# Patient Record
Sex: FEMALE | Race: WHITE | Employment: OTHER | URBAN - METROPOLITAN AREA
[De-identification: names, ages, dates, MRNs, and addresses within clinical notes are randomized per-mention and may not be internally consistent; named-entity substitution may affect disease eponyms.]

---

## 2017-03-01 ENCOUNTER — HOSPITAL ENCOUNTER (OUTPATIENT)
Age: 77
Setting detail: SPECIMEN
Discharge: HOME | End: 2017-03-01
Payer: MEDICARE

## 2017-03-01 LAB
POC INR: 2
PROTHROMBIN TIME, POC: 23.9 SEC (ref 9.6–14.4)

## 2017-03-29 ENCOUNTER — HOSPITAL ENCOUNTER (OUTPATIENT)
Age: 77
Setting detail: SPECIMEN
Discharge: HOME | End: 2017-03-29
Payer: MEDICARE

## 2017-03-29 LAB
POC INR: 1.8
PROTHROMBIN TIME, POC: 21.5 SEC (ref 9.6–14.4)

## 2017-04-12 ENCOUNTER — HOSPITAL ENCOUNTER (OUTPATIENT)
Age: 77
Setting detail: SPECIMEN
Discharge: HOME | End: 2017-04-12
Payer: MEDICARE

## 2017-04-12 LAB
POC INR: 1.8
PROTHROMBIN TIME, POC: 22.1 SEC (ref 9.6–14.4)

## 2017-05-03 ENCOUNTER — HOSPITAL ENCOUNTER (OUTPATIENT)
Age: 77
Setting detail: SPECIMEN
Discharge: HOME | End: 2017-05-03
Payer: MEDICARE

## 2017-05-03 LAB
POC INR: 2.4
PROTHROMBIN TIME, POC: 28.9 SEC (ref 9.6–14.4)

## 2017-06-06 ENCOUNTER — HOSPITAL ENCOUNTER (OUTPATIENT)
Age: 77
Setting detail: SPECIMEN
Discharge: HOME | End: 2017-06-06
Payer: MEDICARE

## 2017-06-06 LAB
POC INR: 2.5
PROTHROMBIN TIME, POC: 30.3 SEC (ref 9.6–14.4)

## 2017-07-05 ENCOUNTER — HOSPITAL ENCOUNTER (OUTPATIENT)
Age: 77
Setting detail: SPECIMEN
Discharge: HOME | End: 2017-07-05
Payer: MEDICARE

## 2017-07-05 LAB
POC INR: 2.5
PROTHROMBIN TIME, POC: 30.5 SEC (ref 9.6–14.4)

## 2017-08-02 ENCOUNTER — HOSPITAL ENCOUNTER (OUTPATIENT)
Age: 77
Setting detail: SPECIMEN
Discharge: HOME | End: 2017-08-02
Payer: MEDICARE

## 2017-08-02 LAB
POC INR: 2.2
PROTHROMBIN TIME, POC: 26.1 SEC (ref 9.6–14.4)

## 2017-09-11 ENCOUNTER — HOSPITAL ENCOUNTER (OUTPATIENT)
Age: 77
Setting detail: SPECIMEN
Discharge: HOME | End: 2017-09-11
Payer: MEDICARE

## 2017-09-11 LAB
POC INR: 2.6
PROTHROMBIN TIME, POC: 31.4 SEC (ref 9.6–14.4)

## 2017-10-13 ENCOUNTER — HOSPITAL ENCOUNTER (OUTPATIENT)
Age: 77
Setting detail: SPECIMEN
Discharge: HOME | End: 2017-10-13
Payer: MEDICARE

## 2017-10-13 LAB
POC INR: 2.1
PROTHROMBIN TIME, POC: 25.4 SEC (ref 9.6–14.4)

## 2017-11-17 ENCOUNTER — HOSPITAL ENCOUNTER (OUTPATIENT)
Age: 77
Setting detail: SPECIMEN
Discharge: HOME | End: 2017-11-17
Payer: MEDICARE

## 2017-11-17 LAB
POC INR: 3.1
PROTHROMBIN TIME, POC: 37.6 SEC (ref 9.6–14.4)

## 2017-11-20 ENCOUNTER — OFFICE VISIT (OUTPATIENT)
Dept: FAMILY MEDICINE CLINIC | Age: 77
End: 2017-11-20
Payer: MEDICARE

## 2017-11-20 VITALS
RESPIRATION RATE: 18 BRPM | HEART RATE: 61 BPM | HEIGHT: 65 IN | OXYGEN SATURATION: 98 % | DIASTOLIC BLOOD PRESSURE: 82 MMHG | SYSTOLIC BLOOD PRESSURE: 144 MMHG | TEMPERATURE: 97.3 F | BODY MASS INDEX: 30.16 KG/M2 | WEIGHT: 181 LBS

## 2017-11-20 DIAGNOSIS — M62.838 MUSCLE SPASM: Primary | ICD-10-CM

## 2017-11-20 DIAGNOSIS — M54.2 NECK PAIN: ICD-10-CM

## 2017-11-20 DIAGNOSIS — M54.50 ACUTE BILATERAL LOW BACK PAIN WITHOUT SCIATICA: ICD-10-CM

## 2017-11-20 PROCEDURE — G8484 FLU IMMUNIZE NO ADMIN: HCPCS | Performed by: INTERNAL MEDICINE

## 2017-11-20 PROCEDURE — G8399 PT W/DXA RESULTS DOCUMENT: HCPCS | Performed by: INTERNAL MEDICINE

## 2017-11-20 PROCEDURE — G8427 DOCREV CUR MEDS BY ELIG CLIN: HCPCS | Performed by: INTERNAL MEDICINE

## 2017-11-20 PROCEDURE — 4040F PNEUMOC VAC/ADMIN/RCVD: CPT | Performed by: INTERNAL MEDICINE

## 2017-11-20 PROCEDURE — 99213 OFFICE O/P EST LOW 20 MIN: CPT | Performed by: INTERNAL MEDICINE

## 2017-11-20 PROCEDURE — G8417 CALC BMI ABV UP PARAM F/U: HCPCS | Performed by: INTERNAL MEDICINE

## 2017-11-20 PROCEDURE — 1036F TOBACCO NON-USER: CPT | Performed by: INTERNAL MEDICINE

## 2017-11-20 PROCEDURE — 1123F ACP DISCUSS/DSCN MKR DOCD: CPT | Performed by: INTERNAL MEDICINE

## 2017-11-20 PROCEDURE — 1090F PRES/ABSN URINE INCON ASSESS: CPT | Performed by: INTERNAL MEDICINE

## 2017-11-20 RX ORDER — BACLOFEN 10 MG/1
10 TABLET ORAL 3 TIMES DAILY
Qty: 30 TABLET | Refills: 0 | Status: SHIPPED | OUTPATIENT
Start: 2017-11-20 | End: 2019-03-22

## 2017-11-20 ASSESSMENT — ENCOUNTER SYMPTOMS
VOMITING: 0
VISUAL CHANGE: 0
ABDOMINAL PAIN: 0
NAUSEA: 0
BACK PAIN: 1
DIARRHEA: 0
PHOTOPHOBIA: 0
ABDOMINAL DISTENTION: 0
BOWEL INCONTINENCE: 0
VOICE CHANGE: 0
TROUBLE SWALLOWING: 0

## 2017-11-20 ASSESSMENT — PATIENT HEALTH QUESTIONNAIRE - PHQ9
1. LITTLE INTEREST OR PLEASURE IN DOING THINGS: 0
SUM OF ALL RESPONSES TO PHQ9 QUESTIONS 1 & 2: 0
SUM OF ALL RESPONSES TO PHQ QUESTIONS 1-9: 0
2. FEELING DOWN, DEPRESSED OR HOPELESS: 0

## 2017-11-20 NOTE — PROGRESS NOTES
( goal A1C is < 7)   No results found for: LABMICR  LDL Cholesterol (mg/dL)   Date Value   01/11/2016 120   09/11/2014 117   07/08/2013 138 (H)       (goal LDL is <100)   AST (U/L)   Date Value   01/11/2016 18     ALT (U/L)   Date Value   01/11/2016 12     BUN (mg/dL)   Date Value   01/11/2016 17     BP Readings from Last 3 Encounters:   11/20/17 (!) 144/82   08/30/16 100/64   08/26/16 116/80          (goal 120/80)    Past Medical History:   Diagnosis Date    Allergic rhinitis     Arthritis     Atrial fibrillation (HCC)     Hypertension     [facility]     Myocardial infarction ([facility] [facility] 2008      Past Surgical History:   Procedure Laterality Date    APPENDECTOMY      CARPAL TUNNEL RELEASE Right     HERNIA REPAIR      HYSTERECTOMY      NECK SURGERY      C5-C6    NOSE SURGERY      cancer removed    OTHER SURGICAL HISTORY      heart surg/growth in heart sac       No family history on file. Social History   Substance Use Topics    Smoking status: Former Smoker     Quit date: 1/1/1981    Smokeless tobacco: Never Used    Alcohol use No      Current Outpatient Prescriptions   Medication Sig Dispense Refill    baclofen (LIORESAL) 10 MG tablet Take 1 tablet by mouth 3 times daily 30 tablet 0    polyvinyl alcohol-povidone (REFRESH) 1.4-0.6 % ophthalmic solution Place 1-2 drops into both eyes 4 times daily      triamterene-hydrochlorothiazide (MAXZIDE-25) 37.5-25 MG per tablet Take 1 tablet by mouth daily      Glucosamine-Chondroitin--200-150 MG TABS Take by mouth      PROAIR  (90 BASE) MCG/ACT AERS   Inhale 2 puffs into the lungs every 6 hours as needed       diltiazem (CARDIZEM CD) 240 MG ER capsule Take 240 mg by mouth daily.  aspirin 81 MG tablet Take 81 mg by mouth daily.  fluticasone-salmeterol (ADVAIR) 250-50 MCG/DOSE AEPB Inhale 1 puff into the lungs every 12 hours.  azelastine (ASTELIN) 137 MCG/SPRAY nasal spray 1 spray by Nasal route 2 times daily.  Use in

## 2017-12-11 ENCOUNTER — HOSPITAL ENCOUNTER (OUTPATIENT)
Age: 77
Setting detail: SPECIMEN
Discharge: HOME | End: 2017-12-11
Payer: MEDICARE

## 2017-12-11 LAB
POC INR: 2.7
PROTHROMBIN TIME, POC: 32 SEC (ref 9.6–14.4)

## 2018-01-09 ENCOUNTER — HOSPITAL ENCOUNTER (OUTPATIENT)
Age: 78
Setting detail: SPECIMEN
Discharge: HOME | End: 2018-01-09
Payer: MEDICARE

## 2018-01-09 LAB
POC INR: 3.4
PROTHROMBIN TIME, POC: 41.2 SEC (ref 9.6–14.4)

## 2018-01-26 ENCOUNTER — OFFICE VISIT (OUTPATIENT)
Dept: FAMILY MEDICINE CLINIC | Age: 78
End: 2018-01-26
Payer: MEDICARE

## 2018-01-26 VITALS
TEMPERATURE: 96.9 F | HEART RATE: 61 BPM | SYSTOLIC BLOOD PRESSURE: 138 MMHG | BODY MASS INDEX: 30.16 KG/M2 | WEIGHT: 181 LBS | OXYGEN SATURATION: 97 % | DIASTOLIC BLOOD PRESSURE: 74 MMHG | RESPIRATION RATE: 18 BRPM | HEIGHT: 65 IN

## 2018-01-26 DIAGNOSIS — M19.012 OSTEOARTHRITIS OF LEFT SHOULDER, UNSPECIFIED OSTEOARTHRITIS TYPE: ICD-10-CM

## 2018-01-26 DIAGNOSIS — M25.512 ACUTE PAIN OF LEFT SHOULDER: ICD-10-CM

## 2018-01-26 DIAGNOSIS — S49.90XA SHOULDER INJURY, INITIAL ENCOUNTER: ICD-10-CM

## 2018-01-26 DIAGNOSIS — M25.612 DECREASED RANGE OF MOTION OF LEFT SHOULDER: Primary | ICD-10-CM

## 2018-01-26 PROCEDURE — G8399 PT W/DXA RESULTS DOCUMENT: HCPCS | Performed by: INTERNAL MEDICINE

## 2018-01-26 PROCEDURE — G8484 FLU IMMUNIZE NO ADMIN: HCPCS | Performed by: INTERNAL MEDICINE

## 2018-01-26 PROCEDURE — 4040F PNEUMOC VAC/ADMIN/RCVD: CPT | Performed by: INTERNAL MEDICINE

## 2018-01-26 PROCEDURE — 99213 OFFICE O/P EST LOW 20 MIN: CPT | Performed by: INTERNAL MEDICINE

## 2018-01-26 PROCEDURE — G8427 DOCREV CUR MEDS BY ELIG CLIN: HCPCS | Performed by: INTERNAL MEDICINE

## 2018-01-26 PROCEDURE — G8417 CALC BMI ABV UP PARAM F/U: HCPCS | Performed by: INTERNAL MEDICINE

## 2018-01-26 PROCEDURE — 1036F TOBACCO NON-USER: CPT | Performed by: INTERNAL MEDICINE

## 2018-01-26 PROCEDURE — 96372 THER/PROPH/DIAG INJ SC/IM: CPT | Performed by: INTERNAL MEDICINE

## 2018-01-26 PROCEDURE — 1090F PRES/ABSN URINE INCON ASSESS: CPT | Performed by: INTERNAL MEDICINE

## 2018-01-26 PROCEDURE — 1123F ACP DISCUSS/DSCN MKR DOCD: CPT | Performed by: INTERNAL MEDICINE

## 2018-01-26 RX ORDER — MONTELUKAST SODIUM 10 MG/1
10 TABLET ORAL NIGHTLY
COMMUNITY
End: 2019-03-22

## 2018-01-26 RX ORDER — TRIAMCINOLONE ACETONIDE 40 MG/ML
40 INJECTION, SUSPENSION INTRA-ARTICULAR; INTRAMUSCULAR ONCE
Status: COMPLETED | OUTPATIENT
Start: 2018-01-26 | End: 2018-01-26

## 2018-01-26 RX ADMIN — TRIAMCINOLONE ACETONIDE 40 MG: 40 INJECTION, SUSPENSION INTRA-ARTICULAR; INTRAMUSCULAR at 10:33

## 2018-01-26 ASSESSMENT — ENCOUNTER SYMPTOMS
CONSTIPATION: 0
BACK PAIN: 0
SHORTNESS OF BREATH: 0
WHEEZING: 0
DIARRHEA: 0
COLOR CHANGE: 0
STRIDOR: 0
ABDOMINAL PAIN: 0

## 2018-01-26 ASSESSMENT — PATIENT HEALTH QUESTIONNAIRE - PHQ9
1. LITTLE INTEREST OR PLEASURE IN DOING THINGS: 0
2. FEELING DOWN, DEPRESSED OR HOPELESS: 0
SUM OF ALL RESPONSES TO PHQ9 QUESTIONS 1 & 2: 0
SUM OF ALL RESPONSES TO PHQ QUESTIONS 1-9: 0

## 2018-01-27 NOTE — PROGRESS NOTES
rheumatoid arthritis. Her past medical history is significant for osteoarthritis. There is no history of diabetes, gout or rheumatoid arthritis. No results found for: LABA1C          ( goal A1C is < 7)   No results found for: LABMICR  LDL Cholesterol (mg/dL)   Date Value   01/11/2016 120   09/11/2014 117   07/08/2013 138 (H)       (goal LDL is <100)   AST (U/L)   Date Value   01/11/2016 18     ALT (U/L)   Date Value   01/11/2016 12     BUN (mg/dL)   Date Value   01/11/2016 17     BP Readings from Last 3 Encounters:   01/26/18 138/74   11/20/17 (!) 144/82   08/30/16 100/64          (goal 120/80)    Past Medical History:   Diagnosis Date    Allergic rhinitis     Arthritis     At[facility] fibrillation ([facility] [facility]     Hypertension     Melanoma ([facility] [address]     Myocardial infarction 2008      Past Surgical History:   Procedure Laterality Date    APPENDECTOMY      CARPAL TUNNEL RELEASE Right     HERNIA REPAIR      HYSTERECTOMY      NECK SURGERY      C5-C6    NOSE SURGERY      cancer removed    OTHER SURGICAL HISTORY      heart surg/growth in heart sac       No family history on file. Social History   Substance Use Topics    Smoking status: Former Smoker     Quit date: 1/1/1981    Smokeless tobacco: Never Used    Alcohol use No      Current Outpatient Prescriptions   Medication Sig Dispense Refill    montelukast (SINGULAIR) 10 MG tablet Take 10 mg by mouth nightly      baclofen (LIORESAL) 10 MG tablet Take 1 tablet by mouth 3 times daily 30 tablet 0    polyvinyl alcohol-povidone (REFRESH) 1.4-0.6 % ophthalmic solution Place 1-2 drops into both eyes 4 times daily      triamterene-hydrochlorothiazide (MAXZIDE-25) 37.5-25 MG per tablet Take 1 tablet by mouth daily      Glucosamine-Chondroitin--200-150 MG TABS Take by mouth      PROAIR  (90 BASE) MCG/ACT AERS   Inhale 2 puffs into the lungs every 6 hours as needed       diltiazem (CARDIZEM CD) 240 MG ER capsule Take 240 mg by mouth daily.       aspirin 81 MG tablet Take 81 mg by mouth daily.  fluticasone-salmeterol (ADVAIR) 250-50 MCG/DOSE AEPB Inhale 1 puff into the lungs every 12 hours.  azelastine (ASTELIN) 137 MCG/SPRAY nasal spray 1 spray by Nasal route 2 times daily. Use in each nostril as directed      Calcium-Vitamin D (CALTRATE 600 PLUS-VIT D PO) Take  by mouth.  nitroGLYCERIN (NITRODUR) 0.1 MG/HR Place 1 patch onto the skin daily.  Multiple Vitamins-Minerals (MULTIVITAL PO) Take  by mouth.  warfarin (COUMADIN) 5 MG tablet Take 7.5 mg by mouth        No current facility-administered medications for this visit. No Known Allergies    Health Maintenance   Topic Date Due    DTaP/Tdap/Td vaccine (1 - Tdap) 01/14/1958    Pneumococcal low/med risk (1 of 2 - PCV13) 01/14/2004    Potassium monitoring  01/11/2017    Creatinine monitoring  01/11/2017    Lipid screen  01/11/2021    Zostavax vaccine  Addressed    DEXA (modify frequency per FRAX score)  Addressed    Flu vaccine  Completed       Subjective:      Review of Systems   Constitutional: Positive for activity change. Negative for appetite change, chills, diaphoresis, fatigue, fever and unexpected weight change. Eyes: Negative for visual disturbance. Respiratory: Negative for shortness of breath, wheezing and stridor. Cardiovascular: Negative for chest pain, palpitations and leg swelling. Gastrointestinal: Negative for abdominal pain, constipation and diarrhea. Musculoskeletal: Positive for arthralgias and stiffness. Negative for back pain, gait problem, gout, joint swelling, myalgias, neck pain and neck stiffness. Skin: Negative for color change, itching, rash and wound. Neurological: Positive for weakness. Negative for dizziness, tremors, seizures, syncope, speech difficulty, light-headedness, numbness and headaches. Hematological: Negative for adenopathy. Does not bruise/bleed easily. Psychiatric/Behavioral: Negative for sleep disturbance.

## 2018-02-06 ENCOUNTER — HOSPITAL ENCOUNTER (OUTPATIENT)
Age: 78
Setting detail: SPECIMEN
Discharge: HOME | End: 2018-02-06
Payer: MEDICARE

## 2018-02-06 LAB
POC INR: 2.5
PROTHROMBIN TIME, POC: 30.3 SEC (ref 9.6–14.4)

## 2018-03-07 ENCOUNTER — HOSPITAL ENCOUNTER (OUTPATIENT)
Age: 78
Setting detail: SPECIMEN
Discharge: HOME | End: 2018-03-07
Payer: MEDICARE

## 2018-03-07 LAB
POC INR: 1.7
PROTHROMBIN TIME, POC: 19.8 SEC (ref 9.6–14.4)

## 2018-03-27 ENCOUNTER — HOSPITAL ENCOUNTER (OUTPATIENT)
Age: 78
Setting detail: SPECIMEN
Discharge: HOME | End: 2018-03-27
Payer: MEDICARE

## 2018-03-27 LAB
POC INR: 1.7
PROTHROMBIN TIME, POC: 20 SEC (ref 9.6–14.4)

## 2018-04-17 ENCOUNTER — HOSPITAL ENCOUNTER (OUTPATIENT)
Age: 78
Setting detail: SPECIMEN
Discharge: HOME | End: 2018-04-17
Payer: MEDICARE

## 2018-04-17 LAB
POC INR: 2.8
PROTHROMBIN TIME, POC: 33 SEC (ref 9.6–14.4)

## 2018-05-15 ENCOUNTER — HOSPITAL ENCOUNTER (OUTPATIENT)
Age: 78
Setting detail: SPECIMEN
Discharge: HOME | End: 2018-05-15
Payer: MEDICARE

## 2018-05-15 LAB
POC INR: 1.9
PROTHROMBIN TIME, POC: 22.4 SEC (ref 9.6–14.4)

## 2018-06-05 ENCOUNTER — HOSPITAL ENCOUNTER (OUTPATIENT)
Age: 78
Setting detail: SPECIMEN
Discharge: HOME | End: 2018-06-05
Payer: MEDICARE

## 2018-06-05 LAB
POC INR: 2.6
PROTHROMBIN TIME, POC: 31 SEC (ref 9.6–14.4)

## 2018-06-11 ENCOUNTER — NURSE ONLY (OUTPATIENT)
Dept: FAMILY MEDICINE CLINIC | Age: 78
End: 2018-06-11
Payer: MEDICARE

## 2018-06-11 DIAGNOSIS — Z11.1 PPD SCREENING TEST: Primary | ICD-10-CM

## 2018-06-11 PROCEDURE — 86580 TB INTRADERMAL TEST: CPT | Performed by: NURSE PRACTITIONER

## 2018-06-14 ENCOUNTER — OFFICE VISIT (OUTPATIENT)
Dept: FAMILY MEDICINE CLINIC | Age: 78
End: 2018-06-14
Payer: MEDICARE

## 2018-06-14 DIAGNOSIS — Z11.1 PPD SCREENING TEST: Primary | ICD-10-CM

## 2018-06-14 PROCEDURE — G8417 CALC BMI ABV UP PARAM F/U: HCPCS | Performed by: PHYSICIAN ASSISTANT

## 2018-06-14 PROCEDURE — G8428 CUR MEDS NOT DOCUMENT: HCPCS | Performed by: PHYSICIAN ASSISTANT

## 2018-06-14 PROCEDURE — 99211 OFF/OP EST MAY X REQ PHY/QHP: CPT | Performed by: PHYSICIAN ASSISTANT

## 2018-06-26 ENCOUNTER — TELEPHONE (OUTPATIENT)
Dept: FAMILY MEDICINE CLINIC | Age: 78
End: 2018-06-26

## 2018-06-26 NOTE — TELEPHONE ENCOUNTER
The patient had a chest X-Ray due to left breast pain at the the [facility] the end of May or beginning of June. The patient also states she had a stress test (which was negative per patient) at the [facility].   She has an appointment on Monday 7/2/18 and she is requesting results be requested so it can be discussed with Collins Burnett

## 2018-06-28 NOTE — TELEPHONE ENCOUNTER
The patient had a chest X-Ray due to left breast pain at the the [facility] the end of May or beginning of June. The patient also states she had a stress test (which was negative per patient) at the [facility].   She has an appointment on Monday 7/2/18 and she is requesting results be requested so it can be discussed with Christiano Khan

## 2018-07-02 ENCOUNTER — OFFICE VISIT (OUTPATIENT)
Dept: FAMILY MEDICINE CLINIC | Age: 78
End: 2018-07-02
Payer: MEDICARE

## 2018-07-02 VITALS
HEART RATE: 64 BPM | RESPIRATION RATE: 18 BRPM | OXYGEN SATURATION: 94 % | WEIGHT: 188 LBS | DIASTOLIC BLOOD PRESSURE: 82 MMHG | HEIGHT: 65 IN | TEMPERATURE: 99.3 F | SYSTOLIC BLOOD PRESSURE: 138 MMHG | BODY MASS INDEX: 31.32 KG/M2

## 2018-07-02 DIAGNOSIS — R53.83 FATIGUE, UNSPECIFIED TYPE: ICD-10-CM

## 2018-07-02 DIAGNOSIS — N64.4 BREAST PAIN, LEFT: Primary | ICD-10-CM

## 2018-07-02 DIAGNOSIS — Z78.0 POSTMENOPAUSAL: ICD-10-CM

## 2018-07-02 DIAGNOSIS — Z23 NEED FOR PNEUMOCOCCAL VACCINATION: ICD-10-CM

## 2018-07-02 DIAGNOSIS — R07.81 RIB PAIN ON LEFT SIDE: ICD-10-CM

## 2018-07-02 DIAGNOSIS — I10 ESSENTIAL HYPERTENSION: ICD-10-CM

## 2018-07-02 PROCEDURE — 1036F TOBACCO NON-USER: CPT | Performed by: PHYSICIAN ASSISTANT

## 2018-07-02 PROCEDURE — G8427 DOCREV CUR MEDS BY ELIG CLIN: HCPCS | Performed by: PHYSICIAN ASSISTANT

## 2018-07-02 PROCEDURE — 99214 OFFICE O/P EST MOD 30 MIN: CPT | Performed by: PHYSICIAN ASSISTANT

## 2018-07-02 PROCEDURE — 90670 PCV13 VACCINE IM: CPT | Performed by: PHYSICIAN ASSISTANT

## 2018-07-02 PROCEDURE — G8417 CALC BMI ABV UP PARAM F/U: HCPCS | Performed by: PHYSICIAN ASSISTANT

## 2018-07-02 PROCEDURE — G8399 PT W/DXA RESULTS DOCUMENT: HCPCS | Performed by: PHYSICIAN ASSISTANT

## 2018-07-02 PROCEDURE — 1090F PRES/ABSN URINE INCON ASSESS: CPT | Performed by: PHYSICIAN ASSISTANT

## 2018-07-02 PROCEDURE — 4040F PNEUMOC VAC/ADMIN/RCVD: CPT | Performed by: PHYSICIAN ASSISTANT

## 2018-07-02 PROCEDURE — G0009 ADMIN PNEUMOCOCCAL VACCINE: HCPCS | Performed by: PHYSICIAN ASSISTANT

## 2018-07-02 PROCEDURE — 1123F ACP DISCUSS/DSCN MKR DOCD: CPT | Performed by: PHYSICIAN ASSISTANT

## 2018-07-02 RX ORDER — RANITIDINE 300 MG/1
TABLET ORAL NIGHTLY
COMMUNITY
Start: 2018-06-18 | End: 2021-03-02

## 2018-07-02 ASSESSMENT — ENCOUNTER SYMPTOMS
VOMITING: 0
NAUSEA: 0
SHORTNESS OF BREATH: 0
DIARRHEA: 0
BACK PAIN: 0
CONSTIPATION: 0
COUGH: 0
COLOR CHANGE: 0
ABDOMINAL PAIN: 0
WHEEZING: 0

## 2018-07-02 NOTE — PROGRESS NOTES
Visit Information    Have you changed or started any medications since your last visit including any over-the-counter medicines, vitamins, or herbal medicines? yes -    Are you having any side effects from any of your medications? -  no  Have you stopped taking any of your medications? Is so, why? -  no    Have you seen any other physician or provider since your last visit? Yes - Records Obtained  Have you had any other diagnostic tests since your last visit? Yes - Records Obtained  Have you been seen in the emergency room and/or had an admission to a hospital since we last saw you? No  Have you had your routine dental cleaning in the past 6 months? yes -     Have you activated your [employer] account? If not, what are your barriers?  No: pending     Patient Care Team:  Cassy Hatfield PA-C as PCP - Cleone Osgood, MD as Consulting Physician (Cardiology)  Cari Dykes MD as Consulting Physician (Allergy & Immunology)    Medical History Review  Past Medical, Family, and Social History reviewed and does contribute to the patient presenting condition    Health Maintenance   Topic Date Due    DTaP/Tdap/Td vaccine (1 - Tdap) 01/14/1958    Shingles Vaccine (1 of 2 - 2 Dose Series) 01/14/1989    Pneumococcal low/med risk (1 of 2 - PCV13) 01/14/2004    Potassium monitoring  01/11/2017    Creatinine monitoring  01/11/2017    Flu vaccine (1) 09/01/2018    DEXA (modify frequency per FRAX score)  Addressed
tablet Take 7.5 mg by mouth       PROAIR  (90 BASE) MCG/ACT AERS   Inhale 2 puffs into the lungs every 6 hours as needed        No current facility-administered medications for this visit. No Known Allergies    Health Maintenance   Topic Date Due    DTaP/Tdap/Td vaccine (1 - Tdap) 01/14/1958    Shingles Vaccine (1 of 2 - 2 Dose Series) 01/14/1989    Pneumococcal low/med risk (1 of 2 - PCV13) 01/14/2004    Potassium monitoring  01/11/2017    Creatinine monitoring  01/11/2017    Flu vaccine (1) 09/01/2018    DEXA (modify frequency per FRAX score)  Addressed       Subjective:      Review of Systems   Constitutional: Positive for fatigue. Negative for activity change, appetite change, chills and unexpected weight change. /82 (Site: Right Arm, Position: Sitting, Cuff Size: Medium Adult)   Pulse 64   Temp 99.3 °F (37.4 °C) (Oral)   Resp 18   Ht 5' 5\" (1.651 m)   Wt 188 lb (85.3 kg)   SpO2 94%   BMI 31.28 kg/m²    Respiratory: Negative for cough, shortness of breath and wheezing. Cardiovascular: Positive for chest pain. Gastrointestinal: Negative for abdominal pain, constipation, diarrhea, nausea and vomiting. Endocrine: Negative for polyuria. Genitourinary: Negative for dysuria. Musculoskeletal: Positive for arthralgias and myalgias. Negative for back pain, gait problem and joint swelling. Skin: Negative for color change, pallor, rash and wound. Neurological: Negative for dizziness. Hematological: Negative for adenopathy. Psychiatric/Behavioral: Positive for sleep disturbance. Negative for agitation and confusion. The patient is not nervous/anxious. Objective:     Physical Exam   Constitutional: She is oriented to person, place, and time. She appears well-developed and well-nourished. HENT:   Head: Normocephalic and atraumatic. Neck: Neck supple. Cardiovascular: Normal rate, regular rhythm and normal heart sounds. No murmur heard.   Pulmonary/Chest:

## 2018-07-03 ENCOUNTER — HOSPITAL ENCOUNTER (OUTPATIENT)
Age: 78
Setting detail: SPECIMEN
Discharge: HOME | End: 2018-07-03
Payer: MEDICARE

## 2018-07-03 ENCOUNTER — TELEPHONE (OUTPATIENT)
Dept: FAMILY MEDICINE CLINIC | Age: 78
End: 2018-07-03

## 2018-07-03 DIAGNOSIS — I10 ESSENTIAL HYPERTENSION: ICD-10-CM

## 2018-07-03 DIAGNOSIS — R53.83 FATIGUE, UNSPECIFIED TYPE: ICD-10-CM

## 2018-07-03 DIAGNOSIS — N64.4 BREAST PAIN: Primary | ICD-10-CM

## 2018-07-03 LAB
ABSOLUTE EOS #: 0.13 K/UL (ref 0–0.44)
ABSOLUTE IMMATURE GRANULOCYTE: 0.03 K/UL (ref 0–0.3)
ABSOLUTE LYMPH #: 1.98 K/UL (ref 1.1–3.7)
ABSOLUTE MONO #: 0.85 K/UL (ref 0.1–1.2)
ALBUMIN SERPL-MCNC: 3.9 G/DL (ref 3.5–5.2)
ALBUMIN/GLOBULIN RATIO: 1.3 (ref 1–2.5)
ALP BLD-CCNC: 76 U/L (ref 35–104)
ALT SERPL-CCNC: 12 U/L (ref 5–33)
ANION GAP SERPL CALCULATED.3IONS-SCNC: 16 MMOL/L (ref 9–17)
AST SERPL-CCNC: 16 U/L
BASOPHILS # BLD: 1 % (ref 0–2)
BASOPHILS ABSOLUTE: 0.07 K/UL (ref 0–0.2)
BILIRUB SERPL-MCNC: 0.47 MG/DL (ref 0.3–1.2)
BUN BLDV-MCNC: 24 MG/DL (ref 8–23)
BUN/CREAT BLD: ABNORMAL (ref 9–20)
CALCIUM SERPL-MCNC: 9.2 MG/DL (ref 8.6–10.4)
CHLORIDE BLD-SCNC: 105 MMOL/L (ref 98–107)
CHOLESTEROL/HDL RATIO: 3.5
CHOLESTEROL: 185 MG/DL
CO2: 24 MMOL/L (ref 20–31)
CREAT SERPL-MCNC: 1.05 MG/DL (ref 0.5–0.9)
DIFFERENTIAL TYPE: NORMAL
EOSINOPHILS RELATIVE PERCENT: 2 % (ref 1–4)
GFR AFRICAN AMERICAN: >60 ML/MIN
GFR NON-AFRICAN AMERICAN: 51 ML/MIN
GFR SERPL CREATININE-BSD FRML MDRD: ABNORMAL ML/MIN/{1.73_M2}
GFR SERPL CREATININE-BSD FRML MDRD: ABNORMAL ML/MIN/{1.73_M2}
GLUCOSE BLD-MCNC: 88 MG/DL (ref 70–99)
HCT VFR BLD CALC: 41.9 % (ref 36.3–47.1)
HDLC SERPL-MCNC: 53 MG/DL
HEMOGLOBIN: 13.4 G/DL (ref 11.9–15.1)
IMMATURE GRANULOCYTES: 0 %
LDL CHOLESTEROL: 107 MG/DL (ref 0–130)
LYMPHOCYTES # BLD: 28 % (ref 24–43)
MCH RBC QN AUTO: 29.4 PG (ref 25.2–33.5)
MCHC RBC AUTO-ENTMCNC: 32 G/DL (ref 28.4–34.8)
MCV RBC AUTO: 91.9 FL (ref 82.6–102.9)
MONOCYTES # BLD: 12 % (ref 3–12)
NRBC AUTOMATED: 0 PER 100 WBC
PDW BLD-RTO: 14 % (ref 11.8–14.4)
PLATELET # BLD: 251 K/UL (ref 138–453)
PLATELET ESTIMATE: NORMAL
PMV BLD AUTO: 11.1 FL (ref 8.1–13.5)
POC INR: 2.8
POTASSIUM SERPL-SCNC: 3.6 MMOL/L (ref 3.7–5.3)
PROTHROMBIN TIME, POC: 33.9 SEC (ref 9.6–14.4)
RBC # BLD: 4.56 M/UL (ref 3.95–5.11)
RBC # BLD: NORMAL 10*6/UL
SEG NEUTROPHILS: 57 % (ref 36–65)
SEGMENTED NEUTROPHILS ABSOLUTE COUNT: 4.01 K/UL (ref 1.5–8.1)
SODIUM BLD-SCNC: 145 MMOL/L (ref 135–144)
TOTAL PROTEIN: 6.9 G/DL (ref 6.4–8.3)
TRIGL SERPL-MCNC: 124 MG/DL
TSH SERPL DL<=0.05 MIU/L-ACNC: 1.47 MIU/L (ref 0.3–5)
VITAMIN B-12: 1184 PG/ML (ref 232–1245)
VLDLC SERPL CALC-MCNC: NORMAL MG/DL (ref 1–30)
WBC # BLD: 7.1 K/UL (ref 3.5–11.3)
WBC # BLD: NORMAL 10*3/UL

## 2018-07-09 ENCOUNTER — HOSPITAL ENCOUNTER (OUTPATIENT)
Dept: MAMMOGRAPHY | Age: 78
Discharge: HOME | End: 2018-07-11
Payer: MEDICARE

## 2018-07-09 ENCOUNTER — HOSPITAL ENCOUNTER (OUTPATIENT)
Dept: ULTRASOUND IMAGING | Age: 78
Discharge: HOME | End: 2018-07-11
Payer: MEDICARE

## 2018-07-09 DIAGNOSIS — R52 PAIN: ICD-10-CM

## 2018-07-09 DIAGNOSIS — N64.4 BREAST PAIN: ICD-10-CM

## 2018-07-09 PROCEDURE — 76642 ULTRASOUND BREAST LIMITED: CPT

## 2018-07-09 PROCEDURE — G0279 TOMOSYNTHESIS, MAMMO: HCPCS

## 2018-07-16 ENCOUNTER — OFFICE VISIT (OUTPATIENT)
Dept: FAMILY MEDICINE CLINIC | Age: 78
End: 2018-07-16
Payer: MEDICARE

## 2018-07-16 VITALS
SYSTOLIC BLOOD PRESSURE: 120 MMHG | OXYGEN SATURATION: 98 % | DIASTOLIC BLOOD PRESSURE: 74 MMHG | WEIGHT: 195 LBS | BODY MASS INDEX: 32.49 KG/M2 | HEIGHT: 65 IN | HEART RATE: 55 BPM

## 2018-07-16 DIAGNOSIS — M19.012 PRIMARY OSTEOARTHRITIS OF LEFT SHOULDER: Primary | ICD-10-CM

## 2018-07-16 PROCEDURE — 1090F PRES/ABSN URINE INCON ASSESS: CPT | Performed by: PHYSICIAN ASSISTANT

## 2018-07-16 PROCEDURE — 4040F PNEUMOC VAC/ADMIN/RCVD: CPT | Performed by: PHYSICIAN ASSISTANT

## 2018-07-16 PROCEDURE — G8417 CALC BMI ABV UP PARAM F/U: HCPCS | Performed by: PHYSICIAN ASSISTANT

## 2018-07-16 PROCEDURE — G8427 DOCREV CUR MEDS BY ELIG CLIN: HCPCS | Performed by: PHYSICIAN ASSISTANT

## 2018-07-16 PROCEDURE — 1123F ACP DISCUSS/DSCN MKR DOCD: CPT | Performed by: PHYSICIAN ASSISTANT

## 2018-07-16 PROCEDURE — 99213 OFFICE O/P EST LOW 20 MIN: CPT | Performed by: PHYSICIAN ASSISTANT

## 2018-07-16 PROCEDURE — G8399 PT W/DXA RESULTS DOCUMENT: HCPCS | Performed by: PHYSICIAN ASSISTANT

## 2018-07-16 PROCEDURE — 1101F PT FALLS ASSESS-DOCD LE1/YR: CPT | Performed by: PHYSICIAN ASSISTANT

## 2018-07-16 PROCEDURE — 1036F TOBACCO NON-USER: CPT | Performed by: PHYSICIAN ASSISTANT

## 2018-07-16 ASSESSMENT — ENCOUNTER SYMPTOMS
COLOR CHANGE: 0
SHORTNESS OF BREATH: 0
COUGH: 0
WHEEZING: 0
ABDOMINAL PAIN: 0
VOMITING: 0
CONSTIPATION: 0
NAUSEA: 0
DIARRHEA: 0

## 2018-07-16 NOTE — PROGRESS NOTES
Visit Information    Have you changed or started any medications since your last visit including any over-the-counter medicines, vitamins, or herbal medicines? no   Have you stopped taking any of your medications? Is so, why? -  no  Are you having any side effects from any of your medications? - no    Have you seen any other physician or provider since your last visit?  no   Have you had any other diagnostic tests since your last visit? yes - mammogram   Have you been seen in the emergency room and/or had an admission in a hospital since we last saw you?  no   Have you had your routine dental cleaning in the past 6 months?  no     Do you have an active MyChart account? If no, what is the barrier?   No: code    Patient Care Team:  Jasmin Morse PA-C as PCP - Gregoria Georges MD as Consulting Physician (Cardiology)  Diana Mendes MD as Consulting Physician (Allergy & Immunology)    Medical History Review  Past Medical, Family, and Social History reviewed and does contribute to the patient presenting condition    Health Maintenance   Topic Date Due    DTaP/Tdap/Td vaccine (1 - Tdap) 01/14/1958    Shingles Vaccine (1 of 2 - 2 Dose Series) 01/14/1989    Flu vaccine (1) 09/01/2018    Pneumococcal low/med risk (2 of 2 - PPSV23) 07/02/2019    Potassium monitoring  07/03/2019    Creatinine monitoring  07/03/2019    DEXA (modify frequency per FRAX score)  Addressed
Quit date: 1/1/1981    Smokeless tobacco: Never Used    Alcohol use No      Current Outpatient Prescriptions   Medication Sig Dispense Refill    ranitidine (ZANTAC) 300 MG tablet       montelukast (SINGULAIR) 10 MG tablet Take 10 mg by mouth nightly      baclofen (LIORESAL) 10 MG tablet Take 1 tablet by mouth 3 times daily 30 tablet 0    polyvinyl alcohol-povidone (REFRESH) 1.4-0.6 % ophthalmic solution Place 1-2 drops into both eyes 4 times daily      triamterene-hydrochlorothiazide (MAXZIDE-25) 37.5-25 MG per tablet Take 1 tablet by mouth daily      Glucosamine-Chondroitin--200-150 MG TABS Take by mouth      PROAIR  (90 BASE) MCG/ACT AERS   Inhale 2 puffs into the lungs every 6 hours as needed       diltiazem (CARDIZEM CD) 240 MG ER capsule Take 240 mg by mouth daily.  aspirin 81 MG tablet Take 81 mg by mouth daily.  fluticasone-salmeterol (ADVAIR) 250-50 MCG/DOSE AEPB Inhale 1 puff into the lungs every 12 hours.  azelastine (ASTELIN) 137 MCG/SPRAY nasal spray 1 spray by Nasal route 2 times daily. Use in each nostril as directed      Calcium-Vitamin D (CALTRATE 600 PLUS-VIT D PO) Take  by mouth.  nitroGLYCERIN (NITRODUR) 0.1 MG/HR Place 1 patch onto the skin daily.  Multiple Vitamins-Minerals (MULTIVITAL PO) Take  by mouth.  warfarin (COUMADIN) 5 MG tablet Take 7.5 mg by mouth        No current facility-administered medications for this visit.       No Known Allergies    Health Maintenance   Topic Date Due    DTaP/Tdap/Td vaccine (1 - Tdap) 01/14/1958    Shingles Vaccine (1 of 2 - 2 Dose Series) 01/14/1989    Flu vaccine (1) 09/01/2018    Pneumococcal low/med risk (2 of 2 - PPSV23) 07/02/2019    Potassium monitoring  07/03/2019    Creatinine monitoring  07/03/2019    DEXA (modify frequency per FRAX score)  Addressed       Subjective:      Review of Systems   Constitutional: Negative for activity change, appetite change, fatigue, fever and unexpected

## 2018-08-01 ENCOUNTER — HOSPITAL ENCOUNTER (OUTPATIENT)
Age: 78
Setting detail: SPECIMEN
Discharge: HOME | End: 2018-08-01
Payer: MEDICARE

## 2018-08-01 LAB
POC INR: 3.2
PROTHROMBIN TIME, POC: 38.4 SEC (ref 9.6–14.4)

## 2018-08-28 ENCOUNTER — HOSPITAL ENCOUNTER (OUTPATIENT)
Age: 78
Setting detail: SPECIMEN
Discharge: HOME | End: 2018-08-28
Payer: MEDICARE

## 2018-08-28 LAB
INR BLD: 2.2
PROTHROMBIN TIME: 22.8 SEC (ref 9–12)

## 2018-09-26 ENCOUNTER — HOSPITAL ENCOUNTER (OUTPATIENT)
Age: 78
Setting detail: SPECIMEN
Discharge: HOME | End: 2018-09-26
Payer: MEDICARE

## 2018-09-26 LAB
POC INR: 2.8
PROTHROMBIN TIME, POC: 34 SEC (ref 9.6–14.4)

## 2018-10-25 ENCOUNTER — HOSPITAL ENCOUNTER (OUTPATIENT)
Age: 78
Setting detail: SPECIMEN
Discharge: HOME | End: 2018-10-25
Payer: MEDICARE

## 2018-10-25 LAB
POC INR: 3.4
PROTHROMBIN TIME, POC: 40.5 SEC (ref 9.6–14.4)

## 2018-11-26 ENCOUNTER — HOSPITAL ENCOUNTER (OUTPATIENT)
Age: 78
Setting detail: SPECIMEN
Discharge: HOME | End: 2018-11-26
Payer: MEDICARE

## 2018-11-26 LAB
ANION GAP SERPL CALCULATED.3IONS-SCNC: 13 MMOL/L (ref 9–17)
BUN BLDV-MCNC: 17 MG/DL (ref 8–23)
BUN/CREAT BLD: ABNORMAL (ref 9–20)
CALCIUM SERPL-MCNC: 9.9 MG/DL (ref 8.6–10.4)
CHLORIDE BLD-SCNC: 100 MMOL/L (ref 98–107)
CO2: 27 MMOL/L (ref 20–31)
CREAT SERPL-MCNC: 0.98 MG/DL (ref 0.5–0.9)
GFR AFRICAN AMERICAN: >60 ML/MIN
GFR NON-AFRICAN AMERICAN: 55 ML/MIN
GFR SERPL CREATININE-BSD FRML MDRD: ABNORMAL ML/MIN/{1.73_M2}
GFR SERPL CREATININE-BSD FRML MDRD: ABNORMAL ML/MIN/{1.73_M2}
GLUCOSE BLD-MCNC: 88 MG/DL (ref 70–99)
INR BLD: 2.5
POTASSIUM SERPL-SCNC: 3.8 MMOL/L (ref 3.7–5.3)
PROTHROMBIN TIME: 25.3 SEC (ref 9–12)
SODIUM BLD-SCNC: 140 MMOL/L (ref 135–144)

## 2018-12-11 ENCOUNTER — OFFICE VISIT (OUTPATIENT)
Dept: FAMILY MEDICINE CLINIC | Age: 78
End: 2018-12-11
Payer: MEDICARE

## 2018-12-11 VITALS
HEIGHT: 66 IN | DIASTOLIC BLOOD PRESSURE: 72 MMHG | SYSTOLIC BLOOD PRESSURE: 120 MMHG | OXYGEN SATURATION: 98 % | HEART RATE: 58 BPM | WEIGHT: 193 LBS | BODY MASS INDEX: 31.02 KG/M2

## 2018-12-11 DIAGNOSIS — M25.551 PAIN OF RIGHT HIP JOINT: Primary | ICD-10-CM

## 2018-12-11 PROCEDURE — G8427 DOCREV CUR MEDS BY ELIG CLIN: HCPCS | Performed by: PHYSICIAN ASSISTANT

## 2018-12-11 PROCEDURE — 1101F PT FALLS ASSESS-DOCD LE1/YR: CPT | Performed by: PHYSICIAN ASSISTANT

## 2018-12-11 PROCEDURE — 1090F PRES/ABSN URINE INCON ASSESS: CPT | Performed by: PHYSICIAN ASSISTANT

## 2018-12-11 PROCEDURE — G8482 FLU IMMUNIZE ORDER/ADMIN: HCPCS | Performed by: PHYSICIAN ASSISTANT

## 2018-12-11 PROCEDURE — 4040F PNEUMOC VAC/ADMIN/RCVD: CPT | Performed by: PHYSICIAN ASSISTANT

## 2018-12-11 PROCEDURE — 99213 OFFICE O/P EST LOW 20 MIN: CPT | Performed by: PHYSICIAN ASSISTANT

## 2018-12-11 PROCEDURE — G8399 PT W/DXA RESULTS DOCUMENT: HCPCS | Performed by: PHYSICIAN ASSISTANT

## 2018-12-11 PROCEDURE — 1036F TOBACCO NON-USER: CPT | Performed by: PHYSICIAN ASSISTANT

## 2018-12-11 PROCEDURE — 1123F ACP DISCUSS/DSCN MKR DOCD: CPT | Performed by: PHYSICIAN ASSISTANT

## 2018-12-11 PROCEDURE — G8417 CALC BMI ABV UP PARAM F/U: HCPCS | Performed by: PHYSICIAN ASSISTANT

## 2018-12-11 ASSESSMENT — ENCOUNTER SYMPTOMS
COLOR CHANGE: 0
BACK PAIN: 0

## 2018-12-11 NOTE — PROGRESS NOTES
[address] [facility] [address]  Dept: [phone]  Dept Fax: [phone]    Fernando Arreaga is a 77 y.o. female who presents today for her medical conditions/complaintsas noted below. Fernando Arreaga is c/o of   Chief Complaint   Patient presents with    Groin Pain     Right side groin pain         HPI:     Hip Pain    The incident occurred more than 1 week ago. There was no injury mechanism. The pain is present in the right hip. The quality of the pain is described as aching. The pain is moderate. The pain has been fluctuating since onset. Pertinent negatives include no inability to bear weight, loss of motion, loss of sensation, muscle weakness, numbness or tingling. The symptoms are aggravated by movement. She has tried NSAIDs for the symptoms. The treatment provided mild relief. Patient here reporting hx of OA in the hip but now has been worsening and feeling it more in the right groin. She has been seeing DR. Tamika Ag but used celebrex and pt does not do well on this. She is interested in another opinion on this.      No results found for: LABA1C          ( goal A1Cis < 7)   No results found for: LABMICR  LDL Cholesterol (mg/dL)   Date Value   07/03/2018 107   01/11/2016 120   09/11/2014 117       (goal LDL is <100)   AST (U/L)   Date Value   07/03/2018 16     ALT (U/L)   Date Value   07/03/2018 12     BUN (mg/dL)   Date Value   11/26/2018 17     BP Readings from Last 3 Encounters:   12/11/18 120/72   07/16/18 120/74   07/02/18 138/82          (goal 120/80)    Past Medical History:   Diagnosis Date    Allergic rhinitis     Arthritis     Atrial fibrillation (HCC)     Hypertension     Melanoma ([address]     Myocardial infarction ([address] 2008      Past Surgical History:   Procedure Laterality Date    APPENDECTOMY      CARPAL TUNNEL RELEASE Right     HERNIA REPAIR      HYSTERECTOMY      NECK SURGERY      C5-C6    NOSE monitoring  11/26/2019    Creatinine monitoring  11/26/2019    DEXA (modify frequency per FRAX score)  Addressed    Flu vaccine  Completed       Subjective:     Review of Systems   Constitutional: Negative for activity change, appetite change, fatigue and fever. Musculoskeletal: Positive for arthralgias (hip R). Negative for back pain, gait problem, joint swelling and myalgias. Skin: Negative for color change, pallor, rash and wound. Neurological: Negative for tingling, weakness and numbness. Hematological: Negative for adenopathy. Psychiatric/Behavioral: The patient is not nervous/anxious. Objective:     Physical Exam   Constitutional: She is oriented to person, place, and time. She appears well-developed and well-nourished. HENT:   Head: Normocephalic and atraumatic. Musculoskeletal:        Right hip: She exhibits tenderness (medial thigh and with flexion of joint). She exhibits normal range of motion, normal strength, no bony tenderness, no swelling, no crepitus, no deformity and no laceration. Neurological: She is alert and oriented to person, place, and time. Skin: Skin is warm and dry. No rash noted. No erythema. No pallor. Psychiatric: She has a normal mood and affect. Her behavior is normal. Judgment and thought content normal.   Nursing note and vitals reviewed. /72   Pulse 58   Ht 5' 6\" (1.676 m)   Wt 193 lb (87.5 kg)   SpO2 98%   BMI 31.15 kg/m²     Assessment:       Diagnosis Orders   1. Pain of right hip joint  XR HIP RIGHT (2-3 VIEWS)    Brandon Harris DO, [facility]:      Return if symptoms worsen or fail to improve.     OA present on imaging  Referral to ortho at this point  Avoid nsaids due to coumadin  Can use tylenol prn  Patient agreed with treatment plan      Orders Placed This Encounter   Procedures    XR HIP RIGHT (2-3 VIEWS)     Order Specific Question:   Reason for exam:     Answer:   pain in groin, medially

## 2018-12-28 ENCOUNTER — HOSPITAL ENCOUNTER (OUTPATIENT)
Age: 78
Setting detail: SPECIMEN
Discharge: HOME | End: 2018-12-28
Payer: MEDICARE

## 2018-12-28 LAB
POC INR: 2.3
PROTHROMBIN TIME, POC: 27.4 SEC (ref 9.6–14.4)

## 2018-12-31 ENCOUNTER — OFFICE VISIT (OUTPATIENT)
Dept: ORTHOPEDIC SURGERY | Age: 78
End: 2018-12-31
Payer: MEDICARE

## 2018-12-31 VITALS — WEIGHT: 194 LBS | HEIGHT: 65 IN | BODY MASS INDEX: 32.32 KG/M2

## 2018-12-31 DIAGNOSIS — M16.11 PRIMARY OSTEOARTHRITIS OF RIGHT HIP: Primary | ICD-10-CM

## 2018-12-31 PROCEDURE — 1123F ACP DISCUSS/DSCN MKR DOCD: CPT | Performed by: STUDENT IN AN ORGANIZED HEALTH CARE EDUCATION/TRAINING PROGRAM

## 2018-12-31 PROCEDURE — 1090F PRES/ABSN URINE INCON ASSESS: CPT | Performed by: STUDENT IN AN ORGANIZED HEALTH CARE EDUCATION/TRAINING PROGRAM

## 2018-12-31 PROCEDURE — 99203 OFFICE O/P NEW LOW 30 MIN: CPT | Performed by: STUDENT IN AN ORGANIZED HEALTH CARE EDUCATION/TRAINING PROGRAM

## 2018-12-31 PROCEDURE — G8399 PT W/DXA RESULTS DOCUMENT: HCPCS | Performed by: STUDENT IN AN ORGANIZED HEALTH CARE EDUCATION/TRAINING PROGRAM

## 2018-12-31 PROCEDURE — 1036F TOBACCO NON-USER: CPT | Performed by: STUDENT IN AN ORGANIZED HEALTH CARE EDUCATION/TRAINING PROGRAM

## 2018-12-31 PROCEDURE — 1101F PT FALLS ASSESS-DOCD LE1/YR: CPT | Performed by: STUDENT IN AN ORGANIZED HEALTH CARE EDUCATION/TRAINING PROGRAM

## 2018-12-31 PROCEDURE — G8427 DOCREV CUR MEDS BY ELIG CLIN: HCPCS | Performed by: STUDENT IN AN ORGANIZED HEALTH CARE EDUCATION/TRAINING PROGRAM

## 2018-12-31 PROCEDURE — G8417 CALC BMI ABV UP PARAM F/U: HCPCS | Performed by: STUDENT IN AN ORGANIZED HEALTH CARE EDUCATION/TRAINING PROGRAM

## 2018-12-31 PROCEDURE — G8482 FLU IMMUNIZE ORDER/ADMIN: HCPCS | Performed by: STUDENT IN AN ORGANIZED HEALTH CARE EDUCATION/TRAINING PROGRAM

## 2018-12-31 PROCEDURE — 4040F PNEUMOC VAC/ADMIN/RCVD: CPT | Performed by: STUDENT IN AN ORGANIZED HEALTH CARE EDUCATION/TRAINING PROGRAM

## 2018-12-31 NOTE — PROGRESS NOTES
[facility]  [facility]  [address] [place], [place] [address] [address] [facility] [address]  Dept: [phone]    Ambulatory Orthopedic Office Visit      CHIEF COMPLAINT:    Chief Complaint   Patient presents with    Hip Pain     right       HISTORY OF PRESENT ILLNESS:      The patient is a 77 y. o.female who is being seen at the request of  Yenny Hernandez PA-C for consultation and evaluation of right hip pain. Pt reports dull, achy groin pain over the last several months. Significantly worsening the last month. No new falls, trauma. Pt does not use cane, walker. Pt having increased pain with activities. Pt takes tylenol without relief. Occasionally uses aleve if pain even worse. Pt has not had formal PT, injections.       Past Medical History:    Past Medical History:   Diagnosis Date    Allergic rhinitis     Arthritis     [facility] ([facility]     Hypertension     [facility]anoma ([facility]     Myocardial infarction ([facility] [address] 2008       Past Surgical History:    Past Surgical History:   Procedure Laterality Date    APPENDECTOMY      CARPAL TUNNEL RELEASE Right     HERNIA REPAIR      HYSTERECTOMY      NECK SURGERY      C5-C6    NOSE SURGERY      cancer removed    OTHER SURGICAL HISTORY      heart surg/growth in heart sac       Current Medications:   Current Outpatient Prescriptions   Medication Sig Dispense Refill    ranitidine (ZANTAC) 300 MG tablet       montelukast (SINGULAIR) 10 MG tablet Take 10 mg by mouth nightly      baclofen (LIORESAL) 10 MG tablet Take 1 tablet by mouth 3 times daily 30 tablet 0    polyvinyl alcohol-povidone (REFRESH) 1.4-0.6 % ophthalmic solution Place 1-2 drops into both eyes 4 times daily      triamterene-hydrochlorothiazide (MAXZIDE-25) 37.5-25 MG per tablet Take 1 tablet by mouth daily      Glucosamine-Chondroitin--200-150 MG TABS Take by mouth      PROAIR  (90 BASE) MCG/ACT AERS   Inhale 2 puffs into the lungs every 6 hours as needed       diltiazem (CARDIZEM

## 2019-02-04 ENCOUNTER — HOSPITAL ENCOUNTER (OUTPATIENT)
Age: 79
Setting detail: SPECIMEN
Discharge: HOME | End: 2019-02-04
Payer: MEDICARE

## 2019-02-04 LAB
POC INR: 2.8
PROTHROMBIN TIME, POC: 33.2 SEC (ref 9.6–14.4)

## 2019-02-22 ENCOUNTER — OFFICE VISIT (OUTPATIENT)
Dept: ORTHOPEDIC SURGERY | Age: 79
End: 2019-02-22
Payer: MEDICARE

## 2019-02-22 VITALS — BODY MASS INDEX: 32.39 KG/M2 | WEIGHT: 194.4 LBS | HEIGHT: 65 IN

## 2019-02-22 DIAGNOSIS — Z01.818 PRE-OP TESTING: ICD-10-CM

## 2019-02-22 DIAGNOSIS — M16.11 PRIMARY OSTEOARTHRITIS OF RIGHT HIP: Primary | ICD-10-CM

## 2019-02-22 PROCEDURE — 1036F TOBACCO NON-USER: CPT | Performed by: ORTHOPAEDIC SURGERY

## 2019-02-22 PROCEDURE — 4040F PNEUMOC VAC/ADMIN/RCVD: CPT | Performed by: ORTHOPAEDIC SURGERY

## 2019-02-22 PROCEDURE — 99213 OFFICE O/P EST LOW 20 MIN: CPT | Performed by: ORTHOPAEDIC SURGERY

## 2019-02-22 PROCEDURE — 1090F PRES/ABSN URINE INCON ASSESS: CPT | Performed by: ORTHOPAEDIC SURGERY

## 2019-02-22 PROCEDURE — 1123F ACP DISCUSS/DSCN MKR DOCD: CPT | Performed by: ORTHOPAEDIC SURGERY

## 2019-02-22 PROCEDURE — 1101F PT FALLS ASSESS-DOCD LE1/YR: CPT | Performed by: ORTHOPAEDIC SURGERY

## 2019-02-22 PROCEDURE — G8482 FLU IMMUNIZE ORDER/ADMIN: HCPCS | Performed by: ORTHOPAEDIC SURGERY

## 2019-02-22 PROCEDURE — G8417 CALC BMI ABV UP PARAM F/U: HCPCS | Performed by: ORTHOPAEDIC SURGERY

## 2019-02-22 PROCEDURE — G8427 DOCREV CUR MEDS BY ELIG CLIN: HCPCS | Performed by: ORTHOPAEDIC SURGERY

## 2019-02-22 PROCEDURE — G8399 PT W/DXA RESULTS DOCUMENT: HCPCS | Performed by: ORTHOPAEDIC SURGERY

## 2019-02-22 RX ORDER — LOSARTAN POTASSIUM 50 MG/1
50 TABLET ORAL DAILY
COMMUNITY
End: 2019-09-13 | Stop reason: ALTCHOICE

## 2019-02-22 ASSESSMENT — ENCOUNTER SYMPTOMS
SHORTNESS OF BREATH: 0
WHEEZING: 0
BACK PAIN: 0

## 2019-03-05 ENCOUNTER — HOSPITAL ENCOUNTER (OUTPATIENT)
Age: 79
Setting detail: SPECIMEN
Discharge: HOME | End: 2019-03-05
Payer: MEDICARE

## 2019-03-05 LAB
INR BLD: 2.3
PROTHROMBIN TIME: 22.4 SEC (ref 9–12)

## 2019-03-22 ENCOUNTER — HOSPITAL ENCOUNTER (OUTPATIENT)
Dept: PREADMISSION TESTING | Age: 79
Discharge: HOME | End: 2019-03-26
Payer: MEDICARE

## 2019-03-22 ENCOUNTER — HOSPITAL ENCOUNTER (OUTPATIENT)
Dept: GENERAL RADIOLOGY | Age: 79
Discharge: HOME | End: 2019-03-24
Payer: MEDICARE

## 2019-03-22 VITALS
DIASTOLIC BLOOD PRESSURE: 84 MMHG | WEIGHT: 194.45 LBS | RESPIRATION RATE: 20 BRPM | HEART RATE: 63 BPM | SYSTOLIC BLOOD PRESSURE: 174 MMHG | HEIGHT: 66 IN | OXYGEN SATURATION: 98 % | BODY MASS INDEX: 31.25 KG/M2 | TEMPERATURE: 97.7 F

## 2019-03-22 DIAGNOSIS — Z01.818 PRE-OP TESTING: ICD-10-CM

## 2019-03-22 LAB
ALBUMIN SERPL-MCNC: 4.2 G/DL (ref 3.5–5.2)
ALBUMIN/GLOBULIN RATIO: 1.2 (ref 1–2.5)
ALP BLD-CCNC: 79 U/L (ref 35–104)
ALT SERPL-CCNC: 12 U/L (ref 5–33)
ANION GAP SERPL CALCULATED.3IONS-SCNC: 13 MMOL/L (ref 9–17)
AST SERPL-CCNC: 17 U/L
BILIRUB SERPL-MCNC: 0.49 MG/DL (ref 0.3–1.2)
BILIRUBIN URINE: NEGATIVE
BUN BLDV-MCNC: 27 MG/DL (ref 8–23)
BUN/CREAT BLD: ABNORMAL (ref 9–20)
CALCIUM SERPL-MCNC: 9.6 MG/DL (ref 8.6–10.4)
CHLORIDE BLD-SCNC: 102 MMOL/L (ref 98–107)
CO2: 26 MMOL/L (ref 20–31)
COLOR: YELLOW
COMMENT UA: NORMAL
CREAT SERPL-MCNC: 1.04 MG/DL (ref 0.5–0.9)
GFR AFRICAN AMERICAN: >60 ML/MIN
GFR NON-AFRICAN AMERICAN: 51 ML/MIN
GFR SERPL CREATININE-BSD FRML MDRD: ABNORMAL ML/MIN/{1.73_M2}
GFR SERPL CREATININE-BSD FRML MDRD: ABNORMAL ML/MIN/{1.73_M2}
GLUCOSE BLD-MCNC: 99 MG/DL (ref 70–99)
GLUCOSE URINE: NEGATIVE
HCT VFR BLD CALC: 43.2 % (ref 36.3–47.1)
HEMOGLOBIN: 14 G/DL (ref 11.9–15.1)
INR BLD: 1.8
KETONES, URINE: NEGATIVE
LEUKOCYTE ESTERASE, URINE: NEGATIVE
MCH RBC QN AUTO: 30.2 PG (ref 25.2–33.5)
MCHC RBC AUTO-ENTMCNC: 32.4 G/DL (ref 28.4–34.8)
MCV RBC AUTO: 93.1 FL (ref 82.6–102.9)
MRSA, DNA, NASAL: NORMAL
NITRITE, URINE: NEGATIVE
NRBC AUTOMATED: 0 PER 100 WBC
PDW BLD-RTO: 13.5 % (ref 11.8–14.4)
PH UA: 7 (ref 5–8)
PLATELET # BLD: 253 K/UL (ref 138–453)
PMV BLD AUTO: 10.5 FL (ref 8.1–13.5)
POTASSIUM SERPL-SCNC: 4 MMOL/L (ref 3.7–5.3)
PROTEIN UA: NEGATIVE
PROTHROMBIN TIME: 18.4 SEC (ref 9–12)
RBC # BLD: 4.64 M/UL (ref 3.95–5.11)
SODIUM BLD-SCNC: 141 MMOL/L (ref 135–144)
SPECIFIC GRAVITY UA: 1.01 (ref 1–1.03)
SPECIMEN DESCRIPTION: NORMAL
TOTAL PROTEIN: 7.6 G/DL (ref 6.4–8.3)
TURBIDITY: CLEAR
URINE HGB: NEGATIVE
UROBILINOGEN, URINE: NORMAL
WBC # BLD: 5.9 K/UL (ref 3.5–11.3)

## 2019-03-22 PROCEDURE — 81003 URINALYSIS AUTO W/O SCOPE: CPT

## 2019-03-22 PROCEDURE — 36415 COLL VENOUS BLD VENIPUNCTURE: CPT

## 2019-03-22 PROCEDURE — 80053 COMPREHEN METABOLIC PANEL: CPT

## 2019-03-22 PROCEDURE — 87641 MR-STAPH DNA AMP PROBE: CPT

## 2019-03-22 PROCEDURE — 85610 PROTHROMBIN TIME: CPT

## 2019-03-22 PROCEDURE — 71046 X-RAY EXAM CHEST 2 VIEWS: CPT

## 2019-03-22 PROCEDURE — 85027 COMPLETE CBC AUTOMATED: CPT

## 2019-03-22 RX ORDER — MONTELUKAST SODIUM 10 MG/1
10 TABLET ORAL NIGHTLY
COMMUNITY

## 2019-03-22 RX ORDER — SODIUM CHLORIDE, SODIUM LACTATE, POTASSIUM CHLORIDE, CALCIUM CHLORIDE 600; 310; 30; 20 MG/100ML; MG/100ML; MG/100ML; MG/100ML
1000 INJECTION, SOLUTION INTRAVENOUS CONTINUOUS
Status: CANCELLED | OUTPATIENT
Start: 2019-03-22

## 2019-03-22 NOTE — H&P (VIEW-ONLY)
History and Physical    Pt Name: Niki Jessica  MRN: [id number]  YOB: 1940  Date of evaluation: 3/22/2019  Primary Care Physician: Mikie Sheehan PA-C  Patient evaluated at the request of  Dr. Marianne COTO    Reason for evaluation:  Right hip DJD   SUBJECTIVE:   History of Chief Complaint:  According to Ortho note in Feb/2019    Tatiana Harvey is a [de-identified] y.o. female ,   Chief Complaint   Patient presents with    Hip Pain       right         HPI Niki Jessica  is a [de-identified] y.o.  female who presents today in follow for right hip arthritis. The patient was last seen on 2/6/2019 and underwent treatment in the form of physical therapy. The patient notes some improvement for a short time but the right hip pain has returned. She would like to discuss her pain associated with right hip arthritis.      hx of right hip pain x 1 yr , hx of  Her right leg giving out , hx of deep  right  Buttock pain . Past Medical History      has a past medical history of Allergic rhinitis, Arthritis, Atrial fibrillation ([address] Hypertension, Melanoma ([address] and Myocardial infarction ([address] Past Surgical History   has a past surgical history that includes Appendectomy; hernia repair; other surgical history; Hysterectomy; Neck surgery; Carpal tunnel release (Right); and Nose surgery.        Medications   Scheduled Meds:  Current Outpatient Rx   Medication Sig Dispense Refill    losartan (COZAAR) 50 MG tablet Take 50 mg by mouth daily      ranitidine (ZANTAC) 300 MG tablet       montelukast (SINGULAIR) 10 MG tablet Take 10 mg by mouth nightly      baclofen (LIORESAL) 10 MG tablet Take 1 tablet by mouth 3 times daily 30 tablet 0    polyvinyl alcohol-povidone (REFRESH) 1.4-0.6 % ophthalmic solution Place 1-2 drops into both eyes 4 times daily      triamterene-hydrochlorothiazide (MAXZIDE-25) 37.5-25 MG per tablet Take 1 tablet by mouth daily      Glucosamine-Chondroitin--200-150 MG TABS Take by mouth  PROAIR  (90 BASE) MCG/ACT AERS   Inhale 2 puffs into the lungs every 6 hours as needed       diltiazem (CARDIZEM CD) 240 MG ER capsule Take 240 mg by mouth daily.  aspirin 81 MG tablet Take 81 mg by mouth daily.  fluticasone-salmeterol (ADVAIR) 250-50 MCG/DOSE AEPB Inhale 1 puff into the lungs every 12 hours.  azelastine (ASTELIN) 137 MCG/SPRAY nasal spray 1 spray by Nasal route 2 times daily. Use in each nostril as directed      Calcium-Vitamin D (CALTRATE 600 PLUS-VIT D PO) Take  by mouth.  nitroGLYCERIN (NITRODUR) 0.1 MG/HR Place 1 patch onto the skin daily.  Multiple Vitamins-Minerals (MULTIVITAL PO) Take  by mouth.  warfarin (COUMADIN) 5 MG tablet Take 7.5 mg by mouth        Continuous Infusions:  PRN Meds:. Allergies  has No Known Allergies. Family History    family history is not on file.     Family Status   Relation Name Status    Mother      Father           Social History  Social History     Socioeconomic History    Marital status:      Spouse name: Not on file    Number of children: Not on file    Years of education: Not on file    Highest education level: Not on file   Occupational History    Not on file   Social Needs    Financial resource strain: Not on file    Food insecurity:     Worry: Not on file     Inability: Not on file    Transportation needs:     Medical: Not on file     Non-medical: Not on file   Tobacco Use    Smoking status: Former Smoker     Packs/day: 1.00     Years: 20.00     Pack years: 20.00     Types: Cigarettes     Last attempt to quit: 1981     Years since quittin.2    Smokeless tobacco: Never Used   Substance and Sexual Activity    Alcohol use: No    Drug use: No    Sexual activity: Not on file   Lifestyle    Physical activity:     Days per week: Not on file     Minutes per session: Not on file    Stress: Not on file   Relationships    Social connections:     Talks on phone: Not on file

## 2019-03-27 ENCOUNTER — OFFICE VISIT (OUTPATIENT)
Dept: FAMILY MEDICINE CLINIC | Age: 79
End: 2019-03-27
Payer: MEDICARE

## 2019-03-27 VITALS
WEIGHT: 196 LBS | BODY MASS INDEX: 32.65 KG/M2 | HEIGHT: 65 IN | OXYGEN SATURATION: 98 % | HEART RATE: 60 BPM | TEMPERATURE: 97.6 F | DIASTOLIC BLOOD PRESSURE: 80 MMHG | SYSTOLIC BLOOD PRESSURE: 134 MMHG

## 2019-03-27 DIAGNOSIS — M16.11 PRIMARY OSTEOARTHRITIS OF RIGHT HIP: Primary | ICD-10-CM

## 2019-03-27 PROCEDURE — 99402 PREV MED CNSL INDIV APPRX 30: CPT | Performed by: PHYSICIAN ASSISTANT

## 2019-03-27 PROCEDURE — G8482 FLU IMMUNIZE ORDER/ADMIN: HCPCS | Performed by: PHYSICIAN ASSISTANT

## 2019-03-27 ASSESSMENT — ENCOUNTER SYMPTOMS
BACK PAIN: 0
VOMITING: 0
BLOOD IN STOOL: 0
WHEEZING: 0
SHORTNESS OF BREATH: 0
ABDOMINAL PAIN: 0
CONSTIPATION: 0
DIARRHEA: 0
COLOR CHANGE: 0
NAUSEA: 0
COUGH: 0

## 2019-03-27 ASSESSMENT — PATIENT HEALTH QUESTIONNAIRE - PHQ9
SUM OF ALL RESPONSES TO PHQ QUESTIONS 1-9: 0
SUM OF ALL RESPONSES TO PHQ QUESTIONS 1-9: 0
1. LITTLE INTEREST OR PLEASURE IN DOING THINGS: 0
SUM OF ALL RESPONSES TO PHQ9 QUESTIONS 1 & 2: 0
2. FEELING DOWN, DEPRESSED OR HOPELESS: 0

## 2019-04-08 ENCOUNTER — ANESTHESIA EVENT (OUTPATIENT)
Dept: OPERATING ROOM | Age: 79
DRG: 470 | End: 2019-04-08
Payer: MEDICARE

## 2019-04-09 ENCOUNTER — ANESTHESIA (OUTPATIENT)
Dept: OPERATING ROOM | Age: 79
DRG: 470 | End: 2019-04-09
Payer: MEDICARE

## 2019-04-09 ENCOUNTER — APPOINTMENT (OUTPATIENT)
Dept: GENERAL RADIOLOGY | Age: 79
DRG: 470 | End: 2019-04-09
Attending: ORTHOPAEDIC SURGERY
Payer: MEDICARE

## 2019-04-09 ENCOUNTER — HOSPITAL ENCOUNTER (INPATIENT)
Age: 79
LOS: 3 days | Discharge: HOME HEALTH | DRG: 470 | End: 2019-04-12
Attending: ORTHOPAEDIC SURGERY | Admitting: ORTHOPAEDIC SURGERY
Payer: MEDICARE

## 2019-04-09 VITALS — OXYGEN SATURATION: 89 % | SYSTOLIC BLOOD PRESSURE: 127 MMHG | DIASTOLIC BLOOD PRESSURE: 70 MMHG

## 2019-04-09 DIAGNOSIS — Z96.641 STATUS POST RIGHT HIP REPLACEMENT: ICD-10-CM

## 2019-04-09 DIAGNOSIS — M16.11 OSTEOARTHRITIS OF RIGHT HIP, UNSPECIFIED OSTEOARTHRITIS TYPE: Primary | ICD-10-CM

## 2019-04-09 DIAGNOSIS — G89.18 POST-OP PAIN: ICD-10-CM

## 2019-04-09 PROBLEM — R00.1 SINUS BRADYCARDIA: Status: ACTIVE | Noted: 2019-04-09

## 2019-04-09 LAB
POC INR: 1
POC POTASSIUM: 4 MMOL/L (ref 3.5–4.5)
PROTHROMBIN TIME, POC: 12 SEC (ref 10.4–14.2)

## 2019-04-09 PROCEDURE — 2500000003 HC RX 250 WO HCPCS: Performed by: STUDENT IN AN ORGANIZED HEALTH CARE EDUCATION/TRAINING PROGRAM

## 2019-04-09 PROCEDURE — 85610 PROTHROMBIN TIME: CPT

## 2019-04-09 PROCEDURE — 51798 US URINE CAPACITY MEASURE: CPT

## 2019-04-09 PROCEDURE — 2580000003 HC RX 258: Performed by: STUDENT IN AN ORGANIZED HEALTH CARE EDUCATION/TRAINING PROGRAM

## 2019-04-09 PROCEDURE — 6360000002 HC RX W HCPCS: Performed by: NURSE ANESTHETIST, CERTIFIED REGISTERED

## 2019-04-09 PROCEDURE — 6370000000 HC RX 637 (ALT 250 FOR IP): Performed by: STUDENT IN AN ORGANIZED HEALTH CARE EDUCATION/TRAINING PROGRAM

## 2019-04-09 PROCEDURE — 3600000004 HC SURGERY LEVEL 4 BASE: Performed by: ORTHOPAEDIC SURGERY

## 2019-04-09 PROCEDURE — 6360000002 HC RX W HCPCS: Performed by: ANESTHESIOLOGY

## 2019-04-09 PROCEDURE — 94640 AIRWAY INHALATION TREATMENT: CPT

## 2019-04-09 PROCEDURE — 64450 NJX AA&/STRD OTHER PN/BRANCH: CPT | Performed by: ANESTHESIOLOGY

## 2019-04-09 PROCEDURE — 84132 ASSAY OF SERUM POTASSIUM: CPT

## 2019-04-09 PROCEDURE — 3600000014 HC SURGERY LEVEL 4 ADDTL 15MIN: Performed by: ORTHOPAEDIC SURGERY

## 2019-04-09 PROCEDURE — 7100000001 HC PACU RECOVERY - ADDTL 15 MIN: Performed by: ORTHOPAEDIC SURGERY

## 2019-04-09 PROCEDURE — 97535 SELF CARE MNGMENT TRAINING: CPT

## 2019-04-09 PROCEDURE — C1713 ANCHOR/SCREW BN/BN,TIS/BN: HCPCS | Performed by: ORTHOPAEDIC SURGERY

## 2019-04-09 PROCEDURE — 3700000000 HC ANESTHESIA ATTENDED CARE: Performed by: ORTHOPAEDIC SURGERY

## 2019-04-09 PROCEDURE — 97530 THERAPEUTIC ACTIVITIES: CPT

## 2019-04-09 PROCEDURE — 3700000001 HC ADD 15 MINUTES (ANESTHESIA): Performed by: ORTHOPAEDIC SURGERY

## 2019-04-09 PROCEDURE — 2580000003 HC RX 258: Performed by: ORTHOPAEDIC SURGERY

## 2019-04-09 PROCEDURE — 2709999900 HC NON-CHARGEABLE SUPPLY: Performed by: ORTHOPAEDIC SURGERY

## 2019-04-09 PROCEDURE — 99221 1ST HOSP IP/OBS SF/LOW 40: CPT | Performed by: FAMILY MEDICINE

## 2019-04-09 PROCEDURE — 2580000003 HC RX 258: Performed by: ANESTHESIOLOGY

## 2019-04-09 PROCEDURE — 6360000002 HC RX W HCPCS: Performed by: STUDENT IN AN ORGANIZED HEALTH CARE EDUCATION/TRAINING PROGRAM

## 2019-04-09 PROCEDURE — 1200000000 HC SEMI PRIVATE

## 2019-04-09 PROCEDURE — 2500000003 HC RX 250 WO HCPCS: Performed by: ANESTHESIOLOGY

## 2019-04-09 PROCEDURE — 2580000003 HC RX 258: Performed by: FAMILY MEDICINE

## 2019-04-09 PROCEDURE — 97162 PT EVAL MOD COMPLEX 30 MIN: CPT

## 2019-04-09 PROCEDURE — 73501 X-RAY EXAM HIP UNI 1 VIEW: CPT

## 2019-04-09 PROCEDURE — 51701 INSERT BLADDER CATHETER: CPT

## 2019-04-09 PROCEDURE — 0SR9029 REPLACEMENT OF RIGHT HIP JOINT WITH METAL ON POLYETHYLENE SYNTHETIC SUBSTITUTE, CEMENTED, OPEN APPROACH: ICD-10-PCS | Performed by: ORTHOPAEDIC SURGERY

## 2019-04-09 PROCEDURE — 97166 OT EVAL MOD COMPLEX 45 MIN: CPT

## 2019-04-09 PROCEDURE — 27130 TOTAL HIP ARTHROPLASTY: CPT | Performed by: ORTHOPAEDIC SURGERY

## 2019-04-09 PROCEDURE — 7100000000 HC PACU RECOVERY - FIRST 15 MIN: Performed by: ORTHOPAEDIC SURGERY

## 2019-04-09 PROCEDURE — 2700000000 HC OXYGEN THERAPY PER DAY

## 2019-04-09 PROCEDURE — C1776 JOINT DEVICE (IMPLANTABLE): HCPCS | Performed by: ORTHOPAEDIC SURGERY

## 2019-04-09 PROCEDURE — 94664 DEMO&/EVAL PT USE INHALER: CPT

## 2019-04-09 PROCEDURE — 73502 X-RAY EXAM HIP UNI 2-3 VIEWS: CPT

## 2019-04-09 PROCEDURE — 2720000010 HC SURG SUPPLY STERILE: Performed by: ORTHOPAEDIC SURGERY

## 2019-04-09 PROCEDURE — 2500000003 HC RX 250 WO HCPCS: Performed by: NURSE ANESTHETIST, CERTIFIED REGISTERED

## 2019-04-09 DEVICE — PLUG BONE DIA20X24MM W/ DISP INSRTR ALLEN: Type: IMPLANTABLE DEVICE | Status: FUNCTIONAL

## 2019-04-09 DEVICE — CANCELLOUS BONE SCREW FLAT HEAD Ø 6,5 L30
Type: IMPLANTABLE DEVICE | Status: FUNCTIONAL
Brand: MPACT ACETABULAR SYSTEM

## 2019-04-09 DEVICE — CEMENT BONE 40GM HI VISC PALACOS R: Type: IMPLANTABLE DEVICE | Site: HIP | Status: FUNCTIONAL

## 2019-04-09 DEVICE — AMISTEM C CEMENTED STEM STANDARD SIZE 4
Type: IMPLANTABLE DEVICE | Status: FUNCTIONAL
Brand: AMISTEM C FEMORAL STEMS

## 2019-04-09 DEVICE — ACETABULAR SHELL Ø56 TWO HOLES
Type: IMPLANTABLE DEVICE | Status: FUNCTIONAL
Brand: MPACT ACETABULAR SYSTEM

## 2019-04-09 DEVICE — COMPONENT TOT HIP CAPPED STD HD LNR COCR POLYETH: Type: IMPLANTABLE DEVICE | Status: FUNCTIONAL

## 2019-04-09 DEVICE — FLAT PE  HC LINER Ø 36 / F
Type: IMPLANTABLE DEVICE | Status: FUNCTIONAL
Brand: MPACT ACETABULAR SYSTEM

## 2019-04-09 DEVICE — CANCELLOUS BONE SCREW FLAT HEAD Ø 6,5 L15
Type: IMPLANTABLE DEVICE | Status: FUNCTIONAL
Brand: MPACT ACETABULAR SYSTEM

## 2019-04-09 DEVICE — FEMORAL HEAD Ø 36 SIZE M
Type: IMPLANTABLE DEVICE | Site: HIP | Status: FUNCTIONAL
Brand: COCR FEMORAL BALL HEAD

## 2019-04-09 RX ORDER — WARFARIN SODIUM 10 MG/1
10 TABLET ORAL
Status: COMPLETED | OUTPATIENT
Start: 2019-04-09 | End: 2019-04-09

## 2019-04-09 RX ORDER — ASPIRIN 81 MG/1
81 TABLET ORAL DAILY
Status: DISCONTINUED | OUTPATIENT
Start: 2019-04-09 | End: 2019-04-12 | Stop reason: HOSPADM

## 2019-04-09 RX ORDER — FENTANYL CITRATE 50 UG/ML
25 INJECTION, SOLUTION INTRAMUSCULAR; INTRAVENOUS EVERY 5 MIN PRN
Status: DISCONTINUED | OUTPATIENT
Start: 2019-04-09 | End: 2019-04-09 | Stop reason: HOSPADM

## 2019-04-09 RX ORDER — ONDANSETRON 2 MG/ML
4 INJECTION INTRAMUSCULAR; INTRAVENOUS
Status: DISCONTINUED | OUTPATIENT
Start: 2019-04-09 | End: 2019-04-09 | Stop reason: HOSPADM

## 2019-04-09 RX ORDER — ACETAMINOPHEN 500 MG
1000 TABLET ORAL EVERY 8 HOURS SCHEDULED
Status: DISCONTINUED | OUTPATIENT
Start: 2019-04-09 | End: 2019-04-12 | Stop reason: HOSPADM

## 2019-04-09 RX ORDER — LIDOCAINE HYDROCHLORIDE 10 MG/ML
1 INJECTION, SOLUTION EPIDURAL; INFILTRATION; INTRACAUDAL; PERINEURAL
Status: DISCONTINUED | OUTPATIENT
Start: 2019-04-09 | End: 2019-04-09 | Stop reason: HOSPADM

## 2019-04-09 RX ORDER — GABAPENTIN 300 MG/1
300 CAPSULE ORAL NIGHTLY
Status: DISCONTINUED | OUTPATIENT
Start: 2019-04-09 | End: 2019-04-12 | Stop reason: HOSPADM

## 2019-04-09 RX ORDER — SODIUM CHLORIDE 0.9 % (FLUSH) 0.9 %
10 SYRINGE (ML) INJECTION PRN
Status: DISCONTINUED | OUTPATIENT
Start: 2019-04-09 | End: 2019-04-12 | Stop reason: HOSPADM

## 2019-04-09 RX ORDER — SODIUM CHLORIDE 0.9 % (FLUSH) 0.9 %
10 SYRINGE (ML) INJECTION EVERY 12 HOURS SCHEDULED
Status: DISCONTINUED | OUTPATIENT
Start: 2019-04-09 | End: 2019-04-12 | Stop reason: HOSPADM

## 2019-04-09 RX ORDER — SODIUM CHLORIDE 0.9 % (FLUSH) 0.9 %
10 SYRINGE (ML) INJECTION EVERY 12 HOURS SCHEDULED
Status: DISCONTINUED | OUTPATIENT
Start: 2019-04-09 | End: 2019-04-09 | Stop reason: HOSPADM

## 2019-04-09 RX ORDER — MORPHINE SULFATE 2 MG/ML
2 INJECTION, SOLUTION INTRAMUSCULAR; INTRAVENOUS EVERY 4 HOURS PRN
Status: DISCONTINUED | OUTPATIENT
Start: 2019-04-09 | End: 2019-04-10

## 2019-04-09 RX ORDER — MIDAZOLAM HYDROCHLORIDE 1 MG/ML
2 INJECTION INTRAMUSCULAR; INTRAVENOUS
Status: DISCONTINUED | OUTPATIENT
Start: 2019-04-09 | End: 2019-04-09 | Stop reason: HOSPADM

## 2019-04-09 RX ORDER — ONDANSETRON 2 MG/ML
4 INJECTION INTRAMUSCULAR; INTRAVENOUS EVERY 6 HOURS PRN
Status: DISCONTINUED | OUTPATIENT
Start: 2019-04-09 | End: 2019-04-12 | Stop reason: HOSPADM

## 2019-04-09 RX ORDER — DEXAMETHASONE SODIUM PHOSPHATE 10 MG/ML
10 INJECTION INTRAMUSCULAR; INTRAVENOUS ONCE
Status: DISCONTINUED | OUTPATIENT
Start: 2019-04-09 | End: 2019-04-10

## 2019-04-09 RX ORDER — MAGNESIUM HYDROXIDE 1200 MG/15ML
LIQUID ORAL CONTINUOUS PRN
Status: COMPLETED | OUTPATIENT
Start: 2019-04-09 | End: 2019-04-09

## 2019-04-09 RX ORDER — MIDAZOLAM HYDROCHLORIDE 1 MG/ML
INJECTION INTRAMUSCULAR; INTRAVENOUS PRN
Status: DISCONTINUED | OUTPATIENT
Start: 2019-04-09 | End: 2019-04-09 | Stop reason: SDUPTHER

## 2019-04-09 RX ORDER — GLYCOPYRROLATE 1 MG/5 ML
SYRINGE (ML) INTRAVENOUS PRN
Status: DISCONTINUED | OUTPATIENT
Start: 2019-04-09 | End: 2019-04-09 | Stop reason: SDUPTHER

## 2019-04-09 RX ORDER — OXYCODONE HYDROCHLORIDE 5 MG/1
5 TABLET ORAL EVERY 4 HOURS PRN
Status: DISCONTINUED | OUTPATIENT
Start: 2019-04-09 | End: 2019-04-12 | Stop reason: HOSPADM

## 2019-04-09 RX ORDER — SODIUM CHLORIDE 0.9 % (FLUSH) 0.9 %
10 SYRINGE (ML) INJECTION PRN
Status: DISCONTINUED | OUTPATIENT
Start: 2019-04-09 | End: 2019-04-09 | Stop reason: HOSPADM

## 2019-04-09 RX ORDER — SODIUM CHLORIDE, SODIUM LACTATE, POTASSIUM CHLORIDE, CALCIUM CHLORIDE 600; 310; 30; 20 MG/100ML; MG/100ML; MG/100ML; MG/100ML
1000 INJECTION, SOLUTION INTRAVENOUS CONTINUOUS
Status: DISCONTINUED | OUTPATIENT
Start: 2019-04-09 | End: 2019-04-09

## 2019-04-09 RX ORDER — ACETAMINOPHEN 500 MG
1000 TABLET ORAL ONCE
Status: COMPLETED | OUTPATIENT
Start: 2019-04-09 | End: 2019-04-09

## 2019-04-09 RX ORDER — DEXAMETHASONE SODIUM PHOSPHATE 10 MG/ML
INJECTION INTRAMUSCULAR; INTRAVENOUS PRN
Status: DISCONTINUED | OUTPATIENT
Start: 2019-04-09 | End: 2019-04-09 | Stop reason: SDUPTHER

## 2019-04-09 RX ORDER — FENTANYL CITRATE 50 UG/ML
50 INJECTION, SOLUTION INTRAMUSCULAR; INTRAVENOUS EVERY 5 MIN PRN
Status: DISCONTINUED | OUTPATIENT
Start: 2019-04-09 | End: 2019-04-09 | Stop reason: HOSPADM

## 2019-04-09 RX ORDER — ONDANSETRON 2 MG/ML
4 INJECTION INTRAMUSCULAR; INTRAVENOUS ONCE
Status: DISCONTINUED | OUTPATIENT
Start: 2019-04-09 | End: 2019-04-09 | Stop reason: HOSPADM

## 2019-04-09 RX ORDER — PROPOFOL 10 MG/ML
INJECTION, EMULSION INTRAVENOUS CONTINUOUS PRN
Status: DISCONTINUED | OUTPATIENT
Start: 2019-04-09 | End: 2019-04-09 | Stop reason: SDUPTHER

## 2019-04-09 RX ORDER — SODIUM CHLORIDE, SODIUM LACTATE, POTASSIUM CHLORIDE, CALCIUM CHLORIDE 600; 310; 30; 20 MG/100ML; MG/100ML; MG/100ML; MG/100ML
INJECTION, SOLUTION INTRAVENOUS CONTINUOUS
Status: DISCONTINUED | OUTPATIENT
Start: 2019-04-09 | End: 2019-04-09

## 2019-04-09 RX ORDER — HEPARIN SODIUM 5000 [USP'U]/ML
5000 INJECTION, SOLUTION INTRAVENOUS; SUBCUTANEOUS EVERY 8 HOURS SCHEDULED
Status: DISCONTINUED | OUTPATIENT
Start: 2019-04-10 | End: 2019-04-10

## 2019-04-09 RX ORDER — NITROGLYCERIN 2.5 MG/D
1 PATCH TRANSDERMAL DAILY
Status: DISCONTINUED | OUTPATIENT
Start: 2019-04-09 | End: 2019-04-12 | Stop reason: HOSPADM

## 2019-04-09 RX ORDER — KETOROLAC TROMETHAMINE 30 MG/ML
15 INJECTION, SOLUTION INTRAMUSCULAR; INTRAVENOUS EVERY 6 HOURS
Status: DISCONTINUED | OUTPATIENT
Start: 2019-04-09 | End: 2019-04-10

## 2019-04-09 RX ORDER — TRIAMTERENE AND HYDROCHLOROTHIAZIDE 37.5; 25 MG/1; MG/1
1 TABLET ORAL DAILY
Status: DISCONTINUED | OUTPATIENT
Start: 2019-04-10 | End: 2019-04-10

## 2019-04-09 RX ORDER — SODIUM CHLORIDE 9 MG/ML
INJECTION, SOLUTION INTRAVENOUS CONTINUOUS
Status: DISCONTINUED | OUTPATIENT
Start: 2019-04-09 | End: 2019-04-10

## 2019-04-09 RX ORDER — CYCLOSPORINE 0.5 MG/ML
1 EMULSION OPHTHALMIC 2 TIMES DAILY
Status: DISCONTINUED | OUTPATIENT
Start: 2019-04-09 | End: 2019-04-12 | Stop reason: HOSPADM

## 2019-04-09 RX ORDER — ROPIVACAINE HYDROCHLORIDE 2 MG/ML
INJECTION, SOLUTION EPIDURAL; INFILTRATION; PERINEURAL
Status: DISCONTINUED | OUTPATIENT
Start: 2019-04-09 | End: 2019-04-09 | Stop reason: SDUPTHER

## 2019-04-09 RX ORDER — AZELASTINE 1 MG/ML
1 SPRAY, METERED NASAL 2 TIMES DAILY
Status: DISCONTINUED | OUTPATIENT
Start: 2019-04-09 | End: 2019-04-12 | Stop reason: HOSPADM

## 2019-04-09 RX ORDER — LOSARTAN POTASSIUM 50 MG/1
50 TABLET ORAL DAILY
Status: DISCONTINUED | OUTPATIENT
Start: 2019-04-10 | End: 2019-04-10

## 2019-04-09 RX ORDER — WARFARIN SODIUM 7.5 MG/1
7.5 TABLET ORAL DAILY
Status: DISCONTINUED | OUTPATIENT
Start: 2019-04-10 | End: 2019-04-09 | Stop reason: DRUGHIGH

## 2019-04-09 RX ORDER — DILTIAZEM HYDROCHLORIDE 240 MG/1
240 CAPSULE, COATED, EXTENDED RELEASE ORAL DAILY
Status: DISCONTINUED | OUTPATIENT
Start: 2019-04-10 | End: 2019-04-12 | Stop reason: HOSPADM

## 2019-04-09 RX ORDER — ONDANSETRON 2 MG/ML
INJECTION INTRAMUSCULAR; INTRAVENOUS PRN
Status: DISCONTINUED | OUTPATIENT
Start: 2019-04-09 | End: 2019-04-09 | Stop reason: SDUPTHER

## 2019-04-09 RX ORDER — EPHEDRINE SULFATE/0.9% NACL/PF 50 MG/5 ML
SYRINGE (ML) INTRAVENOUS PRN
Status: DISCONTINUED | OUTPATIENT
Start: 2019-04-09 | End: 2019-04-09 | Stop reason: SDUPTHER

## 2019-04-09 RX ORDER — OXYCODONE HYDROCHLORIDE 5 MG/1
10 TABLET ORAL EVERY 4 HOURS PRN
Status: DISCONTINUED | OUTPATIENT
Start: 2019-04-09 | End: 2019-04-12 | Stop reason: HOSPADM

## 2019-04-09 RX ORDER — MONTELUKAST SODIUM 10 MG/1
10 TABLET ORAL NIGHTLY
Status: DISCONTINUED | OUTPATIENT
Start: 2019-04-09 | End: 2019-04-12 | Stop reason: HOSPADM

## 2019-04-09 RX ORDER — PREGABALIN 75 MG/1
75 CAPSULE ORAL ONCE
Status: COMPLETED | OUTPATIENT
Start: 2019-04-09 | End: 2019-04-09

## 2019-04-09 RX ADMIN — ROPIVACAINE HYDROCHLORIDE 40 ML: 2 INJECTION, SOLUTION EPIDURAL; INFILTRATION at 10:12

## 2019-04-09 RX ADMIN — ACETAMINOPHEN 1000 MG: 500 TABLET ORAL at 13:53

## 2019-04-09 RX ADMIN — MIDAZOLAM HYDROCHLORIDE 2 MG: 1 INJECTION, SOLUTION INTRAMUSCULAR; INTRAVENOUS at 07:15

## 2019-04-09 RX ADMIN — ONDANSETRON 4 MG: 2 INJECTION INTRAMUSCULAR; INTRAVENOUS at 15:58

## 2019-04-09 RX ADMIN — OXYCODONE HYDROCHLORIDE 10 MG: 5 TABLET ORAL at 17:03

## 2019-04-09 RX ADMIN — Medication 0.2 MG: at 07:40

## 2019-04-09 RX ADMIN — SODIUM CHLORIDE: 9 INJECTION, SOLUTION INTRAVENOUS at 23:31

## 2019-04-09 RX ADMIN — Medication 2 G: at 07:30

## 2019-04-09 RX ADMIN — PHENYLEPHRINE HYDROCHLORIDE 100 MCG: 10 INJECTION INTRAVENOUS at 07:41

## 2019-04-09 RX ADMIN — Medication 2 G: at 15:58

## 2019-04-09 RX ADMIN — MOMETASONE FUROATE AND FORMOTEROL FUMARATE DIHYDRATE 2 PUFF: 200; 5 AEROSOL RESPIRATORY (INHALATION) at 21:37

## 2019-04-09 RX ADMIN — Medication 40 ML: at 10:08

## 2019-04-09 RX ADMIN — PHENYLEPHRINE HYDROCHLORIDE 100 MCG: 10 INJECTION INTRAVENOUS at 07:44

## 2019-04-09 RX ADMIN — TRANEXAMIC ACID 1 G: 100 INJECTION, SOLUTION INTRAVENOUS at 08:49

## 2019-04-09 RX ADMIN — KETOROLAC TROMETHAMINE 15 MG: 30 INJECTION, SOLUTION INTRAMUSCULAR; INTRAVENOUS at 22:03

## 2019-04-09 RX ADMIN — SODIUM CHLORIDE, POTASSIUM CHLORIDE, SODIUM LACTATE AND CALCIUM CHLORIDE: 600; 310; 30; 20 INJECTION, SOLUTION INTRAVENOUS at 09:08

## 2019-04-09 RX ADMIN — Medication 2 G: at 23:31

## 2019-04-09 RX ADMIN — WARFARIN SODIUM 10 MG: 10 TABLET ORAL at 18:18

## 2019-04-09 RX ADMIN — OXYCODONE HYDROCHLORIDE 10 MG: 5 TABLET ORAL at 22:03

## 2019-04-09 RX ADMIN — GABAPENTIN 300 MG: 300 CAPSULE ORAL at 22:03

## 2019-04-09 RX ADMIN — CYCLOSPORINE 1 DROP: 0.5 EMULSION OPHTHALMIC at 22:03

## 2019-04-09 RX ADMIN — ONDANSETRON 4 MG: 2 INJECTION, SOLUTION INTRAMUSCULAR; INTRAVENOUS at 08:53

## 2019-04-09 RX ADMIN — SODIUM CHLORIDE, POTASSIUM CHLORIDE, SODIUM LACTATE AND CALCIUM CHLORIDE: 600; 310; 30; 20 INJECTION, SOLUTION INTRAVENOUS at 06:42

## 2019-04-09 RX ADMIN — MONTELUKAST SODIUM 10 MG: 10 TABLET, FILM COATED ORAL at 22:03

## 2019-04-09 RX ADMIN — KETOROLAC TROMETHAMINE 15 MG: 30 INJECTION, SOLUTION INTRAMUSCULAR; INTRAVENOUS at 17:04

## 2019-04-09 RX ADMIN — Medication 10 MG: at 07:47

## 2019-04-09 RX ADMIN — DEXAMETHASONE SODIUM PHOSPHATE 10 MG: 10 INJECTION INTRAMUSCULAR; INTRAVENOUS at 08:53

## 2019-04-09 RX ADMIN — MORPHINE SULFATE 2 MG: 2 INJECTION, SOLUTION INTRAMUSCULAR; INTRAVENOUS at 23:47

## 2019-04-09 RX ADMIN — OXYCODONE HYDROCHLORIDE 10 MG: 5 TABLET ORAL at 12:59

## 2019-04-09 RX ADMIN — Medication 10 MG: at 08:04

## 2019-04-09 RX ADMIN — Medication 10 MG: at 07:51

## 2019-04-09 RX ADMIN — Medication 10 MG: at 08:25

## 2019-04-09 RX ADMIN — PROPOFOL 100 MCG/KG/MIN: 10 INJECTION, EMULSION INTRAVENOUS at 07:31

## 2019-04-09 RX ADMIN — TRANEXAMIC ACID 1 G: 100 INJECTION, SOLUTION INTRAVENOUS at 07:34

## 2019-04-09 RX ADMIN — PREGABALIN 75 MG: 75 CAPSULE ORAL at 06:45

## 2019-04-09 RX ADMIN — Medication 10 MG: at 07:46

## 2019-04-09 RX ADMIN — PHENYLEPHRINE HYDROCHLORIDE 100 MCG: 10 INJECTION INTRAVENOUS at 07:34

## 2019-04-09 RX ADMIN — ACETAMINOPHEN 1000 MG: 500 TABLET ORAL at 06:45

## 2019-04-09 RX ADMIN — SODIUM CHLORIDE: 9 INJECTION, SOLUTION INTRAVENOUS at 11:22

## 2019-04-09 RX ADMIN — PHENYLEPHRINE HYDROCHLORIDE 100 MCG: 10 INJECTION INTRAVENOUS at 07:39

## 2019-04-09 RX ADMIN — KETOROLAC TROMETHAMINE 15 MG: 30 INJECTION, SOLUTION INTRAMUSCULAR; INTRAVENOUS at 10:19

## 2019-04-09 RX ADMIN — ACETAMINOPHEN 1000 MG: 500 TABLET ORAL at 22:03

## 2019-04-09 ASSESSMENT — PULMONARY FUNCTION TESTS
PIF_VALUE: 0
PIF_VALUE: 1
PIF_VALUE: 0
PIF_VALUE: 3
PIF_VALUE: 0

## 2019-04-09 ASSESSMENT — PAIN DESCRIPTION - PAIN TYPE
TYPE: SURGICAL PAIN;ACUTE PAIN
TYPE: SURGICAL PAIN
TYPE: SURGICAL PAIN

## 2019-04-09 ASSESSMENT — PAIN SCALES - GENERAL
PAINLEVEL_OUTOF10: 0
PAINLEVEL_OUTOF10: 2
PAINLEVEL_OUTOF10: 0
PAINLEVEL_OUTOF10: 7
PAINLEVEL_OUTOF10: 2
PAINLEVEL_OUTOF10: 7
PAINLEVEL_OUTOF10: 6
PAINLEVEL_OUTOF10: 6
PAINLEVEL_OUTOF10: 7
PAINLEVEL_OUTOF10: 10
PAINLEVEL_OUTOF10: 9
PAINLEVEL_OUTOF10: 0
PAINLEVEL_OUTOF10: 2
PAINLEVEL_OUTOF10: 0

## 2019-04-09 ASSESSMENT — PAIN DESCRIPTION - LOCATION
LOCATION: HIP

## 2019-04-09 ASSESSMENT — PAIN DESCRIPTION - DESCRIPTORS: DESCRIPTORS: ACHING;DISCOMFORT

## 2019-04-09 ASSESSMENT — PAIN DESCRIPTION - FREQUENCY: FREQUENCY: CONTINUOUS

## 2019-04-09 ASSESSMENT — PAIN DESCRIPTION - ORIENTATION
ORIENTATION: RIGHT

## 2019-04-09 ASSESSMENT — PAIN DESCRIPTION - PROGRESSION: CLINICAL_PROGRESSION: NOT CHANGED

## 2019-04-09 ASSESSMENT — PAIN - FUNCTIONAL ASSESSMENT: PAIN_FUNCTIONAL_ASSESSMENT: 0-10

## 2019-04-09 ASSESSMENT — PAIN DESCRIPTION - ONSET: ONSET: ON-GOING

## 2019-04-09 NOTE — CARE COORDINATION
[facility] for [facility] Replacement Ortho Bundle Transition Interview    Patient Interviewed: Samia Castillo  Informed patient of CCJR Ortho Bundle Program and role of CTS/CTC. CMS Letter Given:  Yes    Living Situation:   Living arrangements: with family:  daughter                   Home: 1-story house/ trailer   Social support:a good social support network    Risk Analysis:  Has the following:  Readmission Risk              Risk of Unplanned Readmission:        10          Chronic Illness:   Past Medical History:   Diagnosis Date    Allergic rhinitis     Arthritis     [facility]     Hypertension     Melanoma ([facility]     on nose    Myocardial infarction [facility]) 2008    Wears glasses     Wears partial dentures     upper     Fall Risk & DME:   Any previous falls or injuries in the home? Denies falls. Visual Impairment:  Glasses   Difficulty hearing: WFL (hard of hearing, Deaf, Wears hearing aids)   Able to express needs/desires? Yes   Home Equipment: Shower chair, toilet raiser, grab bars in the shower, grab bars around the toilet. *New rolling walker delivered today from [place] [employer]. *      Financial Assessment   Afford medications? Yes   Connected with the following services? none    Mode of transportation? Active . Works part time [employer] on [place]. Health Literacy Assessment   Does anyone help with medications at home? No: Independent. Anything preventing from taking medications at home? No: Denies. Anticipated Needs Post Evaluation:  CCJR Care Transitions and discharge planning. Met with patient at bedside. Provided patient with a copy of the Medicare CCJR Bundle Initiative Letter. Answered all questions as they arose during visit regarding CCJR Bundle initiative. Explained role of Care Transitions with CCJR Bundle. Patient is agreeable to care transitions outreach. Contact information provided for Care Transitions office and remote [id number]      Patient lives with her

## 2019-04-09 NOTE — PROGRESS NOTES
Dr. Miguel Ohara completes right lumbar plexis nerve block in PACU. Patient still under effects of spinal anesthesia so nerve stimulator not used.  No sedation given and vital signs per PACU RN

## 2019-04-09 NOTE — INTERVAL H&P NOTE
Pt Name: Rachelle Diaz  MRN: [id number]  YOB: 1940  Date of evaluation: 4/9/2019    I have reviewed the patient's history and physical examination completed in pre-admission testing on 3/22/19    No changes to history or on examination today, unless noted below. Cardiac clearance granted, copy on file. No other change. (PAT vitals in epic flowsheet).      LALITA QUIROZ CNP  4/9/19  6:23 AM

## 2019-04-09 NOTE — ANESTHESIA PRE PROCEDURE
Department of Anesthesiology  Preprocedure Note       Name:  Kari Garg   Age:  [de-identified] y.o.  :  1940                                          MRN:  [id number]         Date:  2019      Surgeon: Johnathan Ulrich):  Omari Christopher, DO    Procedure: HIP TOTAL ARTHROPLASTY - MEDACTA, C-ARM, MEDACTA TABLE, NSA= GENERAL VS SPINAL, FASCIA ILLIACA BLOCK, *STANDARD (Right )    Medications prior to admission:   Prior to Admission medications    Medication Sig Start Date End Date Taking? Authorizing Provider   CycloSPORINE (RESTASIS OP) Apply 1 drop to eye 2 times daily   Yes Historical Provider, MD   Multiple Vitamins-Minerals (HAIR SKIN AND NAILS FORMULA PO) Take by mouth   Yes Historical Provider, MD   losartan (COZAAR) 50 MG tablet Take 50 mg by mouth daily   Yes Historical Provider, MD   ranitidine (ZANTAC) 300 MG tablet nightly  18  Yes Historical Provider, MD   polyvinyl alcohol-povidone (REFRESH) 1.4-0.6 % ophthalmic solution Place 1-2 drops into both eyes 4 times daily   Yes Historical Provider, MD   triamterene-hydrochlorothiazide (MAXZIDE-25) 37.5-25 MG per tablet Take 1 tablet by mouth daily 12/29/15  Yes Historical Provider, MD   diltiazem (CARDIZEM CD) 240 MG ER capsule Take 240 mg by mouth daily. 14  Yes DUDLEY Rosales - IRA   fluticasone-salmeterol (ADVAIR) 250-50 MCG/DOSE AEPB Inhale 1 puff into the lungs as needed    Yes Historical Provider, MD   nitroGLYCERIN (NITRODUR) 0.1 MG/HR Place 1 patch onto the skin daily. Yes Historical Provider, MD   Multiple Vitamins-Minerals (MULTIVITAL PO) Take  by mouth. Yes Historical Provider, MD   montelukast (SINGULAIR) 10 MG tablet Take 10 mg by mouth nightly    Historical Provider, MD   aspirin 81 MG tablet Take 81 mg by mouth daily Will stop 7 days before surgery    Historical Provider, MD   azelastine (ASTELIN) 137 MCG/SPRAY nasal spray 1 spray by Nasal route 2 times daily.  Use in each nostril as directed    Historical Provider, MD HYSTERECTOMY      NECK SURGERY      C5-C6    NOSE SURGERY      cancer removed    OTHER SURGICAL HISTORY      heart surg/growth in heart sac    TONSILLECTOMY         Social History:    Social History     Tobacco Use    Smoking status: Former Smoker     Packs/day: 1.00     Years: 20.00     Pack years: 20.00     Types: Cigarettes     Last attempt to quit: 1981     Years since quittin.2    Smokeless tobacco: Never Used   Substance Use Topics    Alcohol use: No                                Counseling given: Not Answered      Vital Signs (Current):   Vitals:    19 0549 19 0621   BP:  (!) 155/62   Pulse:  58   Resp:  20   Temp:  98.6 °F (37 °C)   TempSrc:  Temporal   SpO2:  98%   Weight: 195 lb (88.5 kg)    Height: 5' 5.51\" (1.664 m)                                               BP Readings from Last 3 Encounters:   19 (!) 155/62   19 134/80   19 (!) 174/84       NPO Status: Time of last liquid consumption:                         Time of last solid consumption:                         Date of last liquid consumption: 19                        Date of last solid food consumption: 19    BMI:   Wt Readings from Last 3 Encounters:   19 195 lb (88.5 kg)   19 196 lb (88.9 kg)   19 194 lb 7.1 oz (88.2 kg)     Body mass index is 31.94 kg/m².     CBC:   Lab Results   Component Value Date    WBC 5.9 2019    RBC 4.64 2019    HGB 14.0 2019    HCT 43.2 2019    MCV 93.1 2019    RDW 13.5 2019     2019       CMP:   Lab Results   Component Value Date     2019    K 4.0 2019     2019    CO2 26 2019    BUN 27 2019    CREATININE 1.04 2019    GFRAA >60 2019    LABGLOM 51 2019    GLUCOSE 99 2019    PROT 7.6 2019    CALCIUM 9.6 2019    BILITOT 0.49 2019    ALKPHOS 79 2019    AST 17 2019    ALT 12 2019       POC Tests: No results for input(s): POCGLU, POCNA, POCK, POCCL, POCBUN, POCHEMO, POCHCT in the last 72 hours. Coags:   Lab Results   Component Value Date    PROTIME 18.4 03/22/2019    PROTIME 33.2 02/04/2019    INR 1.8 03/22/2019       HCG (If Applicable): No results found for: PREGTESTUR, PREGSERUM, HCG, HCGQUANT     ABGs: No results found for: PHART, PO2ART, QYK6VGF, GKB6USU, BEART, S0IKESRF     Type & Screen (If Applicable):  No results found for: [facility]    Anesthesia Evaluation  Patient summary reviewed and Nursing notes reviewed  Airway: Mallampati: I  TM distance: >3 FB   Neck ROM: full  Mouth opening: > = 3 FB Dental:    (+) lower dentures and upper dentures      Pulmonary:Negative Pulmonary ROS breath sounds clear to auscultation                             Cardiovascular:  Exercise tolerance: good (>4 METS),   (+) hypertension:,     (-) past MI      Rhythm: regular  Rate: normal           Beta Blocker:  Not on Beta Blocker         Neuro/Psych:   Negative Neuro/Psych ROS  (+) depression/anxiety             GI/Hepatic/Renal: Neg GI/Hepatic/Renal ROS            Endo/Other: Negative Endo/Other ROS                    Abdominal:       Abdomen: soft. Vascular: negative vascular ROS. Anesthesia Plan      MAC, spinal, regional and general     ASA 3     (Plan for LP block (right) and spinal, AND propofol gtt. Back up GETA )  Induction: intravenous. MIPS: Postoperative opioids intended and Prophylactic antiemetics administered. Anesthetic plan and risks discussed with patient. Plan discussed with CRNA.     Attending anesthesiologist reviewed and agrees with [address] MD   4/9/2019

## 2019-04-09 NOTE — LETTER
[address]  [facility] for Joint Replacement Model  Notification Letter    [facility] is participating in a New Care Improvement Initiative from [facility] is participating in a Medicare initiative called the [address] [place] [address] for Joint Replacement (CJR) model. The CJR model aims to promote quality and financial accountability for care surrounding lower-extremity joint replacement (LEJR) procedures, commonly referred to as hip and knee replacements and/or other major leg procedures. [address] participation in the [address] [place] [address],[address] [facility] should not restrict your access to care for your medical condition or your freedom to choose your health care providers and services. All existing Medicare beneficiary protections continue to be available to you. These include the ability to report concerns of substandard care to Quality Improvement Organizations and 1-800-MEDICARE. The CJR model aims to help give you better care. The CJR model aims to support better and more efficient care for beneficiaries undergoing LEJR procedures. A CJR episode of care is typically defined as an admission of an eligible Medicare beneficiary to a hospital participating in the [address] [place] [address],[address] [facility] that eventually results in a discharge paid under Medicare Severity-Diagnosis Related Groups (MS-DRG) 469 (major joint replacement or reattachment of lower extremity with major complications or comorbidities) or 470 (major joint replacement or reattachment of lower extremity without major complications or comorbidities). The CJR episode of care continues for 90 days following discharge.  This model tests bundled payment and quality measurement for an episode of care associated with LEJR procedures to encourage hospitals, physicians, and post-acute care providers to work together to improve the quality and coordination of care from the initial hospitalization through recovery. Through this bundled payment model, Marilou Marie will receive additional payments if quality and spending performance are strong or, if not, potentially have to repay Medicare for a portion of the spending for care surrounding a lower extremity joint replacement procedure. Medicare is using the CJR model to encourage Marilou Marie to work more closely with your doctors and other health care providers that help patients recover after discharge from the hospital, including nursing homes (skilled nursing facilities), home health agencies, inpatient rehabilitation facilities, and long-term care hospitals. The goal of the model is to encourage these providers and suppliers to provide you with better, more coordinated care during and following your hospital stay. The model is expected to lower the cost of care to [facility] but your costs for covered care will not increase due to these changes. Marilou Marie is working closely with the doctors and other health care providers and suppliers who will care for you during and following your hospital stay and extending through the recovery period. By working together, your health care providers and suppliers are planning more efficient, high quality care as you undergo treatment. Medicare will monitor your care to ensure you and others are receiving high quality care. It's your choice which hospital, doctor, or other providers you use. You have the right to choose which hospital, doctor, or other post-hospital stay health care provider you use. · To find a different doctor, visit Medicare's Physician Compare website, Imaxio.co.nz, or call 1-800-MEDICARE (811 8221). TTY users should call 0-113.288.3152.   · To find a different hospital, visit [email or web address]

## 2019-04-09 NOTE — ANESTHESIA PROCEDURE NOTES
Spinal Block    Patient location during procedure: OR  Reason for block: primary anesthetic  Staffing  Anesthesiologist: Elida Lutz MD  Resident/CRNA: DUDLEY Glasgow CRNA  Performed: anesthesiologist   Preanesthetic Checklist  Completed: patient identified, site marked, surgical consent, pre-op evaluation, timeout performed, IV checked, risks and benefits discussed, monitors and equipment checked, anesthesia consent given, oxygen available and patient being monitored  Spinal Block  Patient position: sitting  Prep: ChloraPrep  Patient monitoring: cardiac monitor and continuous pulse ox  Approach: left paramedian  Location: L3/L4  Provider prep: sterile gloves  Local infiltration: lidocaine  Agent: bupivacaine  Dose: 2  Dose: 2  Needle  Needle type: Pencan   Needle gauge: 24 G  Needle length: 4 in  Needle insertion depth: 5 cm  Assessment  Sensory level: T8  Swirl obtained: Yes  CSF: clear  Attempts: 1  Hemodynamics: stable

## 2019-04-09 NOTE — DISCHARGE INSTR - COC
Continuity of Care Form    Patient Name: Christal Navarro   :  1940  MRN:  [id number]    Admit date:  2019  Discharge date:      Code Status Order: Full Code   Advance Directives:   [facility] Documentation     Date/Time Healthcare Directive Type of Healthcare Directive Copy in [address] Agent's Name Healthcare Agent's Phone Number    [phone]  Yes, patient has an advance directive for healthcare treatment -- -- -- -- --          Admitting Physician:  Natalia Watts DO  PCP: Davina Florez PA-C    Discharging Nurse: Boaz Unit/Room#: 3548/6052-63  Discharging Unit Phone Number: [phone]    Emergency Contact:   Extended Emergency Contact Information  Primary Emergency Contact: Tammie James  Address: [place] [address]  Home Phone: [phone]  Relation: Domestic Partner  Secondary Emergency Contact: Benito Main  Address: [place] [address]  Home Phone: [phone]  Relation: Grandchild    Past Surgical History:  Past Surgical History:   Procedure Laterality Date    APPENDECTOMY      CARPAL TUNNEL RELEASE Right     HERNIA REPAIR      umbilical    HYSTERECTOMY      JOINT REPLACEMENT      NECK SURGERY      C5-C6    NOSE SURGERY      cancer removed    OTHER SURGICAL HISTORY      heart surg/growth in heart sac    TONSILLECTOMY      TOTAL HIP ARTHROPLASTY Right 2019       Immunization History:   Immunization History   Administered Date(s) Administered    Influenza Vaccine, unspecified formulation 10/04/2015    Influenza, High Dose (Fluzone 65 yrs and older) 10/28/2014, 10/15/2017, 2018    PPD Test 2018    Pneumococcal 13-valent Conjugate (Tiffany Habermann) 2018, 2018       Active Problems:  Patient Active Problem List   Diagnosis Code    Hypertension I10    Status post right hip replacement Z96.641    Allergic rhinitis J30.9    Atrial fibrillation (HCC) I48.91    Melanoma ([facility] C43.9    [facility] I21.9    Wears glasses Z97.3       Isolation/Infection:   Isolation          No Isolation            Nurse Assessment:  Last Vital Signs: BP (!) 142/64   Pulse (!) 47   Temp 95.8 °F (35.4 °C) (Oral)   Resp 16   Ht 5' 5.51\" (1.664 m)   Wt 195 lb (88.5 kg)   SpO2 100%   BMI 31.94 kg/m²     Last documented pain score (0-10 scale): Pain Level: 2  Last Weight:   Wt Readings from Last 1 Encounters:   04/09/19 195 lb (88.5 kg)     Mental Status:  oriented and alert    IV Access:  - None    Nursing Mobility/ADLs:  Walking   Assisted -walker X1 assist  Transfer  Assisted- walker X1 assist  Bathing  Assisted-set up  Dressing  Assisted  Toileting  Assisted-walker X1 assist  Feeding  Independent  Med Admin  Independent  Med Delivery   whole    Wound Care Documentation and Therapy:        Elimination:  Continence:   · Bowel: Yes  · Bladder: Yes  Urinary Catheter: None   Colostomy/Ileostomy/Ileal Conduit: No       Date of Last BM: n/a    Intake/Output Summary (Last 24 hours) at 4/9/2019 1236  Last data filed at 4/9/2019 1109  Gross per 24 hour   Intake 1167 ml   Output 500 ml   Net 667 ml     No intake/output data recorded. Safety Concerns:      At Risk for Falls    Impairments/Disabilities:      None    Nutrition Therapy:  Current Nutrition Therapy:   - Oral Diet:  General    Routes of Feeding: Oral  Liquids: No Restrictions  Daily Fluid Restriction: no  Last Modified Barium Swallow with Video (Video Swallowing Test): not done        Rehab Therapies: Physical Therapy and Occupational Therapy  Weight Bearing Status/Restrictions: No weight bearing restirctions  Other Medical Equipment (for information only, NOT a DME order):  walker  Other Treatments: ***    Patient's personal belongings (please select all that are sent with patient):  pt belonging bag    RN SIGNATURE:  Electronically signed by Valentine Jewell RN on 4/10/19 at 11:45 AM    CASE MANAGEMENT/SOCIAL WORK SECTION    Inpatient Status Date: 4/9/19    Readmission Risk Assessment Score:  Readmission Risk              Risk of Unplanned Readmission:        8           Discharging to Facility/ Agency   · Name: [facility]  [address] [facility] [place] [facility] [address]        Phone: [phone]       Fax: [phone]          ·     / signature: Electronically signed by Lily Dallas RN on 4/11/19 at 10:12 AM    PHYSICIAN SECTION    Prognosis: Good    Condition at Discharge: Stable    Rehab Potential (if transferring to Rehab): Good    Recommended Labs or Other Treatments After Discharge: NOne    Physician Certification: I certify the above information and transfer of Ching Talbert  is necessary for the continuing treatment of the diagnosis listed and that she requires Home Care for less 30 days.      Update Admission H&P: No change in H&P    PHYSICIAN SIGNATURE:  Electronically signed by Norma Jones DO on 4/10/19 at 11:21 AM

## 2019-04-09 NOTE — BRIEF OP NOTE
Brief Postoperative Note  ______________________________________________________________    Patient: Lynette Christensen  YOB: 1940  MRN: [id number]  Date of Procedure: 4/9/2019    Pre-Op Diagnosis: RIGHT HIP ARTHRITIS     Post-Op Diagnosis: Same       Procedure(s):  ASI HIP TOTAL ARTHROPLASTY - MEDACTA, C-ARM, MEDACTA TABLE, NSA= GENERAL VS SPINAL, FASCIA ILLIACA BLOCK, *STANDARD (RIGHT)    Anesthesia: Regional, General, Monitor Anesthesia Care, Spinal    Surgeon(s):  Guille Shore DO    Assistant: Christen Aguiar    Estimated Blood Loss (mL): 465     Complications: None    IVF: 1000 cc crystalloid    Implants:  Implant Name Type Inv. Item Serial No.  Lot No. LRB No. Used   IMPL HIP ACET SHELL CMNTLSS SZ 56 Hip IMPL HIP ACET SHELL CMNTLSS SZ 1000 [facility] [id number] Right 1   SCREW BONE CANC FLAT HEAD SZ 30 Screw/Plate/Nail/Owen SCREW BONE CANC FLAT HEAD SZ 30  [employer] [place] [id number] Right 1   IMPL HIP LINER  SZ 36 Hip IMPL HIP LINER  SZ 36  [employer] [place] [id number] Right 1   SCREW BONE CANC FLAT HEAD 6.5X15MM Screw/Plate/Nail/Owen SCREW BONE CANC FLAT HEAD 6.5X15MM  [employer] [place] [id number] Right 1   IMPL CEMENT PALACOS R Cement IMPL CEMENT [facility] [id number] Right 1   IMPL CEMENT PALACOS R Cement IMPL CEMENT [facility] [id number] Right 1   [employer] MEDULARRY CEMENT PLUG     [id number] Right 1   [employer]  FEMORAL STEM     [id number] Right 1   IMPL HIP COCR FEM HEAD 36MM 0 SZ M Hip IMPL HIP COCR FEM HEAD 36MM 0 SZ M  [employer] [place] [id number] Right 1         Drains:   Urethral Catheter Non-latex 16 fr (Active)       Findings: terminal right hip OA. See op note.      Radha Alonso DO  Date: 4/9/2019  Time: 9:21 AM

## 2019-04-09 NOTE — ANESTHESIA PROCEDURE NOTES
Peripheral Block    Patient location during procedure: post-op  Start time: 4/9/2019 10:00 AM  End time: 4/9/2019 10:08 AM  Staffing  Anesthesiologist: Viet Ochoa MD  Performed: anesthesiologist   Preanesthetic Checklist  Completed: patient identified, site marked, surgical consent, pre-op evaluation, timeout performed, IV checked, risks and benefits discussed, monitors and equipment checked, anesthesia consent given, oxygen available and patient being monitored  Peripheral Block  Patient position: left lateral decubitus  Prep: ChloraPrep  Patient monitoring: cardiac monitor, continuous pulse ox, frequent blood pressure checks and IV access  Block type: Lumbar plexus  Laterality: right  Injection technique: single-shot  Procedures: paresthesia technique  Local infiltration: lidocaine  Infiltration strength: 1 %  Dose: 3 mL  Provider prep: mask and sterile gloves  Local infiltration: lidocaine  Needle  Needle type: combined needle/nerve stimulator   Needle gauge: 21 G  Needle length: 10 cm  Needle localization: anatomical landmarks  Needle insertion depth: 6.5 cm  Assessment  Injection assessment: negative aspiration for heme, no paresthesia on injection and local visualized surrounding nerve on ultrasound  Paresthesia pain: none  Slow fractionated injection: yes  Hemodynamics: stable  Additional Notes              Reason for block: post-op pain management and at surgeon's request

## 2019-04-09 NOTE — PROGRESS NOTES
Patient has not urinated since surgery. Patient attempted to urinate on own. Bladder scan for 330mL  Notified resident, stated to re scan patient in a few hours and if greater than 400mL straight cath patient.

## 2019-04-09 NOTE — PROGRESS NOTES
Physical Therapy    Facility/Department: [address] ORTHO/MED SURG  Initial Assessment    NAME: Kari Garg  : 1940  MRN: [id number]    Date of Service: 2019    Discharge Recommendations:    Further therapy recommended at discharge. Assessment   Body structures, Functions, Activity limitations: Decreased functional mobility ; Decreased endurance;Decreased strength  Assessment: Good effort with ambulation with fairly good control of her R LE with mobilization. Prognosis: Good  Decision Making: Medium Complexity  Patient Education: PT  POC, Goals  REQUIRES PT FOLLOW UP: Yes  Activity Tolerance  Activity Tolerance: Patient limited by fatigue       Patient Diagnosis(es): The encounter diagnosis was Osteoarthritis of right hip, unspecified osteoarthritis type. has a past medical history of Allergic rhinitis, Arthritis, Atrial fibrillation ([facility] [address] Hypertension, Melanoma ([facility] [address] Myocardial infarction [facility]), Wears glasses, and Wears partial dentures. has a past surgical history that includes Appendectomy; other surgical history; Hysterectomy; Neck surgery; Carpal tunnel release (Right); Nose surgery; hernia repair; Tonsillectomy; joint replacement; and Total hip arthroplasty (Right, 2019).     Restrictions  Restrictions/Precautions  Restrictions/Precautions: Weight Bearing, Fall Risk  Required Braces or Orthoses?: No  Lower Extremity Weight Bearing Restrictions  Right Lower Extremity Weight Bearing: Weight Bearing As Tolerated  Position Activity Restriction  Hip Precautions: Posterior hip precautions  Vision/Hearing  Vision: Impaired  Vision Exceptions: Wears glasses at all times  Hearing: Within functional limits     Subjective  General  Patient assessed for rehabilitation services?: Yes  Response To Previous Treatment: Not applicable  Family / Caregiver Present: No  Follows Commands: Within Functional Limits  Pain Screening  Patient Currently in Pain: Yes  Pain Assessment  Pain Assessment: 0-10  Pain Assistance  Stand to sit: Minimal Assistance  Ambulation  Ambulation?: Yes  Ambulation 1  Surface: level tile  Device: Rolling Walker  Assistance: Minimal assistance  Distance: amb 25 ft with a RW x min assist with WBAT R LE     Balance  Posture: Good  Sitting - Static: Good  Sitting - Dynamic: Fair  Standing - Static: Fair  Standing - Dynamic: Fair  Exercises  Hamstring Sets: x 10 R LE  Quad Sets: x 10 R LE  Heelslides: x 10 R LE  Gluteal Sets: x 10  Knee Long Arc Quad: x 10 R LE  Ankle Pumps: x 10 R LE     Plan   Plan  Times per week: BID  Current Treatment Recommendations: Strengthening, Endurance Training, Gait Training, Functional Mobility Training, Stair training, Safety Education & Training  Safety Devices  Type of devices: Bed alarm in place, Call light within reach, Patient at risk for falls, Left in bed, Nurse notified    G-Code     OutComes Score    AM-PAC Score  AM-PAC Inpatient Mobility Raw Score : 16  AM-PAC Inpatient T-Scale Score : 40.78  Mobility Inpatient CMS 0-100% Score: 54.16  Mobility Inpatient CMS G-Code Modifier : CK     Goals  Short term goals  Time Frame for Short term goals: 10 visits  Short term goal 1: transfers with SBA  Short term goal 2: amb 200 ft with a RW x SBA with WBAT R LE  Short term goal 3: ascend/descend 4 steps with SBA  Short term goal 4: total hip exercises x SBA  Patient Goals   Patient goals : Return home       Therapy Time   Individual Concurrent Group Co-treatment   Time In 1300         Time Out 1330         Minutes [address], PT

## 2019-04-09 NOTE — PROGRESS NOTES
PROTOCOLS  NURSING IMPLEMENTED    TOTAL JOINT DVT/PE  VENOUS THROMBOEMBOLISM PROPHYLAXIS  (Nursing Automatically Implement)    Niki Jessica  [phone]  [email or web address]  4/9/19    YES DVT RISK FACTOR SCORE YES MAJOR BLEEDING RISK FACTORS SCORE     [x] 48years old or greater (1)   [] Hx. Easy Bleeding (1)      [] Heart failure (2)   [] NSAID Use in Last 5 Days (2)      [] Varicose veins - Hx. (1)   [] Gastrointestinal or Genitourinary bleeding in Last 14 Days (2)      [] Myocardial Infarction - Hx. (1)         [x] Cancer - Hx. (2)         [x] Atrial fibrillation - Hx. (1)         [] Ischemic Stroke - Hx. (1)         [] Diabetes Mellitus - Hx. (1)         [] Previous DVT/PE - Hx.  (2)         [] Hormone Replacement Therapy (1)         [] Obesity (1)         [] Paralysis (1)         [] Pregnancy (1)         [] Smoking (1)                   [] Thromophilia (1)   []   Mild to Moderate Bleeding (2)      [] Total Hip Arthroplasty (1)   [] Active Bleeding (4)      [] Family history of PE or DVT? (4) (Consider the following labs to test for presence of inhibitor deficiency state:) Factor V Leiden, Prothrombin Gene Mutation, Protein S Deficiency, Protien C Deficiency, Antithrombin Deficiency   [] Malignant Hypertension (2)        [] Thrombocytopenia 20k to 100k (2)        [] Thrombocytopenia less than 20k (4)        [] Bleeding Diathesis (4)        [] \"Bloody Stick\" Epidural or Spinal (2)     TOTAL DVT SCORE   TOTAL BLEEDING SCORE      [] CLASS A   Standard Risk DVT (0-3)    [x] CLASS X Standard Risk Bleeding (0-4)      [x] CLASS B Elevated Risk DVT (greater than 3)    [] CLASS Y High Risk Bleeding (greater than 4)     FINAL MATRIX (e.g. AY)       *If allergic to ASA use Warfarin  *BY patient consider no treatment  **Consider venous filter with high risk PE  **If on Coumadin pre-op, then restart night of surgery      []  DVT Prophylaxis: Class AX, AY    Ecotrin 81 mg by mouth BID starting day of surgery for 6 weeks for all total joints. (If allergic to aspirin, give eliquis 2.5mg BID X 14 days (knees) or 35 days  (hips) starting first day postop at 0600). [x]  DVT Prophylaxis:  Class BX (neo choice)    [x]Eliquis 2.5mg BID x 14 days (knees) or 35 days (hips) starting first day postop      at 0600      [] Lovenox 40 mg subcu daily starting first day postop at 0600 x 14 days                             (knees) or 35 days (hips)    Ecotrin 81 mg PO BID-start when Lovenox is finished. (Teach injection prior to discharge.)       [] Xarelto 10 mg by mouth daily starting first day postop at 0600     14 days (knees) or 35 days (hips). If creatinine clearance less than 30 mL/min, give Lovenox 30 mg subcu   Daily starting first day postop at 0600. Ecotrin 81 mg PO BID-starting   when Lovenox is finished. (Teach injection prior to      discharge.)  (For discharge fill throughout the 10 mg daily with no    refills.)      []  DVT Prophylaxis:  Class BY (neo choice)               []  Eliquis 2.5mg BID x 14 days (knees) or 35 days (hips) starting first day                              postop at 0600        []  Ecotrin 81 mg PO BID x 6weeks (all joints)      []  Lovenox 40mg SQ daily to start at 0600 first day post-Op day. 14 days for knees, 35 days for hips    Ecotrin 81 mg PO BID - start when Lovenox is finished. (Teach injection prior to discharge.)       [] Xarelto 10 mg PO daily starting first day Post-Op at 0600    14 days (knees) or 35 days (hips)    If Creatinine Clearance less than 30ml/min, give Lovenox 30 mg    SQ daily starting first day Post-Op at 0600 x 14 days (knees) or 35   days (hips). Ecotrin 81 mg PO BID - start when Lovenox is finished.     (Teach injection prior to discharge.)  (For discharge fill throughout the   10 mg daily #32 with no refills.)      Electronically signed by Martha Alonso DO on 4/9/2019 at 6:28 AM

## 2019-04-09 NOTE — CONSULTS
[address] / HISTORY AND PHYSICAL EXAMINATION            Date:   4/9/2019  Patient name:  Marie Saba  Date of admission:  4/9/2019  5:11 AM  MRN:   [id number]  Account:  [de-identified]  YOB: 1940  PCP:    Marnie Bernheim, PA-C  Room:   7796/2146-08  Code Status:    Full Code    Physician Requesting Consult: Sobia Mott DO    Reason for Consult:  Medical management    Chief Complaint:      Hip replacement    History Obtained From:     patient, electronic medical record    History of Present Illness:     Ms Nirali Velázquez is a pleasant [de-identified] Y O female that was admitted for elective R hip arthroplasty for chronic worsening OA. She has history of Afib, OA, HTN, CAD  Shunt is pretty much active and independent prior to admission  Currently she is status post right hip arthroplasty this a.m. She denies any chest pain, sob, nausea, vomiting, fever or chills. He is still uncomfortable at any position after the surgery which is expected    Past Medical History:     Past Medical History:   Diagnosis Date    Allergic rhinitis     Arthritis     Atrial fibrillation ([address]     Hypertension     Melanoma ([facility] [address]     on nose    Myocardial infarction [facility]) 2008    Wears glasses     Wears partial dentures     upper        Past Surgical History:     Past Surgical History:   Procedure Laterality Date    APPENDECTOMY      CARPAL TUNNEL RELEASE Right     HERNIA REPAIR      umbilical    HYSTERECTOMY      JOINT REPLACEMENT      NECK SURGERY      C5-C6    NOSE SURGERY      cancer removed    OTHER SURGICAL HISTORY      heart surg/growth in heart sac    TONSILLECTOMY      TOTAL HIP ARTHROPLASTY Right 04/09/2019        Medications Prior to Admission:     Prior to Admission medications    Medication Sig Start Date End Date Taking?  Authorizing Provider   CycloSPORINE (RESTASIS OP) Apply 1 drop to eye 2 times daily   Yes Historical Provider, MD   Multiple Vitamins-Minerals (HAIR SKIN AND NAILS FORMULA PO) Take by mouth   Yes Historical Provider, MD   losartan (COZAAR) 50 MG tablet Take 50 mg by mouth daily   Yes Historical Provider, MD   ranitidine (ZANTAC) 300 MG tablet nightly  6/18/18  Yes Historical Provider, MD   polyvinyl alcohol-povidone (REFRESH) 1.4-0.6 % ophthalmic solution Place 1-2 drops into both eyes 4 times daily   Yes Historical Provider, MD   triamterene-hydrochlorothiazide (MAXZIDE-25) 37.5-25 MG per tablet Take 1 tablet by mouth daily 12/29/15  Yes Historical Provider, MD   diltiazem (CARDIZEM CD) 240 MG ER capsule Take 240 mg by mouth daily. 8/8/14  Yes DUDLEY Malin - IRA   fluticasone-salmeterol (ADVAIR) 250-50 MCG/DOSE AEPB Inhale 1 puff into the lungs as needed    Yes Historical Provider, MD   nitroGLYCERIN (NITRODUR) 0.1 MG/HR Place 1 patch onto the skin daily. Yes Historical Provider, MD   Multiple Vitamins-Minerals (MULTIVITAL PO) Take  by mouth. Yes Historical Provider, MD   montelukast (SINGULAIR) 10 MG tablet Take 10 mg by mouth nightly    Historical Provider, MD   aspirin 81 MG tablet Take 81 mg by mouth daily Will stop 7 days before surgery    Historical Provider, MD   azelastine (ASTELIN) 137 MCG/SPRAY nasal spray 1 spray by Nasal route 2 times daily. Use in each nostril as directed    Historical Provider, MD   Calcium-Vitamin D (CALTRATE 600 PLUS-VIT D PO) Take  by mouth. Historical Provider, MD   warfarin (COUMADIN) 5 MG tablet Take 7.5 mg by mouth Will stop 4 day before surgery    Historical Provider, MD        Allergies:     Patient has no known allergies. Social History:     Tobacco:    reports that she quit smoking about 38 years ago. Her smoking use included cigarettes. She has a 20.00 pack-year smoking history. She has never used smokeless tobacco.  Alcohol:      reports that she does not drink alcohol. Drug Use:  reports that she does not use drugs.     Family History: supple, no carotid bruits, thyroid not palpable  Lungs: Bilateral equal air entry, clear to ausculation, no wheezing, rales or rhonchi, normal effort  Cardiovascular: normal rate, regular rhythm, no murmur, gallop, rub. Abdomen: Soft, nontender, nondistended, normal bowel sounds, no hepatomegaly or splenomegaly  Neurologic: There are no new focal motor or sensory deficits, normal muscle tone and bulk, no abnormal sensation, normal speech, cranial nerves II through XII grossly intact  Skin: No gross lesions, rashes, bruising or bleeding on exposed skin area  Extremities:  peripheral pulses palpable, no pedal edema or calf pain with palpation. Surgical site in dressing, CDI  Psych: normal affect    Investigations:      Laboratory Testing:  Recent Results (from the past 24 hour(s))   POTASSIUM (POC)    Collection Time: 04/09/19  6:34 AM   Result Value Ref Range    POC Potassium 4.0 3.5 - 4.5 mmol/L   POCT INR    Collection Time: 04/09/19  6:36 AM   Result Value Ref Range    Protime 12.0 10.4 - 14.2 sec    POC INR 1.0        Imaging/Diagonstics:    EXAMINATION:  SINGLE XRAY VIEW OF THE PELVIS AND 2 XRAY VIEWS RIGHT HIP   FINDINGS:  Right total hip arthroplasty is in place. There is no acute periprosthetic  fracture or evidence of prosthesis loosening. Overlying soft tissue gas is  compatible with recent surgery.        Assessment :      Primary Problem  Status post right hip replacement    Active Hospital Problems    Diagnosis Date Noted    Status post right hip replacement [Z96.641] 04/09/2019     Priority: High    Atrial fibrillation (HCC) [I48.91]      Priority: High    Sinus bradycardia [R00.1] 04/09/2019     Priority: Medium    Allergic rhinitis [J30.9]     History of MI (myocardial infarction) [I25.2] 01/01/2008       Plan:       Post op management per Ortho  Pain control, recommend to avoid IV pain meds  Titrate fluids to stop once tolerating diet  Monitor HR and vitals  Labs in am  PT/OT  Resume Warfarin

## 2019-04-09 NOTE — CARE COORDINATION
This patient is being followed by [address] [place] [address] for the [facility] Ortho Bundle:  Date of Surgery: 4/9/2019   90-day Post-Acute Medicare Bundle Initiative.

## 2019-04-09 NOTE — PROGRESS NOTES
Occupational Therapy   Occupational Therapy Initial Assessment  Date: 2019   Patient Name: Rayo Recinos  MRN: [id number]     : 1940    Date of Service: 2019    Discharge Recommendations:    Further therapy recommended at discharge. Assessment   Performance deficits / Impairments: Decreased functional mobility ; Decreased strength;Decreased endurance;Decreased ADL status; Decreased high-level IADLs  Treatment Diagnosis: R RENETTA   Prognosis: Good  Decision Making: Medium Complexity  Patient Education: pt ed on POc, purpose of eval, importance of movement, benefits of therapy, hip precautions, and safety during functional transfers/functional mobility. good return   REQUIRES OT FOLLOW UP: Yes  Activity Tolerance  Activity Tolerance: Patient Tolerated treatment well;Patient limited by pain  Safety Devices  Safety Devices in place: Yes  Type of devices: Gait belt;Call light within reach; Left in bed;Patient at risk for falls; Bed alarm in place  Restraints  Initially in place: No        Patient Diagnosis(es): The encounter diagnosis was Osteoarthritis of right hip, unspecified osteoarthritis type. has a past medical history of Allergic rhinitis, Arthritis, Atrial fibrillation ([facility] [address] Hypertension, Melanoma ([facility] [address] Myocardial infarction [facility]), Wears glasses, and Wears partial dentures. has a past surgical history that includes Appendectomy; other surgical history; Hysterectomy; Neck surgery; Carpal tunnel release (Right); Nose surgery; hernia repair; Tonsillectomy; joint replacement; and Total hip arthroplasty (Right, 2019).     Treatment Diagnosis: R RENETTA       Restrictions  Restrictions/Precautions  Restrictions/Precautions: Weight Bearing, Fall Risk  Required Braces or Orthoses?: No  Lower Extremity Weight Bearing Restrictions  Right Lower Extremity Weight Bearing: Weight Bearing As Tolerated  Position Activity Restriction  Hip Precautions: Posterior hip precautions    Subjective   General  Chart Reviewed: No  Patient assessed for rehabilitation services?: Yes  Family / Caregiver Present: Yes(pt daughter present for duration of session)  Diagnosis: R RENETTA   Subjective  Subjective: co-eval with PT  General Comment  Comments: RN ok'd for therapy this afternoon. Pt agreeable to participate in session and cooperative/pleasant throughout. Pt very motivated to get up and move. Pain Assessment  Pain Assessment: 0-10  Pain Level: 9  Pain Type: Surgical pain;Acute pain  Pain Location: Hip  Pain Orientation: Right  Non-Pharmaceutical Pain Intervention(s): Therapeutic presence;Distraction; Ambulation/Increased Activity  Response to Pain Intervention: Patient Satisfied    Oxygen Therapy  O2 Device: Nasal cannula  O2 Flow Rate (L/min): 2 L/min    Social/Functional History  Social/Functional History  Lives With: Daughter  Type of Home: House  Home Layout: One level  Home Access: Stairs to enter with rails  Entrance Stairs - Number of Steps: 3  Entrance Stairs - Rails: Both  Bathroom Shower/Tub: Tub/Shower unit  Bathroom Toilet: Standard  Bathroom Equipment: Toilet raiser, Grab bars around toilet, Grab bars in shower, Shower chair  Home Equipment: Standard walker(pt reported no use of DME at baseline )  Receives Help From: Family  ADL Assistance: [facility] [facility] [address]: [address] [place] [address] Responsibilities: Yes(pt reported completing [de-identified] of IADLs but daughter assists )  Meal Prep Responsibility: Primary  Laundry Responsibility: Primary  Cleaning Responsibility: Primary  Shopping Responsibility: Primary  Ambulation Assistance: Independent  Transfer Assistance: Independent  Active : Yes  Mode of Transportation: Car  Occupation: Retired, Part time employment  Type of occupation: Retired from "[employer]. 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   Additional Comments: pt reported daughter works during the day and pt reported doesn't spend a lot of time at home, on the go majority of time Objective   Vision: Impaired  Vision Exceptions: Wears glasses at all times  Hearing: Within functional limits    Orientation  Overall Orientation Status: Within Functional Limits     Balance  Sitting Balance: Supervision(~10 minutes on EOB and on toilet )  Standing Balance: Contact guard assistance(with RW)  Standing Balance  Time: ~4 minutes  Activity: pt completed functional mobility to/from bathroom   Sit to stand: Minimal assistance  Stand to sit: Minimal assistance  Comment: pt with no LOB but reported increased pain during movement      ADL  Feeding: Modified independent   Grooming: Modified independent   UE Bathing: Supervision  LE Bathing: Moderate assistance  UE Dressing: Supervision  LE Dressing:  Moderate assistance(pt required assistance for donning bilateral socks )  Toileting: Minimal assistance(pt attempted used restroom during session and required min A for toilet transfer )  Tone RUE  RUE Tone: Normotonic  Tone LUE  LUE Tone: Normotonic  Coordination  Movements Are Fluid And Coordinated: Yes     Bed mobility  Supine to Sit: Minimal assistance  Sit to Supine: Contact guard assistance  Scooting: Minimal assistance  Transfers  Sit to stand: Minimal assistance  Stand to sit: Minimal assistance  Transfer Comments: with RW      Cognition  Overall Cognitive Status: WFL     Sensation  Overall Sensation Status: WFL      LUE AROM : WFL  Left Hand AROM: WFL  RUE AROM : WFL  Right Hand AROM: WFL    LUE Strength  Gross LUE Strength: Exceptions to [facility] [place]  L Hand Grasp: 4/5  LUE Strength Comment: overall muscle strength 4/5   RUE Strength  Gross RUE Strength: Exceptions to [facility] [place]  R Hand Grasp: 4/5  RUE Strength Comment: overall muscle strength 4/5       Plan   Plan  Times per week: 5-7x/wk (joint)    AM-PAC Score   AM-[facility] Inpatient Daily Activity Raw Score: 18  AM-PAC Inpatient ADL T-Scale Score : 38.66  ADL Inpatient CMS 0-100% Score: 46.65  ADL Inpatient CMS G-Code Modifier : CK    Goals  Short term goals  Time

## 2019-04-09 NOTE — ANESTHESIA POSTPROCEDURE EVALUATION
Department of Anesthesiology  Postprocedure Note    Patient: Sayra Toro  MRN: [id number]  YOB: 1940  Date of evaluation: 4/9/2019  Time:  10:14 AM     Procedure Summary     Date:  04/09/19 Room / Location:  [address] OR    Anesthesia Start:  1846 Anesthesia Stop:  5378    Procedure:  HIP TOTAL ARTHROPLASTY - MEDACTA, C-ARM, MEDACTA TABLE, NSA= GENERAL VS SPINAL, FASCIA ILLIACA BLOCK, *STANDARD (Right ) Diagnosis:  (RIGHT HIP ARTHRITIS )    Surgeon:  Brad Wynn DO Responsible Provider:  Flynn Escobedo MD    Anesthesia Type:  MAC, spinal, regional, general ASA Status:  3          Anesthesia Type: MAC, spinal, regional, general    Zeke Phase I: Zeke Score: 8    Zeke Phase II:      Last vitals: Reviewed and per EMR flowsheets.        Anesthesia Post Evaluation    Patient location during evaluation: PACU  Patient participation: complete - patient participated  Level of consciousness: awake  Pain score: 2  Airway patency: patent  Nausea & Vomiting: no vomiting and no nausea  Complications: no  Cardiovascular status: blood pressure returned to baseline  Respiratory status: acceptable  Hydration status: euvolemic  Comments: Post op LP block done

## 2019-04-10 LAB
ANION GAP SERPL CALCULATED.3IONS-SCNC: 14 MMOL/L (ref 9–17)
ANION GAP SERPL CALCULATED.3IONS-SCNC: 14 MMOL/L (ref 9–17)
BUN BLDV-MCNC: 39 MG/DL (ref 8–23)
BUN BLDV-MCNC: 48 MG/DL (ref 8–23)
BUN/CREAT BLD: ABNORMAL (ref 9–20)
BUN/CREAT BLD: ABNORMAL (ref 9–20)
CALCIUM SERPL-MCNC: 7.5 MG/DL (ref 8.6–10.4)
CALCIUM SERPL-MCNC: 8.2 MG/DL (ref 8.6–10.4)
CHLORIDE BLD-SCNC: 95 MMOL/L (ref 98–107)
CHLORIDE BLD-SCNC: 98 MMOL/L (ref 98–107)
CO2: 19 MMOL/L (ref 20–31)
CO2: 20 MMOL/L (ref 20–31)
CREAT SERPL-MCNC: 1.68 MG/DL (ref 0.5–0.9)
CREAT SERPL-MCNC: 1.95 MG/DL (ref 0.5–0.9)
GFR AFRICAN AMERICAN: 30 ML/MIN
GFR AFRICAN AMERICAN: 36 ML/MIN
GFR NON-AFRICAN AMERICAN: 25 ML/MIN
GFR NON-AFRICAN AMERICAN: 29 ML/MIN
GFR SERPL CREATININE-BSD FRML MDRD: ABNORMAL ML/MIN/{1.73_M2}
GLUCOSE BLD-MCNC: 122 MG/DL (ref 70–99)
GLUCOSE BLD-MCNC: 143 MG/DL (ref 70–99)
HCT VFR BLD CALC: 34.4 % (ref 36.3–47.1)
HEMOGLOBIN: 10.9 G/DL (ref 11.9–15.1)
INR BLD: 0.9
MCH RBC QN AUTO: 30 PG (ref 25.2–33.5)
MCHC RBC AUTO-ENTMCNC: 31.7 G/DL (ref 28.4–34.8)
MCV RBC AUTO: 94.8 FL (ref 82.6–102.9)
NRBC AUTOMATED: 0 PER 100 WBC
PDW BLD-RTO: 13.2 % (ref 11.8–14.4)
PLATELET # BLD: 226 K/UL (ref 138–453)
PMV BLD AUTO: 10.7 FL (ref 8.1–13.5)
POTASSIUM SERPL-SCNC: 4.5 MMOL/L (ref 3.7–5.3)
POTASSIUM SERPL-SCNC: 5 MMOL/L (ref 3.7–5.3)
PROTHROMBIN TIME: 9.9 SEC (ref 9–12)
RBC # BLD: 3.63 M/UL (ref 3.95–5.11)
SODIUM BLD-SCNC: 129 MMOL/L (ref 135–144)
SODIUM BLD-SCNC: 131 MMOL/L (ref 135–144)
WBC # BLD: 13 K/UL (ref 3.5–11.3)

## 2019-04-10 PROCEDURE — 6370000000 HC RX 637 (ALT 250 FOR IP): Performed by: FAMILY MEDICINE

## 2019-04-10 PROCEDURE — 36415 COLL VENOUS BLD VENIPUNCTURE: CPT

## 2019-04-10 PROCEDURE — 2700000000 HC OXYGEN THERAPY PER DAY

## 2019-04-10 PROCEDURE — 97110 THERAPEUTIC EXERCISES: CPT

## 2019-04-10 PROCEDURE — 85610 PROTHROMBIN TIME: CPT

## 2019-04-10 PROCEDURE — 94664 DEMO&/EVAL PT USE INHALER: CPT

## 2019-04-10 PROCEDURE — 85027 COMPLETE CBC AUTOMATED: CPT

## 2019-04-10 PROCEDURE — 97116 GAIT TRAINING THERAPY: CPT

## 2019-04-10 PROCEDURE — 1200000000 HC SEMI PRIVATE

## 2019-04-10 PROCEDURE — 97530 THERAPEUTIC ACTIVITIES: CPT

## 2019-04-10 PROCEDURE — 2580000003 HC RX 258: Performed by: FAMILY MEDICINE

## 2019-04-10 PROCEDURE — 6360000002 HC RX W HCPCS: Performed by: STUDENT IN AN ORGANIZED HEALTH CARE EDUCATION/TRAINING PROGRAM

## 2019-04-10 PROCEDURE — 97535 SELF CARE MNGMENT TRAINING: CPT

## 2019-04-10 PROCEDURE — 51798 US URINE CAPACITY MEASURE: CPT

## 2019-04-10 PROCEDURE — 94640 AIRWAY INHALATION TREATMENT: CPT

## 2019-04-10 PROCEDURE — 6360000002 HC RX W HCPCS: Performed by: FAMILY MEDICINE

## 2019-04-10 PROCEDURE — 6370000000 HC RX 637 (ALT 250 FOR IP): Performed by: STUDENT IN AN ORGANIZED HEALTH CARE EDUCATION/TRAINING PROGRAM

## 2019-04-10 PROCEDURE — 99232 SBSQ HOSP IP/OBS MODERATE 35: CPT | Performed by: FAMILY MEDICINE

## 2019-04-10 PROCEDURE — 80048 BASIC METABOLIC PNL TOTAL CA: CPT

## 2019-04-10 RX ORDER — TRAMADOL HYDROCHLORIDE 50 MG/1
50 TABLET ORAL EVERY 6 HOURS PRN
Status: DISCONTINUED | OUTPATIENT
Start: 2019-04-10 | End: 2019-04-12 | Stop reason: HOSPADM

## 2019-04-10 RX ORDER — SODIUM CHLORIDE 9 MG/ML
INJECTION, SOLUTION INTRAVENOUS CONTINUOUS
Status: DISCONTINUED | OUTPATIENT
Start: 2019-04-10 | End: 2019-04-12

## 2019-04-10 RX ORDER — 0.9 % SODIUM CHLORIDE 0.9 %
1000 INTRAVENOUS SOLUTION INTRAVENOUS ONCE
Status: COMPLETED | OUTPATIENT
Start: 2019-04-10 | End: 2019-04-10

## 2019-04-10 RX ORDER — PROMETHAZINE HYDROCHLORIDE 25 MG/ML
12.5 INJECTION, SOLUTION INTRAMUSCULAR; INTRAVENOUS EVERY 6 HOURS PRN
Status: DISCONTINUED | OUTPATIENT
Start: 2019-04-10 | End: 2019-04-12 | Stop reason: HOSPADM

## 2019-04-10 RX ORDER — DOCUSATE SODIUM 100 MG/1
100 CAPSULE, LIQUID FILLED ORAL 2 TIMES DAILY PRN
Qty: 60 CAPSULE | Refills: 0 | Status: SHIPPED | OUTPATIENT
Start: 2019-04-10 | End: 2019-09-13

## 2019-04-10 RX ORDER — WARFARIN SODIUM 7.5 MG/1
7.5 TABLET ORAL
Status: COMPLETED | OUTPATIENT
Start: 2019-04-10 | End: 2019-04-10

## 2019-04-10 RX ORDER — TRAMADOL HYDROCHLORIDE 50 MG/1
100 TABLET ORAL EVERY 6 HOURS PRN
Status: DISCONTINUED | OUTPATIENT
Start: 2019-04-10 | End: 2019-04-12 | Stop reason: HOSPADM

## 2019-04-10 RX ORDER — METOPROLOL TARTRATE 5 MG/5ML
5 INJECTION INTRAVENOUS EVERY 6 HOURS PRN
Status: DISCONTINUED | OUTPATIENT
Start: 2019-04-10 | End: 2019-04-12 | Stop reason: HOSPADM

## 2019-04-10 RX ORDER — OXYCODONE HYDROCHLORIDE AND ACETAMINOPHEN 5; 325 MG/1; MG/1
1-2 TABLET ORAL EVERY 6 HOURS PRN
Qty: 42 TABLET | Refills: 0 | Status: SHIPPED | OUTPATIENT
Start: 2019-04-10 | End: 2019-04-17

## 2019-04-10 RX ADMIN — CYCLOSPORINE 1 DROP: 0.5 EMULSION OPHTHALMIC at 21:03

## 2019-04-10 RX ADMIN — ONDANSETRON 4 MG: 2 INJECTION INTRAMUSCULAR; INTRAVENOUS at 08:12

## 2019-04-10 RX ADMIN — ACETAMINOPHEN 1000 MG: 500 TABLET ORAL at 13:15

## 2019-04-10 RX ADMIN — SODIUM CHLORIDE 1000 ML: 9 INJECTION, SOLUTION INTRAVENOUS at 13:19

## 2019-04-10 RX ADMIN — TRAMADOL HYDROCHLORIDE 50 MG: 50 TABLET, FILM COATED ORAL at 21:02

## 2019-04-10 RX ADMIN — ONDANSETRON 4 MG: 2 INJECTION INTRAMUSCULAR; INTRAVENOUS at 17:27

## 2019-04-10 RX ADMIN — ASPIRIN 81 MG: 81 TABLET ORAL at 08:58

## 2019-04-10 RX ADMIN — OXYCODONE HYDROCHLORIDE 10 MG: 5 TABLET ORAL at 02:05

## 2019-04-10 RX ADMIN — DILTIAZEM HYDROCHLORIDE 240 MG: 240 CAPSULE, COATED, EXTENDED RELEASE ORAL at 08:58

## 2019-04-10 RX ADMIN — WARFARIN SODIUM 7.5 MG: 7.5 TABLET ORAL at 17:27

## 2019-04-10 RX ADMIN — ACETAMINOPHEN 1000 MG: 500 TABLET ORAL at 05:54

## 2019-04-10 RX ADMIN — SODIUM CHLORIDE 1000 ML: 9 INJECTION, SOLUTION INTRAVENOUS at 13:18

## 2019-04-10 RX ADMIN — KETOROLAC TROMETHAMINE 15 MG: 30 INJECTION, SOLUTION INTRAMUSCULAR; INTRAVENOUS at 04:08

## 2019-04-10 RX ADMIN — MONTELUKAST SODIUM 10 MG: 10 TABLET, FILM COATED ORAL at 21:02

## 2019-04-10 RX ADMIN — PROMETHAZINE HYDROCHLORIDE 12.5 MG: 25 INJECTION INTRAMUSCULAR; INTRAVENOUS at 10:19

## 2019-04-10 RX ADMIN — CYCLOSPORINE 1 DROP: 0.5 EMULSION OPHTHALMIC at 08:58

## 2019-04-10 RX ADMIN — MOMETASONE FUROATE AND FORMOTEROL FUMARATE DIHYDRATE 2 PUFF: 200; 5 AEROSOL RESPIRATORY (INHALATION) at 08:44

## 2019-04-10 RX ADMIN — ACETAMINOPHEN 1000 MG: 500 TABLET ORAL at 22:36

## 2019-04-10 RX ADMIN — SODIUM CHLORIDE: 9 INJECTION, SOLUTION INTRAVENOUS at 08:59

## 2019-04-10 RX ADMIN — TRAMADOL HYDROCHLORIDE 100 MG: 50 TABLET, FILM COATED ORAL at 13:16

## 2019-04-10 RX ADMIN — HEPARIN SODIUM 5000 UNITS: 5000 INJECTION INTRAVENOUS; SUBCUTANEOUS at 05:55

## 2019-04-10 RX ADMIN — SODIUM CHLORIDE: 9 INJECTION, SOLUTION INTRAVENOUS at 12:30

## 2019-04-10 RX ADMIN — OXYCODONE HYDROCHLORIDE 10 MG: 5 TABLET ORAL at 05:54

## 2019-04-10 RX ADMIN — SODIUM CHLORIDE 1000 ML: 9 INJECTION, SOLUTION INTRAVENOUS at 10:20

## 2019-04-10 ASSESSMENT — ENCOUNTER SYMPTOMS
WHEEZING: 0
VOICE CHANGE: 0
COUGH: 0
SHORTNESS OF BREATH: 0
CONSTIPATION: 0
SINUS PRESSURE: 0
VOMITING: 0
DIARRHEA: 0
SORE THROAT: 0
NAUSEA: 1
ABDOMINAL PAIN: 0
BLOOD IN STOOL: 0

## 2019-04-10 ASSESSMENT — PAIN SCALES - GENERAL
PAINLEVEL_OUTOF10: 6
PAINLEVEL_OUTOF10: 2
PAINLEVEL_OUTOF10: 2
PAINLEVEL_OUTOF10: 7
PAINLEVEL_OUTOF10: 5
PAINLEVEL_OUTOF10: 7
PAINLEVEL_OUTOF10: 5

## 2019-04-10 ASSESSMENT — PAIN DESCRIPTION - LOCATION
LOCATION: HIP

## 2019-04-10 ASSESSMENT — PAIN DESCRIPTION - FREQUENCY: FREQUENCY: CONTINUOUS

## 2019-04-10 ASSESSMENT — PAIN DESCRIPTION - ORIENTATION
ORIENTATION: RIGHT
ORIENTATION: RIGHT

## 2019-04-10 ASSESSMENT — PAIN DESCRIPTION - PROGRESSION: CLINICAL_PROGRESSION: NOT CHANGED

## 2019-04-10 ASSESSMENT — PAIN DESCRIPTION - PAIN TYPE
TYPE: SURGICAL PAIN

## 2019-04-10 ASSESSMENT — PAIN DESCRIPTION - DESCRIPTORS
DESCRIPTORS: ACHING;SORE
DESCRIPTORS: ACHING;DISCOMFORT;SORE
DESCRIPTORS: DISCOMFORT

## 2019-04-10 ASSESSMENT — PAIN DESCRIPTION - ONSET: ONSET: ON-GOING

## 2019-04-10 NOTE — PROGRESS NOTES
Pharmacy Note     Renal Dose Adjustment    Kari Garg is a [de-identified] y.o. female. Pharmacist assessment of renally cleared medications. Recent Labs     04/10/19  0602   BUN 39*       Recent Labs     04/10/19  0602   CREATININE 1.68*       Estimated Creatinine Clearance: 30 mL/min (A) (based on SCr of 1.68 mg/dL (H)). Estimated CrCl using Ideal Body Weight: 24.5 mL/min (based on IBW 58.2 kg)    Height:   Ht Readings from Last 1 Encounters:   04/09/19 5' 5.51\" (1.664 m)     Weight:  Wt Readings from Last 1 Encounters:   04/09/19 195 lb (88.5 kg)       The following medication dose has been adjusted based upon renal function per P&T Guidelines:             Enoxaparin adjusted to 30 mg daily for CrCl below 30 ml/min.   Priscila Augustin PharmD, BCPS  4/10/2019  11:37 AM

## 2019-04-10 NOTE — PROGRESS NOTES
Physical Therapy  Facility/Department: [address] ORTHO/MED SURG  Daily Treatment Note  NAME: Fred Og  : 1940  MRN: [id number]    Date of Service: 4/10/2019    Discharge Recommendations:Further therapy recommended at discharge. Patient Diagnosis(es): The primary encounter diagnosis was Osteoarthritis of right hip, unspecified osteoarthritis type. Diagnoses of Post-op pain and Status post right hip replacement were also pertinent to this visit. has a past medical history of Allergic rhinitis, Arthritis, Atrial fibrillation ([facility] [address] Hypertension, Melanoma ([facility] [address] Myocardial infarction [facility]), Wears glasses, and Wears partial dentures. has a past surgical history that includes Appendectomy; other surgical history; Hysterectomy; Neck surgery; Carpal tunnel release (Right); Nose surgery; hernia repair; Tonsillectomy; joint replacement; Total hip arthroplasty (Right, 2019); and Total hip arthroplasty (Right, 2019). Restrictions  Restrictions/Precautions  Restrictions/Precautions: Weight Bearing, Fall Risk  Required Braces or Orthoses?: No  Lower Extremity Weight Bearing Restrictions  Right Lower Extremity Weight Bearing: Weight Bearing As Tolerated  Position Activity Restriction  Hip Precautions: Posterior hip precautions  Other position/activity restrictions: Up with assist.  Subjective   General  Chart Reviewed: Yes  Response To Previous Treatment: Patient with no complaints from previous session. Family / Caregiver Present: No  Subjective  Subjective: RN and Pt agreeable to PT.  Pt resting in chair upon arrival, repost feeling better this afternoon  General Comment  Comments: Pt threw up during treatment x2  Pain Screening  Patient Currently in Pain: Denies  Vital Signs  Patient Currently in Pain: Denies       Orientation  Orientation  Overall Orientation Status: Within Normal Limits  Cognition      Objective   Bed mobility  Rolling to Left: Minimal assistance  Supine to Sit: Minimal assistance  Sit to Supine: Contact guard assistance  Scooting: Modified independent  Comment: Increase time required d/t pt feeling sick in stomach. Transfers  Sit to Stand: Minimal Assistance  Stand to sit: Minimal Assistance  Comment: Increase time to clear symptoms of dizziness upon standing. Ambulation  Ambulation?: Yes  Ambulation 1  Surface: level tile  Device: Rolling Walker  Assistance: Minimal assistance;Contact guard assistance  Quality of Gait: decrease quique forward head posture. Distance: 100ft x2  Comments: Cues given for safe turns. Pt feeling noxious requiring mulitple rest breaks for symptoms to clear. Balance  Posture: Good  Sitting - Static: (Pt sat on toilet sit for 6 minutes . demo fraustration for not being able to empty bladder, stating \"I have been trying all day \")  Sitting - Dynamic: Fair;+  Standing - Static: Fair  Standing - Dynamic: Fair  Comments: BUE support on RW with standing  Exercises  Hamstring Sets: 10reps  Quad Sets: 10reps  Gluteal Sets: 10reps  Knee Long Arc Quad: 10reps  Ankle Pumps: 10reps  Comments: . THE. Cues given for technique. Total Hip Arthroplasty Exercise Program: Quad sets, glut sets, ankle pumps, heel slides, short arc quads, hip abduction slides. Reps: 10     Assessment   Body structures, Functions, Activity limitations: Decreased functional mobility ; Decreased endurance;Decreased strength  Assessment: Pt amb 100ft x2 using Rw amd MIN-CGA for safety. Multiple rest breaks d/t fatigue and pt feeling noxious. Prognosis: Good  Patient Education: Safe mobility.  Hip preacuations  REQUIRES PT FOLLOW UP: Yes  Activity Tolerance  Activity Tolerance: Patient limited by fatigue;Treatment limited secondary to medical complications (free text)  Activity Tolerance: rest as needed     Goals  Short term goals  Time Frame for Short term goals: 10 visits  Short term goal 1: transfers with SBA  Short term goal 2: amb 200 ft with a RW x SBA with WBAT R LE  Short term goal 3: ascend/descend 4 steps with SBA  Short term goal 4: total hip exercises x SBA  Patient Goals   Patient goals : Return home    Plan    Plan  Times per week: BID  Current Treatment Recommendations: Strengthening, Endurance Training, Gait Training, Functional Mobility Training, Stair training, Safety Education & Training  Safety Devices  Type of devices: Bed alarm in place, Call light within reach, Patient at risk for falls, Left in bed, Nurse notified     Therapy Time   Individual Concurrent Group Co-treatment   Time In 0305         Time Out 0353         Minutes RIKI Kauffman

## 2019-04-10 NOTE — CARE COORDINATION
Joint Replacement Navigator Daily Rounding Note    Admission Date:   4/9/2019 Pavan Silver is a [de-identified] y.o.  female    Post Op Day # 1    Labs:         Recent Labs     04/10/19  0602   WBC 13.0*   HGB 10.9*          Recent Labs     04/10/19  0602   *   K 4.5   CL 95*   CO2 20   BUN 39*   CREATININE 1.68*   GLUCOSE 143*         Met with:     Patient  Patient's LOC:   fatigued  Patient progress   Slow, is nauseus and had emesis  Patient's Pain:   Patient rates pain managed  Oral Intake:    decreased but adequate  Output:    decreased monitoring   Sherman in:   no  infublock   no  Patient - ready for self care yes  Willing    Yes   Able    Yes with the following limitation  Renal function off, not urinating  Family or others are Ready for promoting self care  Yes   Willing    yes  Able    Yes    Patient goals  home    Considerations for care plan   Daughter will be assisting  Patients response to lifestyle changes  accepting  Additional educational needs are  None, spend time with patient and also daughter yesrterday  Barriers to self care plan   none  Plan for next level of care:  Home with home care [facility]    Walker/DME:    DME needed:    walker and      DME Agency:     received from [facility]    Anticoagulation:  Anticoagulation:  Is on lovenox now. Also coumadin, needs therapeutic INR  Prescription in chart:  yes     Continuity of Care: The [address] form is on the chart. The [address] form is not signed by the physician. Discharge Planning:  Confirmed with patient that discharge plan is Home with [address] [place] [address]. Agency/Facility chosen: [facility]  Awaiting pre-certification no  Anticipated discharge date: tomorrow    Self care instructions were reviewed with patient. Patient's questions and concerns were answered. Actively listened and provided reassurance.      Electronically signed by: Madonna Gann RN on 4/10/2019 at 12:19 PM

## 2019-04-10 NOTE — PROGRESS NOTES
50 mg Oral Daily   montelukast 10 mg Oral Nightly   nitroGLYCERIN 1 patch Transdermal Daily   triamterene-hydrochlorothiazide 1 tablet Oral Daily   sodium chloride flush 10 mL Intravenous 2 times per day   acetaminophen 1,000 mg Oral 3 times per day   ketorolac 15 mg Intravenous Q6H   gabapentin 300 mg Oral Nightly   mometasone-formoterol 2 puff Inhalation BID   warfarin (COUMADIN) daily dosing (placeholder)  Other RX Placeholder       Review of Systems   Review of Systems   Constitutional: Positive for appetite change. Negative for activity change, chills, fatigue, fever and unexpected weight change. HENT: Negative for congestion, mouth sores, postnasal drip, sinus pressure, sore throat and voice change. Eyes: Negative for visual disturbance. Respiratory: Negative for cough, shortness of breath and wheezing. Cardiovascular: Negative for chest pain and palpitations. Gastrointestinal: Positive for nausea. Negative for abdominal pain, blood in stool, constipation, diarrhea and vomiting. Endocrine: Negative for polyuria. Genitourinary: Negative for difficulty urinating, dysuria, frequency and urgency. Musculoskeletal: Negative for arthralgias, joint swelling and myalgias. Neurological: Negative for dizziness, tremors, speech difficulty, light-headedness and headaches.      Objective :      Current Vitals : Temp: 97.6 °F (36.4 °C),  Pulse: 68, Resp: 17, BP: (!) 110/47, SpO2: 97 %  Last 24 Hrs Vitals   Patient Vitals for the past 24 hrs:   BP Temp Temp src Pulse Resp SpO2   04/10/19 0745 (!) 110/47 97.6 °F (36.4 °C) Oral 68 17 97 %   04/10/19 0400 132/75 97.8 °F (36.6 °C) Oral 70 16 98 %   04/09/19 2315 119/67 97.5 °F (36.4 °C) Oral 61 17 95 %   04/09/19 1551 (!) 124/57 98.4 °F (36.9 °C) Oral 58 20 100 %   04/09/19 1120 (!) 142/64 95.8 °F (35.4 °C) Oral (!) 47 16 100 %   04/09/19 1030 110/82 96.8 °F (36 °C) Temporal (!) 44 17 99 %   04/09/19 1015 (!) 103/43 -- -- (!) 46 15 98 %   04/09/19 1000 (!) 102/37 -- -- (!) 47 14 95 %   04/09/19 0945 (!) 97/51 -- -- (!) 49 23 94 %   04/09/19 0930 (!) 102/39 -- -- 54 23 94 %   04/09/19 0924 (!) 100/42 96.8 °F (36 °C) Temporal 54 12 97 %     Intake / output   04/09 0701 - 04/10 0700  In: 1760.3 [I.V.:1760.3]  Out: 900 [Urine:600]  Physical Exam:  Physical Exam   Constitutional: She is oriented to person, place, and time. She appears well-developed and well-nourished. No distress. Nasal cannula in place. HENT:   Mouth/Throat: No oropharyngeal exudate. Eyes: Pupils are equal, round, and reactive to light. Conjunctivae are normal. No scleral icterus. Neck: No JVD present. No thyromegaly present. Cardiovascular: Normal rate, regular rhythm and normal heart sounds. Exam reveals no gallop and no friction rub. No murmur heard. Pulmonary/Chest: Effort normal. No respiratory distress. She has no wheezes. She has no rales. Abdominal: Soft. Bowel sounds are normal. She exhibits no distension and no mass. There is no tenderness. There is no rebound and no guarding. Musculoskeletal:   Right hip surgical wound   Lymphadenopathy:     She has no cervical adenopathy. Neurological: She is alert and oriented to person, place, and time. No cranial nerve deficit. She exhibits normal muscle tone. Skin: She is not diaphoretic. Nursing note and vitals reviewed.     Lower Extremities : No ankle Edema , No calf Tenderness     Laboratory findings:    Recent Labs     04/09/19  0636 04/10/19  0602   WBC  --  13.0*   HGB  --  10.9*   HCT  --  34.4*   PLT  --  226   INR 1.0 0.9     Recent Labs     04/10/19  0602   *   K 4.5   CL 95*   CO2 20   GLUCOSE 143*   BUN 39*   CREATININE 1.68*   CALCIUM 8.2*     No results for input(s): PROT, LABALBU, LABA1C, S6LQCUE, A9KYCOG, FT4, TSH, AST, ALT, LDH, GGT, ALKPHOS, BILITOT, BILIDIR, AMMONIA, AMYLASE, LIPASE, LACTATE, CHOL, HDL, LDLCHOLESTEROL, CHOLHDLRATIO, TRIG, VLDL, BNP, TROPONINI, CKTOTAL, CKMB, CKMBINDEX, RF, DO in the last 72 hours.       Specific Gravity, UA   Date Value Ref Range Status   03/22/2019 1.009 1.005 - 1.030 Final     Protein, UA   Date Value Ref Range Status   03/22/2019 NEGATIVE NEGATIVE Final     RBC, UA   Date Value Ref Range Status   12/01/2014 2 TO 5 0 - 2 /HPF Final     Blood, UA POC   Date Value Ref Range Status   12/05/2014 negative  Final     Bacteria, UA   Date Value Ref Range Status   12/01/2014 NOT REPORTED NONE Final     Nitrite, Urine   Date Value Ref Range Status   03/22/2019 NEGATIVE NEGATIVE Final     WBC, UA   Date Value Ref Range Status   12/01/2014 2 TO 5 0 - 5 /HPF Final     Leukocyte Esterase, Urine   Date Value Ref Range Status   03/22/2019 NEGATIVE NEGATIVE Final     Echocardiogram 11/22/17  LVEF 65%, no regional wall motion abnormality. 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 No pericardial effusion. Trace mitral regurgitation mild tricuspid regurgitation. Clinical Course : unchanged  Assessment and Plan  :        1. Paroxysmal atrial fibrillation  - in NSR. Rate controlled. Continue Cardizem. Coumadin resumed 4/9/19   2. SEGUN - . Pre renal - secondary to poor oral intake. Hold  losartan. Continue IV fluids. 3. Right hip arthritis s/p right hip arthroplasty - postop surgical management per orthopedic surgery. 4. Essential hypertension - well controlled  5. Former smoker - quit 38 years ago  10. Chronic diastolic dysfunction - compensated  7. Nausea and vomiting - likley secondary to opioid. Use tylenol and tramadol. Limit Narcotics. PT OT     Continue to monitor vitals , Intake / output ,  Cell count , HGB , Kidney function, oxygenation  as indicated . Plan and updates discussed with patient ,  answers  explained to satisfaction.    Plan discussed with Staff Randi AGUILERA     (Please note that portions of this note were completed with a voice recognition program. Efforts were made to edit the dictations but occasionally words are mis-transcribed.)      Kiana Pisano MD  4/10/2019

## 2019-04-10 NOTE — PROGRESS NOTES
post-operative restrictions, we have determined that they will need: bedside commode, shower chair and wheeled walker    ----------------------------------------  Hakan Franco DO  PGY-1, Department of [facility] [address], [place], [place]      PGY-3 Addendum    Patient seen and examined. Agree with above. Patient doing well. Appropriate post op TTP. No acute issues. Tolerating PO intake. Patient would like to try to go home today. Exam as above. Plan is to mobilize with therapy. Will DC home if doing well. DC instructions placed. All questions answered. Since patient wanting to go home, will DC heparin and start lovenox to bridge until Warfarin therapeutic. 5 day script provided to patient to use upon discharge.        Eben Smith DO PGY-3  Orthopedic Surgery Resident  [facility], [facility]

## 2019-04-10 NOTE — PLAN OF CARE
Problem: Discharge Planning:  Goal: Knowledge of discharge instructions  Description  Knowledge of discharge instructions  Outcome: Ongoing     Problem: Infection - Surgical Site:  Goal: Signs of wound healing will improve  Description  Signs of wound healing will improve  4/10/2019 1804 by Jose A Acevedo  Outcome: Ongoing  4/10/2019 0449 by María Hammer RN  Outcome: Ongoing     Problem: Mobility - Impaired:  Goal: Achieve maximum mobility level  Description  Achieve maximum mobility level  Outcome: Ongoing     Problem: Pain - Acute:  Goal: Pain level will decrease  Description  Pain level will decrease  4/10/2019 1804 by Jose A Acevedo  Outcome: Ongoing  4/10/2019 0449 by María Hammer RN  Outcome: Ongoing     Problem: Pain:  Goal: Pain level will decrease  Description  Pain level will decrease  4/10/2019 1804 by Jose A Acevedo  Outcome: Ongoing  4/10/2019 0449 by María Hammer RN  Outcome: Ongoing  Goal: Control of acute pain  Description  Control of acute pain  4/10/2019 1804 by Jose A Acevedo  Outcome: Ongoing  4/10/2019 0449 by María Hammer RN  Outcome: Ongoing  Goal: Control of chronic pain  Description  Control of chronic pain  Outcome: Ongoing     Problem: Musculor/Skeletal Functional Status  Goal: Highest potential functional level  Outcome: Ongoing     Problem: Falls - Risk of:  Goal: Will remain free from falls  Description  Will remain free from falls  Outcome: Ongoing  Goal: Absence of physical injury  Description  Absence of physical injury  Outcome: Ongoing

## 2019-04-10 NOTE — OP NOTE
[facility]                                OPERATIVE REPORT    PATIENT NAME: Luis Hinson                     :        1940  MED REC NO:   [id number]                             ROOM:       6128  ACCOUNT NO:   [de-identified]                           ADMIT DATE: 2019  PROVIDER:     Mandy Earl    DATE OF PROCEDURE:  2019    PREOPERATIVE DIAGNOSIS:  Right hip severe degenerative joint disease. POSTOPERATIVE DIAGNOSIS:  Right hip severe degenerative joint disease. PROCEDURE:  Right total hip arthroplasty. SURGEON:  Mandy Earl DO    ASSISTANTS:  Maria Isabel Singer DO; Rosa Greer DO    ANESTHESIA:  Spinal and regional.    COMPLICATIONS:  None. ESTIMATED BLOOD LOSS:  Per brief postoperative note. DISPOSITION:  To PACU in stable condition. NARRATIVE SUMMARY:  The patient is an 79-year-old female who presented  to [address] [place] [address] Surgical Department for right total hip  arthroplasty. The patient has had progressively worsening right hip  pain which has failed to improve despite conservative therapy to include  activity modifications. Symptoms are greatly affecting her usual  activities of daily living, and as a result, the patient has indicated  she would like surgical intervention at this time. The patient was identified preoperatively, where consent was obtained,  signed, placed on the chart. The procedure was described, her questions  were answered. The risks of the procedure were described to include,  but not limited to, the risk of anesthesia; infection; bleeding; need  for further surgery in future; numbness, tingling, weakness, and pain to  the right lower extremity; scar; limp; leg-length inequality; fracture;  hip dislocation; DVT; and death. She notes understanding of these risks  and states she wishes to proceed.   She was administered IV antibiotics  perioperatively. She was then taken to the operative suite, where she  was placed supine upon the operative table. Spinal anesthesia was  affected after a regional block was affected to the right lower  extremity. The patient was placed on AMIS table with her perineum  placed against a well-padded perineal post.  The right lower extremity  was placed in a well-padded traction boot hastings and the left lower  extremity placed in a well-padded Snyder stand. The right lower extremity  was then prepped and draped in sterile fashion. After an appropriate  surgical time-out, incision was made over the muscle belly of the tensor  fascia caroline. Sharp incision was carried through the skin and  subcutaneous tissue. Full-thickness skin flaps were raised over the  fascia, which was incised sharply in-line with the skin incision. Subfascial dissection anteriorly exposed the interval between the  sartorius and the tensor fascia caroline, where the circumflex vessels were  identified and ligated. The pericapsular fat was excised. The rectus  and the iliocapsularis were gently elevated from the anterior aspect of  the capsule, and the anterior capsule was excised. That exposed the  femoral neck and head. Appropriate femoral neck cut was made, and  femoral head was removed from the acetabulum. The acetabulum was then  inspected. Significant amount of chondromalacia of the femoral head and  acetabulum was appreciated to grade-4 chondromalacia. Marginal  osteophytes, labrum, and pulvinar were excised from the acetabulum. The  acetabulum was gently reamed to the appropriate size, and the  appropriately sized acetabular cup was then placed and impacted until  fully seated at an appropriate theta angle and anteversion. Supplemental screw fixation was affected using proper AO technique with  2 screws. An acetabular liner was then placed and impacted until fully seated. This was a neutral liner.     Attention was then drawn to preparation of the femur. The pubofemoral  and ischiofemoral ligaments were released. The proximal femur was then  externally rotated, adducted, and extended, bringing the femur into the  incision. Retractors were placed circumferentially around the femur for  exposure. Gentle box osteotome and broaching of the femoral canal was  then affected. It was determined at this time that cortical bone was a  little thin, and it was felt that cementing the femoral stem in would be  appropriate. The appropriately sized femoral stem was then cemented in  using a third-generation cement technique with pressurization and a  distal cement restrictor. Once this was cemented in the appropriate  version, trial heads were placed. It was reduced, put through range of  motion, and was found to be stable. Trial was then dislocated, and an  implantable head was then placed and impacted on the Reema Knife taper. The  hip was reduced, put through range of motion, and was found to be  stable. Leg lengths were noted be approximate with superimposed  fluoroscopic images. Final fluoroscopic images were taken and saved in  the AP and lateral projection. Dilute Betadine solution was placed  within the incision for 3 minutes and then thoroughly irrigated and  suctioned. The incision was then closed in layers with absorbable  suture, with the first layer being the fascial layer and then  subcutaneous fatty layer and then the subcuticular layer. Dermabond was  used cutaneously. Sterile dressing was applied. Anesthesia was  reversed. The patient was taken to the postop recovery room in stable  condition. There were no immediate postoperative complications, and the  procedure was tolerated well. IMPLANTS:  Medacta AMIS cemented size 4, highly polished stem, with a  56-mm Mpact acetabular cup with 2 supplemental screws, a 36-mm neutral  liner, and a cobalt-chrome standard 36-mm, +0 head.         Georgina GARCIA: 04/10/2019 6:52:52       T: 04/10/2019 6:56:16     [email or web address]  Job#: [id number]     Doc#: [id number]    CC:

## 2019-04-10 NOTE — PLAN OF CARE
Problem: Infection - Surgical Site:  Goal: Signs of wound healing will improve  Description  Signs of wound healing will improve  4/10/2019 0449 by Van Moreira RN  Outcome: Ongoing     Problem: Pain - Acute:  Goal: Pain level will decrease  Description  Pain level will decrease  4/10/2019 0449 by Van Moreira RN  Outcome: Ongoing     Problem: Pain:  Goal: Pain level will decrease  Description  Pain level will decrease  4/10/2019 0449 by Van Moreira RN  Outcome: Ongoing     Problem: Pain:  Goal: Control of acute pain  Description  Control of acute pain  4/10/2019 0449 by Van Moreira RN  Outcome: Ongoing

## 2019-04-10 NOTE — PROGRESS NOTES
Physical Therapy  Facility/Department: [address] ORTHO/MED SURG  Daily Treatment Note  NAME: Doris Chávez  : 1940  MRN: [id number]    Date of Service: 4/10/2019    Discharge Recommendations:Further therapy recommended at discharge. Patient Diagnosis(es): The primary encounter diagnosis was Osteoarthritis of right hip, unspecified osteoarthritis type. Diagnoses of Post-op pain and Status post right hip replacement were also pertinent to this visit. has a past medical history of Allergic rhinitis, Arthritis, Atrial fibrillation ([facility] [address] Hypertension, Melanoma ([facility] [address] Myocardial infarction [facility]), Wears glasses, and Wears partial dentures. has a past surgical history that includes Appendectomy; other surgical history; Hysterectomy; Neck surgery; Carpal tunnel release (Right); Nose surgery; hernia repair; Tonsillectomy; joint replacement; and Total hip arthroplasty (Right, 2019). Restrictions  Restrictions/Precautions  Restrictions/Precautions: Weight Bearing, Fall Risk  Required Braces or Orthoses?: No  Lower Extremity Weight Bearing Restrictions  Right Lower Extremity Weight Bearing: Weight Bearing As Tolerated  Position Activity Restriction  Hip Precautions: Posterior hip precautions  Subjective   General  Chart Reviewed: Yes  Response To Previous Treatment: Patient with no complaints from previous session. Family / Caregiver Present: No  Subjective  Subjective: RN and Pt agreeable to PT. Pt alert in bed upon arrival, reports feeling nauseated and throwing up since morning.    General Comment  Comments: Pt threw up during treatment x2  Pain Screening  Patient Currently in Pain: Denies  Vital Signs  Patient Currently in Pain: Denies       Orientation  Orientation  Overall Orientation Status: Within Normal Limits  Cognition      Objective   Bed mobility  Rolling to Left: Minimal assistance  Supine to Sit: Minimal assistance  Sit to Supine: Contact guard assistance  Scooting: Minimal assistance  Comment: Increase time required d/t pt feeling sick in stomach. Transfers  Sit to Stand: Minimal Assistance  Stand to sit: Minimal Assistance  Ambulation  Ambulation?: Yes  Ambulation 1  Surface: level tile  Device: Rolling Walker  Assistance: Minimal assistance  Quality of Gait: decrease quique forward head posture. Distance: 12ft x2  Comments: distance limited by pt throwing up and feeling weak. Balance  Posture: Good  Sitting - Static: (Sat on toilet sit for ~10minutes )  Sitting - Dynamic: Fair;+  Standing - Static: Fair  Standing - Dynamic: Fair  Comments: BUE support on RW with standing  Exercises  Hamstring Sets: 10reps  Quad Sets: 10reps  Gluteal Sets: 10reps  Knee Long Arc Quad: 10reps  Ankle Pumps: 10reps  Comments: . THE. Cues given for technique. Assessment   Body structures, Functions, Activity limitations: Decreased functional mobility ; Decreased endurance;Decreased strength  Assessment: Pt amb 12ft x2 using RW  min A, limited nausea and vomiting. Prognosis: Good  Patient Education: Safe mobility.  Hip preacuations  REQUIRES PT FOLLOW UP: Yes  Activity Tolerance  Activity Tolerance: Patient limited by fatigue;Treatment limited secondary to medical complications (free text)  Activity Tolerance: rest as needed d/t nausea and vomitting     Goals  Short term goals  Time Frame for Short term goals: 10 visits  Short term goal 1: transfers with SBA  Short term goal 2: amb 200 ft with a RW x SBA with WBAT R LE  Short term goal 3: ascend/descend 4 steps with SBA  Short term goal 4: total hip exercises x SBA  Patient Goals   Patient goals : Return home    Plan    Plan  Times per week: BID  Current Treatment Recommendations: Strengthening, Endurance Training, Gait Training, Functional Mobility Training, Stair training, Safety Education & Training  Safety Devices  Type of devices: Bed alarm in place, Call light within reach, Patient at risk for falls, Left in bed, Nurse notified     Therapy Time   Individual Concurrent Group Co-treatment   Time In 0909         Time Out 0950         Minutes [address] [place], [facility]

## 2019-04-10 NOTE — PROGRESS NOTES
Pharmacy Note  Warfarin Consult follow-up      Recent Labs     04/10/19  0602   INR 0.9     Recent Labs     04/10/19  0602   HGB 10.9*   HCT 34.4*          Significant Drug-Drug Interactions:  New warfarin drug-drug interactions: Lovenox  Discontinued drug-drug interactions: cefazolin  Current warfarin drug-drug interactions: acetaminophen, aspirin,  dexamethasone,       Date INR Dose   4/9/19 1.0 10 mg   4/10/19 0.9 7.5 mg       Notes:      Patient restarted warfarin yesterday so it is expected that INR is low today. Will order warfarin 7.5 mg which is an increase from 5 mg home dose. Daily PT/INR while inpatient. [address] [place] [address]  Ph., CACP, Clinical Pharmacist  Anticoagulation Services, [address] [place] [address] Coumadin Clinic  4/10/2019  2:31 PM

## 2019-04-10 NOTE — PROGRESS NOTES
assist.  Subjective   General  Chart Reviewed: No  Patient assessed for rehabilitation services?: Yes  Family / Caregiver Present: No  Diagnosis: R RENETTA   Subjective  Pain Assessment  Pain Assessment: 0-10  Pain Level: 7  Pain Type: Surgical pain  Pain Location: Hip  Pain Orientation: Right  Pain Descriptors: Aching;Discomfort; Sore  Pain Frequency: Continuous  Non-Pharmaceutical Pain Intervention(s): Therapeutic presence;Distraction; Ambulation/Increased Activity  Vital Signs  Patient Currently in Pain: Yes   Orientation  Orientation  Overall Orientation Status: Within Functional Limits  Objective    ADL  Grooming: Setup;Supervision(Oral care standing at sink.)  UE Bathing: Setup;Stand by assistance(Seated EOB, max A for back.)  LE Bathing: Setup;Stand by assistance  UE Dressing: Contact guard assistance(To manage gown.)  LE Dressing: Moderate assistance  Toileting: Setup;Stand by assistance(Pericare/bottom care standing at sink.)  Additional Comments: Pt completed ADL care seated EOB, surgical soap used appropriately. Oral care/pericare completed standing at sink. Balance  Sitting Balance: Supervision(Seated EOB unsupported.)  Standing Balance: Contact guard assistance  Standing Balance  Time: 10 minutes  Activity: Functional mobility to/from bathroom using RW, ADL care standing at sink. Sit to stand: Contact guard assistance(Completed x2.)  Stand to sit: Contact guard assistance(Completed x2.)  Functional Mobility  Functional - Mobility Device: Rolling Walker  Activity: To/from bathroom  Assist Level: Contact guard assistance  Functional Mobility Comments: No LOB noted.   Bed mobility  Supine to Sit: Minimal assistance(To manage RLE to EOB.)  Scooting: Modified independent  Transfers  Sit to stand: Contact guard assistance(Completed x2.)  Stand to sit: Contact guard assistance(Completed x2.)  Cognition  Overall Cognitive Status: [facility]    Plan   Plan  Times per week: 5-7x/wk (joint)    Goals  Short term goals  Time Frame for Short term goals: pt will, by discharge  Short term goal 1: dem SBA during functional transfers/functional mobility with LRD  Short term goal 2: dem ~8 minutes dynamic standing tolerance with LRD and SBA in order to complete functional tasks  Short term goal 3: increase activity tolerance to 25+ minutes in order to participate in [address] term goal 4: complete LB ADLs with min A, set up and AE, as needed  Short term goal 5: complete UB ADLs and grooming tasks with supervision and set up  Short term goal 6: ind maintain hip precautions during functional transfers/functional mobility        Therapy Time   Individual Concurrent Group Co-treatment   Time In 1345         Time Out 1445         Minutes 60            Pt supine in bed upon therapist arrival. Pleasant and agreeable to therapy. See above for LOF for all tasks. Pt retired to seated in chair at end of session with chair alarm activated and call light within reach.     KANDY Cramer/COLIN

## 2019-04-11 ENCOUNTER — APPOINTMENT (OUTPATIENT)
Dept: GENERAL RADIOLOGY | Age: 79
DRG: 470 | End: 2019-04-11
Attending: ORTHOPAEDIC SURGERY
Payer: MEDICARE

## 2019-04-11 ENCOUNTER — APPOINTMENT (OUTPATIENT)
Dept: ULTRASOUND IMAGING | Age: 79
DRG: 470 | End: 2019-04-11
Attending: ORTHOPAEDIC SURGERY
Payer: MEDICARE

## 2019-04-11 PROBLEM — N17.9 AKI (ACUTE KIDNEY INJURY) (HCC): Status: ACTIVE | Noted: 2019-04-11

## 2019-04-11 LAB
ANION GAP SERPL CALCULATED.3IONS-SCNC: 11 MMOL/L (ref 9–17)
BNP INTERPRETATION: ABNORMAL
BUN BLDV-MCNC: 53 MG/DL (ref 8–23)
BUN/CREAT BLD: ABNORMAL (ref 9–20)
CALCIUM SERPL-MCNC: 7.4 MG/DL (ref 8.6–10.4)
CHLORIDE BLD-SCNC: 95 MMOL/L (ref 98–107)
CO2: 18 MMOL/L (ref 20–31)
CREAT SERPL-MCNC: 1.88 MG/DL (ref 0.5–0.9)
GFR AFRICAN AMERICAN: 31 ML/MIN
GFR NON-AFRICAN AMERICAN: 26 ML/MIN
GFR SERPL CREATININE-BSD FRML MDRD: ABNORMAL ML/MIN/{1.73_M2}
GFR SERPL CREATININE-BSD FRML MDRD: ABNORMAL ML/MIN/{1.73_M2}
GLUCOSE BLD-MCNC: 104 MG/DL (ref 70–99)
INR BLD: 1.1
POTASSIUM SERPL-SCNC: 5.2 MMOL/L (ref 3.7–5.3)
PRO-BNP: 542 PG/ML
PROTHROMBIN TIME: 11.9 SEC (ref 9–12)
SODIUM BLD-SCNC: 124 MMOL/L (ref 135–144)

## 2019-04-11 PROCEDURE — 6370000000 HC RX 637 (ALT 250 FOR IP): Performed by: FAMILY MEDICINE

## 2019-04-11 PROCEDURE — 6370000000 HC RX 637 (ALT 250 FOR IP): Performed by: STUDENT IN AN ORGANIZED HEALTH CARE EDUCATION/TRAINING PROGRAM

## 2019-04-11 PROCEDURE — 97535 SELF CARE MNGMENT TRAINING: CPT

## 2019-04-11 PROCEDURE — 97110 THERAPEUTIC EXERCISES: CPT

## 2019-04-11 PROCEDURE — 2580000003 HC RX 258: Performed by: FAMILY MEDICINE

## 2019-04-11 PROCEDURE — 51798 US URINE CAPACITY MEASURE: CPT

## 2019-04-11 PROCEDURE — 99232 SBSQ HOSP IP/OBS MODERATE 35: CPT | Performed by: FAMILY MEDICINE

## 2019-04-11 PROCEDURE — 94640 AIRWAY INHALATION TREATMENT: CPT

## 2019-04-11 PROCEDURE — 97116 GAIT TRAINING THERAPY: CPT

## 2019-04-11 PROCEDURE — 1200000000 HC SEMI PRIVATE

## 2019-04-11 PROCEDURE — 51701 INSERT BLADDER CATHETER: CPT

## 2019-04-11 PROCEDURE — 80048 BASIC METABOLIC PNL TOTAL CA: CPT

## 2019-04-11 PROCEDURE — 36415 COLL VENOUS BLD VENIPUNCTURE: CPT

## 2019-04-11 PROCEDURE — 83880 ASSAY OF NATRIURETIC PEPTIDE: CPT

## 2019-04-11 PROCEDURE — 76770 US EXAM ABDO BACK WALL COMP: CPT

## 2019-04-11 PROCEDURE — 6360000002 HC RX W HCPCS: Performed by: STUDENT IN AN ORGANIZED HEALTH CARE EDUCATION/TRAINING PROGRAM

## 2019-04-11 PROCEDURE — 71045 X-RAY EXAM CHEST 1 VIEW: CPT

## 2019-04-11 PROCEDURE — 85610 PROTHROMBIN TIME: CPT

## 2019-04-11 PROCEDURE — 97530 THERAPEUTIC ACTIVITIES: CPT

## 2019-04-11 RX ORDER — PANTOPRAZOLE SODIUM 40 MG/1
40 TABLET, DELAYED RELEASE ORAL
Status: DISCONTINUED | OUTPATIENT
Start: 2019-04-12 | End: 2019-04-12 | Stop reason: HOSPADM

## 2019-04-11 RX ORDER — METOCLOPRAMIDE 5 MG/1
5 TABLET ORAL
Status: DISCONTINUED | OUTPATIENT
Start: 2019-04-11 | End: 2019-04-12 | Stop reason: HOSPADM

## 2019-04-11 RX ORDER — SUCRALFATE 1 G/1
1 TABLET ORAL EVERY 6 HOURS SCHEDULED
Status: DISCONTINUED | OUTPATIENT
Start: 2019-04-11 | End: 2019-04-12 | Stop reason: HOSPADM

## 2019-04-11 RX ORDER — POLYETHYLENE GLYCOL 3350 17 G/17G
17 POWDER, FOR SOLUTION ORAL DAILY
Status: DISCONTINUED | OUTPATIENT
Start: 2019-04-11 | End: 2019-04-12 | Stop reason: HOSPADM

## 2019-04-11 RX ORDER — WARFARIN SODIUM 7.5 MG/1
7.5 TABLET ORAL
Status: COMPLETED | OUTPATIENT
Start: 2019-04-11 | End: 2019-04-11

## 2019-04-11 RX ORDER — DOCUSATE SODIUM 100 MG/1
100 CAPSULE, LIQUID FILLED ORAL DAILY
Status: DISCONTINUED | OUTPATIENT
Start: 2019-04-11 | End: 2019-04-12 | Stop reason: HOSPADM

## 2019-04-11 RX ORDER — TAMSULOSIN HYDROCHLORIDE 0.4 MG/1
0.4 CAPSULE ORAL DAILY
Status: DISCONTINUED | OUTPATIENT
Start: 2019-04-11 | End: 2019-04-12 | Stop reason: HOSPADM

## 2019-04-11 RX ADMIN — ACETAMINOPHEN 1000 MG: 500 TABLET ORAL at 14:32

## 2019-04-11 RX ADMIN — DOCUSATE SODIUM 100 MG: 100 CAPSULE, LIQUID FILLED ORAL at 11:15

## 2019-04-11 RX ADMIN — ENOXAPARIN SODIUM 30 MG: 30 INJECTION SUBCUTANEOUS at 07:59

## 2019-04-11 RX ADMIN — GABAPENTIN 300 MG: 300 CAPSULE ORAL at 20:51

## 2019-04-11 RX ADMIN — SUCRALFATE 1 G: 1 TABLET ORAL at 17:23

## 2019-04-11 RX ADMIN — WARFARIN SODIUM 7.5 MG: 7.5 TABLET ORAL at 17:23

## 2019-04-11 RX ADMIN — POLYETHYLENE GLYCOL 3350 17 G: 17 POWDER, FOR SOLUTION ORAL at 11:15

## 2019-04-11 RX ADMIN — SODIUM CHLORIDE: 9 INJECTION, SOLUTION INTRAVENOUS at 21:25

## 2019-04-11 RX ADMIN — SODIUM CHLORIDE: 9 INJECTION, SOLUTION INTRAVENOUS at 11:21

## 2019-04-11 RX ADMIN — CYCLOSPORINE 1 DROP: 0.5 EMULSION OPHTHALMIC at 07:59

## 2019-04-11 RX ADMIN — ACETAMINOPHEN 1000 MG: 500 TABLET ORAL at 22:19

## 2019-04-11 RX ADMIN — MOMETASONE FUROATE AND FORMOTEROL FUMARATE DIHYDRATE 2 PUFF: 200; 5 AEROSOL RESPIRATORY (INHALATION) at 19:09

## 2019-04-11 RX ADMIN — TRAMADOL HYDROCHLORIDE 50 MG: 50 TABLET, FILM COATED ORAL at 11:24

## 2019-04-11 RX ADMIN — SUCRALFATE 1 G: 1 TABLET ORAL at 11:14

## 2019-04-11 RX ADMIN — CYCLOSPORINE 1 DROP: 0.5 EMULSION OPHTHALMIC at 20:51

## 2019-04-11 RX ADMIN — TAMSULOSIN HYDROCHLORIDE 0.4 MG: 0.4 CAPSULE ORAL at 11:15

## 2019-04-11 RX ADMIN — ASPIRIN 81 MG: 81 TABLET ORAL at 07:59

## 2019-04-11 RX ADMIN — METOCLOPRAMIDE 5 MG: 5 TABLET ORAL at 11:15

## 2019-04-11 RX ADMIN — ACETAMINOPHEN 1000 MG: 500 TABLET ORAL at 06:39

## 2019-04-11 RX ADMIN — METOCLOPRAMIDE 5 MG: 5 TABLET ORAL at 17:23

## 2019-04-11 RX ADMIN — DILTIAZEM HYDROCHLORIDE 240 MG: 240 CAPSULE, COATED, EXTENDED RELEASE ORAL at 07:59

## 2019-04-11 RX ADMIN — MONTELUKAST SODIUM 10 MG: 10 TABLET, FILM COATED ORAL at 20:51

## 2019-04-11 ASSESSMENT — PAIN DESCRIPTION - DESCRIPTORS
DESCRIPTORS: ACHING
DESCRIPTORS: ACHING

## 2019-04-11 ASSESSMENT — ENCOUNTER SYMPTOMS
SINUS PRESSURE: 0
CONSTIPATION: 1
NAUSEA: 1
WHEEZING: 0
SHORTNESS OF BREATH: 0
BLOOD IN STOOL: 0
DIARRHEA: 0
SORE THROAT: 0
ABDOMINAL PAIN: 0
VOICE CHANGE: 0
VOMITING: 0
COUGH: 0

## 2019-04-11 ASSESSMENT — PAIN SCALES - GENERAL
PAINLEVEL_OUTOF10: 3
PAINLEVEL_OUTOF10: 6
PAINLEVEL_OUTOF10: 3
PAINLEVEL_OUTOF10: 4
PAINLEVEL_OUTOF10: 5
PAINLEVEL_OUTOF10: 4
PAINLEVEL_OUTOF10: 4
PAINLEVEL_OUTOF10: 6

## 2019-04-11 ASSESSMENT — PAIN DESCRIPTION - ORIENTATION
ORIENTATION: RIGHT;ANTERIOR
ORIENTATION: RIGHT;ANTERIOR

## 2019-04-11 ASSESSMENT — PAIN DESCRIPTION - PAIN TYPE
TYPE: ACUTE PAIN;SURGICAL PAIN
TYPE: ACUTE PAIN;SURGICAL PAIN

## 2019-04-11 ASSESSMENT — PAIN DESCRIPTION - FREQUENCY
FREQUENCY: CONTINUOUS
FREQUENCY: CONTINUOUS

## 2019-04-11 ASSESSMENT — PAIN DESCRIPTION - LOCATION
LOCATION: HIP
LOCATION: HIP

## 2019-04-11 NOTE — PROGRESS NOTES
Orthopedic Progress Note    Patient:  Yinka Brink  YOB: 1940     [de-identified] y.o. female    Subjective:  Patient seen and examined this morning. No complaints or concerns at this time. She has been straight cathed 3 times, states she feels the urge but has not gone on her own. Patient will be heading home, she lives with her daughter. She has mild hip pain but states it is controlled on current regimen. Denies fever, HA, CP, SOB, N/V, dysuria, numbness or tingling. -BM/+flatus. Tolerating PO intake. PT ambulated 112 feet. Vitals reviewed, afebrile    Objective:   Vitals:    04/11/19 0400   BP: 132/60   Pulse: 55   Resp: 16   Temp: 97.8 °F (36.6 °C)   SpO2: 100%     Gen: NAD, cooperative  Cardiovascular: Regular rate, no dependent edema, distal pulses 2+  Respiratory: Chest symmetric, no accessory muscle use, normal respirations, no audible wheezes   RLE: Optifoam dressing in place to right hip; thigh and leg compartments soft and compressible. Calf appropriately tender post-op; Negative Hernando's. 2+ DP/PT pulses w/ BCR. TA/EHL/FHL/GS motor intact. Deep & Superficial Peroneal/Saphenous/Sural/Plantar SILT. Recent Labs     04/10/19  0602 04/10/19  1638   WBC 13.0*  --    HGB 10.9*  --    HCT 34.4*  --      --    INR 0.9  --    * 131*   K 4.5 5.0   BUN 39* 48*   CREATININE 1.68* 1.95*   GLUCOSE 143* 122*      Meds: Heparin, Coumadin, ASA  See rec for complete list    Impression/plan: [de-identified] y.o. female s/p Right total hip arthroplasty, POD#2    - WB status: Weight bearing as tolerated right lower extremity  - Completed post op Ancef. - Pain control PO/IV Medication. Attempt to Wean IV medications.   - DVT ppx: EPC, Heparin, Warfarin.  - IM on for assistance with medical management. - Ice as needed for pain and swelling (20 minutes per hour). - Encourage Incentive Spirometry use and deep breathing.   - PT/OT to continue treatment.   - DC planning consist of home with home health. - Please page ortho with any questions. Patient seen and examined. Discussed the need for medical equipment to assist with their recovery during the post-operative period.   Based on the patient's current physical condition and post-operative restrictions, we have determined that they will need: bedside commode, shower chair and wheeled walker    ----------------------------------------  Latisha Abrams DO  PGY-1, Department of [address], [place], [place]

## 2019-04-11 NOTE — PROGRESS NOTES
Pharmacy Note  Warfarin Consult follow-up      Recent Labs     04/11/19  0524   INR 1.1     Recent Labs     04/10/19  0602   HGB 10.9*   HCT 34.4*          Significant Drug-Drug Interactions:  New warfarin drug-drug interactions: Lovenox  Discontinued drug-drug interactions: dexamethasone  Current warfarin drug-drug interactions: acetaminophen, aspirin,  Lovenox       Date INR Dose   4/9/19 1.0 10 mg   4/10/19 0.9 7.5 mg   4/11/19 1.1 7.5 mg       Notes:          Warfarin 7.5 mg ordered per home regimen for today. Daily PT/INR while inpatient. [address] [place] [address]  Ph., CACP, Clinical Pharmacist  Anticoagulation Services, [address] [facility] Coumadin Clinic  4/11/2019  7:44 AM

## 2019-04-11 NOTE — PLAN OF CARE
Problem: Discharge Planning:  Goal: Knowledge of discharge instructions  Description  Knowledge of discharge instructions  4/11/2019 0506 by Anthony Solano RN  Outcome: Ongoing     Problem: Infection - Surgical Site:  Goal: Signs of wound healing will improve  Description  Signs of wound healing will improve  4/11/2019 0506 by Anthony Solano RN  Outcome: Ongoing     Problem: Mobility - Impaired:  Goal: Achieve maximum mobility level  Description  Achieve maximum mobility level  4/11/2019 0506 by Anthony Solaon RN  Outcome: Ongoing     Problem: Pain - Acute:  Goal: Pain level will decrease  Description  Pain level will decrease  4/11/2019 0506 by Anthony Solano RN  Outcome: Ongoing     Problem: Pain:  Goal: Pain level will decrease  Description  Pain level will decrease  4/11/2019 0506 by Anthony Solano RN  Outcome: Ongoing     Problem: Pain:  Goal: Control of acute pain  Description  Control of acute pain  4/11/2019 0506 by Anthony Solano RN  Outcome: Ongoing     Problem: Pain:  Goal: Control of chronic pain  Description  Control of chronic pain  4/11/2019 0506 by Anthony Solano RN  Outcome: Ongoing     Problem: Musculor/Skeletal Functional Status  Goal: Highest potential functional level  4/11/2019 0506 by Anthony Solano RN  Outcome: Ongoing     Problem: Falls - Risk of:  Goal: Will remain free from falls  Description  Will remain free from falls  4/11/2019 0506 by Anthony Solano RN  Outcome: Ongoing     Problem: Falls - Risk of:  Goal: Absence of physical injury  Description  Absence of physical injury  4/11/2019 0506 by Anthony Solano RN  Outcome: Ongoing

## 2019-04-11 NOTE — PROGRESS NOTES
Orthopedic Progress Note    Patient:  Terris Angelucci  YOB: 1940     [de-identified] y.o. female    Subjective:  Patient seen and examined this morning. No acute issues. Pleased with surgical outcome. Denies fever, HA, CP, SOB, N/V, dysuria, numbness or tingling. Tolerating PO intake. PT ambulated 224 feet. Having difficulty with urination. Vitals reviewed, afebrile    Objective:   Vitals:    04/11/19 0400   BP: 132/60   Pulse: 55   Resp: 16   Temp: 97.8 °F (36.6 °C)   SpO2: 100%     Gen: NAD, cooperative   RLE: Optifoam dressing in place to right hip; thigh and leg compartments soft and compressible. Appropriate post op TTP. +2 DP/PT pulses w/ BCR. TA/EHL/FHL/GS motor intact. Deep & Superficial Peroneal/Saphenous/Sural/Plantar SILT. Recent Labs     04/10/19  0602 04/10/19  1638   WBC 13.0*  --    HGB 10.9*  --    HCT 34.4*  --      --    INR 0.9  --    * 131*   K 4.5 5.0   BUN 39* 48*   CREATININE 1.68* 1.95*   GLUCOSE 143* 122*      Meds: Lovenox, Coumadin, ASA  See rec for complete list    Impression/plan: [de-identified] y.o. female s/p Right total hip arthroplasty, POD#2    - WB status: Weight bearing as tolerated right lower extremity  - Pain control PO/IV Medication. Attempt to Wean IV medications.   - DVT ppx: EPC, Lovenox, Warfarin. Script for Lovenox provided to continue upon discharge until Warfarin therapeutic. Patient states has INR draw next week. - IM on for assistance with medical management. - Ice as needed for pain and swelling (20 minutes per hour). - Encourage Incentive Spirometry use and deep breathing.   - PT/OT to continue treatment.  -Once able to urinate today, okay to be discharged. Will see if patient can make urine and attempt urination on own prior to placement of noble. - DC planning consist of home with home health. DC today.  Follow-up with Dr. Yumiko Lancaster in 10-14 days.   - Please page ortho with any questions      Christy Scott, DO PGY-3  Orthopedic Surgery Resident  [address], [facility]

## 2019-04-11 NOTE — PROGRESS NOTES
Pt up to toilet and voided 250 ml in hats and and unmeasured amount in the bed prior to getting up to the bathroom.

## 2019-04-11 NOTE — PLAN OF CARE
Problem: Infection - Surgical Site:  Goal: Signs of wound healing will improve  Description  Signs of wound healing will improve  4/11/2019 1708 by Constantino Borges RN  Outcome: Met This Shift  4/11/2019 0506 by Blessing Hess RN  Outcome: Ongoing     Problem: Mobility - Impaired:  Goal: Achieve maximum mobility level  Description  Achieve maximum mobility level  4/11/2019 1708 by Constantino Borges RN  Outcome: Met This Shift  4/11/2019 0506 by Blessing Hess RN  Outcome: Ongoing     Problem: Musculor/Skeletal Functional Status  Goal: Highest potential functional level  4/11/2019 1708 by Constantino Borges RN  Outcome: Met This Shift  4/11/2019 0506 by Blessing Hess RN  Outcome: Ongoing     Problem: Falls - Risk of:  Goal: Will remain free from falls  Description  Will remain free from falls  4/11/2019 1708 by Constantino Borges RN  Outcome: Met This Shift  4/11/2019 0506 by Blessing Hess RN  Outcome: Ongoing  Goal: Absence of physical injury  Description  Absence of physical injury  4/11/2019 1708 by Constantino Borges RN  Outcome: Met This Shift  4/11/2019 0506 by Blessing Hess RN  Outcome: Ongoing     Problem: Discharge Planning:  Goal: Knowledge of discharge instructions  Description  Knowledge of discharge instructions  4/11/2019 1708 by Constantino Borges RN  Outcome: Ongoing  4/11/2019 0506 by Blessing Hess RN  Outcome: Ongoing     Problem: Pain - Acute:  Goal: Pain level will decrease  Description  Pain level will decrease  4/11/2019 1708 by Constantino Borges RN  Outcome: Ongoing  4/11/2019 0506 by Blessing Hess RN  Outcome: Ongoing     Problem: Pain:  Goal: Pain level will decrease  Description  Pain level will decrease  4/11/2019 1708 by Constantino Borges RN  Outcome: Ongoing  4/11/2019 0506 by Blessing Hess RN  Outcome: Ongoing  Goal: Control of acute pain  Description  Control of acute pain  4/11/2019 1708 by Constantino Borges RN  Outcome: Ongoing  4/11/2019 0506 by Blessing Hess

## 2019-04-11 NOTE — CARE COORDINATION
Patient not seen by navigator. Discharge plan remains home with [facility]. Anticipate discharge on 4/12 if OK with intermed consult.     Electronically signed by Abbe Ellis RN on 4/11/2019 at 3:24 PM

## 2019-04-11 NOTE — PROGRESS NOTES
Occupational Therapy  Facility/Department: [address] ORTHO/MED SURG  Daily Treatment Note  NAME: Billy Goode  : 1940  MRN: [id number]    Date of Service: 2019    Discharge Recommendations: Further therapy recommended at discharge. Assessment   Performance deficits / Impairments: Decreased functional mobility ; Decreased strength;Decreased endurance;Decreased ADL status; Decreased high-level IADLs  Treatment Diagnosis: R RENETTA   Prognosis: Good  Patient Education: Reviewed on OT services, sx soap prec, hip prec, walker safety, transfer safet, DME/AE/ hit kit item demo, fall prevention tips- good return  REQUIRES OT FOLLOW UP: Yes  Activity Tolerance  Activity Tolerance: Patient Tolerated treatment well  Safety Devices  Safety Devices in place: Yes  Type of devices: All fall risk precautions in place;Call light within reach; Chair alarm in place; Left in chair;Nurse notified         Patient Diagnosis(es): The primary encounter diagnosis was Osteoarthritis of right hip, unspecified osteoarthritis type. Diagnoses of Post-op pain and Status post right hip replacement were also pertinent to this visit. has a past medical history of Allergic rhinitis, Arthritis, Atrial fibrillation ([facility] [address] Hypertension, Melanoma ([facility] [address] Myocardial infarction [facility]), Wears glasses, and Wears partial dentures. has a past surgical history that includes Appendectomy; other surgical history; Hysterectomy; Neck surgery; Carpal tunnel release (Right); Nose surgery; hernia repair; Tonsillectomy; joint replacement; Total hip arthroplasty (Right, 2019); and Total hip arthroplasty (Right, 2019).     Restrictions  Restrictions/Precautions  Restrictions/Precautions: General Precautions  Required Braces or Orthoses?: No  Lower Extremity Weight Bearing Restrictions  Right Lower Extremity Weight Bearing: Weight Bearing As Tolerated  Position Activity Restriction  Hip Precautions: Posterior hip precautions  Other position/activity restrictions: Up with assist.  Subjective   General  Chart Reviewed: No  Patient assessed for rehabilitation services?: Yes  Family / Caregiver Present: No  Diagnosis: R RENETTA   Subjective  Vital Signs  Patient Currently in Pain: Denies   Orientation  Orientation  Overall Orientation Status: Within Functional Limits  Objective    ADL  Feeding: Independent  Grooming: Setup;Supervision; Increased time to complete(stood at sink)  UE Bathing: Setup; Increased time to complete;Minimal assistance(stood at sink)  LE Bathing: Setup; Increased time to complete;Stand by assistance;Dependent/Total(stood at sink, dependent below knees seated in chair)  UE Dressing: Setup; Other (Comment); Stand by assistance  LE Dressing: Setup; Increased time to complete;Maximum assistance  Toileting: Setup; Increased time to complete;Stand by assistance  Additional Comments: pt completed ADLs standing at sink and used sx soap on this date. pt receptive to ed and retired to recliner at end of session.  NO LOB during transfers/functional mob w/ RW   Balance  Sitting Balance: Independent  Standing Balance: Stand by assistance  Standing Balance  Sit to stand: Stand by assistance  Stand to sit: Stand by assistance  Toilet Transfers  Toilet - Technique: Ambulating  Equipment Used: Raised toilet seat with rails  Toilet Transfer: Stand by assistance  Toilet Transfers Comments: pt independent w/ toilet hygiene     Transfers  Stand Step Transfers: Contact guard assistance(w/RW)  Sit to stand: Stand by assistance  Stand to sit: Stand by assistance  Attendance  Participation: Active participation         Plan   Plan  Times per week: 5-7x/wk (joint)    Goals  Short term goals  Time Frame for Short term goals: pt will, by discharge  Short term goal 1: dem SBA during functional transfers/functional mobility with LRD  Short term goal 2: dem ~8 minutes dynamic standing tolerance with LRD and SBA in order to complete functional tasks  Short term goal 3: increase activity tolerance to 25+ minutes in order to participate in [address] term goal 4: complete LB ADLs with min A, set up and AE, as needed  Short term goal 5: complete UB ADLs and grooming tasks with supervision and set up  Short term goal 6: ind maintain hip precautions during functional transfers/functional mobility        Therapy Time   Individual Concurrent Group Co-treatment   Time In  13:15  Attempt this am but pt was off the unit during the morning for testing.          Time Out  14:10         Minutes  [address] [facility]

## 2019-04-11 NOTE — PROGRESS NOTES
0.4 mg Oral Daily   enoxaparin 30 mg Subcutaneous Daily   tranexamic acid (CYCLOKAPRON) IVPB 1,000 mg Intravenous Once   aspirin 81 mg Oral Daily   azelastine 1 spray Each Nare BID   cycloSPORINE 1 drop Ophthalmic BID   diltiazem 240 mg Oral Daily   montelukast 10 mg Oral Nightly   nitroGLYCERIN 1 patch Transdermal Daily   sodium chloride flush 10 mL Intravenous 2 times per day   acetaminophen 1,000 mg Oral 3 times per day   gabapentin 300 mg Oral Nightly   mometasone-formoterol 2 puff Inhalation BID   warfarin (COUMADIN) daily dosing (placeholder)  Other RX Placeholder       Review of Systems   Review of Systems   Constitutional: Positive for appetite change. Negative for activity change, chills, fatigue, fever and unexpected weight change. HENT: Negative for congestion, mouth sores, postnasal drip, sinus pressure, sore throat and voice change. Eyes: Negative for visual disturbance. Respiratory: Negative for cough, shortness of breath and wheezing. Cardiovascular: Negative for chest pain and palpitations. Gastrointestinal: Positive for constipation and nausea. Negative for abdominal pain, blood in stool, diarrhea and vomiting. Endocrine: Negative for polyuria. Genitourinary: Positive for difficulty urinating. Negative for dysuria, frequency and urgency. Musculoskeletal: Negative for arthralgias, joint swelling and myalgias. Neurological: Negative for dizziness, tremors, speech difficulty, light-headedness and headaches.      Objective :      Current Vitals : Temp: 97.8 °F (36.6 °C),  Pulse: 55, Resp: 16, BP: 132/60, SpO2: 100 %  Last 24 Hrs Vitals   Patient Vitals for the past 24 hrs:   BP Temp Temp src Pulse Resp SpO2   04/11/19 0400 132/60 97.8 °F (36.6 °C) Oral 55 16 100 %   04/10/19 2358 110/61 97.7 °F (36.5 °C) Oral 61 16 98 %   04/10/19 2030 130/71 98 °F (36.7 °C) Oral 62 16 100 %   04/10/19 2020 -- -- -- -- -- 99 %   04/10/19 1600 (!) 102/56 98.6 °F (37 °C) Axillary 62 16 97 %   04/10/19 1145 133/69 97.5 °F (36.4 °C) Oral 64 14 100 %   04/10/19 0745 (!) 110/47 97.6 °F (36.4 °C) Oral 68 17 97 %     Intake / output   04/10 0701 - 04/11 0700  In: 4742.3 [I.V.:4742.3]  Out: 651 [Urine:650]  Physical Exam:  Physical Exam   Constitutional: She is oriented to person, place, and time. She appears well-developed and well-nourished. No distress. Nasal cannula in place. HENT:   Mouth/Throat: No oropharyngeal exudate. Eyes: Pupils are equal, round, and reactive to light. Conjunctivae are normal. No scleral icterus. Neck: No JVD present. No thyromegaly present. Cardiovascular: Normal rate, regular rhythm and normal heart sounds. Exam reveals no gallop and no friction rub. No murmur heard. Pulmonary/Chest: Effort normal. No respiratory distress. She has no wheezes. She has no rales. Abdominal: Soft. Bowel sounds are normal. She exhibits no distension and no mass. There is no tenderness. There is no rebound and no guarding. Musculoskeletal:   Right hip surgical wound   Lymphadenopathy:     She has no cervical adenopathy. Neurological: She is alert and oriented to person, place, and time. No cranial nerve deficit. She exhibits normal muscle tone. Skin: She is not diaphoretic. Nursing note and vitals reviewed.     Lower Extremities : No ankle Edema , No calf Tenderness     Laboratory findings:    Recent Labs     04/09/19  0636 04/10/19  0602 04/11/19  0524   WBC  --  13.0*  --    HGB  --  10.9*  --    HCT  --  34.4*  --    PLT  --  226  --    INR 1.0 0.9 1.1     Recent Labs     04/10/19  0602 04/10/19  1638 04/11/19  0524   * 131* 124*   K 4.5 5.0 5.2   CL 95* 98 95*   CO2 20 19* 18*   GLUCOSE 143* 122* 104*   BUN 39* 48* 53*   CREATININE 1.68* 1.95* 1.88*   CALCIUM 8.2* 7.5* 7.4*     No results for input(s): PROT, LABALBU, LABA1C, I9BNSYP, N0FYDIP, FT4, TSH, AST, ALT, LDH, GGT, ALKPHOS, BILITOT, BILIDIR, AMMONIA, AMYLASE, LIPASE, LACTATE, CHOL, HDL, LDLCHOLESTEROL, CHOLHDLRATIO, TRIG, VLDL, BNP, TROPONINI, CKTOTAL, CKMB, CKMBINDEX, RF, DO in the last 72 hours. Specific Gravity, UA   Date Value Ref Range Status   03/22/2019 1.009 1.005 - 1.030 Final     Protein, UA   Date Value Ref Range Status   03/22/2019 NEGATIVE NEGATIVE Final     RBC, UA   Date Value Ref Range Status   12/01/2014 2 TO 5 0 - 2 /HPF Final     Blood, UA POC   Date Value Ref Range Status   12/05/2014 negative  Final     Bacteria, UA   Date Value Ref Range Status   12/01/2014 NOT REPORTED NONE Final     Nitrite, Urine   Date Value Ref Range Status   03/22/2019 NEGATIVE NEGATIVE Final     WBC, UA   Date Value Ref Range Status   12/01/2014 2 TO 5 0 - 5 /HPF Final     Leukocyte Esterase, Urine   Date Value Ref Range Status   03/22/2019 NEGATIVE NEGATIVE Final     Echocardiogram 11/22/17  LVEF 65%, no regional wall motion abnormality. 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 No pericardial effusion. Trace mitral regurgitation mild tricuspid regurgitation. Clinical Course : unchanged  Assessment and Plan  :        1. Paroxysmal atrial fibrillation  - in NSR. Rate controlled. Continue Cardizem. Coumadin resumed 4/9/19 . Monitor INR. 2. SEGUN - . Pre renal and obstructive uropathy- secondary to poor oral intake. Hold  losartan. Continue IV fluids. 3. Urinary retention - intermittent catheterization as needed. Start Flomax. 4. Right hip arthritis s/p right hip arthroplasty - postop surgical management per orthopedic surgery. 5. Essential hypertension - well controlled  6. Former smoker - quit 38 years ago  9. Chronic diastolic dysfunction - compensated  8. Nausea and vomiting - likley secondary to opioid. Use tylenol and tramadol. Limit Narcotics. Bowel regimen for constipation. Encourage incentive spirometry    PT OT   Hold discharge today. Continue to monitor kidney function tomorrow. Continue to monitor vitals , Intake / output ,  Cell count , HGB , Kidney function, oxygenation  as indicated .    Plan and updates discussed with patient ,  answers  explained to satisfaction.    Plan discussed with Staff Сергей Negron RN     (Please note that portions of this note were completed with a voice recognition program. Efforts were made to edit the dictations but occasionally words are mis-transcribed.)      Kusum Gonzalez MD  4/11/2019

## 2019-04-11 NOTE — PROGRESS NOTES
Physical Therapy  Facility/Department: [address] ORTHO/MED SURG  Daily Treatment Note  NAME: Leeanna Cuevas  : 1940  MRN: [id number]    Date of Service: 2019    Discharge Recommendations:    Further therapy recommended at discharge. Patient Diagnosis(es): The primary encounter diagnosis was Osteoarthritis of right hip, unspecified osteoarthritis type. Diagnoses of Post-op pain and Status post right hip replacement were also pertinent to this visit. has a past medical history of Allergic rhinitis, Arthritis, Atrial fibrillation ([facility] [address] Hypertension, Melanoma ([facility] [address] Myocardial infarction [facility]), Wears glasses, and Wears partial dentures. has a past surgical history that includes Appendectomy; other surgical history; Hysterectomy; Neck surgery; Carpal tunnel release (Right); Nose surgery; hernia repair; Tonsillectomy; joint replacement; Total hip arthroplasty (Right, 2019); and Total hip arthroplasty (Right, 2019). Restrictions  Restrictions/Precautions  Restrictions/Precautions: General Precautions  Required Braces or Orthoses?: No  Lower Extremity Weight Bearing Restrictions  Right Lower Extremity Weight Bearing: Weight Bearing As Tolerated  Position Activity Restriction  Hip Precautions: Posterior hip precautions  Other position/activity restrictions: Up with assist.  Subjective   General  Chart Reviewed: Yes  Response To Previous Treatment: Patient with no complaints from previous session. Family / Caregiver Present: Yes(daughter)  Subjective  Subjective: Pt in chair visiting with family. Reports 4/10 pain in R hip. Agreeable to PT  General Comment  Comments: pt assisted to bed after session; call light in reach and bed alarm activated  Pain Screening  Patient Currently in Pain: Yes  Pain Assessment  Pain Assessment: 0-10  Pain Level: 4  Pain Type: Acute pain;Surgical pain  Pain Location: Hip  Pain Orientation: Right; Anterior  Pain Descriptors: Aching  Pain Frequency: Continuous  Non-Pharmaceutical Pain Intervention(s): Ambulation/Increased Activity;Cold applied;Elevation  Vital Signs  Patient Currently in Pain: Yes       Orientation  Orientation  Overall Orientation Status: Within Normal Limits  Cognition      Objective   Bed mobility  Bridging: Minimal assistance(pt required [place] to bridge and reposition in bed)  Supine to Sit: Minimal assistance(for RLE mgmt)  Sit to Supine: Minimal assistance  Scooting: Stand by assistance  Transfers  Sit to Stand: Minimal Assistance  Stand to sit: Contact guard assistance  Bed to Chair: Contact guard assistance  Ambulation  Ambulation?: Yes  Ambulation 1  Surface: level tile  Device: Rolling Walker  Assistance: Contact guard assistance  Quality of Gait: shuffling gait, constant VCs to increase R heel strike. decreased pace  Distance: 150ft  Stairs/Curb  Stairs? : (completed stair training during morning session)  Stairs  # Steps : 3  Stairs Height: 6\"  Rails: Bilateral  Device: No Device  Assistance: Contact guard assistance  Comment: cues for sequencing with fair follow thru, no LOB or buckling noted     Balance  Posture: Good  Sitting - Static: Good  Sitting - Dynamic: Good  Standing - Static: Fair;+  Standing - Dynamic: Fair       Exercises: Total Hip Arthroplasty Exercise Program: Quad sets, glut sets, ankle pumps, heel slides, hip abduction slides. Reps: 10x each, AA/AROM  Seated LAQs x 10 reps AROM      Assessment   Body structures, Functions, Activity limitations: Decreased functional mobility ; Decreased endurance;Decreased balance;Decreased strength  Assessment: Exhibits decreased endurance and shuffling gait which worsens with fatigue.   Prognosis: Good  Patient Education: hip precautions, HEP  REQUIRES PT FOLLOW UP: Yes  Activity Tolerance  Activity Tolerance: Patient limited by endurance  Activity Tolerance: SOB with activity, on RA, SPO2 94-97%       Goals  Short term goals  Time Frame for Short term goals: 10 visits  Short term goal 1: transfers with SBA  Short term goal 2: amb 200 ft with a RW x SBA with WBAT R LE  Short term goal 3: ascend/descend 4 steps with SBA  Short term goal 4: total hip exercises x SBA  Patient Goals   Patient goals : Return home    Plan    Plan  Times per week: BID  Current Treatment Recommendations: Strengthening, Endurance Training, Gait Training, Functional Mobility Training, Stair training, Safety Education & Training  Safety Devices  Type of devices:  All fall risk precautions in place, Gait belt, Patient at risk for falls, Call light within reach, Left in bed, Bed alarm in place, Nurse notified  Restraints  Initially in place: No     Therapy Time   Individual Concurrent Group Co-treatment   Time In [address]         Time Out 0309         Minutes [address], PTA

## 2019-04-12 VITALS
HEART RATE: 70 BPM | SYSTOLIC BLOOD PRESSURE: 127 MMHG | BODY MASS INDEX: 31.34 KG/M2 | TEMPERATURE: 97 F | OXYGEN SATURATION: 95 % | DIASTOLIC BLOOD PRESSURE: 54 MMHG | WEIGHT: 195 LBS | RESPIRATION RATE: 16 BRPM | HEIGHT: 66 IN

## 2019-04-12 LAB
ANION GAP SERPL CALCULATED.3IONS-SCNC: 9 MMOL/L (ref 9–17)
BUN BLDV-MCNC: 33 MG/DL (ref 8–23)
BUN/CREAT BLD: ABNORMAL (ref 9–20)
CALCIUM SERPL-MCNC: 7.7 MG/DL (ref 8.6–10.4)
CHLORIDE BLD-SCNC: 100 MMOL/L (ref 98–107)
CO2: 21 MMOL/L (ref 20–31)
CREAT SERPL-MCNC: 1.04 MG/DL (ref 0.5–0.9)
GFR AFRICAN AMERICAN: >60 ML/MIN
GFR NON-AFRICAN AMERICAN: 51 ML/MIN
GFR SERPL CREATININE-BSD FRML MDRD: ABNORMAL ML/MIN/{1.73_M2}
GFR SERPL CREATININE-BSD FRML MDRD: ABNORMAL ML/MIN/{1.73_M2}
GLUCOSE BLD-MCNC: 85 MG/DL (ref 70–99)
INR BLD: 1.8
POTASSIUM SERPL-SCNC: 4.3 MMOL/L (ref 3.7–5.3)
PROTHROMBIN TIME: 18 SEC (ref 9–12)
SODIUM BLD-SCNC: 130 MMOL/L (ref 135–144)

## 2019-04-12 PROCEDURE — 97110 THERAPEUTIC EXERCISES: CPT

## 2019-04-12 PROCEDURE — 80048 BASIC METABOLIC PNL TOTAL CA: CPT

## 2019-04-12 PROCEDURE — 6370000000 HC RX 637 (ALT 250 FOR IP): Performed by: FAMILY MEDICINE

## 2019-04-12 PROCEDURE — 99232 SBSQ HOSP IP/OBS MODERATE 35: CPT | Performed by: FAMILY MEDICINE

## 2019-04-12 PROCEDURE — 94640 AIRWAY INHALATION TREATMENT: CPT

## 2019-04-12 PROCEDURE — 36415 COLL VENOUS BLD VENIPUNCTURE: CPT

## 2019-04-12 PROCEDURE — 94762 N-INVAS EAR/PLS OXIMTRY CONT: CPT

## 2019-04-12 PROCEDURE — 2580000003 HC RX 258: Performed by: FAMILY MEDICINE

## 2019-04-12 PROCEDURE — 6370000000 HC RX 637 (ALT 250 FOR IP): Performed by: STUDENT IN AN ORGANIZED HEALTH CARE EDUCATION/TRAINING PROGRAM

## 2019-04-12 PROCEDURE — 97116 GAIT TRAINING THERAPY: CPT

## 2019-04-12 PROCEDURE — 85610 PROTHROMBIN TIME: CPT

## 2019-04-12 PROCEDURE — 97535 SELF CARE MNGMENT TRAINING: CPT

## 2019-04-12 PROCEDURE — 6360000002 HC RX W HCPCS: Performed by: STUDENT IN AN ORGANIZED HEALTH CARE EDUCATION/TRAINING PROGRAM

## 2019-04-12 RX ORDER — PANTOPRAZOLE SODIUM 40 MG/1
40 TABLET, DELAYED RELEASE ORAL
Qty: 30 TABLET | Refills: 3 | Status: ON HOLD | OUTPATIENT
Start: 2019-04-13 | End: 2019-05-10 | Stop reason: HOSPADM

## 2019-04-12 RX ORDER — MAGNESIUM HYDROXIDE/ALUMINUM HYDROXICE/SIMETHICONE 120; 1200; 1200 MG/30ML; MG/30ML; MG/30ML
30 SUSPENSION ORAL EVERY 6 HOURS PRN
Status: DISCONTINUED | OUTPATIENT
Start: 2019-04-12 | End: 2019-04-12 | Stop reason: HOSPADM

## 2019-04-12 RX ORDER — WARFARIN SODIUM 7.5 MG/1
7.5 TABLET ORAL
Status: DISCONTINUED | OUTPATIENT
Start: 2019-04-12 | End: 2019-04-12 | Stop reason: HOSPADM

## 2019-04-12 RX ORDER — CALCIUM CARBONATE 200(500)MG
1 TABLET,CHEWABLE ORAL DAILY
Qty: 30 TABLET | Refills: 0 | COMMUNITY
Start: 2019-04-12 | End: 2019-05-12

## 2019-04-12 RX ADMIN — TAMSULOSIN HYDROCHLORIDE 0.4 MG: 0.4 CAPSULE ORAL at 09:59

## 2019-04-12 RX ADMIN — MOMETASONE FUROATE AND FORMOTEROL FUMARATE DIHYDRATE 2 PUFF: 200; 5 AEROSOL RESPIRATORY (INHALATION) at 08:39

## 2019-04-12 RX ADMIN — ASPIRIN 81 MG: 81 TABLET ORAL at 09:59

## 2019-04-12 RX ADMIN — DILTIAZEM HYDROCHLORIDE 240 MG: 240 CAPSULE, COATED, EXTENDED RELEASE ORAL at 09:59

## 2019-04-12 RX ADMIN — TRAMADOL HYDROCHLORIDE 100 MG: 50 TABLET, FILM COATED ORAL at 06:09

## 2019-04-12 RX ADMIN — SODIUM CHLORIDE: 9 INJECTION, SOLUTION INTRAVENOUS at 07:23

## 2019-04-12 RX ADMIN — ACETAMINOPHEN 1000 MG: 500 TABLET ORAL at 14:05

## 2019-04-12 RX ADMIN — METOCLOPRAMIDE 5 MG: 5 TABLET ORAL at 09:59

## 2019-04-12 RX ADMIN — SUCRALFATE 1 G: 1 TABLET ORAL at 01:21

## 2019-04-12 RX ADMIN — TRAMADOL HYDROCHLORIDE 100 MG: 50 TABLET, FILM COATED ORAL at 14:49

## 2019-04-12 RX ADMIN — POLYETHYLENE GLYCOL 3350 17 G: 17 POWDER, FOR SOLUTION ORAL at 09:59

## 2019-04-12 RX ADMIN — DOCUSATE SODIUM 100 MG: 100 CAPSULE, LIQUID FILLED ORAL at 09:59

## 2019-04-12 RX ADMIN — ONDANSETRON 4 MG: 2 INJECTION INTRAMUSCULAR; INTRAVENOUS at 06:09

## 2019-04-12 ASSESSMENT — ENCOUNTER SYMPTOMS
SORE THROAT: 0
SHORTNESS OF BREATH: 0
NAUSEA: 1
DIARRHEA: 0
BLOOD IN STOOL: 0
CONSTIPATION: 1
ABDOMINAL PAIN: 0
VOICE CHANGE: 0
VOMITING: 0
COUGH: 0
SINUS PRESSURE: 0
WHEEZING: 0

## 2019-04-12 ASSESSMENT — PAIN SCALES - GENERAL
PAINLEVEL_OUTOF10: 7
PAINLEVEL_OUTOF10: 3
PAINLEVEL_OUTOF10: 7
PAINLEVEL_OUTOF10: 4
PAINLEVEL_OUTOF10: 0
PAINLEVEL_OUTOF10: 0
PAINLEVEL_OUTOF10: 4
PAINLEVEL_OUTOF10: 9

## 2019-04-12 ASSESSMENT — PAIN DESCRIPTION - DESCRIPTORS
DESCRIPTORS: ACHING
DESCRIPTORS: ACHING;DISCOMFORT;SORE
DESCRIPTORS: ACHING;DISCOMFORT

## 2019-04-12 ASSESSMENT — PAIN DESCRIPTION - LOCATION
LOCATION: BACK
LOCATION: HIP
LOCATION: HIP

## 2019-04-12 ASSESSMENT — PAIN - FUNCTIONAL ASSESSMENT: PAIN_FUNCTIONAL_ASSESSMENT: ACTIVITIES ARE NOT PREVENTED

## 2019-04-12 ASSESSMENT — PAIN DESCRIPTION - PAIN TYPE
TYPE: ACUTE PAIN
TYPE: ACUTE PAIN;SURGICAL PAIN
TYPE: ACUTE PAIN;SURGICAL PAIN

## 2019-04-12 ASSESSMENT — PAIN DESCRIPTION - FREQUENCY
FREQUENCY: CONTINUOUS

## 2019-04-12 ASSESSMENT — PAIN DESCRIPTION - ORIENTATION
ORIENTATION: RIGHT;ANTERIOR
ORIENTATION: RIGHT;LOWER
ORIENTATION: RIGHT;LOWER

## 2019-04-12 NOTE — FLOWSHEET NOTE
DATE: 2019    NAME: Marek Terry  MRN: [id number]   : 1940    Patient not seen this date for Physical Therapy due to:  [] Blood transfusion in progress  [] Hemodialysis  [x]  Patient Declined; pt exiting restroom with nursing aide upon arrival to room. Pt stating, \"I can't do anything right now. I'm hurting too much and need to get back to bed\". RN notified of pt's refusal.  [] Spine Precautions   [] Strict Bedrest  [] Surgery/ Procedure  [] Testing      [] Other        [] PT being discontinued at this time. Patient independent. No further needs. [] PT being discontinued at this time as the patient has been transferred to palliative care. No further needs.     Myriam Cole, PTA

## 2019-04-12 NOTE — PLAN OF CARE
BRONCHOSPASM/BRONCHOCONSTRICTION     ? IMPROVE AERATION/BREATH SOUNDS  ? ADMINISTER BRONCHODILATOR THERAPY AS APPROPRIATE  ? ASSESS BREATH SOUNDS  ? IMPLEMENT AEROSOL/MDI PROTOCOL  ?    PATIENT EDUCATION AS NEEDED

## 2019-04-12 NOTE — PROGRESS NOTES
Physical Therapy  Facility/Department: [address] ORTHO/MED SURG  Daily Treatment Note  NAME: Christal Navarro  : 1940  MRN: [id number]    Date of Service: 2019    Discharge Recommendations:    Further therapy recommended at discharge. Patient Diagnosis(es): The primary encounter diagnosis was Osteoarthritis of right hip, unspecified osteoarthritis type. Diagnoses of Post-op pain and Status post right hip replacement were also pertinent to this visit. has a past medical history of Allergic rhinitis, Arthritis, Atrial fibrillation ([facility] [address] Hypertension, Melanoma ([facility] [address] Myocardial infarction [facility]), Wears glasses, and Wears partial dentures. has a past surgical history that includes Appendectomy; other surgical history; Hysterectomy; Neck surgery; Carpal tunnel release (Right); Nose surgery; hernia repair; Tonsillectomy; joint replacement; Total hip arthroplasty (Right, 2019); and Total hip arthroplasty (Right, 2019). Restrictions  Restrictions/Precautions  Restrictions/Precautions: General Precautions  Required Braces or Orthoses?: No  Lower Extremity Weight Bearing Restrictions  Right Lower Extremity Weight Bearing: Weight Bearing As Tolerated  Position Activity Restriction  Hip Precautions: Posterior hip precautions  Other position/activity restrictions: Up with assist.  Subjective   General  Chart Reviewed: Yes  Response To Previous Treatment: Patient with no complaints from previous session. Family / Caregiver Present: No  Subjective  Subjective: Alert in chair; agreeable to PT. General Comment  Comments: Returned to chair after session; call light in reach and alarm activated. Pain Screening  Patient Currently in Pain: Yes  Pain Assessment  Pain Assessment: 0-10  Pain Level: 4  Pain Type: Acute pain;Surgical pain  Pain Location: Hip  Pain Orientation: Right; Anterior  Pain Descriptors: Aching  Pain Frequency: Continuous  Functional Pain Assessment: Activities are not prevented  Vital Signs  Patient Currently in Pain: Yes       Orientation  Orientation  Overall Orientation Status: Within Normal Limits  Cognition      Objective      Transfers  Sit to Stand: Contact guard assistance  Stand to sit: Contact guard assistance  Ambulation  Ambulation?: Yes  Ambulation 1  Surface: level tile  Device: Rolling Walker  Assistance: Contact guard assistance;Stand by assistance  Quality of Gait: decreased pace, cues to increase R heel strike with fair return  Distance: 150ft  Comments: 1 standing rest break due to fatigue  Stairs/Curb  Stairs?: No(pt declined- states she will try this PM)     Balance  Posture: Good  Sitting - Static: Good  Sitting - Dynamic: Good  Standing - Static: Fair;+  Standing - Dynamic: Fair       Exercises:  SAQ, glut sets, quad sets, ankle pumps, heel slides. 10x each, AAROM for heel slides   Gastroc stretch 30''x2      Assessment   Body structures, Functions, Activity limitations: Decreased functional mobility ; Decreased endurance;Decreased strength  Assessment: Pt is ambulating functional distances with no LOB. Prognosis: Good  Patient Education: hip precautions, HEP  REQUIRES PT FOLLOW UP: Yes  Activity Tolerance  Activity Tolerance: Patient limited by endurance     Goals  Short term goals  Time Frame for Short term goals: 10 visits  Short term goal 1: transfers with SBA  Short term goal 2: amb 200 ft with a RW x SBA with WBAT R LE  Short term goal 3: ascend/descend 4 steps with SBA  Short term goal 4: total hip exercises x SBA  Patient Goals   Patient goals : Return home    Plan    Plan  Times per week: BID  Current Treatment Recommendations: Strengthening, Endurance Training, Gait Training, Functional Mobility Training, Stair training, Safety Education & Training  Safety Devices  Type of devices:  All fall risk precautions in place, Gait belt, Left in chair, Chair alarm in place, Call light within reach, Nurse notified  Restraints  Initially in place: No     Therapy Time Individual Concurrent Group Co-treatment   Time In 0920         Time Out 0944         Minutes [address], [place]

## 2019-04-12 NOTE — PLAN OF CARE
Problem: Discharge Planning:  Goal: Knowledge of discharge instructions  Description  Knowledge of discharge instructions  4/12/2019 1605 by Gareth Fraire RN  Outcome: Met This Shift  4/12/2019 0442 by Nessa Hidalgo RN  Outcome: Ongoing     Problem: Infection - Surgical Site:  Goal: Signs of wound healing will improve  Description  Signs of wound healing will improve  4/12/2019 1605 by Gareth Fraire RN  Outcome: Met This Shift  4/12/2019 0442 by Nessa Hidalgo RN  Outcome: Ongoing     Problem: Mobility - Impaired:  Goal: Achieve maximum mobility level  Description  Achieve maximum mobility level  4/12/2019 1605 by Gareth Fraire RN  Outcome: Met This Shift  4/12/2019 0442 by Nessa Hidaglo RN  Outcome: Ongoing     Problem: Pain - Acute:  Goal: Pain level will decrease  Description  Pain level will decrease  4/12/2019 1605 by Gareth Fraire RN  Outcome: Met This Shift  4/12/2019 0442 by Nessa Hidalgo RN  Outcome: Ongoing     Problem: Pain:  Goal: Pain level will decrease  Description  Pain level will decrease  4/12/2019 1605 by Gareth Fraire RN  Outcome: Met This Shift  4/12/2019 0442 by Nessa Hidalgo RN  Outcome: Ongoing  Goal: Control of acute pain  Description  Control of acute pain  4/12/2019 1605 by Gareth Fraire RN  Outcome: Met This Shift  4/12/2019 0442 by Nessa Hidalgo RN  Outcome: Ongoing  Goal: Control of chronic pain  Description  Control of chronic pain  4/12/2019 1605 by Gareth Fraire RN  Outcome: Met This Shift  4/12/2019 0442 by Nessa Hidalgo RN  Outcome: Ongoing     Problem: Musculor/Skeletal Functional Status  Goal: Highest potential functional level  4/12/2019 1605 by Gareth Fraire RN  Outcome: Met This Shift  4/12/2019 0442 by Nessa Hidalgo RN  Outcome: Ongoing     Problem: Falls - Risk of:  Goal: Will remain free from falls  Description  Will remain free from falls  4/12/2019 1605 by Gareth Fraire RN  Outcome: Met This Shift  4/12/2019 0442 by Ivette Adan Danita Beard RN  Outcome: Ongoing  Goal: Absence of physical injury  Description  Absence of physical injury  4/12/2019 1605 by Connee Siemens, RN  Outcome: Met This Shift  4/12/2019 0442 by Irina Tamayo RN  Outcome: Ongoing     Problem: Discharge Planning:  Goal: Knowledge of discharge instructions  Description  Knowledge of discharge instructions  4/12/2019 1605 by Connee Siemens, RN  Outcome: Met This Shift  4/12/2019 0442 by Irina Tamayo RN  Outcome: Ongoing     Problem: Infection - Surgical Site:  Goal: Signs of wound healing will improve  Description  Signs of wound healing will improve  4/12/2019 1605 by Connee Siemens, RN  Outcome: Met This Shift  4/12/2019 0442 by Irina Tamayo RN  Outcome: Ongoing     Problem: Mobility - Impaired:  Goal: Achieve maximum mobility level  Description  Achieve maximum mobility level  4/12/2019 1605 by Connee Siemens, RN  Outcome: Met This Shift  4/12/2019 0442 by Irina Tamayo RN  Outcome: Ongoing     Problem: Pain - Acute:  Goal: Pain level will decrease  Description  Pain level will decrease  4/12/2019 1605 by Connee Siemens, RN  Outcome: Met This Shift  4/12/2019 0442 by Irina Tamayo RN  Outcome: Ongoing     Problem: Pain:  Goal: Pain level will decrease  Description  Pain level will decrease  4/12/2019 1605 by Connee Siemens, RN  Outcome: Met This Shift  4/12/2019 0442 by Irina Tamayo RN  Outcome: Ongoing  Goal: Control of acute pain  Description  Control of acute pain  4/12/2019 1605 by Connee Siemens, RN  Outcome: Met This Shift  4/12/2019 0442 by Irina Tamayo RN  Outcome: Ongoing  Goal: Control of chronic pain  Description  Control of chronic pain  4/12/2019 1605 by Connee Siemens, RN  Outcome: Met This Shift  4/12/2019 0442 by Irina Tamayo RN  Outcome: Ongoing     Problem: Musculor/Skeletal Functional Status  Goal: Highest potential functional level  4/12/2019 1605 by Connee Siemens, RN  Outcome: Met This Shift  4/12/2019 0442 by Mckenna Arredondo Radha Mackey RN  Outcome: Ongoing     Problem: Falls - Risk of:  Goal: Will remain free from falls  Description  Will remain free from falls  4/12/2019 1605 by Nilesh Lobo RN  Outcome: Met This Shift  4/12/2019 0442 by Manju Parker RN  Outcome: Ongoing  Goal: Absence of physical injury  Description  Absence of physical injury  4/12/2019 1605 by Nilesh Lobo RN  Outcome: Met This Shift  4/12/2019 0442 by Manju Parker RN  Outcome: Ongoing

## 2019-04-12 NOTE — CARE COORDINATION
Joint Replacement Navigator Daily Rounding Note    Admission Date:   4/9/2019 Jack Rodney is a [de-identified] y.o.  female    Post Op Day # 3    Labs:         Recent Labs     04/10/19  0602   WBC 13.0*   HGB 10.9*          Recent Labs     04/10/19  1638 04/11/19  0524 04/12/19  0454   * 124* 130*   K 5.0 5.2 4.3   CL 98 95* 100   CO2 19* 18* 21   BUN 48* 53* 33*   CREATININE 1.95* 1.88* 1.04*   GLUCOSE 122* 104* 85         Met with:     Patient  Patient's LOC:   alert and oriented X3  Patient progress   Improved, working with PT  Patient's Pain:   Patient rates pain tolerable  Oral Intake:    good  Output:    adequate   [place] in:   no  ON-Q:    no    Walker/DME:  Walker/DME needed:  No  DME needed:    n/a   DME Agency:    n/a    Anticoagulation:  Anticoagulation:  Coumadin  Prescription in chart:  no     Continuity of Care: The [address] form is on the chart. The [address] form is signed by the physician. Discharge Planning:  Confirmed with patient that discharge plan is Home with [address] [place] [address]. Agency/Facility chosen: [address] [place] [address] pre-certification no  Anticipated discharge date: 4/12    Patient's questions and concerns were answered. Actively listened and provided reassurance.      Electronically signed by: Racquel Dee RN on 4/12/2019 at 9:41 AM

## 2019-04-12 NOTE — PROGRESS NOTES
Occupational Therapy  Facility/Department: [address] ORTHO/MED SURG  Daily Treatment Note  NAME: Brandon Delatorre  : 1940  MRN: [id number]    Date of Service: 2019    Discharge Recommendations: Further therapy recommended at discharge. Assessment   Performance deficits / Impairments: Decreased functional mobility ; Decreased strength;Decreased endurance;Decreased ADL status; Decreased high-level IADLs  Treatment Diagnosis: R RENETTA   Prognosis: Good  Patient Education: OT POC, transfer/walker safety, EC/WS, hip precautions - pt verbalized understanding. Activity Tolerance  Activity Tolerance: Patient Tolerated treatment well  Safety Devices  Safety Devices in place: Yes  Type of devices: Call light within reach; Chair alarm in place; Patient at risk for falls; Left in chair;Nurse notified         Patient Diagnosis(es): The primary encounter diagnosis was Osteoarthritis of right hip, unspecified osteoarthritis type. Diagnoses of Post-op pain and Status post right hip replacement were also pertinent to this visit. has a past medical history of Allergic rhinitis, Arthritis, Atrial fibrillation ([facility] [address] Hypertension, Melanoma ([facility] [address] Myocardial infarction [facility]), Wears glasses, and Wears partial dentures. has a past surgical history that includes Appendectomy; other surgical history; Hysterectomy; Neck surgery; Carpal tunnel release (Right); Nose surgery; hernia repair; Tonsillectomy; joint replacement; Total hip arthroplasty (Right, 2019); and Total hip arthroplasty (Right, 2019). Restrictions  Restrictions/Precautions  Restrictions/Precautions: General Precautions  Required Braces or Orthoses?: No  Lower Extremity Weight Bearing Restrictions  Right Lower Extremity Weight Bearing: Weight Bearing As Tolerated  Position Activity Restriction  Hip Precautions: Posterior hip precautions  Other position/activity restrictions:  Up with assist.  Subjective   General  Chart Reviewed: No  Patient assessed for rehabilitation services?: Yes  Family / Caregiver Present: No  Diagnosis: R RENETTA   Subjective  Pain Assessment  Pain Assessment: 0-10  Pain Level: 7  Pain Type: Acute pain  Pain Location: Back  Pain Orientation: Right; Lower  Pain Descriptors: Aching;Discomfort; Sore  Pain Frequency: Continuous  Non-Pharmaceutical Pain Intervention(s): Therapeutic presence; Ambulation/Increased Activity;Repositioned;Distraction  Vital Signs  Patient Currently in Pain: Yes   Orientation  Orientation  Overall Orientation Status: Within Functional Limits  Objective    ADL  Grooming: Setup;Supervision(Oral care standing at sink.)  UE Bathing: Setup;Contact guard assistance(Seated at sink.)  LE Bathing: Setup;Contact guard assistance(Pericare standing at sink.)  UE Dressing: Minimal assistance(To manage gown.)  LE Dressing: Maximum assistance  Toileting: Setup;Stand by assistance  Additional Comments: Pt completed pericare standing at sink after transferring from toilet, brief mgnt max A. Pt feeling mild nausea this day needing to sit and take a break at sink. ADL care completed seated at sink, surgical soap used appropriately. Oral care completed standing at sink. Balance  Sitting Balance: Modified independent (Seated EOB unsupported.)  Standing Balance: Stand by assistance  Standing Balance  Time: 12 minutes  Activity: Functional mobility to/from bathroom using RW, pericare care/oral care standing at sink. Toilet transfer. Sit to stand: Stand by assistance  Stand to sit: Stand by assistance  Functional Mobility  Functional - Mobility Device: Rolling Walker  Activity: To/from bathroom  Assist Level: Stand by assistance  Functional Mobility Comments: No LOB noted.   Toilet Transfers  Toilet - Technique: Ambulating  Equipment Used: Grab bars  Toilet Transfer: Stand by assistance  Bed mobility  Supine to Sit: Minimal assistance(To manage RLE to EOB.)  Scooting: Modified independent  Transfers  Sit to stand: Stand by assistance  Stand to sit: Stand by assistance  Cognition  [facility]     Plan   Plan  Times per week: 5-7x/wk (joint)  Goals  Short term goals  Time Frame for Short term goals: pt will, by discharge  Short term goal 1: dem SBA during functional transfers/functional mobility with LRD  Short term goal 2: dem ~8 minutes dynamic standing tolerance with LRD and SBA in order to complete functional tasks  Short term goal 3: increase activity tolerance to 25+ minutes in order to participate in ADLS  Short term goal 4: complete LB ADLs with min A, set up and AE, as needed  Short term goal 5: complete UB ADLs and grooming tasks with supervision and set up  Short term goal 6: ind maintain hip precautions during functional transfers/functional mobility        Therapy Time   Individual Concurrent Group Co-treatment   Time In 0810         Time Out 0848         Minutes 38            Pt supine in bed upon therapist arrival. Pleasant and agreeable to therapy. See above for LOF for all tasks. Pt retired to seated in chair at end of session with chair alarm activated and call light within reach.     KANDY Ruiz/COLIN

## 2019-04-12 NOTE — PROGRESS NOTES
glycol 17 g Oral Daily   metoclopramide 5 mg Oral TID AC   pantoprazole 40 mg Oral QAM AC   sucralfate 1 g Oral 4 times per day   enoxaparin 30 mg Subcutaneous Daily   tranexamic acid (CYCLOKAPRON) IVPB 1,000 mg Intravenous Once   aspirin 81 mg Oral Daily   azelastine 1 spray Each Nare BID   cycloSPORINE 1 drop Ophthalmic BID   diltiazem 240 mg Oral Daily   montelukast 10 mg Oral Nightly   nitroGLYCERIN 1 patch Transdermal Daily   sodium chloride flush 10 mL Intravenous 2 times per day   acetaminophen 1,000 mg Oral 3 times per day   gabapentin 300 mg Oral Nightly   mometasone-formoterol 2 puff Inhalation BID   warfarin (COUMADIN) daily dosing (placeholder)  Other RX Placeholder       Review of Systems   Review of Systems   Constitutional: Positive for appetite change. Negative for activity change, chills, fatigue, fever and unexpected weight change. HENT: Negative for congestion, mouth sores, postnasal drip, sinus pressure, sore throat and voice change. Eyes: Negative for visual disturbance. Respiratory: Negative for cough, shortness of breath and wheezing. Cardiovascular: Negative for chest pain and palpitations. Gastrointestinal: Positive for constipation and nausea. Negative for abdominal pain, blood in stool, diarrhea and vomiting. Endocrine: Negative for polyuria. Genitourinary: Negative for difficulty urinating, dysuria, frequency and urgency. Musculoskeletal: Negative for arthralgias, joint swelling and myalgias. Neurological: Negative for dizziness, tremors, speech difficulty, light-headedness and headaches.      Objective :      Current Vitals : Temp: 98.6 °F (37 °C),  Pulse: 78, Resp: 16, BP: (!) 111/48, SpO2: 96 %  Last 24 Hrs Vitals   Patient Vitals for the past 24 hrs:   BP Temp Temp src Pulse Resp SpO2   04/11/19 1829 (!) 111/48 98.6 °F (37 °C) Oral 78 16 96 %   04/11/19 1039 138/65 98.1 °F (36.7 °C) Oral 70 16 --   04/11/19 0800 131/71 -- -- -- -- 97 %     Intake / output   04/11 0701 - 04/12 0700  In: 2341.9 [I.V.:2341.9]  Out: 1950 [Urine:1950]  Physical Exam:  Physical Exam   Constitutional: She is oriented to person, place, and time. She appears well-developed and well-nourished. No distress. HENT:   Mouth/Throat: No oropharyngeal exudate. Eyes: Pupils are equal, round, and reactive to light. Conjunctivae are normal. No scleral icterus. Neck: No JVD present. No thyromegaly present. Cardiovascular: Normal rate, regular rhythm and normal heart sounds. Exam reveals no gallop and no friction rub. No murmur heard. Pulmonary/Chest: Effort normal. No respiratory distress. She has no wheezes. She has no rales. Abdominal: Soft. Bowel sounds are normal. She exhibits no distension and no mass. There is no tenderness. There is no rebound and no guarding. Musculoskeletal:   Right hip surgical wound   Lymphadenopathy:     She has no cervical adenopathy. Neurological: She is alert and oriented to person, place, and time. No cranial nerve deficit. She exhibits normal muscle tone. Skin: She is not diaphoretic. Nursing note and vitals reviewed. Lower Extremities : No ankle Edema , No calf Tenderness     Laboratory findings:    Recent Labs     04/10/19  0602 04/11/19  0524 04/12/19  0454   WBC 13.0*  --   --    HGB 10.9*  --   --    HCT 34.4*  --   --      --   --    INR 0.9 1.1 1.8     Recent Labs     04/10/19  1638 04/11/19  0524 04/12/19  0454   * 124* 130*   K 5.0 5.2 4.3   CL 98 95* 100   CO2 19* 18* 21   GLUCOSE 122* 104* 85   BUN 48* 53* 33*   CREATININE 1.95* 1.88* 1.04*   CALCIUM 7.5* 7.4* 7.7*     No results for input(s): PROT, LABALBU, LABA1C, V5UCJRX, S8PPFNK, FT4, TSH, AST, ALT, LDH, GGT, ALKPHOS, BILITOT, BILIDIR, AMMONIA, AMYLASE, LIPASE, LACTATE, CHOL, HDL, LDLCHOLESTEROL, CHOLHDLRATIO, TRIG, VLDL, BNP, TROPONINI, CKTOTAL, CKMB, CKMBINDEX, RF, OD in the last 72 hours.        Specific Gravity, UA   Date Value Ref Range Status   03/22/2019 1.009 1.005 - 1.030 Final     Protein, UA   Date Value Ref Range Status   03/22/2019 NEGATIVE NEGATIVE Final     RBC, UA   Date Value Ref Range Status   12/01/2014 2 TO 5 0 - 2 /HPF Final     Blood, UA POC   Date Value Ref Range Status   12/05/2014 negative  Final     Bacteria, UA   Date Value Ref Range Status   12/01/2014 NOT REPORTED NONE Final     Nitrite, Urine   Date Value Ref Range Status   03/22/2019 NEGATIVE NEGATIVE Final     WBC, UA   Date Value Ref Range Status   12/01/2014 2 TO 5 0 - 5 /HPF Final     Leukocyte Esterase, Urine   Date Value Ref Range Status   03/22/2019 NEGATIVE NEGATIVE Final     Echocardiogram 11/22/17  LVEF 65%, no regional wall motion abnormality. 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 No pericardial effusion. Trace mitral regurgitation mild tricuspid regurgitation. Clinical Course : unchanged  Assessment and Plan  :        1. Paroxysmal atrial fibrillation  - in NSR. Rate controlled. Continue Cardizem. Coumadin . 2. SEGUN - . Pre renal and obstructive uropathy- secondary to poor oral intake. Resume   losartan. Stop IV fluids. 3. Urinary retention - resolved. 4. Right hip arthritis s/p right hip arthroplasty - postop surgical management per orthopedic surgery. 5. Essential hypertension - well controlled  6. Former smoker - quit 38 years ago  9. Chronic diastolic dysfunction - compensated  8. Nausea and vomiting - likley secondary to opioid. Use tylenol and tramadol. Limit Narcotics. Bowel regimen for constipation. Encourage incentive spirometry      Discontinue IVF   Discontinue Lovenox  Encourage ambulation . Maalox   Bowel regimen   PT OT   OK to discharge today     Continue to monitor vitals , Intake / output ,  Cell count , HGB , Kidney function, oxygenation  as indicated . Plan and updates discussed with patient ,  answers  explained to satisfaction.    Plan discussed with Staff Randi AGUILERA     (Please note that portions of this note were completed with a voice recognition program. Efforts were made to edit the dictations but occasionally words are mis-transcribed.)      Gina Conklin MD  4/12/2019

## 2019-04-12 NOTE — PROGRESS NOTES
CLINICAL PHARMACY NOTE: MEDS TO [address] Select Patient?: Yes  Total # of Prescriptions Filled: 2   The following medications were delivered to the patient:  · PERCOCET   · PROTONIX     Total # of Interventions Completed: 0  Time Spent (min): 0    Additional Documentation:    GOT COLACE OTC

## 2019-04-12 NOTE — PROGRESS NOTES
Orthopedic Progress Note    Patient:  Niki Jessica  YOB: 1940     [age] y.o. female    Subjective:  Patient seen and examined this morning. Plan is for patient to go home where she lives with her daughter. She did have an episode of loss of control of her bladder when she stood up. Otherwise no complaints or concerns at this time. No issue overnight per nursing and patient. Pain controlled on current regimen. Denies fever, HA, CP, SOB, N/V, dysuria, numbness or tingling. -BM/+flatus. Tolerating PO intake.  feet. Vitals reviewed, afebrile    Objective:   Vitals:    04/11/19 1829   BP: (!) 111/48   Pulse: 78   Resp: 16   Temp: 98.6 °F (37 °C)   SpO2: 96%     Gen: NAD, cooperative  Cardiovascular: Regular rate, no dependent edema, distal pulses 2+  Respiratory: Chest symmetric, no accessory muscle use, normal respirations, no audible wheezes  MSK: RLE: Optifoam dressing in place to right hip; thigh and leg compartments soft and compressible. Appropriate post op TTP. +2 DP/PT pulses w/ BCR. TA/EHL/FHL/GS motor intact. Deep & Superficial Peroneal/Saphenous/Sural/Plantar SILT. Recent Labs     04/10/19  0602  04/11/19  0524   WBC 13.0*  --   --    HGB 10.9*  --   --    HCT 34.4*  --   --      --   --    INR 0.9  --  1.1   *   < > 124*   K 4.5   < > 5.2   BUN 39*   < > 53*   CREATININE 1.68*   < > 1.88*   GLUCOSE 143*   < > 104*    < > = values in this interval not displayed. Meds: Lovenox, ASA, Coumadin   See rec for complete list    Impression/plan: [age] ThirdLove y.o. female s/p Right total hip arthroplasty, POD#3    - WB status: Weight bearing as tolerated right lower extremity  - Pain control PO/IV Medication. Attempt to Wean IV medications.   - DVT ppx: EPC, Lovenox, Warfarin. Script for Lovenox provided to continue upon     discharge until Warfarin therapeutic. Patient states has INR draw next week. - IM on for assistance with medical management.     - Ice as needed for pain and swelling (20 minutes per hour). - Encourage Incentive Spirometry use and deep breathing.   - PT/OT to continue treatment. -once okay with IM, patient can be discharged. - DC planning consist of home with home health. DC today. Follow-up with Dr. Gianni Salcedo in   10-14 days.   - Please page ortho with any questions    ----------------------------------------  Jose White DO  PGY-1, Department of [facility] [address], [place]        PGY-3 Addendum    Patient seen and examined. Agree with above. Patient doing well, soreness to hip. Urinating on own. Patient stayed due to SEGUN. Once cleared with IM will DC home. DC instructions placed. All questions answered. Please page with questions.       Nikita Cline, DO PGY-3  Orthopedic Surgery Resident  [address] [facility], [facility], [address]

## 2019-04-12 NOTE — PLAN OF CARE
Problem: Discharge Planning:  Goal: Knowledge of discharge instructions  Description  Knowledge of discharge instructions  4/12/2019 0442 by Freddy Bustamante RN  Outcome: Ongoing     Problem: Infection - Surgical Site:  Goal: Signs of wound healing will improve  Description  Signs of wound healing will improve  4/12/2019 0442 by Freddy Bustamante RN  Outcome: Ongoing     Problem: Mobility - Impaired:  Goal: Achieve maximum mobility level  Description  Achieve maximum mobility level  4/12/2019 0442 by Freddy Bustamante RN  Outcome: Ongoing     Problem: Pain - Acute:  Goal: Pain level will decrease  Description  Pain level will decrease  4/12/2019 0442 by Freddy Bustamante RN  Outcome: Ongoing     Problem: Pain:  Goal: Pain level will decrease  Description  Pain level will decrease  4/12/2019 0442 by Freddy Bustamante RN  Outcome: Ongoing     Problem: Pain:  Goal: Control of acute pain  Description  Control of acute pain  4/12/2019 0442 by Freddy Bustamante RN  Outcome: Ongoing     Problem: Pain:  Goal: Control of chronic pain  Description  Control of chronic pain  4/12/2019 0442 by Freddy Bustamante RN  Outcome: Ongoing     Problem: Musculor/Skeletal Functional Status  Goal: Highest potential functional level  4/12/2019 0442 by Freddy Bustamante RN  Outcome: Ongoing     Problem: Falls - Risk of:  Goal: Will remain free from falls  Description  Will remain free from falls  4/12/2019 0442 by Freddy Bustamante RN  Outcome: Ongoing     Problem: Falls - Risk of:  Goal: Absence of physical injury  Description  Absence of physical injury  4/12/2019 0442 by Freddy Bustamante RN  Outcome: Ongoing

## 2019-04-12 NOTE — PROGRESS NOTES
Pharmacy Note  Warfarin Consult follow-up      Recent Labs     04/12/19  0454   INR 1.8     Recent Labs     04/10/19  0602   HGB 10.9*   HCT 34.4*          Significant Drug-Drug Interactions:  New warfarin drug-drug interactions: none  Discontinued drug-drug interactions: none  Current warfarin drug-drug interactions: acetaminophen, aspirin,  Lovenox       Date INR Dose   4/9/19 1.0 10 mg   4/10/19 0.9 7.5 mg   4/11/19 1.1 7.5 mg   4/12/19 1.8 7.5 mg        Notes:            INR is trending up quickly. Will continue to carefully monitor, may need to reduce dose tomorrow. Will order warfarin 7.5 mg for today per home regimen. Daily PT/INR while inpatient. [address] [place] [address]  Ph., CACP, Clinical Pharmacist  Anticoagulation Services, [facility]  4/12/2019  8:15 AM

## 2019-04-13 ENCOUNTER — CARE COORDINATION (OUTPATIENT)
Dept: CASE MANAGEMENT | Age: 79
End: 2019-04-13

## 2019-04-13 DIAGNOSIS — Z96.641 STATUS POST RIGHT HIP REPLACEMENT: Primary | ICD-10-CM

## 2019-04-13 PROCEDURE — 1111F DSCHRG MED/CURRENT MED MERGE: CPT | Performed by: ORTHOPAEDIC SURGERY

## 2019-04-13 NOTE — CARE COORDINATION
[facility] Initial 24 hour call - R-RENETTA    Spoke with: patient's daughter, [place] who is assisting with care  Incision status: dry, intact - no drainage    Edema/Swelling: right leg swollen - same as upon discharge, left leg edema improved. Using ice pack & elevation    Pain level and status: 4/10 - 7.5/10 - controlled with oxycodone as prescribed    Use of pain medications: oxycodone - as prescribed - daughter says patient is hesitant to take narcotic, but understands that she needs to take as prescribed initially to control pain. Bowels: daughter walked patient to  prior to phone call    [address] active: Caron Haile 115 for [place] ([phone]) - confirmed this status with [address] [facility] service today & twice weekly, PT today & three times weekly, skilled nurse visit today. Medications reviewed with daughter - 1111F completed. Coumadin was resumed evening of surgery & is continued - regulated as before by Dr Eliana Elliott, cardio. Daughter is walking patient as well with her new walker - says patient is doing pretty well with assistance. Outpatient therapy: NA    Do you have all of your medications: yes, 1111F    Changes in medications: pain med, colace, protonix    Care Transition will continue to follow - contact number given.   Encounter routed to Maryam Caro RN CTC for 90 day follow-up    Follow up appointments:  4/24  Future Appointments   Date Time Provider Elena Real   4/24/2019 10:00 AM Guille Shore, [facility]

## 2019-04-15 NOTE — DISCHARGE SUMMARY
Orthopaedic Discharge Summary     Patient ID:  Kari Garg  [id number]  89 y.o.  1940    Admit date: 4/9/2019    Discharge date and time: 4/12/2019  4:44 PM     Admitting Physician: Omari Christopher DO     Discharge Physician: same    Admission Diagnoses: Status post right hip replacement [Z96.641]    Discharge Diagnoses: same    Admission Condition: good    Discharged Condition: good    Surgical procedure: [facility] Course: Patient was met in the preoperative area. History and physical exam were updated. Site was marked and consent obtained. Patient received perioperative antibiotics. Procedure occurred without complication. Discharge was discussed with patient. Pain was controlled. Patient discharged without incident. [address]    [x] Internal Medicine    [] Neurosurgery    [] Urology     [] Trauma     [] Critical Care    [] GI    [] ID     [] Neurology    [] Vascular    [] General Surgery    [] Pain Management     [] None    [] Other:     Disposition: home    Patient Instructions: Baudilio Felix   Home Medication Instructions [facility]:[id number]    Printed on:04/15/19 5152   Medication Information                      aspirin 81 MG tablet  Take 81 mg by mouth daily Will stop 7 days before surgery             azelastine (ASTELIN) 137 MCG/SPRAY nasal spray  1 spray by Nasal route 2 times daily. Use in each nostril as directed             calcium carbonate (ANTACID) 500 MG chewable tablet  Take 1 tablet by mouth daily             Calcium-Vitamin D (CALTRATE 600 PLUS-VIT D PO)  Take  by mouth. CycloSPORINE (RESTASIS OP)  Apply 1 drop to eye 2 times daily             diltiazem (CARDIZEM CD) 240 MG ER capsule  Take 240 mg by mouth daily.              docusate sodium (COLACE) 100 MG capsule  Take 1 capsule by mouth 2 times daily as needed for Constipation             fluticasone-salmeterol (ADVAIR) 250-50 MCG/DOSE AEPB  Inhale 1 puff into the lungs as needed losartan (COZAAR) 50 MG tablet  Take 50 mg by mouth daily             montelukast (SINGULAIR) 10 MG tablet  Take 10 mg by mouth nightly             Multiple Vitamins-Minerals (HAIR SKIN AND NAILS FORMULA PO)  Take by mouth             Multiple Vitamins-Minerals (MULTIVITAL PO)  Take  by mouth. nitroGLYCERIN (NITRODUR) 0.1 MG/HR  Place 1 patch onto the skin daily. oxyCODONE-acetaminophen (PERCOCET) 5-325 MG per tablet  Take 1-2 tablets by mouth every 6 hours as needed for Pain for up to 7 days. pantoprazole (PROTONIX) 40 MG tablet  Take 1 tablet by mouth every morning (before breakfast)             polyvinyl alcohol-povidone (REFRESH) 1.4-0.6 % ophthalmic solution  Place 1-2 drops into both eyes 4 times daily             ranitidine (ZANTAC) 300 MG tablet  nightly              triamterene-hydrochlorothiazide (MAXZIDE-25) 37.5-25 MG per tablet  Take 1 tablet by mouth daily             warfarin (COUMADIN) 5 MG tablet  Take 7.5 mg by mouth Will stop 4 day before surgery               Activity: See DC instructions  Wound Care: See DC instructions    Follow-up with Kevin Cuevas DO in 10-14 days.     Eddie Luna DO   11:34 AM 4/15/2019

## 2019-04-19 ENCOUNTER — CARE COORDINATION (OUTPATIENT)
Dept: CASE MANAGEMENT | Age: 79
End: 2019-04-19

## 2019-04-22 ENCOUNTER — TELEPHONE (OUTPATIENT)
Dept: ORTHOPEDIC SURGERY | Age: 79
End: 2019-04-22

## 2019-04-22 NOTE — TELEPHONE ENCOUNTER
Patient is S/P RENETTA on 4/9/19, she is calling requesting something to help with nausea. Please Advise.  Thank you

## 2019-04-23 RX ORDER — ONDANSETRON 4 MG/1
4 TABLET, FILM COATED ORAL EVERY 12 HOURS PRN
Qty: 6 TABLET | Refills: 0 | Status: SHIPPED | OUTPATIENT
Start: 2019-04-23 | End: 2019-04-26

## 2019-04-24 ENCOUNTER — OFFICE VISIT (OUTPATIENT)
Dept: ORTHOPEDIC SURGERY | Age: 79
End: 2019-04-24

## 2019-04-24 VITALS — HEIGHT: 65 IN | WEIGHT: 195.11 LBS | BODY MASS INDEX: 32.51 KG/M2

## 2019-04-24 DIAGNOSIS — M16.11 PRIMARY OSTEOARTHRITIS OF RIGHT HIP: Primary | ICD-10-CM

## 2019-04-24 DIAGNOSIS — Z96.641 STATUS POST RIGHT HIP REPLACEMENT: ICD-10-CM

## 2019-04-24 PROCEDURE — 99024 POSTOP FOLLOW-UP VISIT: CPT | Performed by: ORTHOPAEDIC SURGERY

## 2019-04-24 ASSESSMENT — ENCOUNTER SYMPTOMS
CONSTIPATION: 0
DIARRHEA: 0
COUGH: 0
NAUSEA: 0

## 2019-04-24 NOTE — PROGRESS NOTES
echogenicity. No evidence of hydronephrosis or intrarenal stones. Renal cysts largest measuring 3.0 cm Bladder: Prevoid bladder volume: 804 mL Postvoid bladder volume: Patient unable to void. Ureteral jets: Not identified. Bladder wall:Bladder wall thickening. No evidence of obstructive uropathy. Bladder distension. Xr Chest Portable    Result Date: 4/11/2019  EXAMINATION: SINGLE XRAY VIEW OF THE CHEST 4/11/2019 11:45 am COMPARISON: March 20, 2019 HISTORY: ORDERING SYSTEM PROVIDED HISTORY: hypoxia TECHNOLOGIST PROVIDED HISTORY: hypoxia FINDINGS: The cardiomediastinal silhouette is mildly enlarged. There is minimal pulmonary vascular congestion. There is mild atelectasis at the bilateral lung bases. There is no pleural effusion. There is no pneumothorax. There is no acute osseous abnormality. Minimal pulmonary vascular congestion. Mild cardiomegaly, progressed. Mild atelectasis at the bilateral lung bases. Xr Hip 2-3 Vw W Pelvis Right    Result Date: 4/9/2019  EXAMINATION: SINGLE XRAY VIEW OF THE PELVIS AND 2 XRAY VIEWS RIGHT HIP 4/9/2019 10:04 am COMPARISON: 12/11/2018 HISTORY: ORDERING SYSTEM PROVIDED HISTORY: post op TECHNOLOGIST PROVIDED HISTORY: AP/AP/X-table post op FINDINGS: Right total hip arthroplasty is in place. There is no acute periprosthetic fracture or evidence of prosthesis loosening. Overlying soft tissue gas is compatible with recent surgery. Status post right total hip arthroplasty. Assessment:      1. Primary osteoarthritis of right hip    2. Status post right hip replacement         Plan:      Dressing removed from right hip. Patient to continue with another week of home physical therapy. If patient feels safe enough to drive starting 6/7/9967 will get patient into outpatient physical therapy. Patient is okay to shower. Patient to follow up in 4 week.s        Follow up: Return in about 1 month (around 5/22/2019) for re- evaluation.     No orders of the defined types were placed in this encounter. Orders Placed This Encounter   Procedures    XR HIP RIGHT (2-3 VIEWS)     Standing Status:   Future     Number of Occurrences:   1     Standing Expiration Date:   4/24/2020     Order Specific Question:   Reason for exam:     Answer:   post op     I, Zheng Carvajal RN am scribing for and in the presence of Dr. Keyur Hoffman  4/28/2019 10:42 PM    I have reviewed and made changes accordingly to the work scribed by Zheng Carvajal RN. The documentation accurately reflects work and decisions made by me. I have also reviewed documentation completed by clinical staff.     Keyur Hoffman DO, [facility]  4/28/2019 10:43 PM    This note is created with the assistance of a speech recognition program.  While intending to generate a document that actually reflects the content of the visit, the document can still have some errors including those of syntax and sound a like substitutions which may escape proof reading.  In such instances, actual meaning can be extrapolated by contextual diversion      Electronically signed by Ml Talbert DO, FAOAO on 4/28/2019 at 10:42 PM

## 2019-04-27 ENCOUNTER — CARE COORDINATION (OUTPATIENT)
Dept: CASE MANAGEMENT | Age: 79
End: 2019-04-27

## 2019-04-27 NOTE — CARE COORDINATION
[facility] Transitions Joint Bundle Follow Up Call    2019      Patient Name: Kevin Blair         Patient : 1940     Discharge Date: 19    RARS: Readmission Risk Score: Readmission Risk Score: 21    PCP: Miguelangel Welsh PA-C                 Message left. Stated who I was, representing the [facility], Care Transitions Team.  CTC will continue to follow per Joint Bundle Protocol.          Follow Up:  Future Appointments   Date Time Provider Elena Real   2019  2:30 PM Nora Jose DO [address] [address]

## 2019-05-02 DIAGNOSIS — M16.11 PRIMARY OSTEOARTHRITIS OF RIGHT HIP: Primary | ICD-10-CM

## 2019-05-02 DIAGNOSIS — Z96.641 STATUS POST RIGHT HIP REPLACEMENT: ICD-10-CM

## 2019-05-05 ENCOUNTER — HOSPITAL ENCOUNTER (INPATIENT)
Age: 79
LOS: 5 days | Discharge: HOME | DRG: 378 | End: 2019-05-10
Attending: EMERGENCY MEDICINE | Admitting: FAMILY MEDICINE
Payer: MEDICARE

## 2019-05-05 DIAGNOSIS — K92.2 GASTROINTESTINAL HEMORRHAGE, UNSPECIFIED GASTROINTESTINAL HEMORRHAGE TYPE: Primary | ICD-10-CM

## 2019-05-05 LAB
ABSOLUTE EOS #: 0.26 K/UL (ref 0–0.44)
ABSOLUTE IMMATURE GRANULOCYTE: 0.13 K/UL (ref 0–0.3)
ABSOLUTE LYMPH #: 1.82 K/UL (ref 1.1–3.7)
ABSOLUTE MONO #: 0.74 K/UL (ref 0.1–1.2)
ALBUMIN SERPL-MCNC: 3.5 G/DL (ref 3.5–5.2)
ALBUMIN/GLOBULIN RATIO: 1.3 (ref 1–2.5)
ALP BLD-CCNC: 64 U/L (ref 35–104)
ALT SERPL-CCNC: 11 U/L (ref 5–33)
ANION GAP SERPL CALCULATED.3IONS-SCNC: 14 MMOL/L (ref 9–17)
AST SERPL-CCNC: 12 U/L
BASOPHILS # BLD: 1 % (ref 0–2)
BASOPHILS ABSOLUTE: 0.07 K/UL (ref 0–0.2)
BILIRUB SERPL-MCNC: 0.18 MG/DL (ref 0.3–1.2)
BUN BLDV-MCNC: 75 MG/DL (ref 8–23)
BUN/CREAT BLD: ABNORMAL (ref 9–20)
CALCIUM SERPL-MCNC: 9.2 MG/DL (ref 8.6–10.4)
CHLORIDE BLD-SCNC: 103 MMOL/L (ref 98–107)
CO2: 22 MMOL/L (ref 20–31)
CREAT SERPL-MCNC: 1.14 MG/DL (ref 0.5–0.9)
DIFFERENTIAL TYPE: ABNORMAL
EOSINOPHILS RELATIVE PERCENT: 3 % (ref 1–4)
GFR AFRICAN AMERICAN: 56 ML/MIN
GFR NON-AFRICAN AMERICAN: 46 ML/MIN
GFR SERPL CREATININE-BSD FRML MDRD: ABNORMAL ML/MIN/{1.73_M2}
GFR SERPL CREATININE-BSD FRML MDRD: ABNORMAL ML/MIN/{1.73_M2}
GLUCOSE BLD-MCNC: 146 MG/DL (ref 70–99)
HCT VFR BLD CALC: 23.8 % (ref 36.3–47.1)
HEMOGLOBIN: 7.4 G/DL (ref 11.9–15.1)
IMMATURE GRANULOCYTES: 2 %
INR BLD: 3.3
LYMPHOCYTES # BLD: 24 % (ref 24–43)
MCH RBC QN AUTO: 30 PG (ref 25.2–33.5)
MCHC RBC AUTO-ENTMCNC: 31.1 G/DL (ref 28.4–34.8)
MCV RBC AUTO: 96.4 FL (ref 82.6–102.9)
MONOCYTES # BLD: 10 % (ref 3–12)
NRBC AUTOMATED: 0 PER 100 WBC
PARTIAL THROMBOPLASTIN TIME: 32.8 SEC (ref 20.5–30.5)
PDW BLD-RTO: 14.2 % (ref 11.8–14.4)
PLATELET # BLD: 327 K/UL (ref 138–453)
PLATELET ESTIMATE: ABNORMAL
PMV BLD AUTO: 10.8 FL (ref 8.1–13.5)
POTASSIUM SERPL-SCNC: 3.9 MMOL/L (ref 3.7–5.3)
PROTHROMBIN TIME: 31.3 SEC (ref 9–12)
RBC # BLD: 2.47 M/UL (ref 3.95–5.11)
RBC # BLD: ABNORMAL 10*6/UL
SEG NEUTROPHILS: 61 % (ref 36–65)
SEGMENTED NEUTROPHILS ABSOLUTE COUNT: 4.71 K/UL (ref 1.5–8.1)
SODIUM BLD-SCNC: 139 MMOL/L (ref 135–144)
TOTAL PROTEIN: 6.1 G/DL (ref 6.4–8.3)
TROPONIN INTERP: ABNORMAL
TROPONIN INTERP: ABNORMAL
TROPONIN T: ABNORMAL NG/ML
TROPONIN T: ABNORMAL NG/ML
TROPONIN, HIGH SENSITIVITY: 30 NG/L (ref 0–14)
TROPONIN, HIGH SENSITIVITY: 30 NG/L (ref 0–14)
WBC # BLD: 7.7 K/UL (ref 3.5–11.3)
WBC # BLD: ABNORMAL 10*3/UL

## 2019-05-05 PROCEDURE — 36430 TRANSFUSION BLD/BLD COMPNT: CPT

## 2019-05-05 PROCEDURE — 86901 BLOOD TYPING SEROLOGIC RH(D): CPT

## 2019-05-05 PROCEDURE — C9113 INJ PANTOPRAZOLE SODIUM, VIA: HCPCS | Performed by: STUDENT IN AN ORGANIZED HEALTH CARE EDUCATION/TRAINING PROGRAM

## 2019-05-05 PROCEDURE — 2580000003 HC RX 258: Performed by: STUDENT IN AN ORGANIZED HEALTH CARE EDUCATION/TRAINING PROGRAM

## 2019-05-05 PROCEDURE — 86920 COMPATIBILITY TEST SPIN: CPT

## 2019-05-05 PROCEDURE — C9113 INJ PANTOPRAZOLE SODIUM, VIA: HCPCS | Performed by: FAMILY MEDICINE

## 2019-05-05 PROCEDURE — 6360000002 HC RX W HCPCS: Performed by: STUDENT IN AN ORGANIZED HEALTH CARE EDUCATION/TRAINING PROGRAM

## 2019-05-05 PROCEDURE — 84484 ASSAY OF TROPONIN QUANT: CPT

## 2019-05-05 PROCEDURE — 96374 THER/PROPH/DIAG INJ IV PUSH: CPT

## 2019-05-05 PROCEDURE — 6360000002 HC RX W HCPCS: Performed by: FAMILY MEDICINE

## 2019-05-05 PROCEDURE — 2580000003 HC RX 258: Performed by: NURSE PRACTITIONER

## 2019-05-05 PROCEDURE — 80053 COMPREHEN METABOLIC PANEL: CPT

## 2019-05-05 PROCEDURE — 85025 COMPLETE CBC W/AUTO DIFF WBC: CPT

## 2019-05-05 PROCEDURE — 99285 EMERGENCY DEPT VISIT HI MDM: CPT

## 2019-05-05 PROCEDURE — P9017 PLASMA 1 DONOR FRZ W/IN 8 HR: HCPCS

## 2019-05-05 PROCEDURE — 99222 1ST HOSP IP/OBS MODERATE 55: CPT | Performed by: INTERNAL MEDICINE

## 2019-05-05 PROCEDURE — 86927 PLASMA FRESH FROZEN: CPT

## 2019-05-05 PROCEDURE — 85730 THROMBOPLASTIN TIME PARTIAL: CPT

## 2019-05-05 PROCEDURE — P9040 RBC LEUKOREDUCED IRRADIATED: HCPCS

## 2019-05-05 PROCEDURE — 93005 ELECTROCARDIOGRAM TRACING: CPT

## 2019-05-05 PROCEDURE — 86900 BLOOD TYPING SEROLOGIC ABO: CPT

## 2019-05-05 PROCEDURE — 1200000000 HC SEMI PRIVATE

## 2019-05-05 PROCEDURE — 2580000003 HC RX 258: Performed by: FAMILY MEDICINE

## 2019-05-05 PROCEDURE — 85610 PROTHROMBIN TIME: CPT

## 2019-05-05 PROCEDURE — 86850 RBC ANTIBODY SCREEN: CPT

## 2019-05-05 RX ORDER — ONDANSETRON 2 MG/ML
4 INJECTION INTRAMUSCULAR; INTRAVENOUS EVERY 6 HOURS PRN
Status: DISCONTINUED | OUTPATIENT
Start: 2019-05-05 | End: 2019-05-10 | Stop reason: HOSPADM

## 2019-05-05 RX ORDER — 0.9 % SODIUM CHLORIDE 0.9 %
250 INTRAVENOUS SOLUTION INTRAVENOUS ONCE
Status: COMPLETED | OUTPATIENT
Start: 2019-05-05 | End: 2019-05-05

## 2019-05-05 RX ORDER — ACETAMINOPHEN 325 MG/1
650 TABLET ORAL EVERY 4 HOURS PRN
Status: DISCONTINUED | OUTPATIENT
Start: 2019-05-05 | End: 2019-05-10 | Stop reason: HOSPADM

## 2019-05-05 RX ORDER — HYDROCODONE BITARTRATE AND ACETAMINOPHEN 5; 325 MG/1; MG/1
2 TABLET ORAL EVERY 4 HOURS PRN
Status: DISCONTINUED | OUTPATIENT
Start: 2019-05-05 | End: 2019-05-10 | Stop reason: HOSPADM

## 2019-05-05 RX ORDER — 0.9 % SODIUM CHLORIDE 0.9 %
500 INTRAVENOUS SOLUTION INTRAVENOUS ONCE
Status: COMPLETED | OUTPATIENT
Start: 2019-05-06 | End: 2019-05-06

## 2019-05-05 RX ORDER — MORPHINE SULFATE 4 MG/ML
4 INJECTION, SOLUTION INTRAMUSCULAR; INTRAVENOUS
Status: DISCONTINUED | OUTPATIENT
Start: 2019-05-05 | End: 2019-05-10 | Stop reason: HOSPADM

## 2019-05-05 RX ORDER — 0.9 % SODIUM CHLORIDE 0.9 %
250 INTRAVENOUS SOLUTION INTRAVENOUS ONCE
Status: DISCONTINUED | OUTPATIENT
Start: 2019-05-05 | End: 2019-05-10 | Stop reason: HOSPADM

## 2019-05-05 RX ORDER — SODIUM CHLORIDE 0.9 % (FLUSH) 0.9 %
10 SYRINGE (ML) INJECTION PRN
Status: DISCONTINUED | OUTPATIENT
Start: 2019-05-05 | End: 2019-05-10 | Stop reason: HOSPADM

## 2019-05-05 RX ORDER — HYDROCODONE BITARTRATE AND ACETAMINOPHEN 5; 325 MG/1; MG/1
1 TABLET ORAL EVERY 4 HOURS PRN
Status: DISCONTINUED | OUTPATIENT
Start: 2019-05-05 | End: 2019-05-10 | Stop reason: HOSPADM

## 2019-05-05 RX ORDER — ONDANSETRON 2 MG/ML
4 INJECTION INTRAMUSCULAR; INTRAVENOUS ONCE
Status: COMPLETED | OUTPATIENT
Start: 2019-05-05 | End: 2019-05-05

## 2019-05-05 RX ORDER — MONTELUKAST SODIUM 10 MG/1
10 TABLET ORAL NIGHTLY
Status: DISCONTINUED | OUTPATIENT
Start: 2019-05-05 | End: 2019-05-10 | Stop reason: HOSPADM

## 2019-05-05 RX ORDER — SODIUM CHLORIDE 9 MG/ML
INJECTION, SOLUTION INTRAVENOUS CONTINUOUS
Status: DISCONTINUED | OUTPATIENT
Start: 2019-05-05 | End: 2019-05-10 | Stop reason: HOSPADM

## 2019-05-05 RX ORDER — DILTIAZEM HYDROCHLORIDE 240 MG/1
240 CAPSULE, COATED, EXTENDED RELEASE ORAL DAILY
Status: DISCONTINUED | OUTPATIENT
Start: 2019-05-05 | End: 2019-05-10 | Stop reason: HOSPADM

## 2019-05-05 RX ORDER — MORPHINE SULFATE 2 MG/ML
2 INJECTION, SOLUTION INTRAMUSCULAR; INTRAVENOUS
Status: DISCONTINUED | OUTPATIENT
Start: 2019-05-05 | End: 2019-05-10 | Stop reason: HOSPADM

## 2019-05-05 RX ORDER — SODIUM CHLORIDE 0.9 % (FLUSH) 0.9 %
10 SYRINGE (ML) INJECTION EVERY 12 HOURS SCHEDULED
Status: DISCONTINUED | OUTPATIENT
Start: 2019-05-05 | End: 2019-05-10 | Stop reason: HOSPADM

## 2019-05-05 RX ADMIN — SODIUM CHLORIDE 80 MG: 9 INJECTION, SOLUTION INTRAVENOUS at 16:12

## 2019-05-05 RX ADMIN — SODIUM CHLORIDE: 9 INJECTION, SOLUTION INTRAVENOUS at 20:50

## 2019-05-05 RX ADMIN — SODIUM CHLORIDE 8 MG/HR: 9 INJECTION, SOLUTION INTRAVENOUS at 16:36

## 2019-05-05 RX ADMIN — ONDANSETRON 4 MG: 2 INJECTION INTRAMUSCULAR; INTRAVENOUS at 14:49

## 2019-05-05 RX ADMIN — SODIUM CHLORIDE 8 MG/HR: 9 INJECTION, SOLUTION INTRAVENOUS at 18:42

## 2019-05-05 RX ADMIN — SODIUM CHLORIDE 250 ML: 9 INJECTION, SOLUTION INTRAVENOUS at 20:50

## 2019-05-05 RX ADMIN — SODIUM CHLORIDE 500 ML: 9 INJECTION, SOLUTION INTRAVENOUS at 23:58

## 2019-05-05 ASSESSMENT — ENCOUNTER SYMPTOMS
ABDOMINAL PAIN: 0
VOMITING: 0
SHORTNESS OF BREATH: 1
RHINORRHEA: 0
BLOOD IN STOOL: 1
NAUSEA: 0
SORE THROAT: 0
COLOR CHANGE: 0

## 2019-05-05 ASSESSMENT — PAIN SCALES - GENERAL
PAINLEVEL_OUTOF10: 0

## 2019-05-05 NOTE — ED NOTES
Report was called to [facility] RN. Writer to call back when second trop results before sending to floor.       Td Bowman RN  05/05/19 1429

## 2019-05-05 NOTE — ED NOTES
Writer called and spoke with Ovi Peña RN on [facility]. Pt is okay to go to floor.       Maria Elena Nix RN  05/05/19 [id number]

## 2019-05-05 NOTE — ED PROVIDER NOTES
[facility]  Emergency Department Encounter  EmergencyMedicine Resident     Pt Name:Nadine Marshall  MRN: [id number]  [place] 1940  Date of evaluation: 5/5/19  PCP:  Molly Casey, [address] [place] [address]       Chief Complaint   Patient presents with    Melena    Nausea       HISTORY OF PRESENT ILLNESS  (Location/Symptom, Timing/Onset, Context/Setting, Quality, Duration, Modifying Factors, Severity.)      Marek Terry is a [de-identified] y.o. female who presents with dark stools and nausea ongoing for approximately 4 weeks, and lightheadedness for 3-4 days. She has a past history of hypertension, MI, atrial fibrillation, and melanoma. Patient is anticoagulated with warfarin. She recently underwent right total hip arthroplasty 4/9/19. She also has a history of hernia repair, carpal tunnel release, appendectomy. She denies any past history of GI bleeding. She denies any significant history of NSAID use. She denies any associated chest pain, nausea, vomiting, or changes in bladder habits. She denies any iron replacement. PAST MEDICAL / SURGICAL / SOCIAL / FAMILY HISTORY      has a past medical history of Allergic rhinitis, Arthritis, Atrial fibrillation ([facility] [address] Hypertension, Melanoma ([facility] [address] Myocardial infarction [facility]), Wears glasses, and Wears partial dentures. has a past surgical history that includes Appendectomy; other surgical history; Hysterectomy; Neck surgery; Carpal tunnel release (Right); Nose surgery; hernia repair; Tonsillectomy; joint replacement; Total hip arthroplasty (Right, 04/09/2019); and Total hip arthroplasty (Right, 4/9/2019).     Social History     Socioeconomic History    Marital status:      Spouse name: Not on file    Number of children: Not on file    Years of education: Not on file    Highest education level: Not on file   Occupational History    Not on file   Social Needs    Financial resource strain: Not on file    Food insecurity:     Worry: Not on file     Inability: Not on file    Transportation needs:     Medical: Not on file     Non-medical: Not on file   Tobacco Use    Smoking status: Former Smoker     Packs/day: 1.00     Years: 20.00     Pack years: 20.00     Types: Cigarettes     Last attempt to quit: 1981     Years since quittin.3    Smokeless tobacco: Never Used   Substance and Sexual Activity    Alcohol use: No    Drug use: No    Sexual activity: Not on file   Lifestyle    Physical activity:     Days per week: Not on file     Minutes per session: Not on file    Stress: Not on file   Relationships    Social connections:     Talks on phone: Not on file     Gets together: Not on file     Attends Latter day service: Not on file     Active member of club or organization: Not on file     Attends meetings of clubs or organizations: Not on file     Relationship status: Not on file    Intimate partner violence:     Fear of current or ex partner: Not on file     Emotionally abused: Not on file     Physically abused: Not on file     Forced sexual activity: Not on file   Other Topics Concern    Not on file   Social History Narrative    Not on file       History reviewed. No pertinent family history. Allergies:  Patient has no known allergies. Home Medications:  Prior to Admission medications    Medication Sig Start Date End Date Taking?  Authorizing Provider   pantoprazole (PROTONIX) 40 MG tablet Take 1 tablet by mouth every morning (before breakfast) 19   Kiana Pisano MD   calcium carbonate (ANTACID) 500 MG chewable tablet Take 1 tablet by mouth daily 19  Kiana Pisano MD   docusate sodium (COLACE) 100 MG capsule Take 1 capsule by mouth 2 times daily as needed for Constipation 4/10/19   Phill Terrell DO   montelukast (SINGULAIR) 10 MG tablet Take 10 mg by mouth nightly    Historical Provider, MD   CycloSPORINE (RESTASIS OP) Apply 1 drop to eye 2 times daily    Historical Provider, MD   Multiple Vitamins-Minerals (HAIR SKIN AND NAILS FORMULA PO) Take by mouth    Historical Provider, MD   losartan (COZAAR) 50 MG tablet Take 50 mg by mouth daily    Historical Provider, MD   ranitidine (ZANTAC) 300 MG tablet nightly  6/18/18   Historical Provider, MD   polyvinyl alcohol-povidone (REFRESH) 1.4-0.6 % ophthalmic solution Place 1-2 drops into both eyes 4 times daily    Historical Provider, MD   triamterene-hydrochlorothiazide (MAXZIDE-25) 37.5-25 MG per tablet Take 1 tablet by mouth daily 12/29/15   Historical Provider, MD   diltiazem (CARDIZEM CD) 240 MG ER capsule Take 240 mg by mouth daily. 8/8/14   DUDLEY Levin - CNP   aspirin 81 MG tablet Take 81 mg by mouth daily Will stop 7 days before surgery    Historical Provider, MD   fluticasone-salmeterol (ADVAIR) 250-50 MCG/DOSE AEPB Inhale 1 puff into the lungs as needed     Historical Provider, MD   azelastine (ASTELIN) 137 MCG/SPRAY nasal spray 1 spray by Nasal route 2 times daily. Use in each nostril as directed    Historical Provider, MD   Calcium-Vitamin D (CALTRATE 600 PLUS-VIT D PO) Take  by mouth. Historical Provider, MD   nitroGLYCERIN (NITRODUR) 0.1 MG/HR Place 1 patch onto the skin daily. Historical Provider, MD   Multiple Vitamins-Minerals (MULTIVITAL PO) Take  by mouth. Historical Provider, MD   warfarin (COUMADIN) 5 MG tablet Take 7.5 mg by mouth Will stop 4 day before surgery    Historical Provider, MD       REVIEW OF SYSTEMS    (2-9 systems for level 4, 10 or more for level 5)      Review of Systems   Constitutional: Negative for chills, fatigue and fever. HENT: Negative for congestion, rhinorrhea and sore throat. Eyes: Negative for visual disturbance. Respiratory: Positive for shortness of breath. Cardiovascular: Negative for chest pain. Gastrointestinal: Positive for blood in stool. Negative for abdominal pain, nausea and vomiting. Genitourinary: Negative for dysuria, frequency and hematuria. Musculoskeletal: Negative for myalgias. Skin: Negative for color change and rash. Neurological: Positive for light-headedness. PHYSICAL EXAM   (up to 7 for level 4, 8 or more for level 5)      INITIAL VITALS:   BP (!) 107/43   Pulse 92   Temp 97.5 °F (36.4 °C)   Resp 16   Ht 5' 5\" (1.651 m)   Wt 182 lb (82.6 kg)   SpO2 99%   BMI 30.29 kg/m²     Physical Exam   Constitutional: She is oriented to person, place, and time. She appears well-developed and well-nourished. No distress. HENT:   Head: Normocephalic and atraumatic. Mouth/Throat: Oropharynx is clear and moist.   Eyes: Pupils are equal, round, and reactive to light. Conjunctivae and EOM are normal.   Neck: Normal range of motion. Neck supple. Cardiovascular: Normal rate, regular rhythm and normal heart sounds. Exam reveals no friction rub. No murmur heard. Pulmonary/Chest: Effort normal and breath sounds normal. No stridor. No respiratory distress. She has no wheezes. Abdominal: Soft. Bowel sounds are normal. She exhibits no distension and no mass. There is no tenderness. There is no guarding. Genitourinary:   Genitourinary Comments: Hemoccult-positive on dark stool, no anal fissure, no hemorrhoids   Musculoskeletal: She exhibits no edema. Neurological: She is alert and oriented to person, place, and time. Skin: Skin is warm. Capillary refill takes less than 2 seconds.        DIFFERENTIAL  DIAGNOSIS     PLAN (LABS / IMAGING / EKG):  Orders Placed This Encounter   Procedures    CBC Auto Differential    Comprehensive Metabolic Panel    Protime-INR    APTT    Troponin    Basic Metabolic Panel w/ Reflex to MG    CBC    Protime-INR    Hemoglobin    Hemoglobin and hematocrit, blood    HEMOGLOBIN AND HEMATOCRIT, BLOOD    Diet NPO Effective Now Exceptions are: Ice Chips    Vital signs per unit routine    Telemetry monitoring    Notify physician    Bedrest with bathroom privileges    Daily weights    Intake and output    Nursing to document all stools for color and consistency    Tobacco cessation education protocol    Place intermittent pneumatic compression device    Full Code    Inpatient consult to GI    Inpatient consult to Hospitalist    Initiate Oxygen Therapy Protocol    EKG 12 Lead    TYPE AND SCREEN    PREPARE FRESH FROZEN PLASMA, 1 Units    PREPARE RBC (CROSSMATCH), 1 Units    PATIENT STATUS (FROM ED OR OR/PROCEDURAL) Inpatient       MEDICATIONS ORDERED:  Orders Placed This Encounter   Medications    ondansetron (ZOFRAN) injection 4 mg    pantoprazole (PROTONIX) 80 mg in sodium chloride 0.9 % 50 mL bolus    DISCONTD: pantoprazole (PROTONIX) 80 mg in sodium chloride 0.9 % 100 mL infusion    diltiazem (CARDIZEM CD) extended release capsule 240 mg    mometasone-formoterol (DULERA) 200-5 MCG/ACT inhaler 2 puff    montelukast (SINGULAIR) tablet 10 mg    0.9 % sodium chloride infusion    sodium chloride flush 0.9 % injection 10 mL    sodium chloride flush 0.9 % injection 10 mL    acetaminophen (TYLENOL) tablet 650 mg    OR Linked Order Group     HYDROcodone-acetaminophen (NORCO) 5-325 MG per tablet 1 tablet     HYDROcodone-acetaminophen (NORCO) 5-325 MG per tablet 2 tablet    OR Linked Order Group     morphine (PF) injection 2 mg     morphine injection 4 mg    ondansetron (ZOFRAN) injection 4 mg    pantoprazole (PROTONIX) 80 mg in sodium chloride 0.9 % 100 mL infusion    0.9 % sodium chloride bolus    0.9 % sodium chloride bolus       DDX: Upper GI bleed, lower GI bleed, iatrogenic, ACS, arrhythmia    DIAGNOSTIC RESULTS / EMERGENCY DEPARTMENT COURSE / MDM     LABS:  Results for orders placed or performed during the hospital encounter of 05/05/19   CBC Auto Differential   Result Value Ref Range    WBC 7.7 3.5 - 11.3 k/uL    RBC 2.47 (L) 3.95 - 5.11 m/uL    Hemoglobin 7.4 (L) 11.9 - 15.1 g/dL    Hematocrit 23.8 (L) 36.3 - 47.1 %    MCV 96.4 82.6 - 102.9 fL    MCH 30.0 25.2 - 33.5 pg    MCHC 31.1 28.4 - 34.8 g/dL    RDW 14.2 11.8 - Arm Band Number [id number]     ABO/Rh O NEGATIVE     Antibody Screen NEGATIVE     Unit Number [id number]     Product Code Leukocyte Reduced Irradiated Red Cell     Unit Divison 00     Dispense Status ISSUED     Transfusion Status OK TO TRANSFUSE     Crossmatch Result COMPATIBLE    PREPARE FRESH FROZEN PLASMA, 1 Units   Result Value Ref Range    Unit Number [id number]     Product Code Fresh Plasma     Unit Divison 00     Dispense Status ISSUED     Transfusion Status OK TO TRANSFUSE        IMPRESSION: GI Bleeding    RADIOLOGY:  None    EKG  EKG Interpretation    Interpreted by me    Rhythm: normal sinus   Rate: normal  Axis: normal  Ectopy: none  Conduction: normal, , QRS 94, QTc 442  ST Segments: no acute change  T Waves: no acute change  Q Waves: none    Clinical Impression: no acute changes and normal EKG    All EKG's are interpreted by the Emergency Department Physician who either signs or Co-signs this chart in the absence of a cardiologist.    EMERGENCY DEPARTMENT COURSE:  Patient alert and oriented, no acute distress. Vital signs within normal limits. Hemoccult-positive on dark stool. Abdominal labs were obtained as well as coagulation studies. Significantly anemic at 7.4 from patient's baseline of approximately 14.0. Abdominal labs grossly unremarkable, electrolytes within normal limits. BUN elevated at 75, given elevated BUN/creatinine ratio, this is concerning for upper GI bleeding. INR 3.3.  4:05 PM- discussed with Dr. Tawnya Thurman and gastroenterology. Recommended reversal and transfusion  Patient was given FFP per gastroenterology for Coumadin reversal, additionally transfused 1 unit. GI planning for endoscopy tomorrow. Nothing by mouth effective immediately. Patient remained stable throughout emergency department stay.   Symptoms appear consistent with upper GI bleeding given melanotic stool and BUN/creatinine ratio, I do not feel that further imaging or lab work are indicated at this time.    PROCEDURES:  None    CONSULTS:  IP CONSULT TO GI  IP CONSULT TO HOSPITALIST    CRITICAL CARE:  None    FINAL IMPRESSION      1. Gastrointestinal hemorrhage, unspecified gastrointestinal hemorrhage type          DISPOSITION / PLAN     DISPOSITION Admitted 05/05/2019 03:52:15 PM      PATIENT REFERRED TO:  Nika Urbina PA-C  [address] [place] [address]  [phone]            DISCHARGE MEDICATIONS:  Current Discharge Medication List          Ava Goddard D.O.   Emergency Medicine Resident    (Please note that portions ofthis note were completed with a voice recognition program.  Efforts were made to edit the dictations but occasionally words are mis-transcribed.)     Ava Goddard MD  05/05/19 8192

## 2019-05-05 NOTE — ED NOTES
Pt tolerating transfusion well. Vitals are stable. Increased transfusion to 500.      Yari Hager RN  05/05/19 4729

## 2019-05-05 NOTE — CARE COORDINATION
Case Management Initial Discharge Plan  Leeanna Cuevas,             Met with:patient to discuss discharge plans. Information verified: address, contacts, phone number, , insurance Yes  PCP: Thiago Membreno PA-C  Date of last visit: 6 weeks     Insurance Provider: medicare , bcbs     Discharge Planning    Living Arrangements:  Family Members   Support Systems:  Family Members, Home Care Staff(was [facility] to [facility] however lives in [place]- unable to find home care agency family is unsure)    Home has 1 stories  4 stairs to climb to get into front door, none stairs to climb to reach second floor  Location of bedroom/bathroom in home main     Patient able to perform ADL's:Assisted    Current Services (outpatient & in home) unsure of name   DME equipment: walker   DME provider: alicia    Pharmacy: [facility]    Potential Assistance Purchasing Medications:  No  Does patient want to participate in local refill/ meds to beds program?  Yes    Potential Assistance Needed:  Home Care    Patient agreeable to home care: Yes  Freedom of choice provided:  yes    Prior SNF/Rehab Placement and Facility: no  Agreeable to SNF/Rehab: No  [place] of choice provided: n/a   Evaluation: n/a    Expected Discharge date:     Patient expects to be discharged to:  home with home care   Follow Up Appointment: Best Day/ Time:      Transportation provider: daughter   Transportation arrangements needed for discharge: No    Readmission Risk              Risk of Unplanned Readmission:        0             Does patient have a readmission risk score greater than 14?: Yes  If yes, follow-up appointment must be made within 7 days of discharge. Discharge Plan: Plan to discharge to home with current home care provider- unable to find out provider with after hours [facility].  Has established pcp           Electronically signed by Gloria Isaacs RN on 19 at 4:59 PM

## 2019-05-05 NOTE — CONSULTS
[facility]    GASTROENTEROLOGY CONSULT    Patient:   Lyle Hernandez   :    1940   Facility:   [facility]   Date:    2019  Admission Dx:  GI bleed [K92.2]  Requesting physician: Bhupinder Corcoran DO  Reason for consult:   Eval - GI bleed   CC : Dizziness    SUBJECTIVE     HISTORY OF PRESENT ILLNESS  This is a [de-identified] y.o. [place]  female who was admitted 2019 with GI bleed [K92.2]. We have been asked to see the patient in consultation by Bhupinder Corcoran DO for  Eval - GI bleed. [de-identified] y/o female with PMH of atrial fibrillation on Coumadin, HTN, arthritis who underwent total right hip arthroplasty 2019 is admitted to the ED with complaints of dizziness, nausea and melena. Workup on admission showed a INR of 3.3. Hemoglobin 7.4 gm/dL. A review of chart shows preoperatively hgb was 14.0 on 2019 and 10.9 on 04/10/2019 postoperatively. Patient reports she has been experiencing decreased appetite and black stool since her surgery. She denies supplemental iron or Pepto-Bismol use. No prior history of GI bleeding. She reports a remote EGD 20 years ago for possible stomach problems and has been on Tagamet since. Records show she was placed on PPI at discharge, but patient did not take it as she was unclear what medication was for. Remote history of colonoscopy. Rectal exam completed in the ED by myself in the presence of Dr Adalid Cooper shows melena. Patient's vital signs are currently stable. Patient denies any abdominal pain, nausea, or vomiting.   No NSAID use         OBJECTIVE:     PAST MEDICAL/SURGICAL HISTORY  Past Medical History:   Diagnosis Date    Allergic rhinitis     Arthritis     Atrial fibrillation ([facility] [address]     Hypertension     Melanoma ([facility] [address]     on nose    Myocardial infarction [facility]) 2008    Wears glasses     Wears partial dentures     upper     Past Surgical History:   Procedure Laterality Date    APPENDECTOMY      CARPAL TUNNEL RELEASE Right     HERNIA REPAIR      umbilical    HYSTERECTOMY      JOINT REPLACEMENT      NECK SURGERY      C5-C6    NOSE SURGERY      cancer removed    OTHER SURGICAL HISTORY      heart surg/growth in heart sac    TONSILLECTOMY      TOTAL HIP ARTHROPLASTY Right 04/09/2019    TOTAL HIP ARTHROPLASTY Right 4/9/2019    HIP TOTAL ARTHROPLASTY - MEDACTA, C-ARM, MEDACTA TABLE, NSA= GENERAL VS SPINAL, FASCIA ILLIACA BLOCK, *STANDARD performed by Tatiana Ring DO at [address]:  No Known Allergies    HOME MEDICATIONS:  Prior to Admission medications    Medication Sig Start Date End Date Taking? Authorizing Provider   pantoprazole (PROTONIX) 40 MG tablet Take 1 tablet by mouth every morning (before breakfast) 4/13/19   Jorge Luis Quijano MD   calcium carbonate (ANTACID) 500 MG chewable tablet Take 1 tablet by mouth daily 4/12/19 5/12/19  Jorge Luis Quijano MD   docusate sodium (COLACE) 100 MG capsule Take 1 capsule by mouth 2 times daily as needed for Constipation 4/10/19   Tano Mason DO   montelukast (SINGULAIR) 10 MG tablet Take 10 mg by mouth nightly    Historical Provider, MD   CycloSPORINE (RESTASIS OP) Apply 1 drop to eye 2 times daily    Historical Provider, MD   Multiple Vitamins-Minerals (HAIR SKIN AND NAILS FORMULA PO) Take by mouth    Historical Provider, MD   losartan (COZAAR) 50 MG tablet Take 50 mg by mouth daily    Historical Provider, MD   ranitidine (ZANTAC) 300 MG tablet nightly  6/18/18   Historical Provider, MD   polyvinyl alcohol-povidone (REFRESH) 1.4-0.6 % ophthalmic solution Place 1-2 drops into both eyes 4 times daily    Historical Provider, MD   triamterene-hydrochlorothiazide (MAXZIDE-25) 37.5-25 MG per tablet Take 1 tablet by mouth daily 12/29/15   Historical Provider, MD   diltiazem (CARDIZEM CD) 240 MG ER capsule Take 240 mg by mouth daily.  8/8/14   DUDLEY De La Fuente - CNP   aspirin 81 MG tablet Take 81 mg by mouth daily Will stop 7 /60   Pulse 68   Temp 99.1 °F (37.3 °C)   Resp 18   Ht 5' 5\" (1.651 m)   Wt 182 lb (82.6 kg)   SpO2 99%   BMI 30.29 kg/m²     GENERAL:   Well developed, Well nourished, No apparent distress  HEAD:   Normocephalic, Atraumatic  EENT:   EOMI, Sclera not icteric, Oropharynx moist   NECK:   Supple, Trachea midline  LUNGS:  CTA Bilaterally  HEART:  Irreg, irreg, No murmur  ABDOMEN:   Soft, Nontender, Nondistended, BS WNL, rectal exam reveals melena  EXT:   No clubbing. No cyanosis. No edema. SKIN:   No rashes. No jaundice. No stigmata of liver disease. MUSC/SKEL:   Adequate muscle bulk for patient's age, No significant synovitis, No deformities  NEURO:  A&O x Three, CN II- XII grossly intact      LABS AND IMAGING:     CBC  Recent Labs     05/05/19  1447   WBC 7.7   HGB 7.4*   HCT 23.8*   MCV 96.4   MCH 30.0   MCHC 31.1          BMP  Recent Labs     05/05/19  1447      K 3.9      CO2 22   BUN 75*   CREATININE 1.14*   GLUCOSE 146*   CALCIUM 9.2       LFTS  Recent Labs     05/05/19  1447   ALKPHOS 64   ALT 11   AST 12   PROT 6.1*   BILITOT 0.18*   LABALBU 3.5       AMYLASE/LIPASE/AMMONIA  No results for input(s): AMYLASE, LIPASE, AMMONIA in the last 72 hours. PT/INR  Recent Labs     05/05/19  1447   PROTIME 31.3*   INR 3.3       ANEMIA STUDIES  No results for input(s): IRON, LABIRON, TIBC, UIBC, FERRITIN, [id number], FOLATE, OCCULTBLD in the last 72 hours. IMAGING    Xr Hip 2-3 Vw W Pelvis Right    Result Date: 4/9/2019  EXAMINATION: SINGLE XRAY VIEW OF THE PELVIS AND 2 XRAY VIEWS RIGHT HIP 4/9/2019 10:04 am COMPARISON: 12/11/2018 HISTORY: ORDERING SYSTEM PROVIDED HISTORY: post op TECHNOLOGIST PROVIDED HISTORY: AP/AP/X-table post op FINDINGS: Right total hip arthroplasty is in place. There is no acute periprosthetic fracture or evidence of prosthesis loosening. Overlying soft tissue gas is compatible with recent surgery. Status post right total hip arthroplasty. IMPRESSION:     [de-identified] y/o female with PMH of MI, atrial fibrillation on Coumadin, HTN, arthritis who underwent total right hip arthroplasty 04/09/2019 is admitted to the ED with complaints of dizziness, nausea and melena. Workup on admission showed a INR of 3.3. Hemoglobin 7.4 gm/dL. A review of chart shows preoperatively hgb was 14.0 on 03/22/2019 and 10.9 on 04/10/2019 postoperatively. Patient reports she has been experiencing melena since surgery. 1. Melena - Suspect UGI source (BUN/Cr ration) . DDX - PUD, erosive gastritis, AVMs, GI malignancy or right-sided colonic lesion less likely    2. Acute drop in Hgb - secondary to Acute GI bleed    3. History of atrial fibrillation on Coumadin. Labs reveal supratherapeutic INR at 3.3      Old records, labs and imaging reviewed as above. PLAN   1. Monitor hemoglobin and hematocrit. Transfuse to keep hemoglobin greater than 8 g/dL (cardiac hx). 2.  Start Protonix gtt  3. Recommend 1 unit PRBC for anemia and 1 unit FFP for supratherapeutic INR. This was discussed with ED resident  4, Plan for EGD tomorrow. Can be completed earlier if highs of hemodynamic instability or significant GI bleeding  5. Keep NPO. 6.  Plan was discussed with patient and daughter while in ED  7. Please call GI with any questions, concerns, or acute change in clinical status    This plan was formulated in collaboration with Dr. Moses Guzmán   Thank you for allowing us to participate in the care of your patient. Electronically signed by: Reji LEE on 5/5/2019 at 4:47 PM     Please note that this note was generated using a voice recognition dictation software. Although every effort was made to ensure the accuracy of this automated transcription, some errors in transcription may have occurred.

## 2019-05-05 NOTE — ED NOTES
Pt arrived to the ED alert and oriented x4. Pt is experiencing dizziness, black stools, SOB, nausea, and malaise. Pt reports she feels dizzy upon standing. Denies LOC. Pt states she has had black stools and been feeling sick since her hip surgery 4 weeks ago, Pt on warfarin for Afib. Pt reports not having an appetite and feeling weak. Pt is not experiencing any pain. Pt placed on cardiac monitor, continuous pulse ox, and BP cuff. Respirations even and unlabored. NAD noted.       Tara Self RN  05/05/19 [address] Belinda Gomez RN  05/05/19 1836

## 2019-05-05 NOTE — ED PROVIDER NOTES
PANEL   PROTIME-INR   APTT   TROPONIN   TROPONIN       EMERGENCY DEPARTMENT COURSE:     -------------------------  BP: 128/61, Temp: 98.2 °F (36.8 °C), Pulse: 89, Resp: 16  Physical Exam   Constitutional: She is oriented to person, place, and time. She appears well-developed and well-nourished. HENT:   Head: Normocephalic and atraumatic. Right Ear: External ear normal.   Left Ear: External ear normal.   Eyes: Right eye exhibits no discharge. Left eye exhibits no discharge. No scleral icterus. Neck: Normal range of motion. No tracheal deviation present. Pulmonary/Chest: Effort normal. No stridor. No respiratory distress. Musculoskeletal: Normal range of motion. Neurological: She is alert and oriented to person, place, and time. Coordination normal.   Skin: Skin is warm and dry. She is not diaphoretic. Psychiatric: She has a normal mood and affect. Her behavior is normal.         Comments  The pt has signs of GI Bleed significant dropn in H and H she is hemodynamically stable at this point with INR in range. Will hold the warfarin discussed with the pt slight increase of clotting but the GI bleed can be deadly and is a major risk in this case. Will admit      Pyle DO, RDMS.   Attending Emergency Physician          Ricardo Treadwell DO  05/05/19 6172

## 2019-05-06 ENCOUNTER — ANESTHESIA EVENT (OUTPATIENT)
Dept: ENDOSCOPY | Age: 79
DRG: 378 | End: 2019-05-06
Payer: MEDICARE

## 2019-05-06 ENCOUNTER — ANESTHESIA (OUTPATIENT)
Dept: ENDOSCOPY | Age: 79
DRG: 378 | End: 2019-05-06
Payer: MEDICARE

## 2019-05-06 VITALS
OXYGEN SATURATION: 97 % | SYSTOLIC BLOOD PRESSURE: 140 MMHG | DIASTOLIC BLOOD PRESSURE: 60 MMHG | RESPIRATION RATE: 24 BRPM

## 2019-05-06 PROBLEM — N17.9 AKI (ACUTE KIDNEY INJURY) (HCC): Status: RESOLVED | Noted: 2019-04-11 | Resolved: 2019-05-06

## 2019-05-06 LAB
ANION GAP SERPL CALCULATED.3IONS-SCNC: 9 MMOL/L (ref 9–17)
BLD PROD TYP BPU: NORMAL
BUN BLDV-MCNC: 57 MG/DL (ref 8–23)
BUN/CREAT BLD: ABNORMAL (ref 9–20)
CALCIUM SERPL-MCNC: 8.1 MG/DL (ref 8.6–10.4)
CHLORIDE BLD-SCNC: 113 MMOL/L (ref 98–107)
CO2: 23 MMOL/L (ref 20–31)
CREAT SERPL-MCNC: 0.99 MG/DL (ref 0.5–0.9)
DISPENSE STATUS BLOOD BANK: NORMAL
EKG ATRIAL RATE: 86 BPM
EKG P AXIS: -12 DEGREES
EKG P-R INTERVAL: 152 MS
EKG Q-T INTERVAL: 370 MS
EKG QRS DURATION: 94 MS
EKG QTC CALCULATION (BAZETT): 442 MS
EKG R AXIS: 16 DEGREES
EKG T AXIS: 51 DEGREES
EKG VENTRICULAR RATE: 86 BPM
GFR AFRICAN AMERICAN: >60 ML/MIN
GFR NON-AFRICAN AMERICAN: 54 ML/MIN
GFR SERPL CREATININE-BSD FRML MDRD: ABNORMAL ML/MIN/{1.73_M2}
GFR SERPL CREATININE-BSD FRML MDRD: ABNORMAL ML/MIN/{1.73_M2}
GLUCOSE BLD-MCNC: 107 MG/DL (ref 70–99)
HCT VFR BLD CALC: 20.7 % (ref 36.3–47.1)
HCT VFR BLD CALC: 22.9 % (ref 36.3–47.1)
HCT VFR BLD CALC: 24.3 % (ref 36.3–47.1)
HCT VFR BLD CALC: 24.7 % (ref 36.3–47.1)
HEMOGLOBIN: 6.5 G/DL (ref 11.9–15.1)
HEMOGLOBIN: 7.3 G/DL (ref 11.9–15.1)
HEMOGLOBIN: 7.9 G/DL (ref 11.9–15.1)
HEMOGLOBIN: 7.9 G/DL (ref 11.9–15.1)
INR BLD: 1.7
INR BLD: 2.2
MCH RBC QN AUTO: 30.2 PG (ref 25.2–33.5)
MCHC RBC AUTO-ENTMCNC: 31.9 G/DL (ref 28.4–34.8)
MCV RBC AUTO: 94.6 FL (ref 82.6–102.9)
NRBC AUTOMATED: 0 PER 100 WBC
PDW BLD-RTO: 15.3 % (ref 11.8–14.4)
PLATELET # BLD: 218 K/UL (ref 138–453)
PMV BLD AUTO: 11 FL (ref 8.1–13.5)
POTASSIUM SERPL-SCNC: 3.7 MMOL/L (ref 3.7–5.3)
PROTHROMBIN TIME: 17.3 SEC (ref 9–12)
PROTHROMBIN TIME: 21.9 SEC (ref 9–12)
RBC # BLD: 2.42 M/UL (ref 3.95–5.11)
SODIUM BLD-SCNC: 145 MMOL/L (ref 135–144)
TRANSFUSION STATUS: NORMAL
UNIT DIVISION: 0
UNIT NUMBER: NORMAL
WBC # BLD: 6.3 K/UL (ref 3.5–11.3)

## 2019-05-06 PROCEDURE — 2500000003 HC RX 250 WO HCPCS: Performed by: NURSE ANESTHETIST, CERTIFIED REGISTERED

## 2019-05-06 PROCEDURE — 3700000000 HC ANESTHESIA ATTENDED CARE: Performed by: INTERNAL MEDICINE

## 2019-05-06 PROCEDURE — 2580000003 HC RX 258: Performed by: NURSE PRACTITIONER

## 2019-05-06 PROCEDURE — 6370000000 HC RX 637 (ALT 250 FOR IP): Performed by: INTERNAL MEDICINE

## 2019-05-06 PROCEDURE — 3609012800 HC EGD DIAGNOSTIC ONLY: Performed by: INTERNAL MEDICINE

## 2019-05-06 PROCEDURE — 85018 HEMOGLOBIN: CPT

## 2019-05-06 PROCEDURE — 86900 BLOOD TYPING SEROLOGIC ABO: CPT

## 2019-05-06 PROCEDURE — C9113 INJ PANTOPRAZOLE SODIUM, VIA: HCPCS | Performed by: FAMILY MEDICINE

## 2019-05-06 PROCEDURE — P9016 RBC LEUKOCYTES REDUCED: HCPCS

## 2019-05-06 PROCEDURE — 85014 HEMATOCRIT: CPT

## 2019-05-06 PROCEDURE — 2580000003 HC RX 258: Performed by: INTERNAL MEDICINE

## 2019-05-06 PROCEDURE — 7100000011 HC PHASE II RECOVERY - ADDTL 15 MIN: Performed by: INTERNAL MEDICINE

## 2019-05-06 PROCEDURE — 85610 PROTHROMBIN TIME: CPT

## 2019-05-06 PROCEDURE — P9017 PLASMA 1 DONOR FRZ W/IN 8 HR: HCPCS

## 2019-05-06 PROCEDURE — 6360000002 HC RX W HCPCS: Performed by: NURSE ANESTHETIST, CERTIFIED REGISTERED

## 2019-05-06 PROCEDURE — 7100000010 HC PHASE II RECOVERY - FIRST 15 MIN: Performed by: INTERNAL MEDICINE

## 2019-05-06 PROCEDURE — 99223 1ST HOSP IP/OBS HIGH 75: CPT | Performed by: FAMILY MEDICINE

## 2019-05-06 PROCEDURE — 85027 COMPLETE CBC AUTOMATED: CPT

## 2019-05-06 PROCEDURE — 0DJ08ZZ INSPECTION OF UPPER INTESTINAL TRACT, VIA NATURAL OR ARTIFICIAL OPENING ENDOSCOPIC: ICD-10-PCS | Performed by: INTERNAL MEDICINE

## 2019-05-06 PROCEDURE — 2580000003 HC RX 258: Performed by: FAMILY MEDICINE

## 2019-05-06 PROCEDURE — 43235 EGD DIAGNOSTIC BRUSH WASH: CPT | Performed by: INTERNAL MEDICINE

## 2019-05-06 PROCEDURE — C9113 INJ PANTOPRAZOLE SODIUM, VIA: HCPCS | Performed by: INTERNAL MEDICINE

## 2019-05-06 PROCEDURE — 3700000001 HC ADD 15 MINUTES (ANESTHESIA): Performed by: INTERNAL MEDICINE

## 2019-05-06 PROCEDURE — 36415 COLL VENOUS BLD VENIPUNCTURE: CPT

## 2019-05-06 PROCEDURE — 1200000000 HC SEMI PRIVATE

## 2019-05-06 PROCEDURE — 80048 BASIC METABOLIC PNL TOTAL CA: CPT

## 2019-05-06 PROCEDURE — 36430 TRANSFUSION BLD/BLD COMPNT: CPT

## 2019-05-06 PROCEDURE — 6360000002 HC RX W HCPCS: Performed by: FAMILY MEDICINE

## 2019-05-06 PROCEDURE — 6360000002 HC RX W HCPCS: Performed by: INTERNAL MEDICINE

## 2019-05-06 PROCEDURE — 86927 PLASMA FRESH FROZEN: CPT

## 2019-05-06 RX ORDER — PROPOFOL 10 MG/ML
INJECTION, EMULSION INTRAVENOUS PRN
Status: DISCONTINUED | OUTPATIENT
Start: 2019-05-06 | End: 2019-05-06 | Stop reason: SDUPTHER

## 2019-05-06 RX ORDER — 0.9 % SODIUM CHLORIDE 0.9 %
250 INTRAVENOUS SOLUTION INTRAVENOUS ONCE
Status: DISCONTINUED | OUTPATIENT
Start: 2019-05-06 | End: 2019-05-10 | Stop reason: HOSPADM

## 2019-05-06 RX ORDER — 0.9 % SODIUM CHLORIDE 0.9 %
250 INTRAVENOUS SOLUTION INTRAVENOUS ONCE
Status: COMPLETED | OUTPATIENT
Start: 2019-05-06 | End: 2019-05-06

## 2019-05-06 RX ORDER — LIDOCAINE HYDROCHLORIDE 10 MG/ML
INJECTION, SOLUTION EPIDURAL; INFILTRATION; INTRACAUDAL; PERINEURAL PRN
Status: DISCONTINUED | OUTPATIENT
Start: 2019-05-06 | End: 2019-05-06 | Stop reason: SDUPTHER

## 2019-05-06 RX ADMIN — PROPOFOL 40 MG: 10 INJECTION, EMULSION INTRAVENOUS at 14:03

## 2019-05-06 RX ADMIN — Medication 10 ML: at 22:13

## 2019-05-06 RX ADMIN — LIDOCAINE HYDROCHLORIDE 30 MG: 10 INJECTION, SOLUTION EPIDURAL; INFILTRATION; INTRACAUDAL at 14:14

## 2019-05-06 RX ADMIN — PROPOFOL 60 MG: 10 INJECTION, EMULSION INTRAVENOUS at 13:54

## 2019-05-06 RX ADMIN — SODIUM CHLORIDE 250 ML: 0.9 INJECTION, SOLUTION INTRAVENOUS at 04:32

## 2019-05-06 RX ADMIN — SODIUM CHLORIDE 8 MG/HR: 9 INJECTION, SOLUTION INTRAVENOUS at 01:31

## 2019-05-06 RX ADMIN — MONTELUKAST SODIUM 10 MG: 10 TABLET, FILM COATED ORAL at 22:12

## 2019-05-06 RX ADMIN — LIDOCAINE HYDROCHLORIDE 30 MG: 10 INJECTION, SOLUTION EPIDURAL; INFILTRATION; INTRACAUDAL at 13:54

## 2019-05-06 RX ADMIN — PROPOFOL 40 MG: 10 INJECTION, EMULSION INTRAVENOUS at 13:57

## 2019-05-06 RX ADMIN — SODIUM CHLORIDE 8 MG/HR: 9 INJECTION, SOLUTION INTRAVENOUS at 16:11

## 2019-05-06 ASSESSMENT — ENCOUNTER SYMPTOMS
VOICE CHANGE: 0
SHORTNESS OF BREATH: 0
COUGH: 0
WHEEZING: 0
DIARRHEA: 0
SORE THROAT: 0
ABDOMINAL PAIN: 0
VOMITING: 0
CONSTIPATION: 0
SINUS PRESSURE: 0
NAUSEA: 0
BLOOD IN STOOL: 1

## 2019-05-06 ASSESSMENT — PAIN - FUNCTIONAL ASSESSMENT: PAIN_FUNCTIONAL_ASSESSMENT: 0-10

## 2019-05-06 ASSESSMENT — PAIN SCALES - GENERAL
PAINLEVEL_OUTOF10: 0

## 2019-05-06 NOTE — ANESTHESIA POSTPROCEDURE EVALUATION
Department of Anesthesiology  Postprocedure Note    Patient: Rachelle Diaz  MRN: [id number]  YOB: 1940  Date of evaluation: 5/6/2019  Time:  3:11 PM     Procedure Summary     Date:  05/06/19 Room / Location:  [facility] 04 / [facility] Endoscopy    Anesthesia Start:  1351 Anesthesia Stop:  1414    Procedure:  EGD DIAGNOSTIC ONLY (N/A ) Diagnosis:  (drop in Hgb. melena)    Surgeon:  Mary Cortes MD Responsible Provider:  Tori Guerrero MD    Anesthesia Type:  MAC ASA Status:  3          Anesthesia Type: MAC    Zeke Phase I: Zeke Score: 10    Zeke Phase II: Zeke Score: 10    Last vitals: Reviewed and per EMR flowsheets.        Anesthesia Post Evaluation    Patient location during evaluation: PACU  Patient participation: complete - patient participated  Level of consciousness: awake and alert  Pain score: 0  Airway patency: patent  Nausea & Vomiting: no nausea and no vomiting  Complications: no  Cardiovascular status: hemodynamically stable  Respiratory status: acceptable  Hydration status: euvolemic

## 2019-05-06 NOTE — PLAN OF CARE
Problem: Falls - Risk of:  Goal: Will remain free from falls  Description  Will remain free from falls  5/6/2019 1614 by Vishal Spann RN  Outcome: Ongoing  5/6/2019 1613 by Vishal Spann RN  Outcome: Met This Shift  5/6/2019 0624 by Snehal Jack RN  Outcome: Met This Shift  Goal: Absence of physical injury  Description  Absence of physical injury  5/6/2019 1614 by Vishal Spann RN  Outcome: Ongoing  5/6/2019 1613 by Vishal Spann RN  Outcome: Met This Shift  5/6/2019 0624 by Snehal Jack RN  Outcome: Met This Shift     Problem: Risk for Impaired Skin Integrity  Goal: Tissue integrity - skin and mucous membranes  Description  Structural intactness and normal physiological function of skin and  mucous membranes.   5/6/2019 1614 by Vishal Spann RN  Outcome: Ongoing  5/6/2019 1613 by Vishal Spann RN  Outcome: Met This Shift  5/6/2019 0624 by Snehal Jack RN  Outcome: Ongoing

## 2019-05-06 NOTE — PROGRESS NOTES
Pt's /42, HR 69. Pt asymptomatic. NP Mary Epstein notified. Orders received for a bolus of fluid, see eMAR. Writer will continue to monitor.

## 2019-05-06 NOTE — PLAN OF CARE
Problem: Falls - Risk of:  Goal: Will remain free from falls  Description  Will remain free from falls  5/6/2019 1613 by Ashley Mccarthy RN  Outcome: Met This Shift  5/6/2019 0624 by Latonia Voss RN  Outcome: Met This Shift  Goal: Absence of physical injury  Description  Absence of physical injury  5/6/2019 1613 by Ashley Mccarthy RN  Outcome: Met This Shift  5/6/2019 0624 by Latonia Voss RN  Outcome: Met This Shift     Problem: Risk for Impaired Skin Integrity  Goal: Tissue integrity - skin and mucous membranes  Description  Structural intactness and normal physiological function of skin and  mucous membranes.   5/6/2019 1613 by Ashley Mccarthy RN  Outcome: Met This Shift  5/6/2019 0624 by Latonia Voss RN  Outcome: Ongoing

## 2019-05-06 NOTE — CARE COORDINATION
[facility] Ortho Bundle Readmission Interview  2019    Patient: Terris Angelucci Patient : 1940   MRN: [id number]  Reason for Admission: GI bleed [K92.2]  RARS: Readmission Risk Score: 20    Attempt to meet with patient x's/2. First attempt, patient noted to be in the bathroom. Second attempt, patient not in room. Will attempt to meet with patient at later time/date. Outreach to Orthopedic Navigator. Notified of patient return. Orthopedic Navigator verbalized awareness. S/P RENETTA on 2019. Services through [address] ended on 2019. [phone]. Confirmed with Velvet Mott at [facility]. Outgoing call to  for unit. LM on secure VM informing case management of patient status with home health services. Readmission Risk  Patient Active Problem List   Diagnosis    Essential hypertension    Status post right hip replacement    Allergic rhinitis    Paroxysmal atrial fibrillation (HCC)    [facility]anoma ([facility] [address]    History of MI (myocardial infarction)    Wears glasses    Sinus bradycardia    Osteoarthritis of right hip    GI bleed       Follow Up  Future Appointments   Date Time Provider Elena Real   2019  2:30 PM [place],  [facility] Maintenance  There are no preventive care reminders to display for this patient.     Fabienne Gamble MA

## 2019-05-06 NOTE — ANESTHESIA PRE PROCEDURE
nostril as directed    Historical Provider, MD   Calcium-Vitamin D (CALTRATE 600 PLUS-VIT D PO) Take  by mouth. Historical Provider, MD   nitroGLYCERIN (NITRODUR) 0.1 MG/HR Place 1 patch onto the skin daily. Historical Provider, MD   Multiple Vitamins-Minerals (MULTIVITAL PO) Take  by mouth. Historical Provider, MD   warfarin (COUMADIN) 5 MG tablet Take 7.5 mg by mouth Will stop 4 day before surgery    Historical Provider, MD       Current medications:    No current facility-administered medications for this visit. No current outpatient medications on file.      Facility-Administered Medications Ordered in Other Visits   Medication Dose Route Frequency Provider Last Rate Last Dose    0.9 % sodium chloride bolus  250 mL Intravenous Once DUDLEY Ahuja - CNP 20 mL/hr at 05/06/19 0432 250 mL at 05/06/19 0432    0.9 % sodium chloride bolus  250 mL Intravenous Once DUDLEY Mane - CNP        phytonadione (ADULT) (VITAMIN K) 5 mg in dextrose 5 % 100 mL IVPB  5 mg Intravenous Once Luther Doshi MD        diltiazem (CARDIZEM CD) extended release capsule 240 mg  240 mg Oral Daily Joaquina Loyd MD        mometasone-formoterol (DULERA) 200-5 MCG/ACT inhaler 2 puff  2 puff Inhalation BID Joaquina Loyd MD        montelukast (SINGULAIR) tablet 10 mg  10 mg Oral Nightly Joaquina Loyd MD        0.9 % sodium chloride infusion   Intravenous Continuous Joaquina Loyd  mL/hr at 05/05/19 2050      sodium chloride flush 0.9 % injection 10 mL  10 mL Intravenous 2 times per day Joaquina Loyd MD        sodium chloride flush 0.9 % injection 10 mL  10 mL Intravenous PRN Joaquina Loyd MD        acetaminophen (TYLENOL) tablet 650 mg  650 mg Oral Q4H PRN Joaquina Loyd MD        HYDROcodone-acetaminophen (NORCO) 5-325 MG per tablet 1 tablet  1 tablet Oral Q4H PRN Joaquina Loyd MD        Or    HYDROcodone-acetaminophen (NORCO) 5-325 MG per tablet 2 tablet  2 tablet Oral Q4H PRN Abigail History:    Social History     Tobacco Use    Smoking status: Former Smoker     Packs/day: 1.00     Years: 20.00     Pack years: 20.00     Types: Cigarettes     Last attempt to quit: 1981     Years since quittin.3    Smokeless tobacco: Never Used   Substance Use Topics    Alcohol use: No                                Counseling given: Not Answered      Vital Signs (Current): There were no vitals filed for this visit. BP Readings from Last 3 Encounters:   19 (!) 132/53   19 (!) 127/54   19 127/70       NPO Status:                                                                                 BMI:   Wt Readings from Last 3 Encounters:   19 198 lb 8 oz (90 kg)   19 195 lb 1.7 oz (88.5 kg)   19 195 lb (88.5 kg)     There is no height or weight on file to calculate BMI.    CBC:   Lab Results   Component Value Date    WBC 6.3 2019    RBC 2.42 2019    HGB 7.9 2019    HCT 24.7 2019    MCV 94.6 2019    RDW 15.3 2019     2019       CMP:   Lab Results   Component Value Date     2019    K 3.7 2019     2019    CO2 23 2019    BUN 57 2019    CREATININE 0.99 2019    GFRAA >60 2019    LABGLOM 54 2019    GLUCOSE 107 2019    PROT 6.1 2019    CALCIUM 8.1 2019    BILITOT 0.18 2019    ALKPHOS 64 2019    AST 12 2019    ALT 11 2019       POC Tests: No results for input(s): POCGLU, POCNA, POCK, POCCL, POCBUN, POCHEMO, POCHCT in the last 72 hours.     Coags:   Lab Results   Component Value Date    PROTIME 21.9 2019    PROTIME 12.0 2019    INR 2.2 2019    APTT 32.8 2019       HCG (If Applicable): No results found for: PREGTESTUR, PREGSERUM, HCG, HCGQUANT     ABGs: No results found for: PHART, PO2ART, ZLE5GET, RXD2SVY, BEART, [id number]     Type & Screen (If Applicable):  No results found for: JOHNNY, [address]    Anesthesia Evaluation  Patient summary reviewed and Nursing notes reviewed no history of anesthetic complications:   Airway: Mallampati: I  TM distance: >3 FB   Neck ROM: full  Mouth opening: > = 3 FB Dental:    (+) lower dentures, upper dentures and partials      Pulmonary:Negative Pulmonary ROS breath sounds clear to auscultation                             Cardiovascular:  Exercise tolerance: good (>4 METS),   (+) hypertension:, past MI:, dysrhythmias: atrial fibrillation,       ECG reviewed  Rhythm: regular  Rate: normal           Beta Blocker:  Not on Beta Blocker         Neuro/Psych:   Negative Neuro/Psych ROS  (+) depression/anxiety             GI/Hepatic/Renal: Neg GI/Hepatic/Renal ROS            Endo/Other: Negative Endo/Other ROS                    Abdominal:       Abdomen: soft. Vascular: negative vascular ROS. Anesthesia Plan      MAC     ASA 3       Induction: intravenous. Anesthetic plan and risks discussed with patient. Plan discussed with CRNA.                   Parul Ruiz MD   5/6/2019

## 2019-05-06 NOTE — PROGRESS NOTES
Pt's /38, HR 70 in left arm. Rechecked in right arm 118/41, HR 69. Pt asymptomatic. NP Matt Go notified. Fluid bolus ordered, see eMAR. Writer will continue to monitor.

## 2019-05-06 NOTE — PROGRESS NOTES
Smoking Cessation - topics covered   []  Health Risks  []  Benefits of Quitting   []  Smoking Cessation  []  Patient has no history of tobacco use  [x]  Patient is former smoker. Patient quit in 1981. [x]  No need for tobacco cessation education. []  Booklet given  []  Patient verbalizes understanding. []  Patient denies need for tobacco cessation education. []  Unable to meet with patient today. Will follow up as able.   Xiomara Res  8:45 AM

## 2019-05-06 NOTE — PLAN OF CARE
Problem: Falls - Risk of:  Goal: Will remain free from falls  Description  Will remain free from falls  Outcome: Met This Shift  Goal: Absence of physical injury  Description  Absence of physical injury  Outcome: Met This Shift     Problem: Risk for Impaired Skin Integrity  Goal: Tissue integrity - skin and mucous membranes  Description  Structural intactness and normal physiological function of skin and  mucous membranes.   Outcome: Ongoing

## 2019-05-07 LAB
ABO/RH: NORMAL
ANTIBODY SCREEN: NEGATIVE
ARM BAND NUMBER: NORMAL
BLD PROD TYP BPU: NORMAL
CROSSMATCH RESULT: NORMAL
CROSSMATCH RESULT: NORMAL
DISPENSE STATUS BLOOD BANK: NORMAL
EXPIRATION DATE: NORMAL
HCT VFR BLD CALC: 22.1 % (ref 36.3–47.1)
HCT VFR BLD CALC: 23.6 % (ref 36.3–47.1)
HEMOGLOBIN: 7 G/DL (ref 11.9–15.1)
HEMOGLOBIN: 7.4 G/DL (ref 11.9–15.1)
INR BLD: 2.1
PROTHROMBIN TIME: 20.5 SEC (ref 9–12)
TRANSFUSION STATUS: NORMAL
UNIT DIVISION: 0
UNIT NUMBER: NORMAL

## 2019-05-07 PROCEDURE — 99232 SBSQ HOSP IP/OBS MODERATE 35: CPT | Performed by: FAMILY MEDICINE

## 2019-05-07 PROCEDURE — 94761 N-INVAS EAR/PLS OXIMETRY MLT: CPT

## 2019-05-07 PROCEDURE — 6370000000 HC RX 637 (ALT 250 FOR IP): Performed by: INTERNAL MEDICINE

## 2019-05-07 PROCEDURE — 86927 PLASMA FRESH FROZEN: CPT

## 2019-05-07 PROCEDURE — 36430 TRANSFUSION BLD/BLD COMPNT: CPT

## 2019-05-07 PROCEDURE — 85610 PROTHROMBIN TIME: CPT

## 2019-05-07 PROCEDURE — 99232 SBSQ HOSP IP/OBS MODERATE 35: CPT | Performed by: INTERNAL MEDICINE

## 2019-05-07 PROCEDURE — 36415 COLL VENOUS BLD VENIPUNCTURE: CPT

## 2019-05-07 PROCEDURE — 2580000003 HC RX 258: Performed by: INTERNAL MEDICINE

## 2019-05-07 PROCEDURE — APPNB45 APP NON BILLABLE 31-45 MINUTES: Performed by: INTERNAL MEDICINE

## 2019-05-07 PROCEDURE — 6360000002 HC RX W HCPCS: Performed by: INTERNAL MEDICINE

## 2019-05-07 PROCEDURE — P9017 PLASMA 1 DONOR FRZ W/IN 8 HR: HCPCS

## 2019-05-07 PROCEDURE — 85018 HEMOGLOBIN: CPT

## 2019-05-07 PROCEDURE — 1200000000 HC SEMI PRIVATE

## 2019-05-07 PROCEDURE — 2500000003 HC RX 250 WO HCPCS: Performed by: NURSE PRACTITIONER

## 2019-05-07 PROCEDURE — 85014 HEMATOCRIT: CPT

## 2019-05-07 PROCEDURE — 94640 AIRWAY INHALATION TREATMENT: CPT

## 2019-05-07 PROCEDURE — C9113 INJ PANTOPRAZOLE SODIUM, VIA: HCPCS | Performed by: INTERNAL MEDICINE

## 2019-05-07 RX ORDER — METOPROLOL TARTRATE 5 MG/5ML
5 INJECTION INTRAVENOUS ONCE
Status: COMPLETED | OUTPATIENT
Start: 2019-05-07 | End: 2019-05-07

## 2019-05-07 RX ORDER — 0.9 % SODIUM CHLORIDE 0.9 %
250 INTRAVENOUS SOLUTION INTRAVENOUS ONCE
Status: COMPLETED | OUTPATIENT
Start: 2019-05-07 | End: 2019-05-08

## 2019-05-07 RX ADMIN — Medication 10 ML: at 21:14

## 2019-05-07 RX ADMIN — DILTIAZEM HYDROCHLORIDE 240 MG: 240 CAPSULE, COATED, EXTENDED RELEASE ORAL at 09:42

## 2019-05-07 RX ADMIN — MONTELUKAST SODIUM 10 MG: 10 TABLET, FILM COATED ORAL at 21:13

## 2019-05-07 RX ADMIN — SODIUM CHLORIDE 8 MG/HR: 9 INJECTION, SOLUTION INTRAVENOUS at 05:20

## 2019-05-07 RX ADMIN — SODIUM CHLORIDE 250 ML: 9 INJECTION, SOLUTION INTRAVENOUS at 14:39

## 2019-05-07 RX ADMIN — Medication 10 ML: at 21:15

## 2019-05-07 RX ADMIN — METOPROLOL TARTRATE 5 MG: 5 INJECTION INTRAVENOUS at 22:42

## 2019-05-07 RX ADMIN — SODIUM CHLORIDE 8 MG/HR: 9 INJECTION, SOLUTION INTRAVENOUS at 16:30

## 2019-05-07 RX ADMIN — MOMETASONE FUROATE AND FORMOTEROL FUMARATE DIHYDRATE 2 PUFF: 200; 5 AEROSOL RESPIRATORY (INHALATION) at 10:10

## 2019-05-07 ASSESSMENT — PAIN SCALES - GENERAL
PAINLEVEL_OUTOF10: 0

## 2019-05-07 ASSESSMENT — ENCOUNTER SYMPTOMS
VOMITING: 0
NAUSEA: 0
SINUS PRESSURE: 0
SORE THROAT: 0
DIARRHEA: 0
VOICE CHANGE: 0
BLOOD IN STOOL: 0
WHEEZING: 0
SHORTNESS OF BREATH: 0
ABDOMINAL PAIN: 0
CONSTIPATION: 0
COUGH: 0

## 2019-05-07 NOTE — CARE COORDINATION
Spoke with [facility] [address] [phone]. Pt was current with them but was discharged on 5/2.

## 2019-05-07 NOTE — PROGRESS NOTES
[address] [facility]      Daily Progress Note     Admit Date: 5/5/2019  Bed/Room No.  3460/7893-17  Admitting Physician : Jorge Luis Quijano MD  Code Status :[address] Day:  LOS: 2 days   Chief Complaint:     Chief Complaint   Patient presents with    Melena    Nausea     Principal Problem:    GI bleed  Active Problems:    Essential hypertension    Status post right hip replacement    Paroxysmal atrial fibrillation ([address]  Resolved Problems:    * No resolved hospital problems. *    Subjective : Interval History/Significant events :  05/07/19    Patient is doing OK, denies any abdominal pain , nausea , vomiting . She is on clear diet . Has good appetite and wants to eat  . No BM since yesterday . Vitals - Stable afebrile  Labs - Low HGB ,     Nursing notes , Consults notes reviewed. Overnight events and updates discussed with Nursing staff . Background History:         Doris Chávez is [de-identified] y.o. female  Who was admitted to the hospital on 5/5/2019 for treatment of GI bleed. Patient came to emergency room with lightheadedness for 3-4 days. Patient reported nausea and dark stools since she had surgery . She has underlying history of hypertension, A. fib, CAD and is on long-term anticoagulation with Coumadin. No change in dose of medication and was therapeutic . She denies any use of NSAIDS or any OTC medications . Patient underwent right hip total arthroplasty on 4/9/19. Initial evaluation showed /61, pulse 89 per minute, respiration 16 per minute. Lab work showed hemoglobin 7.4 dropped to 6.5, creatinine 1.14, BUN 75, troponin 30, bilirubin 0.18, INR 3.3. EKG showed normal sinus rhythm without ST/T-wave changes. Patient denied any previous GI bleed, upper GI study. She has been taken Protonix and ranitidine for GERD symptoms. coumadin was held and patient treated with protonix infusion. Patient underwent EGD on 5/6/19 and was found to have non bleeding Duodenal ulcer. PMH:  Past Medical History:   Diagnosis Date    Allergic rhinitis     Arthritis     Atrial fibrillation ([facility]     Hypertension     Melanoma ([facility]     on nose    Myocardial infarction [facility]) 2008    Wears glasses     Wears partial dentures     upper      Allergies: No Known Allergies   Medications :    sodium chloride 250 mL Intravenous Once   phytonadione (VITAMIN K)  IVPB 5 mg Intravenous Once   diltiazem 240 mg Oral Daily   mometasone-formoterol 2 puff Inhalation BID   montelukast 10 mg Oral Nightly   sodium chloride flush 10 mL Intravenous 2 times per day   sodium chloride 250 mL Intravenous Once   sodium chloride 250 mL Intravenous Once       Review of Systems   Review of Systems   Constitutional: Negative for activity change, appetite change, chills, fatigue, fever and unexpected weight change. HENT: Negative for congestion, mouth sores, postnasal drip, sinus pressure, sore throat and voice change. Eyes: Negative for visual disturbance. Respiratory: Negative for cough, shortness of breath and wheezing. Cardiovascular: Negative for chest pain and palpitations. Gastrointestinal: Negative for abdominal pain, blood in stool, constipation, diarrhea, nausea and vomiting. Endocrine: Negative for polyuria. Genitourinary: Negative for difficulty urinating, dysuria, frequency and urgency. Musculoskeletal: Negative for arthralgias, joint swelling and myalgias. Neurological: Negative for dizziness, tremors, speech difficulty, light-headedness and headaches.      Objective :      Current Vitals : Temp: 98.6 °F (37 °C),  Pulse: 71, Resp: 18, BP: (!) 123/46, SpO2: 100 %  Last 24 Hrs Vitals   Patient Vitals for the past 24 hrs:   BP Temp Temp src Pulse Resp SpO2   05/07/19 0513 (!) 123/46 98.6 °F (37 °C) Oral 71 18 100 %   05/07/19 0055 (!) 140/53 98.1 °F (36.7 °C) Oral 71 17 100 %   05/06/19 2210 (!) 149/66 98.5 °F (36.9 °C) Oral 71 17 99 %   05/06/19 1900 (!) 135/52 98.4 °F (36.9 °C) Oral 85 16 --   05/06/19 1440 116/60 -- -- 68 14 100 %   05/06/19 1425 (!) 122/52 -- -- 68 19 99 %   05/06/19 1410 (!) 127/49 98.2 °F (36.8 °C) Oral 64 16 100 %   05/06/19 1301 (!) 132/53 98.6 °F (37 °C) Infrared 99 11 99 %   05/06/19 1132 124/89 99.2 °F (37.3 °C) Oral 68 18 --     Intake / output   05/06 0701 - 05/07 0700  In: 1946.7 [P.O.:550; I.V.:1300]  Out: -   Physical Exam:  Physical Exam   Constitutional: She is oriented to person, place, and time. She appears well-developed and well-nourished. No distress. HENT:   Mouth/Throat: No oropharyngeal exudate. Eyes: Pupils are equal, round, and reactive to light. Conjunctivae are normal. No scleral icterus. Neck: No JVD present. No thyromegaly present. Cardiovascular: Normal rate, regular rhythm and normal heart sounds. Exam reveals no gallop and no friction rub. No murmur heard. Pulmonary/Chest: Effort normal. No respiratory distress. She has no wheezes. She has no rales. Abdominal: Soft. Bowel sounds are normal. She exhibits no distension and no mass. There is tenderness in the epigastric area. There is no rebound and no guarding. Lymphadenopathy:     She has no cervical adenopathy. Neurological: She is alert and oriented to person, place, and time. No cranial nerve deficit. She exhibits normal muscle tone. Skin: She is not diaphoretic. Nursing note and vitals reviewed. Lower Extremities : No ankle Edema , No calf Tenderness     Laboratory findings:    Recent Labs     05/05/19  1447  05/06/19  0832 05/06/19  1234 05/06/19  1634 05/06/19  2300 05/07/19  0656   WBC 7.7  --  6.3  --   --   --   --    HGB 7.4*   < > 7.3* 7.9*  --  7.9* 7.0*   HCT 23.8*   < > 22.9* 24.7*  --  24.3* 22.1*     --  218  --   --   --   --    INR 3.3  --  2.2  --  1.7  --   --     < > = values in this interval not displayed.      Recent Labs     05/05/19  1447 05/06/19  0832    145*   K 3.9 3.7    113*   CO2 22 23   GLUCOSE 146* 107*   BUN 75* 57* CREATININE 1.14* 0.99*   CALCIUM 9.2 8.1*     Recent Labs     05/05/19  1447   PROT 6.1*   LABALBU 3.5   AST 12   ALT 11   ALKPHOS 64   BILITOT 0.18*          Specific Gravity, UA   Date Value Ref Range Status   03/22/2019 1.009 1.005 - 1.030 Final     Protein, UA   Date Value Ref Range Status   03/22/2019 NEGATIVE NEGATIVE Final     RBC, UA   Date Value Ref Range Status   12/01/2014 2 TO 5 0 - 2 /HPF Final     Blood, UA POC   Date Value Ref Range Status   12/05/2014 negative  Final     Bacteria, UA   Date Value Ref Range Status   12/01/2014 NOT REPORTED NONE Final     Nitrite, Urine   Date Value Ref Range Status   03/22/2019 NEGATIVE NEGATIVE Final     WBC, UA   Date Value Ref Range Status   12/01/2014 2 TO 5 0 - 5 /HPF Final     Leukocyte Esterase, Urine   Date Value Ref Range Status   03/22/2019 NEGATIVE NEGATIVE Final     Imaging/Diagonstics:     Echocardiogram 11/22/17  LVEF 65%, no regional wall motion abnormality.  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  No pericardial effusion.  Trace mitral regurgitation mild tricuspid regurgitation. Clinical Course : stable  Assessment and Plan  :        1. Melena -   3 units Transfusion  PRBC. Underwent EGD with Duodenal non bleeding Ulcer - protonix BID  - Clear diet for now and monitor GI bleed and HGB . 2. Paroxysmal atrial fibrillation - in NSR . on long-term anticoagulation with Coumadin. continue to Hold due to GI bleed. 3. Recent right hip arthroplasty - April 2019  4. Essential hypertension  5. Former smoker - quit 38 years ago  10. Chronic diastolic dysfunction -     Discussed with GI CNP Helen. Continue to monitor vitals , Intake / output ,  Cell count , HGB , Kidney function, oxygenation  as indicated . Plan and updates discussed with patient ,  answers  explained to satisfaction.    Plan discussed with Staff  RN     (Please note that portions of this note were completed with a voice recognition program. Efforts were made to edit the dictations but occasionally words are mis-transcribed.)      Hemant Penn MD  5/7/2019

## 2019-05-07 NOTE — CARE COORDINATION
[facility] Ortho Bundle Readmission Interview 19    Patient: Ruben Bolanos Patient : 1940   MRN: [id number]  Reason for Admission: GI bleed  RARS: Readmission Risk Score: 21         Spoke with: Bruno Alicia with [employer] [facility] at bedside. She stated that she was doing a little better. She said she had a bleed in her upper abd track. She says she takes anticoagulants for her A-fib. She stated that she was done with in home physical therapy and was to start out pt therapy at [facility]. She was doing well. Re explained the [address] [place] [address] initiative and continued contact for the 90 days from discharge. She was receptive to continue with the outreach      Readmission Risk  Patient Active Problem List   Diagnosis    Essential hypertension    Status post right hip replacement    Allergic rhinitis    Paroxysmal atrial fibrillation (HCC)    [facility]anoma ([facility]    History of MI (myocardial infarction)    Wears glasses    Sinus bradycardia    Osteoarthritis of right hip    GI bleed       Inpatient Assessment  Care Transitions Summary    Care Transitions Inpatient Review  Medication Review  Housing Review  Social Support  Durable Medical Equipment  Functional Review  Hearing and Vision  Care Transitions Interventions         Follow Up  Future Appointments   Date Time Provider Elena Real   2019  2:30 PM DO CARMEN Walker [address] [facility] Maintenance  There are no preventive care reminders to display for this patient.     Destiney Johnson RN

## 2019-05-07 NOTE — PROGRESS NOTES
[facility]    Gastroenterology Daily Progress Note      Patient:   Juanito Miller   :    1940   Facility:   [facility]   Date:     2019  Consultant:   Ericka Ramos CNP      Subjective:     [de-identified] y.o. female admitted 2019 with GI bleed [K92.2] and seen for GI bleed/melena. Patient seen and examined. Patient reports having 1 large melena stool around 8 PM last evening and 2 small today. Tolerating CL diet. No abdominal pain       Objective     Scheduled Meds:   sodium chloride  250 mL Intravenous Once    sodium chloride  250 mL Intravenous Once    phytonadione (VITAMIN K)  IVPB  5 mg Intravenous Once    diltiazem  240 mg Oral Daily    mometasone-formoterol  2 puff Inhalation BID    montelukast  10 mg Oral Nightly    sodium chloride flush  10 mL Intravenous 2 times per day    sodium chloride  250 mL Intravenous Once    sodium chloride  250 mL Intravenous Once       Vital Signs:  /67   Pulse 65   Temp 98.4 °F (36.9 °C) (Oral)   Resp 16   Ht 5' 5\" (1.651 m)   Wt 198 lb 8 oz (90 kg)   SpO2 100%   BMI 33.03 kg/m²      Physical Exam:   General:  AAO x 3, speaking in full sentences, no accessory muscle use. Chest:   Bilateral vesicular breath sounds, no rales or wheezes. Cardiac:  S1 S2 RR, no murmurs or gallops  Abdomen: Soft, epigastric tenderness, non distended, BS audible. SKIN:  No rashes, good skin turgor. No jaundice  Extremities:  No edema, no clubbing, No cyanosis  Neuro:  AAO x 3, No FND. Lab and Imaging Review     CBC  Recent Labs     19  1447  19  0832 19  1234 19  2300 19  0656   WBC 7.7  --  6.3  --   --   --    HGB 7.4*   < > 7.3* 7.9* 7.9* 7.0*   HCT 23.8*   < > 22.9* 24.7* 24.3* 22.1*   MCV 96.4  --  94.6  --   --   --    MCH 30.0  --  30.2  --   --   --    MCHC 31.1  --  31.9  --   --   --      --  218  --   --   --     < > = values in this interval not displayed. CL diet  4. Continue PPI gtt. 5. Please call GI with any questions, concerns, or acute change in clinical status      This plan was formulated in collaboration with Dr. Joyceann Kehr     Electronically signed by Ericka Shepard CNP on 5/7/2019 at 3:02 PM    Please note that this note was generated using a voice recognition dictation software. Although every effort was made to ensure the accuracy of this automated transcription, some errors in transcription may have occurred.

## 2019-05-07 NOTE — CARE COORDINATION
Case Management Initial Discharge Plan  Hector Spatz,             Met with:patient to discuss discharge plans. Information verified: address, contacts, phone number, , insurance Yes  PCP: Guy Ku PA-C  Date of last visit: 2018    Insurance Provider: Medicare    Discharge Planning    Living Arrangements:  Family Members   Support Systems:  Family Members, [address] Staff(was referred to [facility] however lives in [place]- unable to find home care agency family is unsure)    Home has one story  four stairs to climb to get into front door, no stairs to climb to reach second floor  Location of bedroom/bathroom in home first floor    Patient able to perform ADL's:Independent    Current Services (outpatient & in home) None  DME equipment: None  DME provider: None    Pharmacy: [facility] Medications:  No  Does patient want to participate in local refill/ meds to beds program?  Yes    Potential Assistance Needed:  Home Care    Patient agreeable to home care: No  [place] of choice provided:  n/a    Prior SNF/Rehab Placement and Facility: NA  Agreeable to SNF/Rehab: No  [place] of choice provided: yes   Evaluation: yes    Expected Discharge date: 2019     Patient expects to be discharged to:  home with home care   Follow Up Appointment: Best Day/ Time:      Transportation provider: Reji Lara- Daughter  Transportation arrangements needed for discharge: No    Readmission Risk              Risk of Unplanned Readmission:        21             Does patient have a readmission risk score greater than 14?: Yes  If yes, follow-up appointment must be made within 7 days of discharge. Discharge Plan: The patient plans to transition back to her home with daughter when discharged.           Electronically signed by Shanna Solo RN on 19 at 12:51 PM

## 2019-05-08 LAB
BLD PROD TYP BPU: NORMAL
DISPENSE STATUS BLOOD BANK: NORMAL
HCT VFR BLD CALC: 22.6 % (ref 36.3–47.1)
HCT VFR BLD CALC: 23.1 % (ref 36.3–47.1)
HCT VFR BLD CALC: 23.4 % (ref 36.3–47.1)
HCT VFR BLD CALC: 23.6 % (ref 36.3–47.1)
HEMOGLOBIN: 7.2 G/DL (ref 11.9–15.1)
HEMOGLOBIN: 7.3 G/DL (ref 11.9–15.1)
HEMOGLOBIN: 7.4 G/DL (ref 11.9–15.1)
HEMOGLOBIN: 7.5 G/DL (ref 11.9–15.1)
TRANSFUSION STATUS: NORMAL
UNIT DIVISION: 0
UNIT NUMBER: NORMAL

## 2019-05-08 PROCEDURE — 99232 SBSQ HOSP IP/OBS MODERATE 35: CPT | Performed by: INTERNAL MEDICINE

## 2019-05-08 PROCEDURE — 6370000000 HC RX 637 (ALT 250 FOR IP): Performed by: FAMILY MEDICINE

## 2019-05-08 PROCEDURE — 85018 HEMOGLOBIN: CPT

## 2019-05-08 PROCEDURE — 6370000000 HC RX 637 (ALT 250 FOR IP): Performed by: NURSE PRACTITIONER

## 2019-05-08 PROCEDURE — 85014 HEMATOCRIT: CPT

## 2019-05-08 PROCEDURE — 99232 SBSQ HOSP IP/OBS MODERATE 35: CPT | Performed by: FAMILY MEDICINE

## 2019-05-08 PROCEDURE — 6360000002 HC RX W HCPCS: Performed by: INTERNAL MEDICINE

## 2019-05-08 PROCEDURE — 94640 AIRWAY INHALATION TREATMENT: CPT

## 2019-05-08 PROCEDURE — 6370000000 HC RX 637 (ALT 250 FOR IP): Performed by: INTERNAL MEDICINE

## 2019-05-08 PROCEDURE — C9113 INJ PANTOPRAZOLE SODIUM, VIA: HCPCS | Performed by: INTERNAL MEDICINE

## 2019-05-08 PROCEDURE — 2580000003 HC RX 258: Performed by: INTERNAL MEDICINE

## 2019-05-08 PROCEDURE — 94760 N-INVAS EAR/PLS OXIMETRY 1: CPT

## 2019-05-08 PROCEDURE — 1200000000 HC SEMI PRIVATE

## 2019-05-08 PROCEDURE — 36415 COLL VENOUS BLD VENIPUNCTURE: CPT

## 2019-05-08 RX ORDER — POLYETHYLENE GLYCOL 3350 17 G/17G
17 POWDER, FOR SOLUTION ORAL DAILY
Status: DISCONTINUED | OUTPATIENT
Start: 2019-05-08 | End: 2019-05-10 | Stop reason: HOSPADM

## 2019-05-08 RX ORDER — AZELASTINE 1 MG/ML
1 SPRAY, METERED NASAL 2 TIMES DAILY
Status: DISCONTINUED | OUTPATIENT
Start: 2019-05-08 | End: 2019-05-10 | Stop reason: HOSPADM

## 2019-05-08 RX ORDER — CYCLOSPORINE 0.5 MG/ML
1 EMULSION OPHTHALMIC 2 TIMES DAILY
Status: DISCONTINUED | OUTPATIENT
Start: 2019-05-08 | End: 2019-05-10 | Stop reason: HOSPADM

## 2019-05-08 RX ORDER — DOCUSATE SODIUM 100 MG/1
100 CAPSULE, LIQUID FILLED ORAL 2 TIMES DAILY PRN
Status: DISCONTINUED | OUTPATIENT
Start: 2019-05-08 | End: 2019-05-10 | Stop reason: HOSPADM

## 2019-05-08 RX ORDER — PANTOPRAZOLE SODIUM 40 MG/1
40 TABLET, DELAYED RELEASE ORAL
Status: DISCONTINUED | OUTPATIENT
Start: 2019-05-08 | End: 2019-05-10 | Stop reason: HOSPADM

## 2019-05-08 RX ORDER — CALCIUM CARBONATE 200(500)MG
1 TABLET,CHEWABLE ORAL DAILY
Status: DISCONTINUED | OUTPATIENT
Start: 2019-05-08 | End: 2019-05-10 | Stop reason: HOSPADM

## 2019-05-08 RX ORDER — TRIAMTERENE AND HYDROCHLOROTHIAZIDE 37.5; 25 MG/1; MG/1
1 TABLET ORAL DAILY
Status: DISCONTINUED | OUTPATIENT
Start: 2019-05-08 | End: 2019-05-10 | Stop reason: HOSPADM

## 2019-05-08 RX ADMIN — SODIUM CHLORIDE: 9 INJECTION, SOLUTION INTRAVENOUS at 21:37

## 2019-05-08 RX ADMIN — CYCLOSPORINE 1 DROP: 0.5 EMULSION OPHTHALMIC at 21:38

## 2019-05-08 RX ADMIN — TRIAMTERENE AND HYDROCHLOROTHIAZIDE 1 TABLET: 37.5; 25 TABLET ORAL at 09:22

## 2019-05-08 RX ADMIN — Medication 10 ML: at 21:41

## 2019-05-08 RX ADMIN — SODIUM CHLORIDE 8 MG: 9 INJECTION, SOLUTION INTRAVENOUS at 02:50

## 2019-05-08 RX ADMIN — PANTOPRAZOLE SODIUM 40 MG: 40 TABLET, DELAYED RELEASE ORAL at 18:17

## 2019-05-08 RX ADMIN — SODIUM CHLORIDE: 9 INJECTION, SOLUTION INTRAVENOUS at 02:50

## 2019-05-08 RX ADMIN — DILTIAZEM HYDROCHLORIDE 240 MG: 240 CAPSULE, COATED, EXTENDED RELEASE ORAL at 09:24

## 2019-05-08 RX ADMIN — SODIUM CHLORIDE: 9 INJECTION, SOLUTION INTRAVENOUS at 12:30

## 2019-05-08 RX ADMIN — MONTELUKAST SODIUM 10 MG: 10 TABLET, FILM COATED ORAL at 21:37

## 2019-05-08 RX ADMIN — ANTACID TABLETS 500 MG: 500 TABLET, CHEWABLE ORAL at 09:22

## 2019-05-08 RX ADMIN — CYCLOSPORINE 1 DROP: 0.5 EMULSION OPHTHALMIC at 09:22

## 2019-05-08 RX ADMIN — POLYETHYLENE GLYCOL 3350 17 G: 17 POWDER, FOR SOLUTION ORAL at 09:22

## 2019-05-08 RX ADMIN — MOMETASONE FUROATE AND FORMOTEROL FUMARATE DIHYDRATE 2 PUFF: 200; 5 AEROSOL RESPIRATORY (INHALATION) at 09:07

## 2019-05-08 ASSESSMENT — ENCOUNTER SYMPTOMS
SHORTNESS OF BREATH: 0
WHEEZING: 0
VOICE CHANGE: 0
SORE THROAT: 0
DIARRHEA: 0
COUGH: 0
VOMITING: 0
NAUSEA: 0
SINUS PRESSURE: 0
ABDOMINAL PAIN: 0
BLOOD IN STOOL: 0
CONSTIPATION: 0

## 2019-05-08 ASSESSMENT — PAIN SCALES - GENERAL
PAINLEVEL_OUTOF10: 0
PAINLEVEL_OUTOF10: 1

## 2019-05-08 NOTE — PROGRESS NOTES
the care of your patient. Please feel free to contact me with any questions or concerns.      Angelica Chu, [address] Gastroenterology  [phone]

## 2019-05-08 NOTE — PROGRESS NOTES
[facility]      Daily Progress Note     Admit Date: 5/5/2019  Bed/Room No.  0543/1160-52  Admitting Physician : Richard Humphries MD  Code Status :[address] Day:  LOS: 3 days   Chief Complaint:     Chief Complaint   Patient presents with    Melena    Nausea     Principal Problem:    GI bleed  Active Problems:    Essential hypertension    Status post right hip replacement    Paroxysmal atrial fibrillation (HCC)    Serologic abnormality  Resolved Problems:    * No resolved hospital problems. *    Subjective : Interval History/Significant events :  05/08/19    Patient had afib with RVR last night . She was feeling palpitations while going to bathroom . She is in NSR and HR is controlled. Patient denies any chest pain , dyspnea , palpitations at this time. Remains on Protonix and CLD . No BM . Vitals - Stable afebrile  Labs - Low HGB ,     Nursing notes , Consults notes reviewed. Overnight events and updates discussed with Nursing staff . Background History:         Francheska Aguilar is [de-identified] y.o. female  Who was admitted to the hospital on 5/5/2019 for treatment of GI bleed. Patient came to emergency room with lightheadedness for 3-4 days. Patient reported nausea and dark stools since she had surgery . She has underlying history of hypertension, A. fib, CAD and is on long-term anticoagulation with Coumadin. No change in dose of medication and was therapeutic . She denies any use of NSAIDS or any OTC medications . Patient underwent right hip total arthroplasty on 4/9/19. Initial evaluation showed /61, pulse 89 per minute, respiration 16 per minute. Lab work showed hemoglobin 7.4 dropped to 6.5, creatinine 1.14, BUN 75, troponin 30, bilirubin 0.18, INR 3.3. EKG showed normal sinus rhythm without ST/T-wave changes. Patient denied any previous GI bleed, upper GI study. She has been taken Protonix and ranitidine for GERD symptoms.  coumadin was held and patient treated 05/08/19 0554 -- -- -- 63 -- 98 % --   05/08/19 0553 -- -- -- 63 -- 97 % --   05/08/19 0552 -- -- -- 60 -- 95 % --   05/08/19 0551 -- -- -- 60 -- 95 % --   05/08/19 0550 -- -- -- 59 -- 95 % --   05/08/19 0549 -- -- -- 60 -- 96 % --   05/08/19 0548 -- -- -- 60 -- 95 % --   05/08/19 0547 -- -- -- 60 -- 97 % --   05/08/19 0546 -- -- -- 60 -- 95 % --   05/08/19 0545 -- -- -- 60 -- 97 % --   05/08/19 0544 -- -- -- 60 -- 96 % --   05/08/19 0543 -- -- -- 60 -- 96 % --   05/08/19 0542 -- -- -- 60 -- 96 % --   05/08/19 0541 -- -- -- 60 -- 96 % --   05/08/19 0540 -- -- -- 60 -- 95 % --   05/08/19 0539 -- -- -- 60 -- 95 % --   05/08/19 0538 -- -- -- 62 -- 95 % --   05/08/19 0537 -- -- -- 61 -- 95 % --   05/08/19 0536 -- -- -- 61 -- 95 % --   05/08/19 0535 -- -- -- 61 -- 95 % --   05/08/19 0534 -- -- -- 62 -- 95 % --   05/08/19 0533 -- -- -- 62 -- 97 % --   05/08/19 0532 -- -- -- 61 -- 96 % --   05/08/19 0531 -- -- -- 62 -- 97 % --   05/08/19 0530 -- -- -- 61 -- 98 % --   05/08/19 0529 -- -- -- 62 -- 96 % --   05/08/19 0528 -- -- -- 64 -- 96 % --   05/08/19 0527 -- -- -- 63 -- 97 % --   05/08/19 0526 -- -- -- 62 -- 97 % --   05/08/19 0525 -- -- -- 62 -- 98 % --   05/08/19 0524 -- -- -- 64 -- 98 % --   05/08/19 0523 -- -- -- 63 -- 97 % --   05/08/19 0522 -- -- -- 64 -- 98 % --   05/08/19 0521 -- -- -- 64 -- 97 % --   05/08/19 0520 -- -- -- 64 -- 97 % --   05/08/19 0519 -- -- -- 67 -- 98 % --   05/08/19 0518 -- -- -- 65 -- 98 % --   05/08/19 0517 -- -- -- 65 -- 98 % --   05/08/19 0516 -- -- -- 65 -- 98 % --   05/08/19 0500 (!) 117/57 -- -- 66 18 -- 192 lb 3.2 oz (87.2 kg)   05/08/19 0400 -- -- -- 63 14 94 % --   05/08/19 0300 -- -- -- 68 -- 98 % --   05/08/19 0245 -- -- -- 109 -- 95 % --   05/08/19 0230 -- -- -- 109 -- 95 % --   05/08/19 0215 -- -- -- 113 -- 93 % --   05/08/19 0200 -- -- -- 123 -- 95 % --   05/08/19 0145 -- -- -- 112 -- 95 % --   05/08/19 0130 -- -- -- 108 -- 96 % --   05/08/19 0115 -- -- -- 125 -- 94 % -- 05/08/19 0100 -- -- -- 124 -- 97 % --   05/08/19 0054 -- -- -- 107 -- -- --   05/08/19 0045 132/75 97.8 °F (36.6 °C) Oral 123 18 98 % --   05/08/19 0030 -- -- -- 109 -- 95 % --   05/08/19 0015 -- -- -- 104 -- 95 % --   05/08/19 0000 -- -- -- 101 -- 97 % --   05/07/19 2346 -- -- -- 109 -- -- --   05/07/19 2345 -- -- -- 102 -- 97 % --   05/07/19 2330 -- -- -- 108 -- 96 % --   05/07/19 2315 -- -- -- 104 -- 96 % --   05/07/19 2300 116/88 -- -- 105 -- 97 % --   05/07/19 2245 116/68 -- -- 109 -- -- --   05/07/19 2230 (!) 147/79 -- -- 134 -- -- --   05/07/19 2215 -- -- -- 117 -- -- --   05/07/19 2200 -- -- -- 121 -- -- --   05/07/19 2145 -- -- -- 136 -- -- --   05/07/19 2130 -- -- -- 125 -- -- --   05/07/19 2115 -- -- -- 141 -- -- --   05/07/19 2108 136/73 97.8 °F (36.6 °C) Oral 124 18 -- --   05/07/19 1919 (!) 159/60 -- -- 76 19 100 % --   05/07/19 1623 (!) 142/38 98.7 °F (37.1 °C) Oral 64 18 100 % --   05/07/19 1434 118/67 98.4 °F (36.9 °C) Oral -- 16 -- --   05/07/19 1401 (!) 131/53 98.5 °F (36.9 °C) Oral 65 18 100 % --   05/07/19 1247 (!) 145/51 98 °F (36.7 °C) Oral 73 18 100 % --   05/07/19 1010 -- -- -- -- 18 -- --   05/07/19 0845 (!) 140/56 98.9 °F (37.2 °C) Oral -- 16 -- --     Intake / output   05/06 2301 - 05/07 2300  In: 6352 [P.O.:1500; I.V.:2987]  Out: 800 [Urine:800]  Physical Exam:  Physical Exam   Constitutional: She is oriented to person, place, and time. She appears well-developed and well-nourished. No distress. HENT:   Mouth/Throat: No oropharyngeal exudate. Eyes: Pupils are equal, round, and reactive to light. Conjunctivae are normal. No scleral icterus. Neck: No JVD present. No thyromegaly present. Cardiovascular: Normal rate, regular rhythm and normal heart sounds. Exam reveals no gallop and no friction rub. No murmur heard. Pulmonary/Chest: Effort normal. No respiratory distress. She has no wheezes. She has no rales. Abdominal: Soft.  Bowel sounds are normal. She exhibits no distension abnormality.  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  No pericardial effusion.  Trace mitral regurgitation mild tricuspid regurgitation. Clinical Course : stable  Assessment and Plan  :        1. Melena -   3 units Transfusion  PRBC. Underwent EGD with Duodenal non bleeding Ulcer - protonix BID  - Full liquid diet for now and monitor GI bleed and HGB . 2. Paroxysmal atrial fibrillation - had Afib with RVR last night . Improved with Lopressor 5 mg IV. Consult cardiology . in NSR . on long-term anticoagulation with Coumadin. continue to Hold due to GI bleed. 3. Recent right hip arthroplasty - April 2019  4. Essential hypertension  5. Former smoker - quit 38 years ago  10. Chronic diastolic dysfunction -   7. Constipation - Bowel regimen . Continue to monitor vitals , Intake / output ,  Cell count , HGB , Kidney function, oxygenation  as indicated . Plan and updates discussed with patient ,  answers  explained to satisfaction.    Plan discussed with Staff Melyssa AGUILERA     (Please note that portions of this note were completed with a voice recognition program. Efforts were made to edit the dictations but occasionally words are mis-transcribed.)      Aggie Jimenez MD  5/8/2019

## 2019-05-09 LAB
ABSOLUTE EOS #: 0.2 K/UL (ref 0–0.44)
ABSOLUTE IMMATURE GRANULOCYTE: 0.06 K/UL (ref 0–0.3)
ABSOLUTE LYMPH #: 1.43 K/UL (ref 1.1–3.7)
ABSOLUTE MONO #: 0.74 K/UL (ref 0.1–1.2)
BASOPHILS # BLD: 1 % (ref 0–2)
BASOPHILS ABSOLUTE: 0.07 K/UL (ref 0–0.2)
BLOOD BANK SPECIMEN: NORMAL
DIFFERENTIAL TYPE: ABNORMAL
EOSINOPHILS RELATIVE PERCENT: 3 % (ref 1–4)
HCT VFR BLD CALC: 22.6 % (ref 36.3–47.1)
HCT VFR BLD CALC: 25.9 % (ref 36.3–47.1)
HEMOGLOBIN: 7 G/DL (ref 11.9–15.1)
HEMOGLOBIN: 8.1 G/DL (ref 11.9–15.1)
IMMATURE GRANULOCYTES: 1 %
INR BLD: 1.4
LYMPHOCYTES # BLD: 23 % (ref 24–43)
MCH RBC QN AUTO: 29.9 PG (ref 25.2–33.5)
MCHC RBC AUTO-ENTMCNC: 31 G/DL (ref 28.4–34.8)
MCV RBC AUTO: 96.6 FL (ref 82.6–102.9)
MONOCYTES # BLD: 12 % (ref 3–12)
NRBC AUTOMATED: 0 PER 100 WBC
PDW BLD-RTO: 15.9 % (ref 11.8–14.4)
PLATELET # BLD: 203 K/UL (ref 138–453)
PLATELET ESTIMATE: ABNORMAL
PMV BLD AUTO: 10.4 FL (ref 8.1–13.5)
PROTHROMBIN TIME: 14 SEC (ref 9–12)
RBC # BLD: 2.34 M/UL (ref 3.95–5.11)
RBC # BLD: ABNORMAL 10*6/UL
SEG NEUTROPHILS: 60 % (ref 36–65)
SEGMENTED NEUTROPHILS ABSOLUTE COUNT: 3.72 K/UL (ref 1.5–8.1)
WBC # BLD: 6.2 K/UL (ref 3.5–11.3)
WBC # BLD: ABNORMAL 10*3/UL

## 2019-05-09 PROCEDURE — 85018 HEMOGLOBIN: CPT

## 2019-05-09 PROCEDURE — P9016 RBC LEUKOCYTES REDUCED: HCPCS

## 2019-05-09 PROCEDURE — 6370000000 HC RX 637 (ALT 250 FOR IP): Performed by: NURSE PRACTITIONER

## 2019-05-09 PROCEDURE — 36415 COLL VENOUS BLD VENIPUNCTURE: CPT

## 2019-05-09 PROCEDURE — 86900 BLOOD TYPING SEROLOGIC ABO: CPT

## 2019-05-09 PROCEDURE — 86901 BLOOD TYPING SEROLOGIC RH(D): CPT

## 2019-05-09 PROCEDURE — 2580000003 HC RX 258: Performed by: INTERNAL MEDICINE

## 2019-05-09 PROCEDURE — 85025 COMPLETE CBC W/AUTO DIFF WBC: CPT

## 2019-05-09 PROCEDURE — 6370000000 HC RX 637 (ALT 250 FOR IP): Performed by: FAMILY MEDICINE

## 2019-05-09 PROCEDURE — 36430 TRANSFUSION BLD/BLD COMPNT: CPT

## 2019-05-09 PROCEDURE — 85610 PROTHROMBIN TIME: CPT

## 2019-05-09 PROCEDURE — 85014 HEMATOCRIT: CPT

## 2019-05-09 PROCEDURE — 99232 SBSQ HOSP IP/OBS MODERATE 35: CPT | Performed by: INTERNAL MEDICINE

## 2019-05-09 PROCEDURE — 99232 SBSQ HOSP IP/OBS MODERATE 35: CPT | Performed by: FAMILY MEDICINE

## 2019-05-09 PROCEDURE — 86850 RBC ANTIBODY SCREEN: CPT

## 2019-05-09 PROCEDURE — 6370000000 HC RX 637 (ALT 250 FOR IP): Performed by: INTERNAL MEDICINE

## 2019-05-09 PROCEDURE — 1200000000 HC SEMI PRIVATE

## 2019-05-09 PROCEDURE — 86920 COMPATIBILITY TEST SPIN: CPT

## 2019-05-09 RX ORDER — DIAPER,BRIEF,INFANT-TODD,DISP
EACH MISCELLANEOUS 2 TIMES DAILY
Status: DISCONTINUED | OUTPATIENT
Start: 2019-05-09 | End: 2019-05-10 | Stop reason: HOSPADM

## 2019-05-09 RX ORDER — WARFARIN SODIUM 7.5 MG/1
7.5 TABLET ORAL DAILY
Status: DISCONTINUED | OUTPATIENT
Start: 2019-05-09 | End: 2019-05-10 | Stop reason: HOSPADM

## 2019-05-09 RX ORDER — 0.9 % SODIUM CHLORIDE 0.9 %
250 INTRAVENOUS SOLUTION INTRAVENOUS ONCE
Status: DISCONTINUED | OUTPATIENT
Start: 2019-05-09 | End: 2019-05-10 | Stop reason: HOSPADM

## 2019-05-09 RX ORDER — HYDROCORTISONE ACETATE 25 MG/1
25 SUPPOSITORY RECTAL 2 TIMES DAILY
Status: DISCONTINUED | OUTPATIENT
Start: 2019-05-09 | End: 2019-05-10 | Stop reason: HOSPADM

## 2019-05-09 RX ADMIN — Medication 10 ML: at 20:56

## 2019-05-09 RX ADMIN — SODIUM CHLORIDE: 9 INJECTION, SOLUTION INTRAVENOUS at 06:57

## 2019-05-09 RX ADMIN — CYCLOSPORINE 1 DROP: 0.5 EMULSION OPHTHALMIC at 13:39

## 2019-05-09 RX ADMIN — ANTACID TABLETS 500 MG: 500 TABLET, CHEWABLE ORAL at 09:21

## 2019-05-09 RX ADMIN — PANTOPRAZOLE SODIUM 40 MG: 40 TABLET, DELAYED RELEASE ORAL at 06:19

## 2019-05-09 RX ADMIN — MONTELUKAST SODIUM 10 MG: 10 TABLET, FILM COATED ORAL at 20:56

## 2019-05-09 RX ADMIN — MOMETASONE FUROATE AND FORMOTEROL FUMARATE DIHYDRATE 2 PUFF: 200; 5 AEROSOL RESPIRATORY (INHALATION) at 09:21

## 2019-05-09 RX ADMIN — WARFARIN SODIUM 7.5 MG: 7.5 TABLET ORAL at 18:46

## 2019-05-09 RX ADMIN — CYCLOSPORINE 1 DROP: 0.5 EMULSION OPHTHALMIC at 21:51

## 2019-05-09 RX ADMIN — PANTOPRAZOLE SODIUM 40 MG: 40 TABLET, DELAYED RELEASE ORAL at 18:48

## 2019-05-09 RX ADMIN — DILTIAZEM HYDROCHLORIDE 240 MG: 240 CAPSULE, COATED, EXTENDED RELEASE ORAL at 09:21

## 2019-05-09 RX ADMIN — HYDROCORTISONE: 10 CREAM TOPICAL at 20:56

## 2019-05-09 RX ADMIN — HYDROCORTISONE ACETATE 25 MG: 25 SUPPOSITORY RECTAL at 13:45

## 2019-05-09 RX ADMIN — POLYETHYLENE GLYCOL 3350 17 G: 17 POWDER, FOR SOLUTION ORAL at 09:21

## 2019-05-09 RX ADMIN — TRIAMTERENE AND HYDROCHLOROTHIAZIDE 1 TABLET: 37.5; 25 TABLET ORAL at 09:21

## 2019-05-09 RX ADMIN — HYDROCORTISONE: 10 CREAM TOPICAL at 13:45

## 2019-05-09 RX ADMIN — HYDROCORTISONE ACETATE 25 MG: 25 SUPPOSITORY RECTAL at 20:55

## 2019-05-09 ASSESSMENT — ENCOUNTER SYMPTOMS
WHEEZING: 0
CONSTIPATION: 0
SORE THROAT: 0
BLOOD IN STOOL: 0
ABDOMINAL PAIN: 0
VOMITING: 0
NAUSEA: 0
SHORTNESS OF BREATH: 0
DIARRHEA: 0
VOICE CHANGE: 0
COUGH: 0
SINUS PRESSURE: 0

## 2019-05-09 ASSESSMENT — PAIN SCALES - GENERAL
PAINLEVEL_OUTOF10: 0

## 2019-05-09 NOTE — PROGRESS NOTES
%  Last 24 Hrs Vitals   Patient Vitals for the past 24 hrs:   BP Temp Temp src Pulse Resp SpO2   05/09/19 0445 (!) 137/51 98.3 °F (36.8 °C) Oral 70 18 --   05/09/19 0000 -- -- -- 72 18 --   05/08/19 1903 (!) 164/62 98.1 °F (36.7 °C) Oral 90 20 100 %   05/08/19 1636 121/73 98.5 °F (36.9 °C) Oral 66 17 100 %   05/08/19 1157 132/60 98.3 °F (36.8 °C) Oral 65 17 100 %   05/08/19 1003 -- -- -- -- -- 98 %   05/08/19 0800 (!) 131/45 98.4 °F (36.9 °C) Oral -- 16 --     Intake / output   05/07 2301 - 05/08 2300  In: 7693 [P.O.:2350; I.V.:3974]  Out: 500 [Urine:500]  Physical Exam:  Physical Exam   Constitutional: She is oriented to person, place, and time. She appears well-developed and well-nourished. No distress. HENT:   Mouth/Throat: No oropharyngeal exudate. Eyes: Pupils are equal, round, and reactive to light. Conjunctivae are normal. No scleral icterus. Neck: No JVD present. No thyromegaly present. Cardiovascular: Normal rate, regular rhythm and normal heart sounds. Exam reveals no gallop and no friction rub. No murmur heard. Pulmonary/Chest: Effort normal. No respiratory distress. She has no wheezes. She has no rales. Abdominal: Soft. Bowel sounds are normal. She exhibits no distension and no mass. There is tenderness in the epigastric area. There is no rebound and no guarding. Lymphadenopathy:     She has no cervical adenopathy. Neurological: She is alert and oriented to person, place, and time. No cranial nerve deficit. She exhibits normal muscle tone. Skin: She is not diaphoretic. Nursing note and vitals reviewed.     Lower Extremities : No ankle Edema , No calf Tenderness     Laboratory findings:    Recent Labs     05/06/19  0832  05/06/19  1634  05/07/19  1811  05/08/19  1145 05/08/19 2040 05/09/19  0440   WBC 6.3  --   --   --   --   --   --   --  6.2   HGB 7.3*   < >  --    < >  --    < > 7.5* 7.3* 7.0*   HCT 22.9*   < >  --    < >  --    < > 23.6* 23.4* 22.6*     --   --   --   --

## 2019-05-09 NOTE — PROGRESS NOTES
THE [facility] AT [place] [facility] Progress Note    Rachelle Diaz is a [de-identified] y.o. female patient. Hospitalization Day:4      Chief consult reason:   GI bleed    Subjective:  Pt seen and examined. Pt states she had small amount of BRBPR this am on the tissue paper-but no significant bleeding. Hgb dropped slightly from 7.3 to 7.0 so she will recieve a unit of PRBC's. Coumadin has not yet been restarted  VITALS:  /66   Pulse 69   Temp 98.1 °F (36.7 °C) (Oral)   Resp 19   Ht 5' 5\" (1.651 m)   Wt 192 lb 3.2 oz (87.2 kg)   SpO2 100%   BMI 31.98 kg/m²   TEMPERATURE:  Current - Temp: 98.1 °F (36.7 °C); Max - Temp  Av.4 °F (36.9 °C)  Min: 98.1 °F (36.7 °C)  Max: 99.1 °F (37.3 °C)    Physical Assessment:  General appearance:  alert, cooperative and no distress  Mental Status:  oriented to person, place and time and normal affect  Lungs:  clear to auscultation bilaterally, normal effort  Heart:  regular rate and rhythm, no murmur  Abdomen:  soft, nontender, nondistended, normal bowel sounds, no masses, hepatomegaly, splenomegaly  Extremities:  no edema, redness, tenderness in the calves  Skin:  no gross lesions, rashes, induration    Data Review:  LABS and IMAGING:     CBC  Recent Labs     19  0120 19  0455 19  1145 19  2040 19  0440   WBC  --   --   --   --  6.2   HGB 7.4* 7.2* 7.5* 7.3* 7.0*   MCV  --   --   --   --  96.6   RDW  --   --   --   --  15.9*   PLT  --   --   --   --  203       ANEMIA STUDIES  No results for input(s): LABIRON, TIBC, FERRITIN, JYXIBRNJ10, FOLATE, OCCULTBLD in the last 72 hours. BMP  No results for input(s): NA, K, CL, CO2, BUN, CREATININE, GLUCOSE, CALCIUM in the last 72 hours. Invalid input(s):  MG,  PHOS;3    LFTS  No results for input(s): ALKPHOS, ALT, AST, BILITOT, BILIDIR, LABALBU in the last 72 hours. AMYLASE/LIPASE/AMMONIA  No results for input(s): AMYLASE, LIPASE, AMMONIA in the last 72 hours.     Acute Hepatitis Panel   No results found Gastroenterology  [phone]

## 2019-05-09 NOTE — PLAN OF CARE
Chuy Null 18    Second Visit Note  For more detailed information please refer to the progress note of the day      5/9/2019    5:14 PM    Name:   Irving Springer  MRN:     [id number]     Acct:      [de-identified]   Room:   [facility]6885-80   Day:  4  Admit Date:  5/5/2019  2:24 PM    PCP:   Gilford Nao, PA-C  Code Status:  Full Code        Pt vitals were reviewed   New labs were reviewed   Patient was seen    Updated plan :     1. No blood in stool today . Start coumadin as Ok'd by GI .          Ashli Huntley MD  5/9/2019  5:14 PM

## 2019-05-10 VITALS
OXYGEN SATURATION: 99 % | SYSTOLIC BLOOD PRESSURE: 147 MMHG | HEIGHT: 65 IN | WEIGHT: 192.2 LBS | HEART RATE: 68 BPM | BODY MASS INDEX: 32.02 KG/M2 | TEMPERATURE: 98.3 F | RESPIRATION RATE: 18 BRPM | DIASTOLIC BLOOD PRESSURE: 55 MMHG

## 2019-05-10 LAB
HCT VFR BLD CALC: 24.5 % (ref 36.3–47.1)
HEMOGLOBIN: 7.9 G/DL (ref 11.9–15.1)

## 2019-05-10 PROCEDURE — 6370000000 HC RX 637 (ALT 250 FOR IP): Performed by: FAMILY MEDICINE

## 2019-05-10 PROCEDURE — 85014 HEMATOCRIT: CPT

## 2019-05-10 PROCEDURE — 2580000003 HC RX 258: Performed by: INTERNAL MEDICINE

## 2019-05-10 PROCEDURE — 85018 HEMOGLOBIN: CPT

## 2019-05-10 PROCEDURE — 6370000000 HC RX 637 (ALT 250 FOR IP): Performed by: NURSE PRACTITIONER

## 2019-05-10 PROCEDURE — 36415 COLL VENOUS BLD VENIPUNCTURE: CPT

## 2019-05-10 PROCEDURE — 99239 HOSP IP/OBS DSCHRG MGMT >30: CPT | Performed by: FAMILY MEDICINE

## 2019-05-10 PROCEDURE — 6370000000 HC RX 637 (ALT 250 FOR IP): Performed by: INTERNAL MEDICINE

## 2019-05-10 RX ORDER — PANTOPRAZOLE SODIUM 40 MG/1
40 TABLET, DELAYED RELEASE ORAL
Qty: 30 TABLET | Refills: 3 | Status: SHIPPED | OUTPATIENT
Start: 2019-05-10 | End: 2019-05-28 | Stop reason: SDUPTHER

## 2019-05-10 RX ORDER — FERROUS SULFATE 325(65) MG
325 TABLET ORAL
Qty: 60 TABLET | Refills: 6 | Status: SHIPPED | OUTPATIENT
Start: 2019-05-10

## 2019-05-10 RX ORDER — HYDROCORTISONE ACETATE 25 MG/1
25 SUPPOSITORY RECTAL 2 TIMES DAILY
Qty: 14 SUPPOSITORY | Refills: 0 | Status: SHIPPED | OUTPATIENT
Start: 2019-05-10 | End: 2019-05-17

## 2019-05-10 RX ADMIN — ANTACID TABLETS 500 MG: 500 TABLET, CHEWABLE ORAL at 08:23

## 2019-05-10 RX ADMIN — MOMETASONE FUROATE AND FORMOTEROL FUMARATE DIHYDRATE 2 PUFF: 200; 5 AEROSOL RESPIRATORY (INHALATION) at 08:22

## 2019-05-10 RX ADMIN — CYCLOSPORINE 1 DROP: 0.5 EMULSION OPHTHALMIC at 08:23

## 2019-05-10 RX ADMIN — DILTIAZEM HYDROCHLORIDE 240 MG: 240 CAPSULE, COATED, EXTENDED RELEASE ORAL at 08:23

## 2019-05-10 RX ADMIN — TRIAMTERENE AND HYDROCHLOROTHIAZIDE 1 TABLET: 37.5; 25 TABLET ORAL at 08:23

## 2019-05-10 RX ADMIN — PANTOPRAZOLE SODIUM 40 MG: 40 TABLET, DELAYED RELEASE ORAL at 06:03

## 2019-05-10 RX ADMIN — HYDROCORTISONE: 10 CREAM TOPICAL at 08:25

## 2019-05-10 RX ADMIN — POLYETHYLENE GLYCOL 3350 17 G: 17 POWDER, FOR SOLUTION ORAL at 08:23

## 2019-05-10 RX ADMIN — HYDROCORTISONE ACETATE 25 MG: 25 SUPPOSITORY RECTAL at 08:26

## 2019-05-10 RX ADMIN — SODIUM CHLORIDE: 9 INJECTION, SOLUTION INTRAVENOUS at 02:03

## 2019-05-10 ASSESSMENT — ENCOUNTER SYMPTOMS
SINUS PRESSURE: 0
WHEEZING: 0
BLOOD IN STOOL: 0
SORE THROAT: 0
VOMITING: 0
COUGH: 0
DIARRHEA: 0
ABDOMINAL PAIN: 0
CONSTIPATION: 0
VOICE CHANGE: 0
SHORTNESS OF BREATH: 0
NAUSEA: 0

## 2019-05-10 ASSESSMENT — PAIN SCALES - GENERAL
PAINLEVEL_OUTOF10: 0
PAINLEVEL_OUTOF10: 0

## 2019-05-10 NOTE — NOTE TO PATIENT VIA PORTAL
Pt assessment was completed pt has a noted D/C order AVS and RX was reviewed IV was removed pt tolerated well family at bed side to transport pt home

## 2019-05-10 NOTE — DISCHARGE SUMMARY
5/9/19. She had no recurrence of melena in hospital and coumadin and was restarted on 5/9/19. Patient was advised to follow up with cardiology for close INR monitoring with Goal ~ 2 . Significant therapeutic interventions:     1. Melena - 3 units Transfusion  PRBC. Underwent EGD with Duodenal non bleeding Ulcer - protonix BID  -   Tolerating  Coumadin. Monitor INR close. .   2. Paroxysmal atrial fibrillation - had Afib with RVR last night . Improved with Lopressor 5 mg IV. Consult cardiology .  in NSR . on long-term anticoagulation with Coumadin. continue to Hold due to GI bleed. 3. Recent right hip arthroplasty - April 2019  4. Essential hypertension  5. Former smoker - quit 38 years ago  10. Chronic diastolic dysfunction -   7. Constipation - Bowel regimen . Significant Diagnostic Studies:   Labs / Micro:/Radiology  Recent Labs     05/10/19  0456 05/09/19  1805 05/09/19  0440  05/06/19  0832  05/05/19  1447   WBC  --   --  6.2  --  6.3  --  7.7   HGB 7.9* 8.1* 7.0*   < > 7.3*   < > 7.4*   HCT 24.5* 25.9* 22.6*   < > 22.9*   < > 23.8*   MCV  --   --  96.6  --  94.6  --  96.4   PLT  --   --  203  --  218  --  327    < > = values in this interval not displayed.      Labs Renal Latest Ref Rng & Units 5/6/2019 5/5/2019 4/12/2019 4/11/2019 4/10/2019   BUN 8 - 23 mg/dL 57(H) 75(H) 33(H) 53(H) 48(H)   Cr 0.50 - 0.90 mg/dL 0.99(H) 1.14(H) 1.04(H) 1.88(H) 1.95(H)   K 3.7 - 5.3 mmol/L 3.7 3.9 4.3 5.2 5.0   Na 135 - 144 mmol/L 145(H) 139 130(L) 124(L) 131(L)     Lab Results   Component Value Date    ALT 11 05/05/2019    AST 12 05/05/2019    ALKPHOS 64 05/05/2019    BILITOT 0.18 (L) 05/05/2019     Lab Results   Component Value Date    TSH 1.47 07/03/2018     No results found for: HAV, HEPAIGM, HEPBIGM, HEPBCAB, HBEAG, HEPCAB  Lab Results   Component Value Date    COLORU YELLOW 03/22/2019    NITRU NEGATIVE 03/22/2019    GLUCOSEU NEGATIVE 03/22/2019    KETUA NEGATIVE 03/22/2019    UROBILINOGEN Normal 03/22/2019    BILIRUBINUR NEGATIVE 03/22/2019    BILIRUBINUR negative 12/05/2014     No results found for: LABA1C  No results found for: EAG  Lab Results   Component Value Date    INR 1.4 05/09/2019    INR 2.1 05/07/2019    INR 1.7 05/06/2019    PROTIME 14.0 (H) 05/09/2019    PROTIME 20.5 (H) 05/07/2019    PROTIME 17.3 (H) 05/06/2019       Imaging/Diagonstics:     Echocardiogram 11/22/17  LVEF 65%, no regional wall motion abnormality.  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  No pericardial effusion.  Trace mitral regurgitation mild tricuspid regurgitation.       EGD 5/6/19   FINDINGS:   Esophagus: The esophagus was inspected to the Z-line. The endoscopic exam showed no pathology.      Stomach: The stomach was inspected in both forward and retroflex fashion and was appropriately distensible. The cardia, fundus, incisura, antrum and pylorus were identified via direct visualization. The endoscopic exam showed no pathology.      Duodenum: The proximal small bowel was inspected through the bulb, sweep, and second portion of the duodenum. The endoscopic exam showed 1 cm low risk ulcer in D1. Consultations:    Consults:     Final Specialist Recommendations/Findings:   IP CONSULT TO GI  IP CONSULT TO HOSPITALIST  IP CONSULT TO CARDIOLOGY      The patient was seen and examined on day of discharge and this discharge summary is in conjunction with any daily progress note from day of discharge. Discharge plan:     Disposition: Home    Physician Follow Up:     Royce Valenzuela PA-C  [address]   [facility] [address]  [address] [facility]  [phone]    Schedule an appointment as soon as possible for a visit  hospital follow up        Schedule an appointment as soon as possible for a visit in 2 weeks  for F/U     Deepti Ram, [address] [place] [address] [address]  [place] [address] [facility]  [phone]    Schedule an appointment as soon as possible for a visit in 2 weeks  F/U Requiring Further Evaluation/Follow Up POST HOSPITALIZATION/Incidental Findings: follow up with cardiology for INR monitoring . Diet: cardiac diet    Activity: As tolerated. Instructions to Patient: monitor stool for blood . Continue Iron supplement . Discharge Medications:      Medication List      START taking these medications    ferrous sulfate 325 (65 Fe) MG tablet  Take 1 tablet by mouth 2 times daily (before meals)     hydrocortisone 25 MG suppository  Commonly known as:  ANUSOL-HC  Place 1 suppository rectally 2 times daily for 7 days        CHANGE how you take these medications    HAIR SKIN AND NAILS FORMULA PO  What changed:  Another medication with the same name was removed. Continue taking this medication, and follow the directions you see here.      pantoprazole 40 MG tablet  Commonly known as:  PROTONIX  Take 1 tablet by mouth 2 times daily (before meals)  What changed:  when to take this        CONTINUE taking these medications    aspirin 81 MG tablet     azelastine 0.1 % nasal spray  Commonly known as:  ASTELIN     calcium carbonate 500 MG chewable tablet  Commonly known as:  ANTACID  Take 1 tablet by mouth daily     CALTRATE 600 PLUS-VIT D PO     diltiazem 240 MG extended release capsule  Commonly known as:  CARDIZEM CD     docusate sodium 100 MG capsule  Commonly known as:  COLACE  Take 1 capsule by mouth 2 times daily as needed for Constipation     fluticasone-salmeterol 250-50 MCG/DOSE Aepb  Commonly known as:  ADVAIR     losartan 50 MG tablet  Commonly known as:  COZAAR     montelukast 10 MG tablet  Commonly known as:  SINGULAIR     nitroGLYCERIN 0.1 MG/HR  Commonly known as:  NITRODUR     ranitidine 300 MG tablet  Commonly known as:  ZANTAC     REFRESH 1.4-0.6 % ophthalmic solution  Generic drug:  polyvinyl alcohol-povidone     RESTASIS OP     triamterene-hydrochlorothiazide 37.5-25 MG per tablet  Commonly known as:  MAXZIDE-25     warfarin 5 MG tablet  Commonly known as: COUMADIN           Where to Get Your Medications      These medications were sent to [facility] [address] [facility], [address] [facility]  [address], [address] [address]    Phone:  [phone]   · ferrous sulfate 325 (65 Fe) MG tablet  · hydrocortisone 25 MG suppository  · pantoprazole 40 MG tablet         Time Spent on discharge is  40 mins in patient examination, evaluation, counseling as well as medication reconciliation, prescriptions for required medications, discharge plan and follow up. Electronically signed by   Emily Arroyo MD  5/10/2019        Thank you Dr. Thiago Membreno PA-C for the opportunity to be involved in this patient's care.

## 2019-05-10 NOTE — DISCHARGE INSTR - ACTIVITY
Gastrointestinal Bleeding: Care Instructions  Your Care Instructions    The digestive or gastrointestinal tract goes from the mouth to the anus. It is often called the GI tract. Bleeding can happen anywhere in the GI tract. It may be caused by an ulcer, an infection, or cancer. It may also be caused by medicines such as aspirin or ibuprofen. Light bleeding may not cause any symptoms at first. But if you continue to bleed for a while, you may feel very weak or tired. Sudden, heavy bleeding means you need to see a doctor right away. This kind of bleeding can be very dangerous. But it can usually be cured or controlled. The doctor may do some tests to find the cause of your bleeding. Follow-up care is a key part of your treatment and safety. Be sure to make and go to all appointments, and call your doctor if you are having problems. It's also a good idea to know your test results and keep a list of the medicines you take. How can you care for yourself at home? · Be safe with medicines. Take your medicines exactly as prescribed. Call your doctor if you think you are having a problem with your medicine. You will get more details on the specific medicines your doctor prescribes. · Do not take aspirin or other anti-inflammatory medicines, such as naproxen (Aleve) or ibuprofen (Advil, Motrin), without talking to your doctor first. Ask your doctor if it is okay to use acetaminophen (Tylenol). · Do not drink alcohol. · The bleeding may make you lose iron. So it's important to eat foods that have a lot of iron. These include red meat, shellfish, poultry, and eggs. They also include beans, raisins, whole-grain breads, and leafy green vegetables. If you want help planning meals, you can make an appointment with a dietitian. When should you call for help? Call 911 anytime you think you may need emergency care.  For example, call if:    · You have sudden, severe belly pain.     · You vomit blood or what looks like coffee grounds.     · You passed out (lost consciousness).     · Your stools are maroon or very bloody.    Call your doctor now or seek immediate medical care if:    · You are dizzy or lightheaded, or you feel like you may faint.     · Your stools are black and look like tar, or they have streaks of blood.     · You have belly pain.     · You vomit or have nausea.     · You have trouble swallowing, or it hurts when you swallow.    Watch closely for changes in your health, and be sure to contact your doctor if:    · You do not get better as expected. Where can you learn more? Go to https://Clearbonpepiceweb.Applika. org and sign in to your [employer] account. Enter M275 in the RxAdvance box to learn more about \"Gastrointestinal Bleeding: Care Instructions. \"     If you do not have an account, please click on the \"Sign Up Now\" link. Current as of: September 23, 2018  Content Version: 12.0  © 9455-6817 [employer]. Care instructions adapted under license by [facility] ([facility]). If you have questions about a medical condition or this instruction, always ask your healthcare professional. [place] [address] any warranty or liability for your use of this information. Anemia From Heavy Bleeding: Care Instructions  Your Care Instructions    Anemia means that your body does not have enough red blood cells. Red blood cells carry oxygen around the body. When you have anemia, you may feel dizzy, tired, and weak. You may also feel your heart pounding. For some people, it's hard to focus and think clearly. One common cause of anemia is bleeding. Bleeding from ulcers, hemorrhoids, cancer, or other problems can cause anemia. It may also be caused by heavy menstrual periods. Your treatment may include iron pills. Iron helps your body make hemoglobin. Hemoglobin is the part of the red blood cell that carries oxygen.  If you have severe anemia, you may need a blood transfusion to give you red blood cells as quickly as possible. Sometimes it takes several months to get iron levels back to normal.  Follow-up care is a key part of your treatment and safety. Be sure to make and go to all appointments, and call your doctor if you are having problems. It's also a good idea to know your test results and keep a list of the medicines you take. How can you care for yourself at home? · Be safe with medicines. Take your medicines exactly as prescribed. Call your doctor if you think you are having a problem with your medicine. · Follow your doctor's advice about eating foods that have a lot of iron in them. These include red meat, shellfish, poultry, and eggs. They also include beans, raisins, whole-grain bread, and leafy green vegetables. · Steam your vegetables. This is the best way to prepare them if you want to get as much iron as possible. · Iron pills can cause constipation. If you take them, there are things you can do to avoid constipation. Drink plenty of fluids, eat foods with a lot of fiber, and exercise every day. When should you call for help? Call 911 anytime you think you may need emergency care. For example, call if:    · You passed out (lost consciousness).     · Your stools are maroon or very bloody.    Call your doctor now or seek immediate medical care if:    · You are short of breath.     · You have new or worse bleeding.     · You are dizzy or light-headed, or you feel like you may faint.    Watch closely for changes in your health, and be sure to contact your doctor if:    · You feel weaker or more tired than usual.     · You do not get better as expected. Where can you learn more? Go to https://Rentlordfaitheb.[employer]esto [email or web address] org and sign in to your [employer] account. Enter P935 in the Cognitive Match box to learn more about \"Anemia From Heavy Bleeding: Care Instructions. \"     If you do not have an account, please click on the \"Sign Up Now\" link. Current as of:  May 6,

## 2019-05-11 ENCOUNTER — CARE COORDINATION (OUTPATIENT)
Dept: CASE MANAGEMENT | Age: 79
End: 2019-05-11

## 2019-05-11 DIAGNOSIS — I10 ESSENTIAL HYPERTENSION: Primary | Chronic | ICD-10-CM

## 2019-05-11 LAB
ABO/RH: NORMAL
ANTIBODY SCREEN: NEGATIVE
ARM BAND NUMBER: NORMAL
BLD PROD TYP BPU: NORMAL
CROSSMATCH RESULT: NORMAL
DISPENSE STATUS BLOOD BANK: NORMAL
EXPIRATION DATE: NORMAL
TRANSFUSION STATUS: NORMAL
UNIT DIVISION: 0
UNIT NUMBER: NORMAL

## 2019-05-11 PROCEDURE — 1111F DSCHRG MED/CURRENT MED MERGE: CPT

## 2019-05-11 NOTE — CARE COORDINATION
[facility] Transitions Initial Follow Up Call    Call within 2 business days of discharge: Yes    Patient: Ching Talbert Patient : 1940   MRN: [id number]  Reason for Admission: GI Bleed  Discharge Date: 5/10/19 RARS: Readmission Risk Score: 19      Last Discharge [facility]       Complaint Diagnosis Description Type Department Provider    19 Melena; Nausea Gastrointestinal hemorrhage, unspecified gastrointestinal hemorrhage type ED to Hosp-Admission (Discharged) (ADMITTED) VZ 2C Melissa Copeland MD; Olivia Alejandre. .. Spoke with: [address]: Discharged 5/10/19    Non-face-to-face services provided:  Assessment and support for treatment adherence and medication management-[id number] Orders entered, Medications reviewed. Goes to [facility] for PT, INT checks. They send all results to Cardiologist who follows. Has been directed to get blood drawn weekly for the next 4 weeks. Reviewed s/s bleeding to watch for. To call physician immediately if any questions, concerns. Klaus Mooney verbalizes understanding.     Follow Up  Future Appointments   Date Time Provider Elena Real   2019  2:30 PM Trung Frank  [facility] Saima Garcia RN

## 2019-05-11 NOTE — CARE COORDINATION
[address] [facility] Transitions Joint Bundle Follow Up Call    2019      Patient Name: Doris Chávez         Patient : 1940     Discharge Date: 5/10/19    RARS: Readmission Risk Score: Readmission Risk Score: 19    PCP: Diann Alvares PA-C                 Recent Hospitalization Dx. GI Bleed Discharged 5/10/19  Spoke with: Radu Lynne    Incision: good     Edema: numbness, no swelling    Pain: none    Pain Control Interventions: nothing    Bowels/Appetite:  No s/s bleeding. Appetite is getting better. Ability to care for basic needs; Shower, meal preparation:  Ok, uses a shower bench. Home Care/Out Patient Therapy: To schedule Out Patient this week. Lives with daughter. Continues to feel weak.     Follow Up:  Future Appointments   Date Time Provider Elena Real   2019  2:30 PM Tatiana Ring DO [address] [address]

## 2019-05-16 ENCOUNTER — HOSPITAL ENCOUNTER (OUTPATIENT)
Age: 79
Setting detail: SPECIMEN
Discharge: HOME | End: 2019-05-16
Payer: MEDICARE

## 2019-05-16 LAB
POC INR: 2.8
PROTHROMBIN TIME, POC: 33.5 SEC (ref 9.6–14.4)

## 2019-05-23 ENCOUNTER — OFFICE VISIT (OUTPATIENT)
Dept: ORTHOPEDIC SURGERY | Age: 79
End: 2019-05-23

## 2019-05-23 ENCOUNTER — HOSPITAL ENCOUNTER (OUTPATIENT)
Age: 79
Setting detail: SPECIMEN
Discharge: HOME | End: 2019-05-23
Payer: MEDICARE

## 2019-05-23 VITALS — BODY MASS INDEX: 31.99 KG/M2 | HEIGHT: 65 IN | WEIGHT: 192 LBS

## 2019-05-23 DIAGNOSIS — M16.11 PRIMARY OSTEOARTHRITIS OF RIGHT HIP: Primary | ICD-10-CM

## 2019-05-23 DIAGNOSIS — Z96.641 STATUS POST RIGHT HIP REPLACEMENT: ICD-10-CM

## 2019-05-23 LAB
POC INR: 2.6
PROTHROMBIN TIME, POC: 30.6 SEC (ref 9.6–14.4)

## 2019-05-23 PROCEDURE — 99024 POSTOP FOLLOW-UP VISIT: CPT | Performed by: ORTHOPAEDIC SURGERY

## 2019-05-23 ASSESSMENT — ENCOUNTER SYMPTOMS
COUGH: 0
CONSTIPATION: 0
DIARRHEA: 0
NAUSEA: 0

## 2019-05-23 NOTE — PROGRESS NOTES
vascular examination to right lower extremity is grossly intact without focal deficits. Neuro: alert. oriented  Eyes: Extra-ocular muscles intact  Mouth: Oral mucosa moist. No perioral lesions  Pulm: Respirations unlabored and regular. Skin: warm, well perfused  Psych:   Patient has good fund of knowledge and displays understanging of exam, diagnosis, and plan. Radiology:     No results found. Assessment:      1. Primary osteoarthritis of right hip    2. Status post right hip replacement         Plan:      Patient is doing very well. She will continue with formal therapy. Patient notes understanding. I will see her back in 6 weeks. Instructed patient to call with any questions or concerns. Follow up: Return in about 6 weeks (around 7/4/2019). No orders of the defined types were placed in this encounter. No orders of the defined types were placed in this encounter. Cristina Major MA am scribing for and in the presence of Dr. Chikis Bonner  5/23/2019 2:55 PM    I have reviewed and made changes accordingly to the work scribed by Miguel Alves MA. The documentation accurately reflects work and decisions made by me. I have also reviewed documentation completed by clinical staff.     Chikis Bonner DO, [facility]  5/28/2019 8:56 PM    This note is created with the assistance of a speech recognition program.  While intending to generate a document that actually reflects the content of the visit, the document can still have some errors including those of syntax and sound a like substitutions which may escape proof reading.  In such instances, actual meaning can be extrapolated by contextual diversion      Electronically signed by Lidia Borges DO, FAOAO on 5/28/2019 at 8:56 PM

## 2019-05-28 ENCOUNTER — HOSPITAL ENCOUNTER (OUTPATIENT)
Age: 79
Setting detail: SPECIMEN
Discharge: HOME | End: 2019-05-28
Payer: MEDICARE

## 2019-05-28 ENCOUNTER — OFFICE VISIT (OUTPATIENT)
Dept: FAMILY MEDICINE CLINIC | Age: 79
End: 2019-05-28
Payer: MEDICARE

## 2019-05-28 VITALS
BODY MASS INDEX: 30.89 KG/M2 | HEIGHT: 65 IN | DIASTOLIC BLOOD PRESSURE: 64 MMHG | SYSTOLIC BLOOD PRESSURE: 120 MMHG | OXYGEN SATURATION: 98 % | HEART RATE: 68 BPM | WEIGHT: 185.4 LBS

## 2019-05-28 DIAGNOSIS — K92.1 GASTROINTESTINAL HEMORRHAGE WITH MELENA: ICD-10-CM

## 2019-05-28 DIAGNOSIS — K92.1 GASTROINTESTINAL HEMORRHAGE WITH MELENA: Primary | ICD-10-CM

## 2019-05-28 DIAGNOSIS — I48.0 PAROXYSMAL ATRIAL FIBRILLATION (HCC): ICD-10-CM

## 2019-05-28 LAB
ABSOLUTE EOS #: 0.39 K/UL (ref 0–0.44)
ABSOLUTE IMMATURE GRANULOCYTE: 0.03 K/UL (ref 0–0.3)
ABSOLUTE LYMPH #: 1.66 K/UL (ref 1.1–3.7)
ABSOLUTE MONO #: 0.7 K/UL (ref 0.1–1.2)
BASOPHILS # BLD: 2 % (ref 0–2)
BASOPHILS ABSOLUTE: 0.11 K/UL (ref 0–0.2)
DIFFERENTIAL TYPE: ABNORMAL
EOSINOPHILS RELATIVE PERCENT: 6 % (ref 1–4)
HCT VFR BLD CALC: 34.8 % (ref 36.3–47.1)
HEMOGLOBIN: 10.5 G/DL (ref 11.9–15.1)
IMMATURE GRANULOCYTES: 1 %
LYMPHOCYTES # BLD: 27 % (ref 24–43)
MCH RBC QN AUTO: 29.5 PG (ref 25.2–33.5)
MCHC RBC AUTO-ENTMCNC: 30.2 G/DL (ref 28.4–34.8)
MCV RBC AUTO: 97.8 FL (ref 82.6–102.9)
MONOCYTES # BLD: 11 % (ref 3–12)
NRBC AUTOMATED: 0 PER 100 WBC
PDW BLD-RTO: 14.5 % (ref 11.8–14.4)
PLATELET # BLD: 312 K/UL (ref 138–453)
PLATELET ESTIMATE: ABNORMAL
PMV BLD AUTO: 10.8 FL (ref 8.1–13.5)
RBC # BLD: 3.56 M/UL (ref 3.95–5.11)
RBC # BLD: ABNORMAL 10*6/UL
SEG NEUTROPHILS: 53 % (ref 36–65)
SEGMENTED NEUTROPHILS ABSOLUTE COUNT: 3.32 K/UL (ref 1.5–8.1)
WBC # BLD: 6.2 K/UL (ref 3.5–11.3)
WBC # BLD: ABNORMAL 10*3/UL

## 2019-05-28 PROCEDURE — 1036F TOBACCO NON-USER: CPT | Performed by: PHYSICIAN ASSISTANT

## 2019-05-28 PROCEDURE — G8399 PT W/DXA RESULTS DOCUMENT: HCPCS | Performed by: PHYSICIAN ASSISTANT

## 2019-05-28 PROCEDURE — G8417 CALC BMI ABV UP PARAM F/U: HCPCS | Performed by: PHYSICIAN ASSISTANT

## 2019-05-28 PROCEDURE — 4040F PNEUMOC VAC/ADMIN/RCVD: CPT | Performed by: PHYSICIAN ASSISTANT

## 2019-05-28 PROCEDURE — 99214 OFFICE O/P EST MOD 30 MIN: CPT | Performed by: PHYSICIAN ASSISTANT

## 2019-05-28 PROCEDURE — 1090F PRES/ABSN URINE INCON ASSESS: CPT | Performed by: PHYSICIAN ASSISTANT

## 2019-05-28 PROCEDURE — 1111F DSCHRG MED/CURRENT MED MERGE: CPT | Performed by: PHYSICIAN ASSISTANT

## 2019-05-28 PROCEDURE — 1123F ACP DISCUSS/DSCN MKR DOCD: CPT | Performed by: PHYSICIAN ASSISTANT

## 2019-05-28 PROCEDURE — G8427 DOCREV CUR MEDS BY ELIG CLIN: HCPCS | Performed by: PHYSICIAN ASSISTANT

## 2019-05-28 RX ORDER — PANTOPRAZOLE SODIUM 40 MG/1
40 TABLET, DELAYED RELEASE ORAL
Qty: 30 TABLET | Refills: 3 | Status: SHIPPED | OUTPATIENT
Start: 2019-05-28 | End: 2020-02-17 | Stop reason: SDUPTHER

## 2019-05-28 ASSESSMENT — ENCOUNTER SYMPTOMS
SHORTNESS OF BREATH: 0
ABDOMINAL PAIN: 0
DIARRHEA: 0
NAUSEA: 0
VOMITING: 0
WHEEZING: 0
COLOR CHANGE: 0
CONSTIPATION: 0
COUGH: 0

## 2019-05-28 NOTE — PROGRESS NOTES
[address] [facility] [address]  Dept: [phone]  Dept Fax: [phone]     Terris Angelucci is a [de-identified] y.o. female who presents today for her medical conditions/complaintsas noted below. Terris Angelucci is c/o of   Chief Complaint   Patient presents with    Atrial Fibrillation     Patient is here for follow up     GI Bleeding     Patient here for follow up after hospital. This happened after hip surgery         HPI:      HPI    Patient here for follow up after recent GI bleed. She had been having dark stools after her hip surgery and states she didn't think anything of it until she became more symptomatic and went to the ER. She reports feeling much better now and tolerating the protonix and iron well. She had EGD confirming duodenal ulcer and has follow up apt with Dr. Eyad Johnson in June.   She is no longer seeing dark stools and reports her energy level is much better  Last Hg lab drawn on 5/10/19  She is eating well    No results found for: LABA1C          ( goal A1Cis < 7)   No results found for: LABMICR  LDL Cholesterol (mg/dL)   Date Value   07/03/2018 107   01/11/2016 120   09/11/2014 117       (goal LDL is <100)   AST (U/L)   Date Value   05/05/2019 12     ALT (U/L)   Date Value   05/05/2019 11     BUN (mg/dL)   Date Value   05/06/2019 57 (H)     BP Readings from Last 3 Encounters:   05/28/19 120/64   05/10/19 (!) 147/55   05/06/19 (!) 140/60          (goal 120/80)    Past Medical History:   Diagnosis Date    Allergic rhinitis     Arthritis     Atrial fibrillation ([address]     Hypertension     Melanoma ([address]     on nose    Myocardial infarction ([address] 2008    Wears glasses     Wears partial dentures     upper      Past Surgical History:   Procedure Laterality Date    APPENDECTOMY      CARPAL TUNNEL RELEASE Right     HERNIA REPAIR      umbilical    HYSTERECTOMY      JOINT REPLACEMENT      NECK SURGERY      C5-C6    NOSE SURGERY      cancer removed    OTHER SURGICAL HISTORY      heart surg/growth in heart sac    TONSILLECTOMY      TOTAL HIP ARTHROPLASTY Right 2019    TOTAL HIP ARTHROPLASTY Right 2019    HIP TOTAL ARTHROPLASTY - MEDACTA, C-ARM, MEDACTA TABLE, NSA= GENERAL VS SPINAL, FASCIA ILLIACA BLOCK, *STANDARD performed by Tatiana Ring DO at [facility] 2019    EGD DIAGNOSTIC ONLY performed by Kwan Yousif MD at [facility]       History reviewed. No pertinent family history. Social History     Tobacco Use    Smoking status: Former Smoker     Packs/day: 1.00     Years: 20.00     Pack years: 20.00     Types: Cigarettes     Last attempt to quit: 1981     Years since quittin.4    Smokeless tobacco: Never Used   Substance Use Topics    Alcohol use: No         Current Outpatient Medications   Medication Sig Dispense Refill    pantoprazole (PROTONIX) 40 MG tablet Take 1 tablet by mouth 2 times daily (before meals) 30 tablet 3    ferrous sulfate 325 (65 Fe) MG tablet Take 1 tablet by mouth 2 times daily (before meals) 60 tablet 6    docusate sodium (COLACE) 100 MG capsule Take 1 capsule by mouth 2 times daily as needed for Constipation 60 capsule 0    montelukast (SINGULAIR) 10 MG tablet Take 10 mg by mouth nightly      CycloSPORINE (RESTASIS OP) Apply 1 drop to eye 2 times daily      Multiple Vitamins-Minerals (HAIR SKIN AND NAILS FORMULA PO) Take by mouth      losartan (COZAAR) 50 MG tablet Take 50 mg by mouth daily      ranitidine (ZANTAC) 300 MG tablet nightly       triamterene-hydrochlorothiazide (MAXZIDE-25) 37.5-25 MG per tablet Take 1 tablet by mouth daily      diltiazem (CARDIZEM CD) 240 MG ER capsule Take 240 mg by mouth daily.       aspirin 81 MG tablet Take 81 mg by mouth daily Will stop 7 days before surgery      fluticasone-salmeterol (ADVAIR) 250-50 MCG/DOSE AEPB Inhale 1 puff into the lungs as needed       azelastine (ASTELIN) 137 MCG/SPRAY nasal spray 1 spray by Nasal route 2 times daily. Use in each nostril as directed      Calcium-Vitamin D (CALTRATE 600 PLUS-VIT D PO) Take  by mouth.  nitroGLYCERIN (NITRODUR) 0.1 MG/HR Place 1 patch onto the skin daily.  warfarin (COUMADIN) 5 MG tablet Take 7.5 mg by mouth Will stop 4 day before surgery       No current facility-administered medications for this visit. No Known Allergies    Health Maintenance   Topic Date Due    DTaP/Tdap/Td vaccine (1 - Tdap) 01/14/1958    Shingles Vaccine (1 of 2) 01/14/1989    Pneumococcal 65+ years Vaccine (2 of 2 - PPSV23) 11/21/2019    Potassium monitoring  05/06/2020    Creatinine monitoring  05/06/2020    Flu vaccine  Completed    DEXA (modify frequency per FRAX score)  Addressed       Subjective:     Review of Systems   Constitutional: Negative for activity change, appetite change, fatigue, fever and unexpected weight change. /64   Pulse 68   Ht 5' 5\" (1.651 m)   Wt 185 lb 6.4 oz (84.1 kg)   SpO2 98%   BMI 30.85 kg/m²    Respiratory: Negative for cough, shortness of breath and wheezing. Cardiovascular: Negative for chest pain and palpitations. Gastrointestinal: Negative for abdominal pain, constipation, diarrhea, nausea and vomiting. Skin: Negative for color change, pallor, rash and wound. Neurological: Negative for weakness. Hematological: Negative for adenopathy. Psychiatric/Behavioral: The patient is not nervous/anxious. Objective:     Physical Exam   Constitutional: She is oriented to person, place, and time. She appears well-developed and well-nourished. HENT:   Head: Normocephalic and atraumatic. Cardiovascular: Normal rate, regular rhythm and normal heart sounds. No murmur heard. Pulmonary/Chest: Effort normal and breath sounds normal. No respiratory distress. She has no wheezes. She has no rales. Abdominal: Soft.  Bowel sounds are normal. She exhibits no distension and no

## 2019-05-28 NOTE — PROGRESS NOTES
Visit Information    Have you changed or started any medications since your last visit including any over-the-counter medicines, vitamins, or herbal medicines? no   Have you stopped taking any of your medications? Is so, why? -  no  Are you having any side effects from any of your medications? - no    Have you seen any other physician or provider since your last visit?  no   Have you had any other diagnostic tests since your last visit? yes   Have you been seen in the emergency room and/or had an admission in a hospital since we last saw you?  yes -   Have you had your routine dental cleaning in the past 6 months?  no     Do you have an active MyChart account? If no, what is the barrier?   Yes    Patient Care Team:  Kala Alexander PA-C as PCP - General  Kala Alexander PA-C as PCP - S Attributed Provider  Betzaida Stanford MD as Consulting Physician (Cardiology)  Eveleen Ahumada, MD as Consulting Physician (Allergy & Immunology)  Kamaljit Paz RN as Care Transition    Medical History Review  Past Medical, Family, and Social History reviewed and does contribute to the patient presenting condition    Health Maintenance   Topic Date Due    DTaP/Tdap/Td vaccine (1 - Tdap) 01/14/1958    Shingles Vaccine (1 of 2) 01/14/1989    Pneumococcal 65+ years Vaccine (2 of 2 - PPSV23) 11/21/2019    Potassium monitoring  05/06/2020    Creatinine monitoring  05/06/2020    Flu vaccine  Completed    DEXA (modify frequency per FRAX score)  Addressed

## 2019-05-29 ENCOUNTER — CLINICAL DOCUMENTATION (OUTPATIENT)
Dept: CASE MANAGEMENT | Age: 79
End: 2019-05-29

## 2019-05-29 NOTE — CARE COORDINATION
Patient is no longer included within the Medicare CCJR Bundle Initiative due to enrollment within [facility]. -  [facility] Shared Savings Plan). CJR episode resolved and Joint Flowsheet updated.  -NR

## 2019-05-30 ENCOUNTER — HOSPITAL ENCOUNTER (OUTPATIENT)
Age: 79
Setting detail: SPECIMEN
Discharge: HOME | End: 2019-05-30
Payer: MEDICARE

## 2019-05-30 LAB
POC INR: 2.5
PROTHROMBIN TIME, POC: 30.4 SEC (ref 9.6–14.4)

## 2019-06-07 ENCOUNTER — HOSPITAL ENCOUNTER (OUTPATIENT)
Age: 79
Setting detail: SPECIMEN
Discharge: HOME | End: 2019-06-07
Payer: MEDICARE

## 2019-06-07 LAB
POC INR: 2.3
PROTHROMBIN TIME, POC: 27.8 SEC (ref 9.6–14.4)

## 2019-06-13 ENCOUNTER — TELEPHONE (OUTPATIENT)
Dept: FAMILY MEDICINE CLINIC | Age: 79
End: 2019-06-13

## 2019-06-27 ENCOUNTER — OFFICE VISIT (OUTPATIENT)
Dept: GASTROENTEROLOGY | Age: 79
End: 2019-06-27
Payer: MEDICARE

## 2019-06-27 VITALS
BODY MASS INDEX: 31.28 KG/M2 | WEIGHT: 188 LBS | HEART RATE: 61 BPM | SYSTOLIC BLOOD PRESSURE: 139 MMHG | DIASTOLIC BLOOD PRESSURE: 78 MMHG

## 2019-06-27 DIAGNOSIS — Z86.010 HISTORY OF COLON POLYPS: ICD-10-CM

## 2019-06-27 DIAGNOSIS — K92.1 GASTROINTESTINAL HEMORRHAGE WITH MELENA: Primary | ICD-10-CM

## 2019-06-27 PROCEDURE — 1123F ACP DISCUSS/DSCN MKR DOCD: CPT | Performed by: INTERNAL MEDICINE

## 2019-06-27 PROCEDURE — G8427 DOCREV CUR MEDS BY ELIG CLIN: HCPCS | Performed by: INTERNAL MEDICINE

## 2019-06-27 PROCEDURE — 4040F PNEUMOC VAC/ADMIN/RCVD: CPT | Performed by: INTERNAL MEDICINE

## 2019-06-27 PROCEDURE — G8417 CALC BMI ABV UP PARAM F/U: HCPCS | Performed by: INTERNAL MEDICINE

## 2019-06-27 PROCEDURE — G8399 PT W/DXA RESULTS DOCUMENT: HCPCS | Performed by: INTERNAL MEDICINE

## 2019-06-27 PROCEDURE — 1036F TOBACCO NON-USER: CPT | Performed by: INTERNAL MEDICINE

## 2019-06-27 PROCEDURE — 1090F PRES/ABSN URINE INCON ASSESS: CPT | Performed by: INTERNAL MEDICINE

## 2019-06-27 PROCEDURE — 99214 OFFICE O/P EST MOD 30 MIN: CPT | Performed by: INTERNAL MEDICINE

## 2019-06-27 RX ORDER — SODIUM, POTASSIUM,MAG SULFATES 17.5-3.13G
SOLUTION, RECONSTITUTED, ORAL ORAL
Qty: 1 BOTTLE | Refills: 0 | Status: SHIPPED | OUTPATIENT
Start: 2019-06-27 | End: 2019-09-13

## 2019-06-27 RX ORDER — POLYETHYLENE GLYCOL 3350 17 G/17G
17 POWDER, FOR SOLUTION ORAL DAILY
Qty: 30 BOTTLE | Refills: 11 | COMMUNITY
Start: 2019-06-27 | End: 2019-07-27

## 2019-06-27 RX ORDER — DIMENHYDRINATE 50 MG
1000 TABLET ORAL DAILY
COMMUNITY

## 2019-06-27 NOTE — PROGRESS NOTES
[facility] PROGRESS NOTE    HISTORY OF PRESENT ILLNESS: Ms. Billy Goode is a [de-identified] y.o. female who presents for follow up on GI bleeding. Into the hospital with melena. She was found to have bleeding peptic ulcer disease. She was on aspirin and Coumadin. She is currently on Protonix twice a day. She also takes Zantac at bedtime for heartburns which mostly affect her at night. She reports no further melena. No abdominal pain. No nausea or vomiting. She reports 2 colonoscopies over the years. Most recently more than 10 years ago. She has had some polyps removed. Past Medical, Family, and Social History reviewed and does not contribute to the patient presenting condition. Patient's PMH/PSH,SH,PSYCH Hx, MEDs, ALLERGIES, and ROS were all reviewed and updated in the appropriate sections.     PAST MEDICAL HISTORY:  Past Medical History:   Diagnosis Date    Allergic rhinitis     Arthritis     Atrial [facility] [address]     Hypertension     Melanoma ([facility]     on nose    Myocardial infarction [facility]) 2008    Wears glasses     Wears partial dentures     upper       Past Surgical History:   Procedure Laterality Date    APPENDECTOMY      CARPAL TUNNEL RELEASE Right     HERNIA REPAIR      umbilical    HYSTERECTOMY      JOINT REPLACEMENT      NECK SURGERY      C5-C6    NOSE SURGERY      cancer removed    OTHER SURGICAL HISTORY      heart surg/growth in heart sac    TONSILLECTOMY      TOTAL HIP ARTHROPLASTY Right 04/09/2019    TOTAL HIP ARTHROPLASTY Right 4/9/2019    HIP TOTAL ARTHROPLASTY - MEDACTA, C-ARM, MEDACTA TABLE, NSA= GENERAL VS SPINAL, FASCIA ILLIACA BLOCK, *STANDARD performed by Donavan Kent DO at [address] [facility] N/A 5/6/2019    EGD DIAGNOSTIC ONLY performed by July Chung MD at [facility] Endoscopy       CURRENT MEDICATIONS:    Current Outpatient Medications:     Flaxseed, Linseed, (FLAX SEED OIL) 1000 MG CAPS, Take 1,000 mg by mouth daily, Disp: , Rfl:     pantoprazole (PROTONIX) 40 MG tablet, Take 1 tablet by mouth 2 times daily (before meals), Disp: 30 tablet, Rfl: 3    ferrous sulfate 325 (65 Fe) MG tablet, Take 1 tablet by mouth 2 times daily (before meals), Disp: 60 tablet, Rfl: 6    docusate sodium (COLACE) 100 MG capsule, Take 1 capsule by mouth 2 times daily as needed for Constipation, Disp: 60 capsule, Rfl: 0    montelukast (SINGULAIR) 10 MG tablet, Take 10 mg by mouth nightly, Disp: , Rfl:     CycloSPORINE (RESTASIS OP), Apply 1 drop to eye 2 times daily, Disp: , Rfl:     Multiple Vitamins-Minerals (HAIR SKIN AND NAILS FORMULA PO), Take by mouth, Disp: , Rfl:     losartan (COZAAR) 50 MG tablet, Take 50 mg by mouth daily, Disp: , Rfl:     ranitidine (ZANTAC) 300 MG tablet, nightly , Disp: , Rfl:     triamterene-hydrochlorothiazide (MAXZIDE-25) 37.5-25 MG per tablet, Take 1 tablet by mouth daily, Disp: , Rfl:     diltiazem (CARDIZEM CD) 240 MG ER capsule, Take 240 mg by mouth daily. , Disp: , Rfl:     aspirin 81 MG tablet, Take 81 mg by mouth daily Will stop 7 days before surgery, Disp: , Rfl:     fluticasone-salmeterol (ADVAIR) 250-50 MCG/DOSE AEPB, Inhale 1 puff into the lungs as needed , Disp: , Rfl:     azelastine (ASTELIN) 137 MCG/SPRAY nasal spray, 1 spray by Nasal route 2 times daily. Use in each nostril as directed, Disp: , Rfl:     Calcium-Vitamin D (CALTRATE 600 PLUS-VIT D PO), Take  by mouth., Disp: , Rfl:     nitroGLYCERIN (NITRODUR) 0.1 MG/HR, Place 1 patch onto the skin daily. , Disp: , Rfl:     warfarin (COUMADIN) 5 MG tablet, Take 7.5 mg by mouth Will stop 4 day before surgery, Disp: , Rfl:     ALLERGIES:   No Known Allergies    SOCIAL HISTORY:   Social History     Socioeconomic History    Marital status:      Spouse name: Not on file    Number of children: Not on file    Years of education: Not on file    Highest education level: Not on file   Occupational History    Not on file   Social Needs    Financial resource strain: Not on file    Food insecurity:     Worry: Not on file     Inability: Not on file    Transportation needs:     Medical: Not on file     Non-medical: Not on file   Tobacco Use    Smoking status: Former Smoker     Packs/day: 1.00     Years: 20.00     Pack years: 20.00     Types: Cigarettes     Last attempt to quit: 1981     Years since quittin.5    Smokeless tobacco: Never Used   Substance and Sexual Activity    Alcohol use: No    Drug use: No    Sexual activity: Not on file   Lifestyle    Physical activity:     Days per week: Not on file     Minutes per session: Not on file    Stress: Not on file   Relationships    Social connections:     Talks on phone: Not on file     Gets together: Not on file     Attends [employer]ism service: Not on file     Active member of club or organization: Not on file     Attends meetings of clubs or organizations: Not on file     Relationship status: Not on file    Intimate partner violence:     Fear of current or ex partner: Not on file     Emotionally abused: Not on file     Physically abused: Not on file     Forced sexual activity: Not on file   Other Topics Concern    Not on file   Social History Narrative    Not on file       REVIEW OF SYSTEMS: A 12-point review of systems was obtained and pertinent positives and negatives were enumerated above in the history of present illness. All other reviewed systems / symptoms were negative. Review of Systems     PHYSICAL EXAMINATION: Vital signs reviewed per the nursing documentation. Wt 188 lb (85.3 kg)   BMI 31.28 kg/m²   Body mass index is 31.28 kg/m². I personally reviewed the nurse's notes and documentation and I agree with her notes. General: alert, appears stated age and cooperative Psych: Normal. and Alert and oriented, appropriate affect. . Normal affect. Mentation normal  HEENT: PERRLA. Clear conjunctivae and sclerae. Moist oral mucosae, no lesions or ulcers.   The neck is supple, without lymphadenopathy or jugular venous distension. No masses. Normal thyroid. Cardiovascular: S1 S2 RRR no rubs or murmurs. Pulmonary: clear BL. No accessory muscle usage. Abdominal Exam: Soft, NT ND, no hepato or spleno megaly, +BS, no ascites. LABORATORY DATA: Reviewed  Lab Results   Component Value Date    WBC 6.2 05/28/2019    HGB 10.5 (L) 05/28/2019    HCT 34.8 (L) 05/28/2019    MCV 97.8 05/28/2019     05/28/2019     (H) 05/06/2019    K 3.7 05/06/2019     (H) 05/06/2019    CO2 23 05/06/2019    BUN 57 (H) 05/06/2019    CREATININE 0.99 (H) 05/06/2019    LABALBU 3.5 05/05/2019    BILITOT 0.18 (L) 05/05/2019    ALKPHOS 64 05/05/2019    AST 12 05/05/2019    ALT 11 05/05/2019    INR 2.3 06/07/2019         Lab Results   Component Value Date    RBC 3.56 (L) 05/28/2019    HGB 10.5 (L) 05/28/2019    MCV 97.8 05/28/2019    MCH 29.5 05/28/2019    MCHC 30.2 05/28/2019    RDW 14.5 (H) 05/28/2019    MPV 10.8 05/28/2019    BASOPCT 2 05/28/2019    LYMPHSABS 1.66 05/28/2019    MONOSABS 0.70 05/28/2019    NEUTROABS 3.32 05/28/2019    EOSABS 0.39 05/28/2019    BASOSABS 0.11 05/28/2019         DIAGNOSTIC TESTING:   No results found. IMPRESSION: Ms. Arabella Nath is a [de-identified] y.o. female with recent melena due to bleeding peptic ulcer disease. She needs to be on Protonix once a day indefinitely given the need for aspirin and Coumadin as indicated by cardiology. History of colon polyps. We discussed risks and benefits. We discussed screening guidelines. After age of 75 decision for screening/surveillance would need to be made on individual basis based on patient's functional status. We discussed this with the patient. She opted to proceed with colonoscopy. We will request holding Coumadin prior to the procedure. Brock Villarreal MD [facility]      Please note that this chart was generated using voice recognition Dragon dictation software.   Although every effort was made to ensure the accuracy of this automated transcription, some errors in transcription may have occurred.

## 2019-06-27 NOTE — TELEPHONE ENCOUNTER
Writer spoke with Isabela from [employer] who states that the Suprep isn't covered by the patients insurance, however, they will cover golytely.

## 2019-07-01 NOTE — TELEPHONE ENCOUNTER
Spoke with patient and advised her the Suprep is not covered. She opted to have Golytely sent to her pharmacy. New order sent and instructions mailed to patient. Also, spoke with patient regarding her coumadin. Received clearance for patient to hold coumadin 5 days prior to her procedure. Patient voiced understanding.

## 2019-07-03 ENCOUNTER — HOSPITAL ENCOUNTER (OUTPATIENT)
Age: 79
Setting detail: SPECIMEN
Discharge: HOME | End: 2019-07-03
Payer: MEDICARE

## 2019-07-03 LAB
INR BLD: 1.9
PROTHROMBIN TIME: 18.7 SEC (ref 9–12)

## 2019-07-12 ENCOUNTER — OFFICE VISIT (OUTPATIENT)
Dept: ORTHOPEDIC SURGERY | Age: 79
End: 2019-07-12
Payer: MEDICARE

## 2019-07-12 VITALS — WEIGHT: 188 LBS | BODY MASS INDEX: 31.32 KG/M2 | HEIGHT: 65 IN

## 2019-07-12 DIAGNOSIS — M16.11 PRIMARY OSTEOARTHRITIS OF RIGHT HIP: Primary | ICD-10-CM

## 2019-07-12 DIAGNOSIS — Z96.641 STATUS POST RIGHT HIP REPLACEMENT: ICD-10-CM

## 2019-07-12 PROCEDURE — 4040F PNEUMOC VAC/ADMIN/RCVD: CPT | Performed by: ORTHOPAEDIC SURGERY

## 2019-07-12 PROCEDURE — G8399 PT W/DXA RESULTS DOCUMENT: HCPCS | Performed by: ORTHOPAEDIC SURGERY

## 2019-07-12 PROCEDURE — 99213 OFFICE O/P EST LOW 20 MIN: CPT | Performed by: ORTHOPAEDIC SURGERY

## 2019-07-12 PROCEDURE — 1123F ACP DISCUSS/DSCN MKR DOCD: CPT | Performed by: ORTHOPAEDIC SURGERY

## 2019-07-12 PROCEDURE — 1036F TOBACCO NON-USER: CPT | Performed by: ORTHOPAEDIC SURGERY

## 2019-07-12 PROCEDURE — G8417 CALC BMI ABV UP PARAM F/U: HCPCS | Performed by: ORTHOPAEDIC SURGERY

## 2019-07-12 PROCEDURE — G8427 DOCREV CUR MEDS BY ELIG CLIN: HCPCS | Performed by: ORTHOPAEDIC SURGERY

## 2019-07-12 PROCEDURE — 1090F PRES/ABSN URINE INCON ASSESS: CPT | Performed by: ORTHOPAEDIC SURGERY

## 2019-07-12 ASSESSMENT — ENCOUNTER SYMPTOMS
DIARRHEA: 0
NAUSEA: 0
COUGH: 0
CONSTIPATION: 0

## 2019-07-12 NOTE — PROGRESS NOTES
oriented to person and place. Eyes: Extra-ocular muscles intact  Mouth: Oral mucosa moist. No perioral lesions  Pulm: Respirations unlabored and regular. Symmetric chest excursion without outward deformity is noted. Skin: warm, well perfused  Psych:   Patient has good fund of knowledge and displays understanging of exam, diagnosis, and plan. Assessment:      1. Primary osteoarthritis of right hip    2. Status post right hip replacement       Plan:      Patient is doing really well. She can continue with activities as tolerated. She should avoid running or jumping. Patient notes understanding. I will see her back in 1 year with x-rays. Follow up:Return in about 1 year (around 7/12/2020) for x rays. No orders of the defined types were placed in this encounter. No orders of the defined types were placed in this encounter. Sara Walker MA am scribing for and in the presence of Dr. Collette Senter  7/12/2019 9:27 AM    I have reviewed and made changes accordingly to the work scribed by Susanne Valderrama MA. The documentation accurately reflects work and decisions made by me. I have also reviewed documentation completed by clinical staff.     Collette Senter, DO, [facility]  7/15/2019 6:24 AM    This note is created with the assistance of a speech recognition program.  While intending to generate a document that actually reflects the content of the visit, the document can still have some errors including those of syntax and sound a like substitutions which may escape proof reading.  In such instances, actual meaning can be extrapolated by contextual diversion      Electronically signed by Landen Tolbert DO, FAOAO on 7/15/2019 at 6:24 AM

## 2019-07-25 ENCOUNTER — TELEPHONE (OUTPATIENT)
Dept: FAMILY MEDICINE CLINIC | Age: 79
End: 2019-07-25

## 2019-07-26 ENCOUNTER — HOSPITAL ENCOUNTER (OUTPATIENT)
Dept: MAMMOGRAPHY | Age: 79
Discharge: HOME | End: 2019-07-28
Payer: MEDICARE

## 2019-07-26 DIAGNOSIS — Z12.39 BREAST CANCER SCREENING: ICD-10-CM

## 2019-07-26 PROCEDURE — 77063 BREAST TOMOSYNTHESIS BI: CPT

## 2019-08-02 ENCOUNTER — HOSPITAL ENCOUNTER (OUTPATIENT)
Age: 79
Setting detail: SPECIMEN
Discharge: HOME | End: 2019-08-02
Payer: MEDICARE

## 2019-08-02 ENCOUNTER — TELEPHONE (OUTPATIENT)
Dept: GASTROENTEROLOGY | Age: 79
End: 2019-08-02

## 2019-08-02 LAB
POC INR: 2.6
PROTHROMBIN TIME, POC: 31.7 SEC (ref 9.6–14.4)

## 2019-08-02 NOTE — TELEPHONE ENCOUNTER
LM for patient to call the office to confirm colon scheduled 8/8/19 at [facility], understanding of bowel prep instructions, arrival time and that patient has a .

## 2019-08-05 NOTE — TELEPHONE ENCOUNTER
Patient called and left a message requesting clarification on her Golytely bowel prep. This is different than the prep instructions she was given, because insurance did not cover original prep. Writer reviewed golytely instructions with patient, who wrote down the instructions and is clear on what she needs to do for Wednesday.

## 2019-08-07 ENCOUNTER — ANESTHESIA EVENT (OUTPATIENT)
Dept: OPERATING ROOM | Age: 79
End: 2019-08-07
Payer: MEDICARE

## 2019-08-08 ENCOUNTER — ANESTHESIA (OUTPATIENT)
Dept: OPERATING ROOM | Age: 79
End: 2019-08-08
Payer: MEDICARE

## 2019-08-08 ENCOUNTER — HOSPITAL ENCOUNTER (OUTPATIENT)
Age: 79
Setting detail: OUTPATIENT SURGERY
Discharge: HOME | End: 2019-08-08
Attending: INTERNAL MEDICINE | Admitting: INTERNAL MEDICINE
Payer: MEDICARE

## 2019-08-08 VITALS
DIASTOLIC BLOOD PRESSURE: 68 MMHG | SYSTOLIC BLOOD PRESSURE: 155 MMHG | HEART RATE: 52 BPM | OXYGEN SATURATION: 99 % | BODY MASS INDEX: 29.95 KG/M2 | RESPIRATION RATE: 12 BRPM | WEIGHT: 186.38 LBS | TEMPERATURE: 97.3 F | HEIGHT: 66 IN

## 2019-08-08 VITALS
RESPIRATION RATE: 23 BRPM | DIASTOLIC BLOOD PRESSURE: 70 MMHG | SYSTOLIC BLOOD PRESSURE: 156 MMHG | OXYGEN SATURATION: 97 %

## 2019-08-08 PROCEDURE — 3700000000 HC ANESTHESIA ATTENDED CARE: Performed by: INTERNAL MEDICINE

## 2019-08-08 PROCEDURE — 2500000003 HC RX 250 WO HCPCS: Performed by: NURSE ANESTHETIST, CERTIFIED REGISTERED

## 2019-08-08 PROCEDURE — 2709999900 HC NON-CHARGEABLE SUPPLY: Performed by: INTERNAL MEDICINE

## 2019-08-08 PROCEDURE — 7100000010 HC PHASE II RECOVERY - FIRST 15 MIN: Performed by: INTERNAL MEDICINE

## 2019-08-08 PROCEDURE — 45385 COLONOSCOPY W/LESION REMOVAL: CPT | Performed by: INTERNAL MEDICINE

## 2019-08-08 PROCEDURE — 6360000002 HC RX W HCPCS: Performed by: NURSE ANESTHETIST, CERTIFIED REGISTERED

## 2019-08-08 PROCEDURE — 2580000003 HC RX 258: Performed by: ANESTHESIOLOGY

## 2019-08-08 PROCEDURE — 3700000001 HC ADD 15 MINUTES (ANESTHESIA): Performed by: INTERNAL MEDICINE

## 2019-08-08 PROCEDURE — 7100000011 HC PHASE II RECOVERY - ADDTL 15 MIN: Performed by: INTERNAL MEDICINE

## 2019-08-08 PROCEDURE — 2720000010 HC SURG SUPPLY STERILE: Performed by: INTERNAL MEDICINE

## 2019-08-08 PROCEDURE — 88305 TISSUE EXAM BY PATHOLOGIST: CPT

## 2019-08-08 PROCEDURE — 3609010500 HC COLONOSCOPY POLYPECTOMY REMOVAL HOT BIOPSY/STOMA: Performed by: INTERNAL MEDICINE

## 2019-08-08 DEVICE — WORKING LENGTH 235CM, WORKING CHANNEL 2.8MM
Type: IMPLANTABLE DEVICE | Status: FUNCTIONAL
Brand: RESOLUTION 360 CLIP

## 2019-08-08 RX ORDER — SODIUM CHLORIDE 9 MG/ML
INJECTION, SOLUTION INTRAVENOUS CONTINUOUS
Status: DISCONTINUED | OUTPATIENT
Start: 2019-08-09 | End: 2019-08-08

## 2019-08-08 RX ORDER — LIDOCAINE HYDROCHLORIDE 20 MG/ML
INJECTION, SOLUTION EPIDURAL; INFILTRATION; INTRACAUDAL; PERINEURAL PRN
Status: DISCONTINUED | OUTPATIENT
Start: 2019-08-08 | End: 2019-08-08 | Stop reason: SDUPTHER

## 2019-08-08 RX ORDER — SODIUM CHLORIDE 0.9 % (FLUSH) 0.9 %
10 SYRINGE (ML) INJECTION EVERY 12 HOURS SCHEDULED
Status: DISCONTINUED | OUTPATIENT
Start: 2019-08-08 | End: 2019-08-08 | Stop reason: HOSPADM

## 2019-08-08 RX ORDER — PROPOFOL 10 MG/ML
INJECTION, EMULSION INTRAVENOUS PRN
Status: DISCONTINUED | OUTPATIENT
Start: 2019-08-08 | End: 2019-08-08 | Stop reason: SDUPTHER

## 2019-08-08 RX ORDER — ONDANSETRON 2 MG/ML
4 INJECTION INTRAMUSCULAR; INTRAVENOUS
Status: DISCONTINUED | OUTPATIENT
Start: 2019-08-08 | End: 2019-08-08 | Stop reason: HOSPADM

## 2019-08-08 RX ORDER — SODIUM CHLORIDE, SODIUM LACTATE, POTASSIUM CHLORIDE, CALCIUM CHLORIDE 600; 310; 30; 20 MG/100ML; MG/100ML; MG/100ML; MG/100ML
INJECTION, SOLUTION INTRAVENOUS CONTINUOUS
Status: DISCONTINUED | OUTPATIENT
Start: 2019-08-09 | End: 2019-08-08 | Stop reason: HOSPADM

## 2019-08-08 RX ORDER — SODIUM CHLORIDE 0.9 % (FLUSH) 0.9 %
10 SYRINGE (ML) INJECTION PRN
Status: DISCONTINUED | OUTPATIENT
Start: 2019-08-08 | End: 2019-08-08 | Stop reason: HOSPADM

## 2019-08-08 RX ORDER — LIDOCAINE HYDROCHLORIDE 10 MG/ML
1 INJECTION, SOLUTION EPIDURAL; INFILTRATION; INTRACAUDAL; PERINEURAL
Status: DISCONTINUED | OUTPATIENT
Start: 2019-08-09 | End: 2019-08-08 | Stop reason: HOSPADM

## 2019-08-08 RX ADMIN — PROPOFOL 20 MG: 10 INJECTION, EMULSION INTRAVENOUS at 11:15

## 2019-08-08 RX ADMIN — PROPOFOL 20 MG: 10 INJECTION, EMULSION INTRAVENOUS at 11:16

## 2019-08-08 RX ADMIN — PROPOFOL 30 MG: 10 INJECTION, EMULSION INTRAVENOUS at 11:12

## 2019-08-08 RX ADMIN — SODIUM CHLORIDE, POTASSIUM CHLORIDE, SODIUM LACTATE AND CALCIUM CHLORIDE: 600; 310; 30; 20 INJECTION, SOLUTION INTRAVENOUS at 10:47

## 2019-08-08 RX ADMIN — PROPOFOL 20 MG: 10 INJECTION, EMULSION INTRAVENOUS at 11:13

## 2019-08-08 RX ADMIN — LIDOCAINE HYDROCHLORIDE 60 MG: 20 INJECTION, SOLUTION EPIDURAL; INFILTRATION; INTRACAUDAL; PERINEURAL at 11:12

## 2019-08-08 RX ADMIN — PROPOFOL 30 MG: 10 INJECTION, EMULSION INTRAVENOUS at 11:21

## 2019-08-08 RX ADMIN — SODIUM CHLORIDE, POTASSIUM CHLORIDE, SODIUM LACTATE AND CALCIUM CHLORIDE: 600; 310; 30; 20 INJECTION, SOLUTION INTRAVENOUS at 11:06

## 2019-08-08 RX ADMIN — PROPOFOL 30 MG: 10 INJECTION, EMULSION INTRAVENOUS at 11:18

## 2019-08-08 RX ADMIN — PROPOFOL 20 MG: 10 INJECTION, EMULSION INTRAVENOUS at 11:19

## 2019-08-08 RX ADMIN — PROPOFOL 20 MG: 10 INJECTION, EMULSION INTRAVENOUS at 11:24

## 2019-08-08 RX ADMIN — PROPOFOL 20 MG: 10 INJECTION, EMULSION INTRAVENOUS at 11:22

## 2019-08-08 ASSESSMENT — PAIN SCALES - GENERAL
PAINLEVEL_OUTOF10: 0

## 2019-08-08 ASSESSMENT — PULMONARY FUNCTION TESTS
PIF_VALUE: 1

## 2019-08-08 ASSESSMENT — PAIN - FUNCTIONAL ASSESSMENT: PAIN_FUNCTIONAL_ASSESSMENT: 0-10

## 2019-08-08 NOTE — OP NOTE
[facility] ENDOSCOPY     PROCEDURE DATE: 08/08/19    REFERRING PHYSICIAN: No ref. provider found     PRIMARY CARE PROVIDER: Suzie Beard PA-C    ATTENDING PHYSICIAN: Conner Mills MD     HISTORY: Ms. Mike Garrido is a [de-identified] y.o. female who presents to the [place] [address] Endoscopy unit for colonoscopy. The patient's clinical history is remarkable for melena and hx of colon polyps. She is currently medically stable and appropriate for the planned procedure. PREOPERATIVE DIAGNOSIS: previous adenomatous polyp, melena. PROCEDURES:   Transanal Colonoscopy with polypectomy (snare cautery). POSTPROCEDURE DIAGNOSIS:  Colon polyp    MEDICATIONS:     MAC per anesthesia    EBL: minimal    INSTRUMENT: Olympus PCF-H190 AL flexible Colonoscope. PREPARATION: The nature and character of the procedure as well as risks, benefits, and alternatives were discussed with the patient and informed consent was obtained. Complications were said to include, but were not limited to: medication allergy, medication reaction, cardiovascular and respiratory problems, bleeding, perforation, infection, and/or missed diagnosis. Following arrival in the endoscopy room, the patient was placed in the left lateral decubitus position and final time-out accomplished in the presence of the nursing staff. Baseline vital signs were obtained and reviewed, and IV sedation was subsequently initiated. FINDINGS: Rectal examination demonstrated no significant visible external abnormality and digital palpation was unremarkable. Following adequate conscious sedation the colonoscope was introduced and advanced under direct visualization to the cecum, which was identified by the ileocecal valve and appendiceal orifice. The bowel preparation was felt to be suboptimal. This included small amounts of thick stool that mostly was able to be adequately irrigated and aspirated. Cecal intubation time was 3 minutes.      Once maximally

## 2019-08-08 NOTE — ANESTHESIA PRE PROCEDURE
Department of Anesthesiology  Preprocedure Note       Name:  Lois Code   Age:  [de-identified] y.o.  :  1940                                          MRN:  [id number]         Date:  2019      Surgeon: Brandon Gamble):  Maricruz Neal MD    Procedure: COLORECTAL CANCER SCREENING, NOT HIGH RISK (N/A )    Medications prior to admission:   Prior to Admission medications    Medication Sig Start Date End Date Taking? Authorizing Provider   pantoprazole (PROTONIX) 40 MG tablet Take 1 tablet by mouth 2 times daily (before meals) 19  Yes Sujey Moscoso PA-C   ferrous sulfate 325 (65 Fe) MG tablet Take 1 tablet by mouth 2 times daily (before meals) 5/10/19  Yes Alcira Ventura MD   montelukast (SINGULAIR) 10 MG tablet Take 10 mg by mouth nightly   Yes Historical Provider, MD   CycloSPORINE (RESTASIS OP) Apply 1 drop to eye 2 times daily   Yes Historical Provider, MD   Multiple Vitamins-Minerals (HAIR SKIN AND NAILS FORMULA PO) Take by mouth   Yes Historical Provider, MD   triamterene-hydrochlorothiazide (MAXZIDE-25) 37.5-25 MG per tablet Take 1 tablet by mouth daily 12/29/15  Yes Historical Provider, MD   diltiazem (CARDIZEM CD) 240 MG ER capsule Take 240 mg by mouth daily. 14  Yes DUDLEY Rojo - CNP   azelastine (ASTELIN) 137 MCG/SPRAY nasal spray 1 spray by Nasal route 2 times daily. Use in each nostril as directed   Yes Historical Provider, MD   Calcium-Vitamin D (CALTRATE 600 PLUS-VIT D PO) Take  by mouth. Yes Historical Provider, MD   nitroGLYCERIN (NITRODUR) 0.1 MG/HR Place 1 patch onto the skin daily.    Yes Historical Provider, MD   Flaxseed, Linseed, (FLAX SEED OIL) 1000 MG CAPS Take 1,000 mg by mouth daily    Historical Provider, MD   Na Sulfate-K Sulfate-Mg Sulf 17.5-3.13-1.6 GM/177ML SOLN Please follow instructions given by office 19   Maricruz Neal MD   docusate sodium (COLACE) 100 MG capsule Take 1 capsule by mouth 2 times daily as needed for Constipation 4/10/19   Rosita Senior, DO losartan (COZAAR) 50 MG tablet Take 50 mg by mouth daily    Historical Provider, MD   ranitidine (ZANTAC) 300 MG tablet nightly  6/18/18   Historical Provider, MD   aspirin 81 MG tablet Take 81 mg by mouth daily Will stop 7 days before surgery    Historical Provider, MD   fluticasone-salmeterol (ADVAIR) 250-50 MCG/DOSE AEPB Inhale 1 puff into the lungs as needed     Historical Provider, MD   warfarin (COUMADIN) 5 MG tablet Take 7.5 mg by mouth Will stop 4 day before surgery    Historical Provider, MD       Current medications:    Current Facility-Administered Medications   Medication Dose Route Frequency Provider Last Rate Last Dose    [START ON 8/9/2019] lactated ringers infusion   Intravenous Continuous Samanthavmegan Lopez,  125 mL/hr at 08/08/19 1047      sodium chloride flush 0.9 % injection 10 mL  10 mL Intravenous 2 times per day Morales García DO        sodium chloride flush 0.9 % injection 10 mL  10 mL Intravenous PRN Shelvmegan Goserin, DO        [START ON 8/9/2019] lidocaine PF 1 % injection 1 mL  1 mL Intradermal Once PRN Shelvy Gosling, DO           Allergies:  No Known Allergies    Problem List:    Patient Active Problem List   Diagnosis Code    Essential hypertension I10    Status post right hip replacement Z96.641    Allergic rhinitis J30.9    Paroxysmal atrial fibrillation (HCC) I48.0    Melanoma ([address] C43.9    History of MI (myocardial infarction) I25.2    Wears glasses Z97.3    Sinus bradycardia R00.1    Osteoarthritis of right hip M16.11    GI bleed K92.2    Serologic abnormality R89.4       Past Medical History:        Diagnosis Date    Allergic rhinitis     Arthritis     Atrial fibrillation ([address]     Hypertension     Melanoma ([address]     on nose    Myocardial infarction ([address] 2008    Wears glasses     Wears partial dentures     upper       Past Surgical History:        Procedure Laterality Date    APPENDECTOMY      CARPAL TUNNEL RELEASE Right     HERNIA REPAIR

## 2019-08-08 NOTE — ANESTHESIA PRE PROCEDURE
daily. Use in each nostril as directed      Calcium-Vitamin D (CALTRATE 600 PLUS-VIT D PO) Take  by mouth.  nitroGLYCERIN (NITRODUR) 0.1 MG/HR Place 1 patch onto the skin daily.       Flaxseed, Linseed, (FLAX SEED OIL) 1000 MG CAPS Take 1,000 mg by mouth daily      Na Sulfate-K Sulfate-Mg Sulf 17.5-3.13-1.6 GM/177ML SOLN Please follow instructions given by office 1 Bottle 0    docusate sodium (COLACE) 100 MG capsule Take 1 capsule by mouth 2 times daily as needed for Constipation 60 capsule 0    losartan (COZAAR) 50 MG tablet Take 50 mg by mouth daily      ranitidine (ZANTAC) 300 MG tablet nightly       aspirin 81 MG tablet Take 81 mg by mouth daily Will stop 7 days before surgery      fluticasone-salmeterol (ADVAIR) 250-50 MCG/DOSE AEPB Inhale 1 puff into the lungs as needed       warfarin (COUMADIN) 5 MG tablet Take 7.5 mg by mouth Will stop 4 day before surgery         Allergies:  No Known Allergies    Problem List:    Patient Active Problem List   Diagnosis Code    Essential hypertension I10    Status post right hip replacement Z96.641    Allergic rhinitis J30.9    Paroxysmal atrial fibrillation (HCC) I48.0    Melanoma (HCC) C43.9    History of MI (myocardial infarction) I25.2    Wears glasses Z97.3    Sinus bradycardia R00.1    Osteoarthritis of right hip M16.11    GI bleed K92.2    Serologic abnormality R89.4       Past Medical History:        Diagnosis Date    Allergic rhinitis     Arthritis     Atrial fibrillation (HCC)     Hypertension     [facility]     on nose    Myocardial infarction ([facility] 2008    Wears glasses     Wears partial dentures     upper       Past Surgical History:        Procedure Laterality Date    APPENDECTOMY      CARPAL TUNNEL RELEASE Right     HERNIA REPAIR      umbilical    HYSTERECTOMY      JOINT REPLACEMENT      NECK SURGERY      C5-C6    NOSE SURGERY      cancer removed    OTHER SURGICAL HISTORY      heart surg/growth in heart sac   

## 2019-08-09 LAB — SURGICAL PATHOLOGY REPORT: NORMAL

## 2019-08-28 ENCOUNTER — HOSPITAL ENCOUNTER (OUTPATIENT)
Age: 79
Setting detail: SPECIMEN
Discharge: HOME | End: 2019-08-28
Payer: MEDICARE

## 2019-08-28 LAB
POC INR: 2
PROTHROMBIN TIME, POC: 23.5 SEC (ref 9.6–14.4)

## 2019-09-13 ENCOUNTER — OFFICE VISIT (OUTPATIENT)
Dept: FAMILY MEDICINE CLINIC | Age: 79
End: 2019-09-13
Payer: MEDICARE

## 2019-09-13 VITALS
HEART RATE: 51 BPM | SYSTOLIC BLOOD PRESSURE: 130 MMHG | HEIGHT: 66 IN | BODY MASS INDEX: 30.22 KG/M2 | TEMPERATURE: 98 F | DIASTOLIC BLOOD PRESSURE: 80 MMHG | OXYGEN SATURATION: 99 % | WEIGHT: 188 LBS

## 2019-09-13 DIAGNOSIS — M25.562 CHRONIC PAIN OF BOTH KNEES: Primary | ICD-10-CM

## 2019-09-13 DIAGNOSIS — Z23 NEED FOR IMMUNIZATION AGAINST INFLUENZA: ICD-10-CM

## 2019-09-13 DIAGNOSIS — G89.29 CHRONIC PAIN OF BOTH KNEES: Primary | ICD-10-CM

## 2019-09-13 DIAGNOSIS — M25.561 CHRONIC PAIN OF BOTH KNEES: Primary | ICD-10-CM

## 2019-09-13 PROCEDURE — 4040F PNEUMOC VAC/ADMIN/RCVD: CPT | Performed by: PHYSICIAN ASSISTANT

## 2019-09-13 PROCEDURE — 1090F PRES/ABSN URINE INCON ASSESS: CPT | Performed by: PHYSICIAN ASSISTANT

## 2019-09-13 PROCEDURE — G8399 PT W/DXA RESULTS DOCUMENT: HCPCS | Performed by: PHYSICIAN ASSISTANT

## 2019-09-13 PROCEDURE — G8417 CALC BMI ABV UP PARAM F/U: HCPCS | Performed by: PHYSICIAN ASSISTANT

## 2019-09-13 PROCEDURE — G0008 ADMIN INFLUENZA VIRUS VAC: HCPCS | Performed by: PHYSICIAN ASSISTANT

## 2019-09-13 PROCEDURE — 90653 IIV ADJUVANT VACCINE IM: CPT | Performed by: PHYSICIAN ASSISTANT

## 2019-09-13 PROCEDURE — G8427 DOCREV CUR MEDS BY ELIG CLIN: HCPCS | Performed by: PHYSICIAN ASSISTANT

## 2019-09-13 PROCEDURE — 99213 OFFICE O/P EST LOW 20 MIN: CPT | Performed by: PHYSICIAN ASSISTANT

## 2019-09-13 PROCEDURE — 1036F TOBACCO NON-USER: CPT | Performed by: PHYSICIAN ASSISTANT

## 2019-09-13 PROCEDURE — 1123F ACP DISCUSS/DSCN MKR DOCD: CPT | Performed by: PHYSICIAN ASSISTANT

## 2019-09-13 ASSESSMENT — ENCOUNTER SYMPTOMS
BACK PAIN: 0
COLOR CHANGE: 0

## 2019-09-13 NOTE — PROGRESS NOTES
benefit, and side effects of prescribed medications. All patientquestions answered. Pt voiced understanding. Reviewed health maintenance. Instructedto continue current medications, diet and exercise. Patient agreed with treatmentplan. Follow up as directed.      Electronically signed by Akanksha Douglas PA-C on 9/13/2019 at 12:23 PM

## 2019-09-18 RX ORDER — ALENDRONATE SODIUM 70 MG/1
70 TABLET ORAL
Qty: 4 TABLET | Refills: 3 | Status: SHIPPED | OUTPATIENT
Start: 2019-09-18 | End: 2020-10-27

## 2019-10-02 ENCOUNTER — HOSPITAL ENCOUNTER (OUTPATIENT)
Age: 79
Setting detail: SPECIMEN
Discharge: HOME | End: 2019-10-02
Payer: MEDICARE

## 2019-10-02 LAB
POC INR: 2.5
PROTHROMBIN TIME, POC: 29.7 SEC (ref 9.6–14.4)

## 2019-10-03 DIAGNOSIS — Z96.641 STATUS POST RIGHT HIP REPLACEMENT: Primary | ICD-10-CM

## 2019-10-03 RX ORDER — AMOXICILLIN 500 MG/1
CAPSULE ORAL
Qty: 4 CAPSULE | Refills: 0 | Status: SHIPPED | OUTPATIENT
Start: 2019-10-03 | End: 2020-10-27

## 2019-11-05 ENCOUNTER — HOSPITAL ENCOUNTER (OUTPATIENT)
Age: 79
Setting detail: SPECIMEN
Discharge: HOME | End: 2019-11-05
Payer: MEDICARE

## 2019-11-05 LAB
POC INR: 1.6
PROTHROMBIN TIME, POC: 19.7 SEC (ref 9.6–14.4)

## 2019-11-26 ENCOUNTER — HOSPITAL ENCOUNTER (OUTPATIENT)
Age: 79
Setting detail: SPECIMEN
Discharge: HOME | End: 2019-11-26
Payer: MEDICARE

## 2019-11-26 LAB
POC INR: 2.2
PROTHROMBIN TIME, POC: 27 SEC (ref 9.6–14.4)

## 2019-12-04 NOTE — PROGRESS NOTES
bleeding Duodenal ulcer. Patient received 1 unit PRBC transfusion on 5/9/19. Patient did not had any recurrence of bleeding off coumadin and was restarted on 5/8/19. PMH:  Past Medical History:   Diagnosis Date    Allergic rhinitis     Arthritis     [facility]     Hypertension     Melanoma ([facility]     on nose    Myocardial infarction [facility]) 2008    Wears glasses     Wears partial dentures     upper      Allergies: No Known Allergies   Medications :    0.9 % sodium chloride 250 mL Intravenous Once   hydrocortisone  Topical BID   hydrocortisone 25 mg Rectal BID   warfarin 7.5 mg Oral Daily   warfarin (COUMADIN) daily dosing (placeholder)  Other RX Placeholder   azelastine 1 spray Each Nare BID   calcium carbonate 1 tablet Oral Daily   cycloSPORINE 1 drop Ophthalmic BID   triamterene-hydrochlorothiazide 1 tablet Oral Daily   polyethylene glycol 17 g Oral Daily   pantoprazole 40 mg Oral BID AC   sodium chloride 250 mL Intravenous Once   phytonadione (VITAMIN K)  IVPB 5 mg Intravenous Once   diltiazem 240 mg Oral Daily   mometasone-formoterol 2 puff Inhalation BID   montelukast 10 mg Oral Nightly   sodium chloride flush 10 mL Intravenous 2 times per day   sodium chloride 250 mL Intravenous Once   sodium chloride 250 mL Intravenous Once       Review of Systems   Review of Systems   Constitutional: Negative for activity change, appetite change, chills, fatigue, fever and unexpected weight change. HENT: Negative for congestion, mouth sores, postnasal drip, sinus pressure, sore throat and voice change. Eyes: Negative for visual disturbance. Respiratory: Negative for cough, shortness of breath and wheezing. Cardiovascular: Negative for chest pain and palpitations. Gastrointestinal: Negative for abdominal pain, blood in stool, constipation, diarrhea, nausea and vomiting. Endocrine: Negative for polyuria.    Genitourinary: Negative for difficulty urinating, dysuria, frequency and urgency. Musculoskeletal: Negative for arthralgias, joint swelling and myalgias. Neurological: Negative for dizziness, tremors, speech difficulty, light-headedness and headaches. Objective :      Current Vitals : Temp: 98.9 °F (37.2 °C),  Pulse: 74, Resp: 18, BP: (!) 145/59, SpO2: 99 %  Last 24 Hrs Vitals   Patient Vitals for the past 24 hrs:   BP Temp Temp src Pulse Resp SpO2   05/10/19 0445 (!) 145/59 -- -- 74 -- --   05/10/19 0430 (!) 153/57 98.9 °F (37.2 °C) -- 73 18 --   05/10/19 0000 (!) 145/55 98.2 °F (36.8 °C) Oral 65 18 --   05/09/19 2045 (!) 158/63 97.8 °F (36.6 °C) Oral 70 24 --   05/09/19 2030 -- -- -- 66 21 --   05/09/19 1915 -- -- -- -- -- 99 %   05/09/19 1730 (!) 158/55 99.3 °F (37.4 °C) Oral 71 18 100 %   05/09/19 1245 (!) 139/51 98.9 °F (37.2 °C) Oral 74 18 99 %   05/09/19 1014 137/66 98.1 °F (36.7 °C) Oral -- -- --   05/09/19 0930 (!) 142/59 98.1 °F (36.7 °C) -- 69 19 --   05/09/19 0915 (!) 142/59 99.1 °F (37.3 °C) Oral -- -- --     Intake / output   05/08 2301 - 05/09 2300  In: 9064 [P.O.:2750; I.V.:3919]  Out: 400 [Urine:400]  Physical Exam:  Physical Exam   Constitutional: She is oriented to person, place, and time. She appears well-developed and well-nourished. No distress. HENT:   Mouth/Throat: No oropharyngeal exudate. Eyes: Pupils are equal, round, and reactive to light. Conjunctivae are normal. No scleral icterus. Neck: No JVD present. No thyromegaly present. Cardiovascular: Normal rate, regular rhythm and normal heart sounds. Exam reveals no gallop and no friction rub. No murmur heard. Pulmonary/Chest: Effort normal. No respiratory distress. She has no wheezes. She has no rales. Abdominal: Soft. Bowel sounds are normal. She exhibits no distension and no mass. There is tenderness in the epigastric area. There is no rebound and no guarding. Lymphadenopathy:     She has no cervical adenopathy. Neurological: She is alert and oriented to person, place, and time.  No Name band; cranial nerve deficit. She exhibits normal muscle tone. Skin: She is not diaphoretic. Nursing note and vitals reviewed. Lower Extremities : No ankle Edema , No calf Tenderness     Laboratory findings:    Recent Labs     05/07/19  1811  05/09/19  0440 05/09/19  1805 05/10/19  0456   WBC  --   --  6.2  --   --    HGB  --    < > 7.0* 8.1* 7.9*   HCT  --    < > 22.6* 25.9* 24.5*   PLT  --   --  203  --   --    INR 2.1  --  1.4  --   --     < > = values in this interval not displayed. No results for input(s): NA, K, CL, CO2, GLUCOSE, BUN, CREATININE, MG, CALCIUM, CAION, PHOS, PSA in the last 72 hours. No results for input(s): PROT, LABALBU, LABA1C, P4EQNUZ, H4WNAWS, FT4, TSH, AST, ALT, LDH, GGT, ALKPHOS, BILITOT, BILIDIR, AMMONIA, AMYLASE, LIPASE, LACTATE, CHOL, HDL, LDLCHOLESTEROL, CHOLHDLRATIO, TRIG, VLDL, BNP, TROPONINI, CKTOTAL, CKMB, CKMBINDEX, RF, DO in the last 72 hours. Specific Gravity, UA   Date Value Ref Range Status   03/22/2019 1.009 1.005 - 1.030 Final     Protein, UA   Date Value Ref Range Status   03/22/2019 NEGATIVE NEGATIVE Final     RBC, UA   Date Value Ref Range Status   12/01/2014 2 TO 5 0 - 2 /HPF Final     Blood, UA POC   Date Value Ref Range Status   12/05/2014 negative  Final     Bacteria, UA   Date Value Ref Range Status   12/01/2014 NOT REPORTED NONE Final     Nitrite, Urine   Date Value Ref Range Status   03/22/2019 NEGATIVE NEGATIVE Final     WBC, UA   Date Value Ref Range Status   12/01/2014 2 TO 5 0 - 5 /HPF Final     Leukocyte Esterase, Urine   Date Value Ref Range Status   03/22/2019 NEGATIVE NEGATIVE Final     Imaging/Diagonstics:     Echocardiogram 11/22/17  LVEF 65%, no regional wall motion abnormality.  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  No pericardial effusion.  Trace mitral regurgitation mild tricuspid regurgitation. Clinical Course : stable  Assessment and Plan  :        1. Melena - 3 units Transfusion  PRBC.  Underwent EGD with Duodenal non bleeding Ulcer - protonix BID -   Tolerating  Coumadin. Monitor INR close. .   2. Paroxysmal atrial fibrillation - had Afib with RVR last night . Improved with Lopressor 5 mg IV. Consult cardiology . in NSR . on long-term anticoagulation with Coumadin. continue to Hold due to GI bleed. 3. Recent right hip arthroplasty - 2019  4. Essential hypertension  5. Former smoker - quit 38 years ago  10. Chronic diastolic dysfunction -   7. Constipation - Bowel regimen . Discharge home   F/U with cardiology for INR monitoring . [facility] . Continue to monitor vitals , Intake / output ,  Cell count , HGB , Kidney function, oxygenation  as indicated . Plan and updates discussed with patient ,  answers  explained to satisfaction.    Plan discussed with Staff   RN     (Please note that portions of this note were completed with a voice recognition program. Efforts were made to edit the dictations but occasionally words are mis-transcribed.)      Daphne Brady MD  5/10/2019

## 2019-12-09 ENCOUNTER — OFFICE VISIT (OUTPATIENT)
Dept: FAMILY MEDICINE CLINIC | Age: 79
End: 2019-12-09
Payer: MEDICARE

## 2019-12-09 VITALS
RESPIRATION RATE: 16 BRPM | WEIGHT: 190 LBS | BODY MASS INDEX: 30.53 KG/M2 | OXYGEN SATURATION: 97 % | DIASTOLIC BLOOD PRESSURE: 80 MMHG | HEART RATE: 57 BPM | TEMPERATURE: 98.3 F | SYSTOLIC BLOOD PRESSURE: 174 MMHG | HEIGHT: 66 IN

## 2019-12-09 DIAGNOSIS — J40 BRONCHITIS: Primary | ICD-10-CM

## 2019-12-09 PROCEDURE — G8417 CALC BMI ABV UP PARAM F/U: HCPCS | Performed by: NURSE PRACTITIONER

## 2019-12-09 PROCEDURE — 1090F PRES/ABSN URINE INCON ASSESS: CPT | Performed by: NURSE PRACTITIONER

## 2019-12-09 PROCEDURE — G8482 FLU IMMUNIZE ORDER/ADMIN: HCPCS | Performed by: NURSE PRACTITIONER

## 2019-12-09 PROCEDURE — 1123F ACP DISCUSS/DSCN MKR DOCD: CPT | Performed by: NURSE PRACTITIONER

## 2019-12-09 PROCEDURE — G8427 DOCREV CUR MEDS BY ELIG CLIN: HCPCS | Performed by: NURSE PRACTITIONER

## 2019-12-09 PROCEDURE — 4040F PNEUMOC VAC/ADMIN/RCVD: CPT | Performed by: NURSE PRACTITIONER

## 2019-12-09 PROCEDURE — G8399 PT W/DXA RESULTS DOCUMENT: HCPCS | Performed by: NURSE PRACTITIONER

## 2019-12-09 PROCEDURE — 99214 OFFICE O/P EST MOD 30 MIN: CPT | Performed by: NURSE PRACTITIONER

## 2019-12-09 PROCEDURE — 1036F TOBACCO NON-USER: CPT | Performed by: NURSE PRACTITIONER

## 2019-12-09 RX ORDER — DOXYCYCLINE HYCLATE 100 MG/1
100 CAPSULE ORAL 2 TIMES DAILY
Qty: 14 CAPSULE | Refills: 0 | Status: SHIPPED | OUTPATIENT
Start: 2019-12-09 | End: 2019-12-16

## 2019-12-09 ASSESSMENT — ENCOUNTER SYMPTOMS
SINUS PRESSURE: 0
CHEST TIGHTNESS: 0
EYE DISCHARGE: 0
SHORTNESS OF BREATH: 1
EYE REDNESS: 0
WHEEZING: 0
COUGH: 1
SORE THROAT: 0
VOICE CHANGE: 0

## 2019-12-30 ENCOUNTER — HOSPITAL ENCOUNTER (OUTPATIENT)
Age: 79
Setting detail: SPECIMEN
Discharge: HOME | End: 2019-12-30
Payer: MEDICARE

## 2019-12-30 LAB
POC INR: 3.2
PROTHROMBIN TIME, POC: 38.4 SEC (ref 9.6–14.4)

## 2020-01-16 ENCOUNTER — HOSPITAL ENCOUNTER (OUTPATIENT)
Age: 80
Setting detail: SPECIMEN
Discharge: HOME | End: 2020-01-16
Payer: MEDICARE

## 2020-01-16 LAB
POC INR: 2.9
PROTHROMBIN TIME, POC: 35.1 SEC (ref 9.6–14.4)

## 2020-02-05 ASSESSMENT — HOOS JR
WALKING ON UNEVEN SURFACE: 0
GOING UP OR DOWN STAIRS: 0
LYING IN BED (TURNING OVER, MAINTAINING HIP POSITION): 0
RISING FROM SITTING: 0
SITTING: 0
BENDING TO THE FLOOR TO PICK UP OBJECT: 0

## 2020-02-05 ASSESSMENT — PROMIS GLOBAL HEALTH SCALE
IN GENERAL, WOULD YOU SAY YOUR QUALITY OF LIFE IS...[ON A SCALE OF 1 (POOR) TO 5 (EXCELLENT)]: 5
IN GENERAL, WOULD YOU SAY YOUR HEALTH IS...[ON A SCALE OF 1 (POOR) TO 5 (EXCELLENT)]: 4
IN THE PAST 7 DAYS, HOW OFTEN HAVE YOU BEEN BOTHERED BY EMOTIONAL PROBLEMS, SUCH AS FEELING ANXIOUS, DEPRESSED, OR IRRITABLE [ON A SCALE FROM 1 (NEVER) TO 5 (ALWAYS)]?: 1
TO WHAT EXTENT ARE YOU ABLE TO CARRY OUT YOUR EVERYDAY PHYSICAL ACTIVITIES SUCH AS WALKING, CLIMBING STAIRS, CARRYING GROCERIES, OR MOVING A CHAIR [ON A SCALE OF 1 (NOT AT ALL) TO 5 (COMPLETELY)]?: 5
IN GENERAL, HOW WOULD YOU RATE YOUR MENTAL HEALTH, INCLUDING YOUR MOOD AND YOUR ABILITY TO THINK [ON A SCALE OF 1 (POOR) TO 5 (EXCELLENT)]?: 5
WHO IS THE PERSON COMPLETING THE PROMIS V1.1 SURVEY?: 0
IN GENERAL, HOW WOULD YOU RATE YOUR PHYSICAL HEALTH [ON A SCALE OF 1 (POOR) TO 5 (EXCELLENT)]?: 4
IN THE PAST 7 DAYS, HOW WOULD YOU RATE YOUR PAIN ON AVERAGE [ON A SCALE FROM 0 (NO PAIN) TO 10 (WORST IMAGINABLE PAIN)]?: 0
HOW IS THE PROMIS V1.1 BEING ADMINISTERED?: 2
IN GENERAL, PLEASE RATE HOW WELL YOU CARRY OUT YOUR USUAL SOCIAL ACTIVITIES (INCLUDES ACTIVITIES AT HOME, AT WORK, AND IN YOUR COMMUNITY, AND RESPONSIBILITIES AS A PARENT, CHILD, SPOUSE, EMPLOYEE, FRIEND, ETC) [ON A SCALE OF 1 (POOR) TO 5 (EXCELLENT)]?: 5
IN THE PAST 7 DAYS, HOW WOULD YOU RATE YOUR FATIGUE ON AVERAGE [ON A SCALE FROM 1 (NONE) TO 5 (VERY SEVERE)]?: 5
IN GENERAL, HOW WOULD YOU RATE YOUR SATISFACTION WITH YOUR SOCIAL ACTIVITIES AND RELATIONSHIPS [ON A SCALE OF 1 (POOR) TO 5 (EXCELLENT)]?: 5

## 2020-02-14 ENCOUNTER — HOSPITAL ENCOUNTER (OUTPATIENT)
Age: 80
Setting detail: SPECIMEN
Discharge: HOME | End: 2020-02-14
Payer: MEDICARE

## 2020-02-14 LAB
INR BLD: 2.3
PROTHROMBIN TIME: 23 SEC (ref 9–12)

## 2020-02-17 RX ORDER — PANTOPRAZOLE SODIUM 40 MG/1
40 TABLET, DELAYED RELEASE ORAL DAILY
Qty: 90 TABLET | Refills: 3 | Status: ON HOLD | OUTPATIENT
Start: 2020-02-17 | End: 2021-04-28

## 2020-03-13 ENCOUNTER — HOSPITAL ENCOUNTER (OUTPATIENT)
Age: 80
Setting detail: SPECIMEN
Discharge: HOME | End: 2020-03-13
Payer: MEDICARE

## 2020-03-13 LAB
POC INR: 2.6
PROTHROMBIN TIME, POC: 31.6 SEC (ref 9.6–14.4)

## 2020-04-09 ENCOUNTER — HOSPITAL ENCOUNTER (OUTPATIENT)
Age: 80
Setting detail: SPECIMEN
Discharge: HOME | End: 2020-04-09
Payer: MEDICARE

## 2020-04-09 LAB
POC INR: 1.7
PROTHROMBIN TIME, POC: 20.4 SEC (ref 9.6–14.4)

## 2020-05-06 ENCOUNTER — HOSPITAL ENCOUNTER (OUTPATIENT)
Age: 80
Setting detail: SPECIMEN
Discharge: HOME | End: 2020-05-06
Payer: MEDICARE

## 2020-05-06 LAB
POC INR: 3.8
PROTHROMBIN TIME, POC: 45.4 SEC (ref 9.6–14.4)

## 2020-05-28 ENCOUNTER — HOSPITAL ENCOUNTER (OUTPATIENT)
Age: 80
Setting detail: SPECIMEN
Discharge: HOME | End: 2020-05-28
Payer: MEDICARE

## 2020-05-28 LAB
POC INR: 2.7
PROTHROMBIN TIME, POC: 32.7 SEC (ref 9.6–14.4)

## 2020-06-03 ENCOUNTER — HOSPITAL ENCOUNTER (OUTPATIENT)
Age: 80
Setting detail: SPECIMEN
Discharge: HOME | End: 2020-06-03
Payer: MEDICARE

## 2020-06-03 ENCOUNTER — OFFICE VISIT (OUTPATIENT)
Dept: FAMILY MEDICINE CLINIC | Age: 80
End: 2020-06-03
Payer: MEDICARE

## 2020-06-03 VITALS
DIASTOLIC BLOOD PRESSURE: 80 MMHG | WEIGHT: 192 LBS | SYSTOLIC BLOOD PRESSURE: 142 MMHG | OXYGEN SATURATION: 98 % | TEMPERATURE: 97.2 F | HEIGHT: 66 IN | BODY MASS INDEX: 30.86 KG/M2 | HEART RATE: 61 BPM

## 2020-06-03 PROBLEM — D58.2 OTHER HEMOGLOBINOPATHIES (HCC): Status: ACTIVE | Noted: 2020-06-03

## 2020-06-03 PROBLEM — Z98.890 HISTORY OF REPAIR OF HIP JOINT: Status: RESOLVED | Noted: 2019-04-09 | Resolved: 2020-06-03

## 2020-06-03 PROBLEM — K21.9 GASTROESOPHAGEAL REFLUX DISEASE: Status: ACTIVE | Noted: 2020-06-03

## 2020-06-03 PROBLEM — M17.0 PRIMARY OSTEOARTHRITIS OF BOTH KNEES: Status: ACTIVE | Noted: 2020-06-03

## 2020-06-03 PROBLEM — R05.3 CHRONIC COUGH: Status: ACTIVE | Noted: 2020-06-03

## 2020-06-03 PROBLEM — J32.9 CHRONIC SINUSITIS: Status: ACTIVE | Noted: 2020-06-03

## 2020-06-03 PROBLEM — J45.40 MODERATE PERSISTENT ASTHMA WITHOUT COMPLICATION: Status: ACTIVE | Noted: 2020-06-03

## 2020-06-03 PROBLEM — M16.10 PRIMARY LOCALIZED OSTEOARTHRITIS OF PELVIC REGION AND THIGH: Status: ACTIVE | Noted: 2020-06-03

## 2020-06-03 PROBLEM — G93.32 CHRONIC FATIGUE SYNDROME: Status: ACTIVE | Noted: 2020-06-03

## 2020-06-03 PROBLEM — M15.9 DEGENERATIVE JOINT DISEASE INVOLVING MULTIPLE JOINTS ON BOTH SIDES OF BODY: Status: ACTIVE | Noted: 2020-06-03

## 2020-06-03 PROBLEM — I48.91 ATRIAL FIBRILLATION (HCC): Status: ACTIVE | Noted: 2019-04-09

## 2020-06-03 PROBLEM — Z98.890 HISTORY OF REPAIR OF HIP JOINT: Status: ACTIVE | Noted: 2019-04-09

## 2020-06-03 LAB
ABSOLUTE EOS #: 0.13 K/UL (ref 0–0.44)
ABSOLUTE IMMATURE GRANULOCYTE: <0.03 K/UL (ref 0–0.3)
ABSOLUTE LYMPH #: 1.43 K/UL (ref 1.1–3.7)
ABSOLUTE MONO #: 0.51 K/UL (ref 0.1–1.2)
BASOPHILS # BLD: 1 % (ref 0–2)
BASOPHILS ABSOLUTE: 0.07 K/UL (ref 0–0.2)
DIFFERENTIAL TYPE: NORMAL
EOSINOPHILS RELATIVE PERCENT: 3 % (ref 1–4)
FERRITIN: 70 UG/L (ref 13–150)
FOLATE: >20 NG/ML
HCT VFR BLD CALC: 41 % (ref 36.3–47.1)
HEMOGLOBIN: 13.1 G/DL (ref 11.9–15.1)
IMMATURE GRANULOCYTES: 0 %
IRON SATURATION: 24 % (ref 20–55)
IRON: 60 UG/DL (ref 37–145)
LYMPHOCYTES # BLD: 28 % (ref 24–43)
MCH RBC QN AUTO: 30 PG (ref 25.2–33.5)
MCHC RBC AUTO-ENTMCNC: 32 G/DL (ref 28.4–34.8)
MCV RBC AUTO: 94 FL (ref 82.6–102.9)
MONOCYTES # BLD: 10 % (ref 3–12)
NRBC AUTOMATED: 0 PER 100 WBC
PDW BLD-RTO: 14.2 % (ref 11.8–14.4)
PLATELET # BLD: 229 K/UL (ref 138–453)
PLATELET ESTIMATE: NORMAL
PMV BLD AUTO: 11.4 FL (ref 8.1–13.5)
RBC # BLD: 4.36 M/UL (ref 3.95–5.11)
RBC # BLD: NORMAL 10*6/UL
SEG NEUTROPHILS: 58 % (ref 36–65)
SEGMENTED NEUTROPHILS ABSOLUTE COUNT: 3.01 K/UL (ref 1.5–8.1)
TOTAL IRON BINDING CAPACITY: 255 UG/DL (ref 250–450)
TSH SERPL DL<=0.05 MIU/L-ACNC: 1.82 MIU/L (ref 0.3–5)
UNSATURATED IRON BINDING CAPACITY: 195 UG/DL (ref 112–347)
VITAMIN B-12: 747 PG/ML (ref 232–1245)
WBC # BLD: 5.2 K/UL (ref 3.5–11.3)
WBC # BLD: NORMAL 10*3/UL

## 2020-06-03 PROCEDURE — G8399 PT W/DXA RESULTS DOCUMENT: HCPCS | Performed by: PHYSICIAN ASSISTANT

## 2020-06-03 PROCEDURE — G8427 DOCREV CUR MEDS BY ELIG CLIN: HCPCS | Performed by: PHYSICIAN ASSISTANT

## 2020-06-03 PROCEDURE — 4040F PNEUMOC VAC/ADMIN/RCVD: CPT | Performed by: PHYSICIAN ASSISTANT

## 2020-06-03 PROCEDURE — 99213 OFFICE O/P EST LOW 20 MIN: CPT | Performed by: PHYSICIAN ASSISTANT

## 2020-06-03 PROCEDURE — 1036F TOBACCO NON-USER: CPT | Performed by: PHYSICIAN ASSISTANT

## 2020-06-03 PROCEDURE — G8417 CALC BMI ABV UP PARAM F/U: HCPCS | Performed by: PHYSICIAN ASSISTANT

## 2020-06-03 PROCEDURE — 1090F PRES/ABSN URINE INCON ASSESS: CPT | Performed by: PHYSICIAN ASSISTANT

## 2020-06-03 PROCEDURE — 1123F ACP DISCUSS/DSCN MKR DOCD: CPT | Performed by: PHYSICIAN ASSISTANT

## 2020-06-03 ASSESSMENT — PATIENT HEALTH QUESTIONNAIRE - PHQ9
SUM OF ALL RESPONSES TO PHQ QUESTIONS 1-9: 0
1. LITTLE INTEREST OR PLEASURE IN DOING THINGS: 0
2. FEELING DOWN, DEPRESSED OR HOPELESS: 0
SUM OF ALL RESPONSES TO PHQ9 QUESTIONS 1 & 2: 0
SUM OF ALL RESPONSES TO PHQ QUESTIONS 1-9: 0

## 2020-06-03 ASSESSMENT — ENCOUNTER SYMPTOMS
CONSTIPATION: 0
VOMITING: 0
COLOR CHANGE: 0
WHEEZING: 0
ABDOMINAL PAIN: 0
DIARRHEA: 0
BLOOD IN STOOL: 0
COUGH: 0
NAUSEA: 0
SHORTNESS OF BREATH: 0

## 2020-06-03 NOTE — PROGRESS NOTES
 JOINT REPLACEMENT      NECK SURGERY      C5-C6    NOSE SURGERY      cancer removed    OTHER SURGICAL HISTORY      heart surg/growth in heart sac    TONSILLECTOMY      TOTAL HIP ARTHROPLASTY Right 2019    TOTAL HIP ARTHROPLASTY Right 2019    HIP TOTAL ARTHROPLASTY - MEDACTA, C-ARM, MEDACTA TABLE, NSA= GENERAL VS SPINAL, FASCIA ILLIACA BLOCK, *STANDARD performed by Torie Rachel DO at [facility] 2019    EGD DIAGNOSTIC ONLY performed by Letha Zamora MD at [facility]       History reviewed. No pertinent family history. Social History     Tobacco Use    Smoking status: Former Smoker     Packs/day: 1.00     Years: 20.00     Pack years: 20.00     Types: Cigarettes     Last attempt to quit: 1981     Years since quittin.4    Smokeless tobacco: Never Used   Substance Use Topics    Alcohol use: No         Current Outpatient Medications   Medication Sig Dispense Refill    pantoprazole (PROTONIX) 40 MG tablet Take 1 tablet by mouth daily 90 tablet 3    Flaxseed, Linseed, (FLAX SEED OIL) 1000 MG CAPS Take 1,000 mg by mouth daily      ferrous sulfate 325 (65 Fe) MG tablet Take 1 tablet by mouth 2 times daily (before meals) 60 tablet 6    montelukast (SINGULAIR) 10 MG tablet Take 10 mg by mouth nightly      CycloSPORINE (RESTASIS OP) Apply 1 drop to eye 2 times daily      ranitidine (ZANTAC) 300 MG tablet nightly       triamterene-hydrochlorothiazide (MAXZIDE-25) 37.5-25 MG per tablet Take 1 tablet by mouth daily      diltiazem (CARDIZEM CD) 240 MG ER capsule Take 240 mg by mouth daily.  aspirin 81 MG tablet Take 81 mg by mouth daily Will stop 7 days before surgery      fluticasone-salmeterol (ADVAIR) 250-50 MCG/DOSE AEPB Inhale 1 puff into the lungs as needed       azelastine (ASTELIN) 137 MCG/SPRAY nasal spray 1 spray by Nasal route 2 times daily.  Use in each nostril as directed      Calcium-Vitamin D (CALTRATE 600 PLUS-VIT D PO) Take  by mouth.  nitroGLYCERIN (NITRODUR) 0.1 MG/HR Place 1 patch onto the skin daily.  warfarin (COUMADIN) 5 MG tablet Take 7.5 mg by mouth Will stop 4 day before surgery      amoxicillin (AMOXIL) 500 MG capsule Take 2 grams PO one hour prior to dental cleaning. (Patient not taking: Reported on 12/9/2019) 4 capsule 0    alendronate (FOSAMAX) 70 MG tablet Take 1 tablet by mouth every 7 days (Patient not taking: Reported on 6/3/2020) 4 tablet 3     No current facility-administered medications for this visit. No Known Allergies    Health Maintenance   Topic Date Due    DTaP/Tdap/Td vaccine (1 - Tdap) 01/14/1959    Annual Wellness Visit (AWV)  05/29/2019    Shingles Vaccine (3 of 3) 10/08/2019    Potassium monitoring  05/06/2020    Creatinine monitoring  05/06/2020    Flu vaccine  Completed    Pneumococcal 65+ years Vaccine  Completed    DEXA (modify frequency per FRAX score)  Addressed    Hepatitis A vaccine  Aged Out    Hepatitis B vaccine  Aged Out    Hib vaccine  Aged Out    Meningococcal (ACWY) vaccine  Aged Out       Subjective:     Review of Systems   Constitutional: Positive for fatigue. Negative for activity change, appetite change and fever. BP (!) 142/80 (Site: Left Upper Arm, Position: Sitting, Cuff Size: Medium Adult)   Pulse 61   Temp 97.2 °F (36.2 °C) (Tympanic)   Ht 5' 5.5\" (1.664 m)   Wt 192 lb (87.1 kg)   SpO2 98%   BMI 31.46 kg/m²    Respiratory: Negative for cough, shortness of breath and wheezing. Cardiovascular: Negative for chest pain and palpitations. Gastrointestinal: Negative for abdominal pain, blood in stool, constipation, diarrhea, nausea and vomiting. Genitourinary: Negative for dysuria. Skin: Negative for color change, pallor, rash and wound. Neurological: Negative for weakness. Hematological: Negative for adenopathy. Psychiatric/Behavioral: Negative for sleep disturbance. The patient is not nervous/anxious. Objective:     Physical Exam  Vitals signs and nursing note reviewed. Constitutional:       General: She is not in acute distress. Appearance: Normal appearance. She is well-developed. She is not ill-appearing. HENT:      Head: Normocephalic and atraumatic. Neck:      Musculoskeletal: Neck supple. No muscular tenderness. Cardiovascular:      Rate and Rhythm: Normal rate and regular rhythm. Heart sounds: No murmur. Pulmonary:      Effort: Pulmonary effort is normal. No respiratory distress. Breath sounds: Normal breath sounds. No wheezing, rhonchi or rales. Lymphadenopathy:      Cervical: No cervical adenopathy. Skin:     General: Skin is warm and dry. Coloration: Skin is not pale. Findings: No erythema or rash. Neurological:      Mental Status: She is alert and oriented to person, place, and time. Psychiatric:         Behavior: Behavior normal.         Thought Content: Thought content normal.         Judgment: Judgment normal.       BP (!) 142/80 (Site: Left Upper Arm, Position: Sitting, Cuff Size: Medium Adult)   Pulse 61   Temp 97.2 °F (36.2 °C) (Tympanic)   Ht 5' 5.5\" (1.664 m)   Wt 192 lb (87.1 kg)   SpO2 98%   BMI 31.46 kg/m²     Assessment:          Diagnosis Orders   1. Fatigue, unspecified type  CBC Auto Differential    Iron and TIBC    Ferritin    Vitamin B12    Folate    TSH with Reflex   2. History of GI bleed  CBC Auto Differential    Iron and TIBC    Ferritin    Vitamin B12    Folate    TSH with Reflex   3. Iron deficiency anemia, unspecified iron deficiency anemia type  CBC Auto Differential    Iron and TIBC    Ferritin    Vitamin B12    Folate    TSH with Reflex   4. Paroxysmal atrial fibrillation (HCC)     5. 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 Malignant melanoma, unspecified site [facility])               Plan:      Return if symptoms worsen or fail to improve, for await results.     Cont with cardiologist and pending sleep study  Will update labs for

## 2020-06-15 ENCOUNTER — OFFICE VISIT (OUTPATIENT)
Dept: ORTHOPEDIC SURGERY | Age: 80
End: 2020-06-15
Payer: MEDICARE

## 2020-06-15 VITALS — WEIGHT: 192.02 LBS | BODY MASS INDEX: 30.86 KG/M2 | HEIGHT: 66 IN

## 2020-06-15 PROCEDURE — 99213 OFFICE O/P EST LOW 20 MIN: CPT | Performed by: PHYSICIAN ASSISTANT

## 2020-06-16 ASSESSMENT — ENCOUNTER SYMPTOMS
COUGH: 0
COLOR CHANGE: 0
SHORTNESS OF BREATH: 0
VOMITING: 0

## 2020-06-24 ENCOUNTER — HOSPITAL ENCOUNTER (OUTPATIENT)
Age: 80
Setting detail: SPECIMEN
Discharge: HOME | End: 2020-06-24
Payer: MEDICARE

## 2020-06-24 LAB
POC INR: 2.9
PROTHROMBIN TIME, POC: 35 SEC (ref 9.6–14.4)

## 2020-07-14 RX ORDER — AMOXICILLIN 500 MG/1
CAPSULE ORAL
Qty: 4 CAPSULE | Refills: 0 | Status: SHIPPED | OUTPATIENT
Start: 2020-07-14 | End: 2020-10-27

## 2020-07-24 ENCOUNTER — HOSPITAL ENCOUNTER (OUTPATIENT)
Age: 80
Setting detail: SPECIMEN
Discharge: HOME | End: 2020-07-24
Payer: MEDICARE

## 2020-07-24 LAB
POC INR: 3.5
PROTHROMBIN TIME, POC: 42.4 SEC (ref 9.6–14.4)

## 2020-08-11 ENCOUNTER — HOSPITAL ENCOUNTER (OUTPATIENT)
Dept: MAMMOGRAPHY | Age: 80
Discharge: HOME | End: 2020-08-13
Payer: MEDICARE

## 2020-08-11 PROCEDURE — 77063 BREAST TOMOSYNTHESIS BI: CPT

## 2020-08-14 ENCOUNTER — HOSPITAL ENCOUNTER (OUTPATIENT)
Age: 80
Setting detail: SPECIMEN
Discharge: HOME | End: 2020-08-14
Payer: MEDICARE

## 2020-08-14 LAB
POC INR: 2.7
PROTHROMBIN TIME, POC: 32.1 SEC (ref 9.6–14.4)

## 2020-09-15 ENCOUNTER — HOSPITAL ENCOUNTER (OUTPATIENT)
Age: 80
Setting detail: SPECIMEN
Discharge: HOME | End: 2020-09-15
Payer: MEDICARE

## 2020-09-15 LAB
POC INR: 2.7
PROTHROMBIN TIME, POC: 32.2 SEC (ref 9.6–14.4)

## 2020-10-12 ENCOUNTER — HOSPITAL ENCOUNTER (OUTPATIENT)
Age: 80
Setting detail: SPECIMEN
Discharge: HOME | End: 2020-10-12
Payer: MEDICARE

## 2020-10-12 LAB
POC INR: 2.5
PROTHROMBIN TIME, POC: 29.9 SEC (ref 9.6–14.4)

## 2020-10-27 ENCOUNTER — OFFICE VISIT (OUTPATIENT)
Dept: FAMILY MEDICINE CLINIC | Age: 80
End: 2020-10-27
Payer: MEDICARE

## 2020-10-27 VITALS
BODY MASS INDEX: 31.99 KG/M2 | WEIGHT: 192 LBS | SYSTOLIC BLOOD PRESSURE: 134 MMHG | OXYGEN SATURATION: 97 % | DIASTOLIC BLOOD PRESSURE: 82 MMHG | HEART RATE: 57 BPM | TEMPERATURE: 97.2 F | RESPIRATION RATE: 16 BRPM | HEIGHT: 65 IN

## 2020-10-27 PROCEDURE — 4040F PNEUMOC VAC/ADMIN/RCVD: CPT | Performed by: PHYSICIAN ASSISTANT

## 2020-10-27 PROCEDURE — 1123F ACP DISCUSS/DSCN MKR DOCD: CPT | Performed by: PHYSICIAN ASSISTANT

## 2020-10-27 PROCEDURE — G8482 FLU IMMUNIZE ORDER/ADMIN: HCPCS | Performed by: PHYSICIAN ASSISTANT

## 2020-10-27 PROCEDURE — G0438 PPPS, INITIAL VISIT: HCPCS | Performed by: PHYSICIAN ASSISTANT

## 2020-10-27 RX ORDER — FLUTICASONE FUROATE, UMECLIDINIUM BROMIDE AND VILANTEROL TRIFENATATE 100; 62.5; 25 UG/1; UG/1; UG/1
1 POWDER RESPIRATORY (INHALATION) DAILY
COMMUNITY

## 2020-10-27 ASSESSMENT — PATIENT HEALTH QUESTIONNAIRE - PHQ9
SUM OF ALL RESPONSES TO PHQ QUESTIONS 1-9: 0
2. FEELING DOWN, DEPRESSED OR HOPELESS: 0
SUM OF ALL RESPONSES TO PHQ9 QUESTIONS 1 & 2: 0
1. LITTLE INTEREST OR PLEASURE IN DOING THINGS: 0
SUM OF ALL RESPONSES TO PHQ QUESTIONS 1-9: 0
SUM OF ALL RESPONSES TO PHQ QUESTIONS 1-9: 0

## 2020-10-27 NOTE — PATIENT INSTRUCTIONS
Personalized Preventive Plan for Cleave Marus - 10/27/2020  Medicare offers a range of preventive health benefits. Some of the tests and screenings are paid in full while other may be subject to a deductible, co-insurance, and/or copay. Some of these benefits include a comprehensive review of your medical history including lifestyle, illnesses that may run in your family, and various assessments and screenings as appropriate. After reviewing your medical record and screening and assessments performed today your provider may have ordered immunizations, labs, imaging, and/or referrals for you. A list of these orders (if applicable) as well as your Preventive Care list are included within your After Visit Summary for your review. Other Preventive Recommendations:    · A preventive eye exam performed by an eye specialist is recommended every 1-2 years to screen for glaucoma; cataracts, macular degeneration, and other eye disorders. · A preventive dental visit is recommended every 6 months. · Try to get at least 150 minutes of exercise per week or 10,000 steps per day on a pedometer . · Order or download the FREE \"Exercise & Physical Activity: Your Everyday Guide\" from [employer]. Call 8-790.987.1264 or search [employer] online. · You need 2126-2312 mg of calcium and 4130-5113 IU of vitamin D per day. It is possible to meet your calcium requirement with diet alone, but a vitamin D supplement is usually necessary to meet this goal.  · When exposed to the sun, use a sunscreen that protects against both UVA and UVB radiation with an SPF of 30 or greater. Reapply every 2 to 3 hours or after sweating, drying off with a towel, or swimming. · Always wear a seat belt when traveling in a car. Always wear a helmet when riding a bicycle or motorcycle.

## 2020-10-27 NOTE — PROGRESS NOTES
Visit Information    Have you changed or started any medications since your last visit including any over-the-counter medicines, vitamins, or herbal medicines? no   Are you having any side effects from any of your medications? -  no  Have you stopped taking any of your medications? Is so, why? -  no    Have you seen any other physician or provider since your last visit? No  Have you had any other diagnostic tests since your last visit? No  Have you been seen in the emergency room and/or had an admission to a hospital since we last saw you? No  Have you had your routine dental cleaning in the past 6 months? no    Have you activated your [employer] account? If not, what are your barriers?  Yes     Patient Care Team:  Ana Laura Carter PA-C as PCP - General  Ana Laurabeverley Carter PA-C as PCP - [facility]  Maico Del Rio MD as Consulting Physician (Cardiology)  Salvador Mitchell MD as Consulting Physician (Allergy & Immunology)    Medical History Review  Past Medical, Family, and Social History reviewed and does contribute to the patient presenting condition    Health Maintenance   Topic Date Due    DTaP/Tdap/Td vaccine (1 - Tdap) 01/14/1958    Annual Wellness Visit (AWV)  05/29/2019    Potassium monitoring  05/06/2020    Creatinine monitoring  05/06/2020    Flu vaccine  Completed    Shingles Vaccine  Completed    Pneumococcal 65+ years Vaccine  Completed    DEXA (modify frequency per FRAX score)  Addressed    Hepatitis A vaccine  Aged Out    Hepatitis B vaccine  Aged Out    Hib vaccine  Aged Out    Meningococcal (ACWY) vaccine  Aged Out

## 2020-10-27 NOTE — PROGRESS NOTES
Medicare Annual Wellness Visit  Name: Viry Partida Date: 10/27/2020   MRN: [id number] Sex: Female   Age: 79 y.o. Ethnicity: Non-/Non    : 1940 Race: Denisha Del Toro is here for Medicare AWV    Screenings for behavioral, psychosocial and functional/safety risks, and cognitive dysfunction are all negative except as indicated below. These results, as well as other patient data from the [address] form, are documented in Flowsheets linked to this Encounter. Patient here for AWV. She reports doing well  Staying active. Working with her daughter recently  Still following with her cardiologist and now pulm as well    No Known Allergies      Prior to Visit Medications    Medication Sig Taking? Authorizing Provider   fluticasone-umeclidin-vilant (TRELEGY ELLIPTA) 100-62.5-25 MCG/INH AEPB Inhale 1 puff into the lungs daily Yes Historical Provider, MD   pantoprazole (PROTONIX) 40 MG tablet Take 1 tablet by mouth daily Yes Sujey Moscoso PA-C   Flaxseed, Linseed, (FLAX SEED OIL) 1000 MG CAPS Take 1,000 mg by mouth daily Yes Historical Provider, MD   ferrous sulfate 325 (65 Fe) MG tablet Take 1 tablet by mouth 2 times daily (before meals) Yes Michael Ross MD   montelukast (SINGULAIR) 10 MG tablet Take 10 mg by mouth nightly Yes Historical Provider, MD   CycloSPORINE (RESTASIS OP) Apply 1 drop to eye 2 times daily Yes Historical Provider, MD   ranitidine (ZANTAC) 300 MG tablet nightly  Yes Historical Provider, MD   triamterene-hydrochlorothiazide (MAXZIDE-25) 37.5-25 MG per tablet Take 1 tablet by mouth daily Yes Historical Provider, MD   diltiazem (CARDIZEM CD) 240 MG ER capsule Take 240 mg by mouth daily.  Yes DUDLEY Moody - CNP   aspirin 81 MG tablet Take 81 mg by mouth daily Will stop 7 days before surgery Yes Historical Provider, MD   fluticasone-salmeterol (ADVAIR) 250-50 MCG/DOSE AEPB Inhale 1 puff into the lungs as needed  Yes Historical Provider, MD azelastine (ASTELIN) 137 MCG/SPRAY nasal spray 1 spray by Nasal route 2 times daily. Use in each nostril as directed Yes Historical Provider, MD   Calcium-Vitamin D (CALTRATE 600 PLUS-VIT D PO) Take  by mouth. Yes Historical Provider, MD   nitroGLYCERIN (NITRODUR) 0.1 MG/HR Place 1 patch onto the skin daily. Yes Historical Provider, MD   warfarin (COUMADIN) 5 MG tablet Take 7.5 mg by mouth Will stop 4 day before surgery Yes Historical Provider, MD         Past Medical History:   Diagnosis Date    Allergic rhinitis     Arthritis     Atrial fibrillation ([facility] [address] [facility]     Hypertension     Melanoma ([facility] [address]     on nose    Myocardial infarction [facility]) 2008    Wears glasses     Wears partial dentures     upper       Past Surgical History:   Procedure Laterality Date    APPENDECTOMY      CARPAL TUNNEL RELEASE Right     COLONOSCOPY N/A 8/8/2019    COLONOSCOPY POLYPECTOMY REMOVAL HOT BIOPSY AND RESOLUTION CLIP performed by Iman Jeffrey MD at [facility] [address]      umbilical   [address]      C5-C6    NOSE SURGERY      cancer removed    OTHER SURGICAL HISTORY      heart surg/growth in heart sac    TONSILLECTOMY      TOTAL HIP ARTHROPLASTY Right 04/09/2019    TOTAL HIP ARTHROPLASTY Right 4/9/2019    HIP TOTAL ARTHROPLASTY - MEDACTA, C-ARM, MEDACTA TABLE, NSA= GENERAL VS SPINAL, FASCIA ILLIACA BLOCK, *STANDARD performed by Olivier Patricio DO at [address] [facility] N/A 5/6/2019    EGD DIAGNOSTIC ONLY performed by Iman Jeffrey MD at [facility]       No family history on file.     CareTeam (Including outside providers/suppliers regularly involved in providing care):   Patient Care Team:  Khai No PA-C as PCP - General  Khai No PA-C as PCP - [facility] Empaneled Provider  Leonarda Price MD as Consulting Physician (Cardiology)  Dwain Sierra MD as Consulting Physician (Allergy & Immunology)    Wt Readings from Last 3 Encounters:   10/27/20 192 lb (87.1 kg)   06/15/20 192 lb 0.3 oz (87.1 kg)   06/03/20 192 lb (87.1 kg)     Vitals:    10/27/20 1446   BP: 134/82   Site: Left Upper Arm   Position: Sitting   Cuff Size: Medium Adult   Pulse: 57   Resp: 16   Temp: 97.2 °F (36.2 °C)   TempSrc: Temporal   SpO2: 97%   Weight: 192 lb (87.1 kg)   Height: 5' 5\" (1.651 m)     Body mass index is 31.95 kg/m². Based upon direct observation of the patient, evaluation of cognition reveals recent and remote memory intact. General Appearance: alert and oriented to person, place and time, well-developed and well-nourished, in no acute distress  Pulmonary/Chest: clear to auscultation bilaterally- no wheezes, rales or rhonchi, normal air movement, no respiratory distress  Cardiovascular: normal rate, regular rhythm, normal S1 and S2 and no murmurs  Extremities: no edema  Psych: pleasant, talkative    Patient's complete Health Risk Assessment and screening values have been reviewed and are found in Flowsheets. The following problems were reviewed today and where indicated follow up appointments were made and/or referrals ordered. Positive Risk Factor Screenings with Interventions:     Health Habits/Nutrition:  Health Habits/Nutrition  Do you exercise for at least 20 minutes 2-3 times per week?: (!) No  Have you lost any weight without trying in the past 3 months?: No  Do you eat fewer than 2 meals per day?: No  Have you seen a dentist within the past year?: Yes  Body mass index: (!) 31.95  Health Habits/Nutrition Interventions:  · Inadequate physical activity:  patient states she does exercis regularly.  she is doing it more at home instead of the [facility] like prior to the pandemic    Personalized Preventive Plan   Current Health Maintenance Status  Immunization History   Administered Date(s) Administered    Influenza Vaccine, unspecified formulation 10/14/2012, 10/04/2015    Influenza Virus Vaccine 11/12/2011, 10/14/2012, 10/01/2019    Influenza, High Dose (Fluzone 65 yrs and older) 10/28/2014, 10/15/2017, 11/21/2018, 10/08/2020    Influenza, High-dose, Candi Menon, 65 yrs +, IM (Fluzone) 10/08/2020    Influenza, Triv, inactivated, subunit, adjuvanted, IM (Fluad 65 yrs and older) 11/21/2018, 09/13/2019    PPD Test 06/11/2018    Pneumococcal Conjugate 13-valent (Sonda Nim) 07/02/2018, 11/21/2018    Pneumococcal Polysaccharide ([id number]) 05/19/2014, 10/08/2020    Zoster Live (Zostavax) 06/30/2013    Zoster Recombinant (Shingrix) 08/13/2019, 10/21/2019        Health Maintenance   Topic Date Due    Annual Wellness Visit (AWV)  05/29/2019    Potassium monitoring  05/06/2020    Creatinine monitoring  05/06/2020    DTaP/Tdap/Td vaccine (1 - Tdap) 10/27/2021 (Originally 1/14/1959)    Flu vaccine  Completed    Shingles Vaccine  Completed    Pneumococcal 65+ years Vaccine  Completed    DEXA (modify frequency per FRAX score)  Addressed    Hepatitis A vaccine  Aged Out    Hepatitis B vaccine  Aged Out    Hib vaccine  Aged Out    Meningococcal (ACWY) vaccine  Aged Out     Recommendations for [facility] Due: see orders and patient instructions/AVS.  . Recommended screening schedule for the next 5-10 years is provided to the patient in written form: see Patient Instructions/AVS.    Franck Szymanski was seen today for medicare awv. Diagnoses and all orders for this visit:    Routine general medical examination at a health care facility    Screening, lipid  -     Comprehensive Metabolic Panel; Future  -     Lipid Panel; Future  -     CBC Auto Differential; Future    Moderate persistent asthma without complication        Recommend healthy diet-low carb/calorie diet, healthy whole foods. Regular exercise encouraged. Recommendation for 150min of exercise a week. Can be divided however convenient. Recommend healthy sleep habit. Try and go to bed at the same time and wake up at the same time for the most restfull pattern.    Also recommend regular healthy fluid intake. Water is the best at hydrating us. Encourage minimal caffeine and pop/soda use  She did have her flu shot this year and states she updated her pneumovac this year. Update labs, order given  Patient following with pulmonary for her asthma.  States had LYNETTE evaluation recently as well  Patient agreed with treatment plan

## 2020-11-06 ENCOUNTER — HOSPITAL ENCOUNTER (EMERGENCY)
Age: 80
Discharge: HOME | End: 2020-11-06
Attending: EMERGENCY MEDICINE
Payer: MEDICARE

## 2020-11-06 VITALS
DIASTOLIC BLOOD PRESSURE: 63 MMHG | RESPIRATION RATE: 18 BRPM | TEMPERATURE: 98.2 F | BODY MASS INDEX: 31.65 KG/M2 | SYSTOLIC BLOOD PRESSURE: 187 MMHG | HEIGHT: 65 IN | HEART RATE: 67 BPM | WEIGHT: 190 LBS | OXYGEN SATURATION: 97 %

## 2020-11-06 PROCEDURE — 12001 RPR S/N/AX/GEN/TRNK 2.5CM/<: CPT

## 2020-11-06 PROCEDURE — 99283 EMERGENCY DEPT VISIT LOW MDM: CPT

## 2020-11-06 PROCEDURE — 6370000000 HC RX 637 (ALT 250 FOR IP): Performed by: EMERGENCY MEDICINE

## 2020-11-06 PROCEDURE — 2500000003 HC RX 250 WO HCPCS: Performed by: EMERGENCY MEDICINE

## 2020-11-06 RX ORDER — LIDOCAINE HYDROCHLORIDE AND EPINEPHRINE 10; 10 MG/ML; UG/ML
20 INJECTION, SOLUTION INFILTRATION; PERINEURAL ONCE
Status: COMPLETED | OUTPATIENT
Start: 2020-11-06 | End: 2020-11-06

## 2020-11-06 RX ADMIN — Medication 1 EACH: at 20:37

## 2020-11-06 RX ADMIN — LIDOCAINE HYDROCHLORIDE AND EPINEPHRINE 20 ML: 10; 10 INJECTION, SOLUTION INFILTRATION; PERINEURAL at 20:38

## 2020-11-06 RX ADMIN — Medication 1 EACH: at 18:59

## 2020-11-06 ASSESSMENT — ENCOUNTER SYMPTOMS
EYES NEGATIVE: 1
SINUS PAIN: 0
COLOR CHANGE: 0
APNEA: 0
VOMITING: 0
SHORTNESS OF BREATH: 0
DIARRHEA: 0
CHEST TIGHTNESS: 0
ABDOMINAL DISTENTION: 0
CONSTIPATION: 0

## 2020-11-06 ASSESSMENT — PAIN SCALES - GENERAL: PAINLEVEL_OUTOF10: 10

## 2020-11-06 NOTE — ED PROVIDER NOTES
EMERGENCY DEPARTMENT ENCOUNTER    Pt Name: Evelyn Barreto  MRN: [id number]  [place] 1940  Date of evaluation: 11/6/20  CHIEF COMPLAINT       Chief Complaint   Patient presents with    Hand Laceration     PT HAS A LACERATION TO THE GAMEZ SIDE OF HER LEFT HAND     HISTORY OF PRESENT ILLNESS   79-year-old female presents to the emergency room with cut to the palmar aspect of her left hand. Patient states the incident occurred around 2 PM.  She tried using compression and wrapping the hand to help with bleeding however she continued to bleed. She does not have any significant pain. she was using a kitchen knife and put a small cut into her hand accidentally. She states that she is on aspirin and Coumadin. REVIEW OF SYSTEMS     Review of Systems   Constitutional: Negative for activity change, chills and diaphoresis. HENT: Negative for congestion, sinus pain and tinnitus. Eyes: Negative. Respiratory: Negative for apnea, chest tightness and shortness of breath. Gastrointestinal: Negative for abdominal distention, constipation, diarrhea and vomiting. Genitourinary: Negative for difficulty urinating and frequency. Musculoskeletal: Negative for arthralgias and myalgias. Skin: Negative for color change and rash. Neurological: Negative for dizziness. Hematological: Negative. Psychiatric/Behavioral: Negative.         PASTMEDICAL HISTORY     Past Medical History:   Diagnosis Date    Allergic rhinitis     Arthritis     Asthma     Atrial fibrillation ([address]     Hypertension     Melanoma ([address]     on nose    Myocardial infarction [facility]) 2008    Wears glasses     Wears partial dentures     upper     Past Problem List  Patient Active Problem List   Diagnosis Code    Hypertension I10    Allergic rhinitis J30.9    Atrial fibrillation (HCC) I48.91    Malignant melanoma ([address] C43.9    History of MI (myocardial infarction) I25.2    Wears glasses Z97.3    Sinus bradycardia R00.1    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    GI bleed K92.2    Serologic abnormality R89.4    Chronic cough R05    Chronic fatigue syndrome R53.82    Chronic sinusitis J32.9    Gastroesophageal reflux disease K21.9    Moderate persistent asthma without complication W45.18    Degenerative joint disease involving multiple joints on both sides of body M15.9    Primary localized osteoarthritis of pelvic region and thigh M16.10    Primary osteoarthritis of both knees M17.0    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     SURGICAL HISTORY       Past Surgical History:   Procedure Laterality Date    APPENDECTOMY      CARPAL TUNNEL RELEASE Right     COLONOSCOPY N/A 8/8/2019    COLONOSCOPY POLYPECTOMY REMOVAL HOT BIOPSY AND RESOLUTION CLIP performed by Mary Mujica MD at [place] [address]      umbilical   [address] [facility]      C5-C6    NOSE SURGERY      cancer removed    OTHER SURGICAL HISTORY      heart surg/growth in heart sac    TONSILLECTOMY      TOTAL HIP ARTHROPLASTY Right 04/09/2019    TOTAL HIP ARTHROPLASTY Right 4/9/2019    HIP TOTAL ARTHROPLASTY - MEDACTA, C-ARM, MEDACTA TABLE, NSA= GENERAL VS SPINAL, FASCIA ILLIACA BLOCK, *STANDARD performed by Shelley Kruse DO at [facility] N/A 5/6/2019    EGD DIAGNOSTIC ONLY performed by Mary Mujica MD at [place] [address]       Discharge Medication List as of 11/6/2020  8:30 PM      CONTINUE these medications which have NOT CHANGED    Details   fluticasone-umeclidin-vilant (TRELEGY ELLIPTA) 100-62.5-25 MCG/INH AEPB Inhale 1 puff into the lungs dailyHistorical Med      pantoprazole (PROTONIX) 40 MG tablet Take 1 tablet by mouth daily, Disp-90 tablet, R-3Normal      Flaxseed, Linseed, (FLAX SEED OIL) 1000 MG CAPS Take 1,000 mg by mouth dailyHistorical Med      ferrous sulfate 325 (65 Fe) MG tablet Take 1 tablet by mouth 2 times daily (before meals), Disp-60 tablet, R-6Normal      montelukast (SINGULAIR) 10 MG tablet Take 10 mg by mouth nightlyHistorical Med      CycloSPORINE (RESTASIS OP) Apply 1 drop to eye 2 times dailyHistorical Med      ranitidine (ZANTAC) 300 MG tablet nightly Historical Med      triamterene-hydrochlorothiazide (MAXZIDE-25) 37.5-25 MG per tablet Take 1 tablet by mouth daily      diltiazem (CARDIZEM CD) 240 MG ER capsule Take 240 mg by mouth daily. aspirin 81 MG tablet Take 81 mg by mouth daily Will stop 7 days before surgeryHistorical Med      azelastine (ASTELIN) 137 MCG/SPRAY nasal spray 1 spray by Nasal route 2 times daily. Use in each nostril as directed      Calcium-Vitamin D (CALTRATE 600 PLUS-VIT D PO) Take  by mouth. nitroGLYCERIN (NITRODUR) 0.1 MG/HR Place 1 patch onto the skin daily. warfarin (COUMADIN) 5 MG tablet Take 7.5 mg by mouth Will stop 4 day before surgeryHistorical Med           ALLERGIES     has No Known Allergies. FAMILY HISTORY     She indicated that her mother is . She indicated that her father is . SOCIAL HISTORY       Social History     Tobacco Use    Smoking status: Former Smoker     Packs/day: 1.00     Years: 20.00     Pack years: 20.00     Types: Cigarettes     Last attempt to quit: 1981     Years since quittin.8    Smokeless tobacco: Never Used   Substance Use Topics    Alcohol use: No    Drug use: No     PHYSICAL EXAM     INITIAL VITALS: BP (!) 187/63   Pulse 67   Temp 98.2 °F (36.8 °C) (Oral)   Resp 18   Ht 5' 5\" (1.651 m)   Wt 190 lb (86.2 kg)   SpO2 97%   BMI 31.62 kg/m²    Physical Exam  Constitutional:       General: She is not in acute distress. Appearance: She is well-developed. HENT:      Head: Normocephalic. Eyes:      Pupils: Pupils are equal, round, and reactive to light. Cardiovascular:      Rate and Rhythm: Normal rate and regular rhythm. Heart sounds: Normal heart sounds.    Pulmonary:      Effort: Pulmonary effort is normal. No respiratory distress. Breath sounds: Normal breath sounds. Musculoskeletal: Normal range of motion. Skin:     General: Skin is warm and dry. Comments: Less than 2 mm laceration to the hand at the hypothenar eminence    Neurological:      Mental Status: She is alert and oriented to person, place, and time. MEDICAL DECISION MAKING:     After 2 applications of Surgifoam and compression bleeding is controlled and minimal.  Did use adhesive to the area to help continue to control the bleeding. Patient's hand was wrapped in a compressive dressing. Plan is to return if bleeding continues at home. CRITICAL CARE:       PROCEDURES:    Procedures    DIAGNOSTIC RESULTS   EKG:All EKG's are interpreted by the Emergency Department Physician who either signs or Co-signs this chart in the absence of a cardiologist.        RADIOLOGY:All plain film, CT, MRI, and formal ultrasound images (except ED bedside ultrasound) are read by the radiologist, see reports below, unless otherwisenoted in MDM or here. No orders to display     LABS: All lab results were reviewed by myself, and all abnormals are listed below. Labs Reviewed - No data to display    EMERGENCY DEPARTMENTCOURSE:         Vitals:    Vitals:    11/06/20 1826 11/06/20 1827   BP:  (!) 187/63   Pulse: 67    Resp: 18    Temp: 98.2 °F (36.8 °C)    TempSrc: Oral    SpO2: 97%    Weight: 190 lb (86.2 kg)    Height: 5' 5\" (1.651 m)        The patient was given the following medications while in the emergency department:  Orders Placed This Encounter   Medications    gelatin adsorbable (GELFOAM) sponge 1 each    lidocaine-EPINEPHrine 1 percent-[id number] injection 20 mL    gelatin adsorbable (GELFOAM) sponge 1 each     CONSULTS:  None    FINAL IMPRESSION      1.  Laceration of left hand without foreign body, initial encounter          DISPOSITION/PLAN   DISPOSITION Decision To Discharge 11/06/2020 08:29:36 PM      PATIENT REFERRED TO:  No follow-up provider specified.   DISCHARGE MEDICATIONS:  Discharge Medication List as of 11/6/2020  8:30 PM        Camila Romero MD  Attending Emergency Physician                 Caden Mistry MD  11/07/20 2787

## 2020-11-07 NOTE — ED NOTES
Patient is brought in from home and presents with laceration to the left hand from scissors. Patient has not been able to stop the bleeding since 1400. Patient is alert and oriented and denies any other complaints at this time.       Parker Antonio RN  11/06/20 1931

## 2020-11-10 ENCOUNTER — HOSPITAL ENCOUNTER (OUTPATIENT)
Age: 80
Setting detail: SPECIMEN
Discharge: HOME | End: 2020-11-10
Payer: MEDICARE

## 2020-11-10 LAB
ABSOLUTE EOS #: 0.22 K/UL (ref 0–0.44)
ABSOLUTE IMMATURE GRANULOCYTE: <0.03 K/UL (ref 0–0.3)
ABSOLUTE LYMPH #: 1.38 K/UL (ref 1.1–3.7)
ABSOLUTE MONO #: 0.61 K/UL (ref 0.1–1.2)
ALBUMIN SERPL-MCNC: 3.9 G/DL (ref 3.5–5.2)
ALBUMIN/GLOBULIN RATIO: 1.3 (ref 1–2.5)
ALP BLD-CCNC: 72 U/L (ref 35–104)
ALT SERPL-CCNC: 10 U/L (ref 5–33)
ANION GAP SERPL CALCULATED.3IONS-SCNC: 14 MMOL/L (ref 9–17)
AST SERPL-CCNC: 18 U/L
BASOPHILS # BLD: 2 % (ref 0–2)
BASOPHILS ABSOLUTE: 0.11 K/UL (ref 0–0.2)
BILIRUB SERPL-MCNC: 0.52 MG/DL (ref 0.3–1.2)
BUN BLDV-MCNC: 24 MG/DL (ref 8–23)
BUN/CREAT BLD: ABNORMAL (ref 9–20)
CALCIUM SERPL-MCNC: 9.4 MG/DL (ref 8.6–10.4)
CHLORIDE BLD-SCNC: 105 MMOL/L (ref 98–107)
CHOLESTEROL/HDL RATIO: 3.6
CHOLESTEROL: 200 MG/DL
CO2: 26 MMOL/L (ref 20–31)
CREAT SERPL-MCNC: 1.02 MG/DL (ref 0.5–0.9)
DIFFERENTIAL TYPE: NORMAL
EOSINOPHILS RELATIVE PERCENT: 4 % (ref 1–4)
GFR AFRICAN AMERICAN: >60 ML/MIN
GFR NON-AFRICAN AMERICAN: 52 ML/MIN
GFR SERPL CREATININE-BSD FRML MDRD: ABNORMAL ML/MIN/{1.73_M2}
GFR SERPL CREATININE-BSD FRML MDRD: ABNORMAL ML/MIN/{1.73_M2}
GLUCOSE BLD-MCNC: 86 MG/DL (ref 70–99)
HCT VFR BLD CALC: 40.6 % (ref 36.3–47.1)
HDLC SERPL-MCNC: 55 MG/DL
HEMOGLOBIN: 12.8 G/DL (ref 11.9–15.1)
IMMATURE GRANULOCYTES: 0 %
LDL CHOLESTEROL: 121 MG/DL (ref 0–130)
LYMPHOCYTES # BLD: 25 % (ref 24–43)
MCH RBC QN AUTO: 30.5 PG (ref 25.2–33.5)
MCHC RBC AUTO-ENTMCNC: 31.5 G/DL (ref 28.4–34.8)
MCV RBC AUTO: 96.7 FL (ref 82.6–102.9)
MONOCYTES # BLD: 11 % (ref 3–12)
NRBC AUTOMATED: 0 PER 100 WBC
PDW BLD-RTO: 14 % (ref 11.8–14.4)
PLATELET # BLD: 245 K/UL (ref 138–453)
PLATELET ESTIMATE: NORMAL
PMV BLD AUTO: 11.2 FL (ref 8.1–13.5)
POC INR: 1.5
POTASSIUM SERPL-SCNC: 4 MMOL/L (ref 3.7–5.3)
PROTHROMBIN TIME, POC: 17.5 SEC (ref 9.6–14.4)
RBC # BLD: 4.2 M/UL (ref 3.95–5.11)
RBC # BLD: NORMAL 10*6/UL
SEG NEUTROPHILS: 58 % (ref 36–65)
SEGMENTED NEUTROPHILS ABSOLUTE COUNT: 3.22 K/UL (ref 1.5–8.1)
SODIUM BLD-SCNC: 145 MMOL/L (ref 135–144)
TOTAL PROTEIN: 6.9 G/DL (ref 6.4–8.3)
TRIGL SERPL-MCNC: 118 MG/DL
VLDLC SERPL CALC-MCNC: ABNORMAL MG/DL (ref 1–30)
WBC # BLD: 5.6 K/UL (ref 3.5–11.3)
WBC # BLD: NORMAL 10*3/UL

## 2020-12-02 ENCOUNTER — HOSPITAL ENCOUNTER (OUTPATIENT)
Age: 80
Setting detail: SPECIMEN
Discharge: HOME | End: 2020-12-02
Payer: MEDICARE

## 2020-12-02 LAB
POC INR: 2.1
PROTHROMBIN TIME, POC: 25 SEC (ref 9.6–14.4)

## 2021-01-04 ENCOUNTER — HOSPITAL ENCOUNTER (OUTPATIENT)
Age: 81
Setting detail: SPECIMEN
Discharge: HOME | End: 2021-01-04
Payer: MEDICARE

## 2021-01-04 LAB
POC INR: 2.1
PROTHROMBIN TIME, POC: 25.5 SEC (ref 9.6–14.4)

## 2021-02-03 ENCOUNTER — IMMUNIZATION (OUTPATIENT)
Dept: FAMILY MEDICINE CLINIC | Age: 81
End: 2021-02-03
Payer: MEDICARE

## 2021-02-03 PROCEDURE — 91301 COVID-19, MODERNA VACCINE 100MCG/0.5ML DOSE: CPT | Performed by: INTERNAL MEDICINE

## 2021-02-03 PROCEDURE — 0011A COVID-19, MODERNA VACCINE 100MCG/0.5ML DOSE: CPT | Performed by: INTERNAL MEDICINE

## 2021-02-08 ENCOUNTER — HOSPITAL ENCOUNTER (OUTPATIENT)
Age: 81
Setting detail: SPECIMEN
Discharge: HOME | End: 2021-02-08
Payer: MEDICARE

## 2021-02-08 LAB
POC INR: 2.1
PROTHROMBIN TIME, POC: 25.3 SEC (ref 9.6–14.4)

## 2021-02-09 ENCOUNTER — TELEPHONE (OUTPATIENT)
Dept: ORTHOPEDIC SURGERY | Age: 81
End: 2021-02-09

## 2021-02-09 NOTE — TELEPHONE ENCOUNTER
Patient had RT RENETTA 04/09/2019. Patient is having dental work done on feb 11, 2021. Dentist requesting abx sent into pt [facility] pharmacy in [address] [place] [address] prior to her visit.   Please advise

## 2021-02-10 DIAGNOSIS — Z96.641 STATUS POST RIGHT HIP REPLACEMENT: Primary | ICD-10-CM

## 2021-02-10 DIAGNOSIS — M16.11 PRIMARY OSTEOARTHRITIS OF RIGHT HIP: ICD-10-CM

## 2021-02-10 RX ORDER — AMOXICILLIN 500 MG/1
CAPSULE ORAL
Qty: 4 CAPSULE | Refills: 0 | Status: SHIPPED | OUTPATIENT
Start: 2021-02-10 | End: 2021-03-02

## 2021-02-10 NOTE — TELEPHONE ENCOUNTER
Patient had a right total hip arthroplasty completed on 4/9/19. Patient is needing an antibiotic for dental procedure.

## 2021-03-02 ENCOUNTER — OFFICE VISIT (OUTPATIENT)
Dept: FAMILY MEDICINE CLINIC | Age: 81
End: 2021-03-02
Payer: MEDICARE

## 2021-03-02 VITALS
BODY MASS INDEX: 29.99 KG/M2 | WEIGHT: 180 LBS | DIASTOLIC BLOOD PRESSURE: 88 MMHG | SYSTOLIC BLOOD PRESSURE: 134 MMHG | HEIGHT: 65 IN | HEART RATE: 58 BPM | OXYGEN SATURATION: 97 %

## 2021-03-02 DIAGNOSIS — M54.6 CHRONIC MIDLINE THORACIC BACK PAIN: Primary | ICD-10-CM

## 2021-03-02 DIAGNOSIS — G89.29 CHRONIC MIDLINE THORACIC BACK PAIN: Primary | ICD-10-CM

## 2021-03-02 PROBLEM — G47.30 SLEEP APNEA: Status: ACTIVE | Noted: 2021-03-02

## 2021-03-02 PROBLEM — G47.10 HYPERSOMNIA: Status: ACTIVE | Noted: 2021-03-02

## 2021-03-02 PROBLEM — F17.200 SMOKING: Status: ACTIVE | Noted: 2021-03-02

## 2021-03-02 PROCEDURE — 1123F ACP DISCUSS/DSCN MKR DOCD: CPT | Performed by: PHYSICIAN ASSISTANT

## 2021-03-02 PROCEDURE — G8482 FLU IMMUNIZE ORDER/ADMIN: HCPCS | Performed by: PHYSICIAN ASSISTANT

## 2021-03-02 PROCEDURE — 99213 OFFICE O/P EST LOW 20 MIN: CPT | Performed by: PHYSICIAN ASSISTANT

## 2021-03-02 PROCEDURE — G8399 PT W/DXA RESULTS DOCUMENT: HCPCS | Performed by: PHYSICIAN ASSISTANT

## 2021-03-02 PROCEDURE — 4040F PNEUMOC VAC/ADMIN/RCVD: CPT | Performed by: PHYSICIAN ASSISTANT

## 2021-03-02 PROCEDURE — G8427 DOCREV CUR MEDS BY ELIG CLIN: HCPCS | Performed by: PHYSICIAN ASSISTANT

## 2021-03-02 PROCEDURE — G8417 CALC BMI ABV UP PARAM F/U: HCPCS | Performed by: PHYSICIAN ASSISTANT

## 2021-03-02 PROCEDURE — 1036F TOBACCO NON-USER: CPT | Performed by: PHYSICIAN ASSISTANT

## 2021-03-02 PROCEDURE — 1090F PRES/ABSN URINE INCON ASSESS: CPT | Performed by: PHYSICIAN ASSISTANT

## 2021-03-02 ASSESSMENT — ENCOUNTER SYMPTOMS
BOWEL INCONTINENCE: 0
COLOR CHANGE: 0
BACK PAIN: 1

## 2021-03-02 ASSESSMENT — PATIENT HEALTH QUESTIONNAIRE - PHQ9
1. LITTLE INTEREST OR PLEASURE IN DOING THINGS: 0
SUM OF ALL RESPONSES TO PHQ QUESTIONS 1-9: 0
SUM OF ALL RESPONSES TO PHQ9 QUESTIONS 1 & 2: 0

## 2021-03-02 NOTE — PROGRESS NOTES
[facility]  [address] [address]  Dept: [phone]  Dept Fax: [phone]    Edil Rodriguez is a 79 y.o. female who presents today for her medical conditions/complaintsas noted below. Edil Rodriguez is c/o of   Chief Complaint   Patient presents with    Back Pain     in bra strap area, pain occurs even when bra is off- concerned of arthritis          HPI:     Back Pain  This is a chronic problem. The current episode started more than 1 year ago. The problem occurs daily. The problem has been gradually worsening since onset. The pain is present in the thoracic spine. The quality of the pain is described as aching and cramping. The pain does not radiate. The pain is moderate. The symptoms are aggravated by position (worse when standing, better when sitting). Pertinent negatives include no bladder incontinence, bowel incontinence, chest pain, fever, leg pain, numbness, tingling or weakness. Treatments tried: tylenol. The treatment provided mild relief. patient states she discussed this with her cardiologist Dr. Segundo Mayer and states heart is good. Concerned is OA. She is working on weight loss.  Down 10 pounds\  No falling    No results found for: LABA1C          ( goal A1Cis < 7)   No results found for: LABMICR  LDL Cholesterol (mg/dL)   Date Value   11/10/2020 121   07/03/2018 107   01/11/2016 120       (goal LDL is <100)   AST (U/L)   Date Value   11/10/2020 18     ALT (U/L)   Date Value   11/10/2020 10     BUN (mg/dL)   Date Value   11/10/2020 24 (H)     BP Readings from Last 3 Encounters:   03/02/21 134/88   11/06/20 (!) 187/63   10/27/20 134/82          (goal 120/80)    Past Medical History:   Diagnosis Date    Allergic rhinitis     Arthritis     Asthma     Atrial fibrillation (HCC)     Hypertension     Melanoma ([address]     on nose    [facility]ocardial infarction [facility]) 2008    Wears glasses     Wears partial dentures     upper Past Surgical History:   Procedure Laterality Date    APPENDECTOMY      CARPAL TUNNEL RELEASE Right     COLONOSCOPY N/A 2019    COLONOSCOPY POLYPECTOMY REMOVAL HOT BIOPSY AND RESOLUTION CLIP performed by Zaira Conti MD at [address] [place] [address] ([facility])      umbilical   [address] [facility]      C5-C6    NOSE SURGERY      cancer removed    OTHER SURGICAL HISTORY      heart surg/growth in heart sac    TONSILLECTOMY      TOTAL HIP ARTHROPLASTY Right 2019    TOTAL HIP ARTHROPLASTY Right 2019    HIP TOTAL ARTHROPLASTY - MEDACTA, C-ARM, MEDACTA TABLE, NSA= GENERAL VS SPINAL, FASCIA ILLIACA BLOCK, *STANDARD performed by Roxi Pollock DO at [address] 2019    EGD DIAGNOSTIC ONLY performed by Zaira Conti MD at [facility]       History reviewed. No pertinent family history. Social History     Tobacco Use    Smoking status: Former Smoker     Packs/day: 1.00     Years: 20.00     Pack years: 20.00     Types: Cigarettes     Quit date: 1981     Years since quittin.1    Smokeless tobacco: Never Used   Substance Use Topics    Alcohol use: No      Current Outpatient Medications   Medication Sig Dispense Refill    fluticasone-umeclidin-vilant (TRELEGY ELLIPTA) 100-62.5-25 MCG/INH AEPB Inhale 1 puff into the lungs daily      pantoprazole (PROTONIX) 40 MG tablet Take 1 tablet by mouth daily 90 tablet 3    Flaxseed, Linseed, (FLAX SEED OIL) 1000 MG CAPS Take 1,000 mg by mouth daily      ferrous sulfate 325 (65 Fe) MG tablet Take 1 tablet by mouth 2 times daily (before meals) 60 tablet 6    montelukast (SINGULAIR) 10 MG tablet Take 10 mg by mouth nightly      CycloSPORINE (RESTASIS OP) Apply 1 drop to eye 2 times daily      triamterene-hydrochlorothiazide (MAXZIDE-25) 37.5-25 MG per tablet Take 1 tablet by mouth daily      diltiazem (CARDIZEM CD) 240 MG ER capsule Take 240 mg by mouth daily.  aspirin 81 MG tablet Take 81 mg by mouth daily Will stop 7 days before surgery      azelastine (ASTELIN) 137 MCG/SPRAY nasal spray 1 spray by Nasal route 2 times daily. Use in each nostril as directed      Calcium-Vitamin D (CALTRATE 600 PLUS-VIT D PO) Take  by mouth.  nitroGLYCERIN (NITRODUR) 0.1 MG/HR Place 1 patch onto the skin daily.  warfarin (COUMADIN) 5 MG tablet Take 7.5 mg by mouth Will stop 4 day before surgery       No current facility-administered medications for this visit. No Known Allergies    Health Maintenance   Topic Date Due    COVID-19 Vaccine (2 of 2 - Moderna series) 03/03/2021    DTaP/Tdap/Td vaccine (1 - Tdap) 10/27/2021 (Originally 1/14/1959)    Annual Wellness Visit (AWV)  10/28/2021    Potassium monitoring  11/10/2021    Creatinine monitoring  11/10/2021    DEXA (modify frequency per FRAX score)  Completed    Flu vaccine  Completed    Shingles Vaccine  Completed    Pneumococcal 65+ years Vaccine  Completed    Hepatitis A vaccine  Aged Out    Hepatitis B vaccine  Aged Out    Hib vaccine  Aged Out    Meningococcal (ACWY) vaccine  Aged Out       Subjective:     Review of Systems   Constitutional: Negative for activity change, appetite change, fatigue, fever and unexpected weight change. Cardiovascular: Negative for chest pain. Gastrointestinal: Negative for bowel incontinence. Genitourinary: Negative for bladder incontinence. Musculoskeletal: Positive for back pain. Negative for arthralgias and gait problem. Skin: Negative for color change, pallor, rash and wound. Neurological: Negative for tingling, weakness and numbness. Hematological: Negative for adenopathy. Psychiatric/Behavioral: Negative for sleep disturbance. The patient is not nervous/anxious. Objective:     Physical Exam  Vitals signs and nursing note reviewed. Constitutional:       General: She is not in acute distress. Appearance: Normal appearance.  She is signed by Sixto Ortiz PA-C on 3/2/2021 at 8:23 AM

## 2021-03-02 NOTE — PROGRESS NOTES
Visit Information    Have you changed or started any medications since your last visit including any over-the-counter medicines, vitamins, or herbal medicines? no   Are you having any side effects from any of your medications? -  no  Have you stopped taking any of your medications? Is so, why? -  no    Have you seen any other physician or provider since your last visit? No  Have you had any other diagnostic tests since your last visit? No  Have you been seen in the emergency room and/or had an admission to a hospital since we last saw you? No  Have you had your routine dental cleaning in the past 6 months? no    Have you activated your [employer] account? If not, what are your barriers?  No:      Patient Care Team:  Marietta Castano PA-C as PCP - [facility]  Marietta Castano PA-C as PCP - [facility]  Chelsey Arzola MD as Consulting Physician (Cardiology)  Kenyetta Garcia MD as Consulting Physician (Allergy & Immunology)    Medical History Review  Past Medical, Family, and Social History reviewed and does contribute to the patient presenting condition    Health Maintenance   Topic Date Due    COVID-19 Vaccine (2 of 2 - Rockney Sinning series) 03/03/2021    DTaP/Tdap/Td vaccine (1 - Tdap) 10/27/2021 (Originally 1/14/1959)    Annual Wellness Visit (AWV)  10/28/2021    Potassium monitoring  11/10/2021    Creatinine monitoring  11/10/2021    DEXA (modify frequency per FRAX score)  Completed    Flu vaccine  Completed    Shingles Vaccine  Completed    Pneumococcal 65+ years Vaccine  Completed    Hepatitis A vaccine  Aged Out    Hepatitis B vaccine  Aged Out    Hib vaccine  Aged Out    Meningococcal (ACWY) vaccine  Aged Out

## 2021-03-03 ENCOUNTER — IMMUNIZATION (OUTPATIENT)
Dept: FAMILY MEDICINE CLINIC | Age: 81
End: 2021-03-03
Payer: MEDICARE

## 2021-03-03 PROCEDURE — 0012A COVID-19, MODERNA VACCINE 100MCG/0.5ML DOSE: CPT | Performed by: INTERNAL MEDICINE

## 2021-03-03 PROCEDURE — 91301 COVID-19, MODERNA VACCINE 100MCG/0.5ML DOSE: CPT | Performed by: INTERNAL MEDICINE

## 2021-03-11 ENCOUNTER — HOSPITAL ENCOUNTER (OUTPATIENT)
Age: 81
Setting detail: SPECIMEN
Discharge: HOME | End: 2021-03-11
Payer: MEDICARE

## 2021-03-11 LAB
POC INR: 1.5
PROTHROMBIN TIME, POC: 18.1 SEC (ref 9.6–14.4)

## 2021-04-08 ENCOUNTER — HOSPITAL ENCOUNTER (OUTPATIENT)
Age: 81
Setting detail: SPECIMEN
Discharge: HOME | End: 2021-04-08
Payer: MEDICARE

## 2021-04-08 LAB
POC INR: 1.9
PROTHROMBIN TIME, POC: 22.9 SEC (ref 9.6–14.4)

## 2021-04-20 ENCOUNTER — OFFICE VISIT (OUTPATIENT)
Dept: FAMILY MEDICINE CLINIC | Age: 81
End: 2021-04-20
Payer: MEDICARE

## 2021-04-20 VITALS
DIASTOLIC BLOOD PRESSURE: 86 MMHG | BODY MASS INDEX: 28.89 KG/M2 | OXYGEN SATURATION: 97 % | TEMPERATURE: 96.9 F | WEIGHT: 173.4 LBS | SYSTOLIC BLOOD PRESSURE: 124 MMHG | HEIGHT: 65 IN | HEART RATE: 131 BPM

## 2021-04-20 DIAGNOSIS — D58.2 OTHER HEMOGLOBINOPATHIES (HCC): ICD-10-CM

## 2021-04-20 DIAGNOSIS — R00.0 TACHYCARDIA: Primary | ICD-10-CM

## 2021-04-20 DIAGNOSIS — C43.9 MALIGNANT MELANOMA, UNSPECIFIED SITE (HCC): ICD-10-CM

## 2021-04-20 DIAGNOSIS — I48.0 PAROXYSMAL ATRIAL FIBRILLATION (HCC): ICD-10-CM

## 2021-04-20 PROCEDURE — 1123F ACP DISCUSS/DSCN MKR DOCD: CPT | Performed by: PHYSICIAN ASSISTANT

## 2021-04-20 PROCEDURE — 1090F PRES/ABSN URINE INCON ASSESS: CPT | Performed by: PHYSICIAN ASSISTANT

## 2021-04-20 PROCEDURE — G8417 CALC BMI ABV UP PARAM F/U: HCPCS | Performed by: PHYSICIAN ASSISTANT

## 2021-04-20 PROCEDURE — 93000 ELECTROCARDIOGRAM COMPLETE: CPT | Performed by: PHYSICIAN ASSISTANT

## 2021-04-20 PROCEDURE — 4040F PNEUMOC VAC/ADMIN/RCVD: CPT | Performed by: PHYSICIAN ASSISTANT

## 2021-04-20 PROCEDURE — 99214 OFFICE O/P EST MOD 30 MIN: CPT | Performed by: PHYSICIAN ASSISTANT

## 2021-04-20 PROCEDURE — 1036F TOBACCO NON-USER: CPT | Performed by: PHYSICIAN ASSISTANT

## 2021-04-20 PROCEDURE — G8427 DOCREV CUR MEDS BY ELIG CLIN: HCPCS | Performed by: PHYSICIAN ASSISTANT

## 2021-04-20 PROCEDURE — G8399 PT W/DXA RESULTS DOCUMENT: HCPCS | Performed by: PHYSICIAN ASSISTANT

## 2021-04-20 ASSESSMENT — ENCOUNTER SYMPTOMS
ABDOMINAL PAIN: 0
COLOR CHANGE: 0
SHORTNESS OF BREATH: 1
WHEEZING: 0
COUGH: 0

## 2021-04-20 NOTE — PROGRESS NOTES
Visit Information    Have you changed or started any medications since your last visit including any over-the-counter medicines, vitamins, or herbal medicines? no   Are you having any side effects from any of your medications? -  no  Have you stopped taking any of your medications? Is so, why? -  no    Have you seen any other physician or provider since your last visit? No  Have you had any other diagnostic tests since your last visit? No  Have you been seen in the emergency room and/or had an admission to a hospital since we last saw you? No  Have you had your routine dental cleaning in the past 6 months? NO    Have you activated your [employer] account? If not, what are your barriers?  Yes     Patient Care Team:  Betsy Key PA-C as PCP - [facility]  Betsy Key PA-C as PCP - [facility]  Sandi Buenrostro MD as Consulting Physician (Cardiology)  Jonelle Shea MD as Consulting Physician (Allergy & Immunology)    Medical History Review  Past Medical, Family, and Social History reviewed and  contribute to the patient presenting condition    Health Maintenance   Topic Date Due    DTaP/Tdap/Td vaccine (1 - Tdap) 10/27/2021 (Originally 1/14/1959)    Annual Wellness Visit (AWV)  10/28/2021    Potassium monitoring  11/10/2021    Creatinine monitoring  11/10/2021    DEXA (modify frequency per FRAX score)  Completed    Flu vaccine  Completed    Shingles Vaccine  Completed    Pneumococcal 65+ years Vaccine  Completed    COVID-19 Vaccine  Completed    Hepatitis A vaccine  Aged Out    Hepatitis B vaccine  Aged Out    Hib vaccine  Aged Out    Meningococcal (ACWY) vaccine  Aged Out

## 2021-04-20 NOTE — PROGRESS NOTES
[address] [facility]  [address] [address]  Dept: [phone]  Dept Fax: [phone]    Loly Lopez is a 79 y.o. female who presents today for her medical conditions/complaintsas noted below. Loly Lopez is c/o of   Chief Complaint   Patient presents with    Shortness of Breath     since yesterday, rapid heart rate    Leg Pain     left shin pain, throbbing  x 1 day         HPI:     HPI    Patient here reporting she was cleaning the house yesterday and noticed SOB. She sat down a little and rested and felt better. She went to help her daughter with a few things and noticed some back pain and SOB returning. She took her mask off and rested again and felt better. This morning she noticed SOB again. She had felt her pulse running high off and on since yesterday. She thought it was anxiety initially. She follows with Dr Sher Martínez every 6 months for her cardiologist.   They manage her coumadin. She states gets that tested monthly. Last test was 2 weeks ago. She also ha been having right anterior shin pain. She notices a twitch in that area. No visible change, no swelling. She has been more active in the yard recently.     No results found for: LABA1C          ( goal A1Cis < 7)   No results found for: LABMICR  LDL Cholesterol (mg/dL)   Date Value   11/10/2020 121   07/03/2018 107   01/11/2016 120       (goal LDL is <100)   AST (U/L)   Date Value   11/10/2020 18     ALT (U/L)   Date Value   11/10/2020 10     BUN (mg/dL)   Date Value   11/10/2020 24 (H)     BP Readings from Last 3 Encounters:   04/20/21 124/86   03/02/21 134/88   11/06/20 (!) 187/63          (goal 120/80)    Past Medical History:   Diagnosis Date    Allergic rhinitis     Arthritis     Asthma     Atrial fibrillation ([facility]     Hypertension     Melanoma ([facility]     on nose    Myocardial infarction [facility]) 2008    Wears glasses     Wears partial dentures     upper Past Surgical History:   Procedure Laterality Date    APPENDECTOMY      CARPAL TUNNEL RELEASE Right     COLONOSCOPY N/A 2019    COLONOSCOPY POLYPECTOMY REMOVAL HOT BIOPSY AND RESOLUTION CLIP performed by eLilani Ochoa MD at [facility]      umbilical   [facility]      C5-C6    NOSE SURGERY      cancer removed    OTHER SURGICAL HISTORY      heart surg/growth in heart sac    TONSILLECTOMY      TOTAL HIP ARTHROPLASTY Right 2019    TOTAL HIP ARTHROPLASTY Right 2019    HIP TOTAL ARTHROPLASTY - MEDACTA, C-ARM, MEDACTA TABLE, NSA= GENERAL VS SPINAL, FASCIA ILLIACA BLOCK, *STANDARD performed by Amara Griffin DO at [facility] 2019    EGD DIAGNOSTIC ONLY performed by Leilani Ochoa MD at [facility]       History reviewed. No pertinent family history. Social History     Tobacco Use    Smoking status: Former Smoker     Packs/day: 1.00     Years: 20.00     Pack years: 20.00     Types: Cigarettes     Quit date: 1981     Years since quittin.3    Smokeless tobacco: Never Used   Substance Use Topics    Alcohol use: No      Current Outpatient Medications   Medication Sig Dispense Refill    fluticasone-umeclidin-vilant (TRELEGY ELLIPTA) 100-62.5-25 MCG/INH AEPB Inhale 1 puff into the lungs daily      pantoprazole (PROTONIX) 40 MG tablet Take 1 tablet by mouth daily 90 tablet 3    Flaxseed, Linseed, (FLAX SEED OIL) 1000 MG CAPS Take 1,000 mg by mouth daily      ferrous sulfate 325 (65 Fe) MG tablet Take 1 tablet by mouth 2 times daily (before meals) 60 tablet 6    montelukast (SINGULAIR) 10 MG tablet Take 10 mg by mouth nightly      CycloSPORINE (RESTASIS OP) Apply 1 drop to eye 2 times daily      triamterene-hydrochlorothiazide (MAXZIDE-25) 37.5-25 MG per tablet Take 1 tablet by mouth daily      diltiazem (CARDIZEM CD) 240 MG ER capsule Take 240 mg by mouth daily.       reviewed. Constitutional:       General: She is not in acute distress. Appearance: Normal appearance. She is well-developed. She is not ill-appearing. HENT:      Head: Normocephalic and atraumatic. Cardiovascular:      Rate and Rhythm: Regular rhythm. Tachycardia present. Pulmonary:      Effort: Pulmonary effort is normal. No respiratory distress. Breath sounds: Normal breath sounds. No wheezing, rhonchi or rales. Musculoskeletal:      Right lower leg: No edema. Left lower leg: No edema. Skin:     General: Skin is warm and dry. Coloration: Skin is not pale. Findings: No erythema or rash. Neurological:      Mental Status: She is alert and oriented to person, place, and time. Motor: No weakness. Gait: Gait normal.   Psychiatric:         Mood and Affect: Mood is anxious. Speech: Speech normal.         Behavior: Behavior normal.         Thought Content: Thought content normal.         Cognition and Memory: Cognition normal.         Judgment: Judgment normal.       /86 (Site: Left Upper Arm, Position: Supine, Cuff Size: Medium Adult)   Pulse 131   Temp 96.9 °F (36.1 °C) (Tympanic)   Ht 5' 5\" (1.651 m)   Wt 173 lb 6.4 oz (78.7 kg)   SpO2 97%   BMI 28.86 kg/m²     Assessment:       Diagnosis Orders   1. Tachycardia  EKG 12 Lead   2. Paroxysmal atrial fibrillation (HCC)     3. 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 Malignant melanoma, unspecified site [facility])               Plan:      Return if symptoms worsen or fail to improve, for await cardiology input. Patient with tachycardio on exam and EKG showing a flutter/a fib presently (reviewed with Dr. Sudarshan Smalls at time of visit)  Patient is already anticoagulated and states has been compliant with medications  No CP present  She and I discussed ER vs seeing her cardiologist. She prefers to make any needed adjustments out patient if possible.  We called over to her cardiologist, Dr. Nima Ross and he was able to see her today at 1:30pm. She is comfortable with that. Advised to use ER if any worsening symptoms or chest pain develops  Patient agrees with treatment plan    She has continued to see her dermatologist as needed with history of melanoma. No current continues, s/p treatment    Orders Placed This Encounter   Procedures    EKG 12 Lead     Order Specific Question:   Reason for Exam?     Answer:   Chest pain           Patient given educational materials - see patient instructions. Discussed use, benefit, and side effects of prescribed medications. All patientquestions answered. Pt voiced understanding. Reviewed health maintenance. Instructedto continue current medications, diet and exercise. Patient agreed with treatmentplan. Follow up as directed.      Electronically signed by Renee Nichole PA-C on 4/20/2021 at 1:12 PM

## 2021-04-26 ENCOUNTER — HOSPITAL ENCOUNTER (OUTPATIENT)
Age: 81
Setting detail: SPECIMEN
Discharge: HOME | End: 2021-04-26
Payer: MEDICARE

## 2021-04-26 LAB
ANION GAP SERPL CALCULATED.3IONS-SCNC: 11 MMOL/L (ref 9–17)
CHLORIDE BLD-SCNC: 102 MMOL/L (ref 98–107)
CO2: 27 MMOL/L (ref 20–31)
POTASSIUM SERPL-SCNC: 4.5 MMOL/L (ref 3.7–5.3)
SODIUM BLD-SCNC: 140 MMOL/L (ref 135–144)

## 2021-05-06 ENCOUNTER — HOSPITAL ENCOUNTER (OUTPATIENT)
Age: 81
Setting detail: SPECIMEN
Discharge: HOME | End: 2021-05-06
Payer: MEDICARE

## 2021-05-06 LAB
POC INR: 2.3
PROTHROMBIN TIME, POC: 27.6 SEC (ref 9.6–14.4)

## 2021-06-07 ENCOUNTER — HOSPITAL ENCOUNTER (OUTPATIENT)
Age: 81
Setting detail: SPECIMEN
Discharge: HOME | End: 2021-06-07
Payer: MEDICARE

## 2021-06-07 LAB
POC INR: 2.7
PROTHROMBIN TIME, POC: 32.1 SEC (ref 9.6–14.4)

## 2021-07-06 ENCOUNTER — HOSPITAL ENCOUNTER (OUTPATIENT)
Age: 81
Setting detail: SPECIMEN
Discharge: HOME | End: 2021-07-06
Payer: MEDICARE

## 2021-07-06 LAB
POC INR: 3.6
PROTHROMBIN TIME, POC: 43.4 SEC (ref 9.6–14.4)

## 2021-08-02 ENCOUNTER — HOSPITAL ENCOUNTER (OUTPATIENT)
Age: 81
Setting detail: SPECIMEN
Discharge: HOME | End: 2021-08-02
Payer: MEDICARE

## 2021-08-02 LAB
POC INR: 2.9
PROTHROMBIN TIME, POC: 34.8 SEC (ref 9.6–14.4)

## 2021-08-31 ENCOUNTER — HOSPITAL ENCOUNTER (OUTPATIENT)
Age: 81
Setting detail: SPECIMEN
Discharge: HOME | End: 2021-08-31
Payer: MEDICARE

## 2021-08-31 LAB
POC INR: 5.8
PROTHROMBIN TIME, POC: 69.5 SEC (ref 9.6–14.4)

## 2021-09-01 ENCOUNTER — OFFICE VISIT (OUTPATIENT)
Dept: PRIMARY CARE CLINIC | Age: 81
End: 2021-09-01
Payer: MEDICARE

## 2021-09-01 VITALS
WEIGHT: 174 LBS | DIASTOLIC BLOOD PRESSURE: 72 MMHG | BODY MASS INDEX: 27.97 KG/M2 | SYSTOLIC BLOOD PRESSURE: 134 MMHG | TEMPERATURE: 98.4 F | HEART RATE: 57 BPM | HEIGHT: 66 IN | OXYGEN SATURATION: 95 %

## 2021-09-01 DIAGNOSIS — M25.571 ACUTE RIGHT ANKLE PAIN: Primary | ICD-10-CM

## 2021-09-01 PROCEDURE — 1123F ACP DISCUSS/DSCN MKR DOCD: CPT

## 2021-09-01 PROCEDURE — 1090F PRES/ABSN URINE INCON ASSESS: CPT

## 2021-09-01 PROCEDURE — 4040F PNEUMOC VAC/ADMIN/RCVD: CPT

## 2021-09-01 PROCEDURE — 1036F TOBACCO NON-USER: CPT

## 2021-09-01 PROCEDURE — G8399 PT W/DXA RESULTS DOCUMENT: HCPCS

## 2021-09-01 PROCEDURE — G8427 DOCREV CUR MEDS BY ELIG CLIN: HCPCS

## 2021-09-01 PROCEDURE — G8417 CALC BMI ABV UP PARAM F/U: HCPCS

## 2021-09-01 PROCEDURE — 99214 OFFICE O/P EST MOD 30 MIN: CPT

## 2021-09-01 NOTE — PATIENT INSTRUCTIONS
Patient Education        Foot Pain: Care Instructions  Your Care Instructions     Foot injuries that cause pain and swelling are fairly common. Almost all sports or home repair projects can cause a misstep that ends up as foot pain. Normal wear and tear, especially as you get older, also can cause foot pain. Most minor foot injuries will heal on their own, and home treatment is usually all you need to do. If you have a severe injury, you may need tests and treatment. Follow-up care is a key part of your treatment and safety. Be sure to make and go to all appointments, and call your doctor if you are having problems. It's also a good idea to know your test results and keep a list of the medicines you take. How can you care for yourself at home? · Take pain medicines exactly as directed. ? If the doctor gave you a prescription medicine for pain, take it as prescribed. ? If you are not taking a prescription pain medicine, ask your doctor if you can take an over-the-counter medicine. · Rest and protect your foot. Take a break from any activity that may cause pain. · Put ice or a cold pack on your foot for 10 to 20 minutes at a time. Put a thin cloth between the ice and your skin. · Prop up the sore foot on a pillow when you ice it or anytime you sit or lie down during the next 3 days. Try to keep it above the level of your heart. This will help reduce swelling. · Your doctor may recommend that you wrap your foot with an elastic bandage. Keep your foot wrapped for as long as your doctor advises. · If your doctor recommends crutches, use them as directed. · Wear roomy footwear. · As soon as pain and swelling end, begin gentle exercises of your foot. Your doctor can tell you which exercises will help. When should you call for help? Call 911 anytime you think you may need emergency care. For example, call if:    · Your foot turns pale, white, blue, or cold.    Call your doctor now or seek immediate medical care if:    · You cannot move or stand on your foot.     · Your foot looks twisted or out of its normal position.     · Your foot is not stable when you step down.     · You have signs of infection, such as:  ? Increased pain, swelling, warmth, or redness. ? Red streaks leading from the sore area. ? Pus draining from a place on your foot. ? A fever.     · Your foot is numb or tingly. Watch closely for changes in your health, and be sure to contact your doctor if:    · You do not get better as expected.     · You have bruises from an injury that last longer than 2 weeks. Where can you learn more? Go to [email or web address].[email or web address] org and sign in to your [employer] account. Enter X218 in the Traction box to learn more about \"Foot Pain: Care Instructions. \"     If you do not have an account, please click on the \"Sign Up Now\" link. Current as of: November 16, 2020               Content Version: 12.9  © 2006-2021 Healthwise, Inc[employer]. Care instructions adapted under license by [facility] ([facility]). If you have questions about a medical condition or this instruction, always ask your healthcare professional. [place] [address] any warranty or liability for your use of this information.

## 2021-09-01 NOTE — PROGRESS NOTES
[facility] [place] [facility] [id number] [address] [facility]  [address] [address]  Dept: [phone]    Colette Valladares is a 79 y.o. female Established patient, who presents to the walk-in clinic today with conditions/complaints as noted below:    Chief Complaint   Patient presents with    Ankle Pain     pt states she woke up and was having swelling and pain in her right ankle          HPI:     Ankle Pain   The incident occurred 1 to 3 hours ago. The incident occurred at home. Injury mechanism: Patient reports there was no injury or trauma. The pain is present in the right ankle. Quality: tender with palpation. The pain is mild. The pain has been intermittent since onset. Associated symptoms include an inability to bear weight. Pertinent negatives include no loss of motion, loss of sensation, muscle weakness, numbness or tingling. She reports no foreign bodies present. The symptoms are aggravated by weight bearing and palpation. She has tried nothing for the symptoms.        Past Medical History:   Diagnosis Date    Allergic rhinitis     Arthritis     Asthma     Atrial fibrillation ([facility]     GERD (gastroesophageal reflux disease)     Hypertension     Melanoma ([facility]     on nose    Myocardial infarction [facility]) 2008    Wears glasses     Wears partial dentures     upper       Current Outpatient Medications   Medication Sig Dispense Refill    pantoprazole (PROTONIX) 40 MG tablet Take 40 mg by mouth daily      calcium carbonate-vitamin D 600-200 MG-UNIT TABS Take 1 tablet by mouth daily      Multiple Vitamins-Minerals (CENTRUM SILVER) TABS Take 1 tablet by mouth daily      famotidine (PEPCID) 40 MG tablet Take 40 mg by mouth daily      fluticasone (FLONASE) 50 MCG/ACT nasal spray 1 spray by Each Nostril route daily      amiodarone (CORDARONE) 200 MG tablet Take 200 mg by mouth daily      fluticasone-umeclidin-vilant (TRELEGY ELLIPTA) 100-62.5-25 MCG/INH AEPB Inhale 1 puff into the lungs daily      Flaxseed, Linseed, (FLAX SEED OIL) 1000 MG CAPS Take 1,000 mg by mouth daily      ferrous sulfate 325 (65 Fe) MG tablet Take 1 tablet by mouth 2 times daily (before meals) 60 tablet 6    montelukast (SINGULAIR) 10 MG tablet Take 10 mg by mouth nightly      CycloSPORINE (RESTASIS OP) Apply 1 drop to eye 2 times daily      triamterene-hydrochlorothiazide (MAXZIDE-25) 37.5-25 MG per tablet Take 1 tablet by mouth daily      diltiazem (CARDIZEM CD) 240 MG ER capsule Take 240 mg by mouth daily.  aspirin 81 MG tablet Take 81 mg by mouth daily Will stop 7 days before surgery      azelastine (ASTELIN) 137 MCG/SPRAY nasal spray 1 spray by Nasal route 2 times daily. Use in each nostril as directed      Calcium-Vitamin D (CALTRATE 600 PLUS-VIT D PO) Take  by mouth.  nitroGLYCERIN (NITRODUR) 0.1 MG/HR Place 1 patch onto the skin daily.  warfarin (COUMADIN) 5 MG tablet Take 7.5 mg by mouth Will stop 4 day before surgery       No current facility-administered medications for this visit. No Known Allergies    Review of Systems:     Review of Systems   Constitutional: Negative for chills, fatigue and fever. HENT: Negative. Eyes: Negative. Respiratory: Negative. Negative for cough, chest tightness, shortness of breath and wheezing. Cardiovascular: Negative. Negative for chest pain, palpitations and leg swelling. Musculoskeletal: Positive for joint swelling. Lateral malleolus     Skin: Negative for rash. Neurological: Negative. Negative for dizziness, tingling, tremors, seizures, facial asymmetry, speech difficulty, weakness, light-headedness, numbness and headaches.        Physical Exam:      /72 (Site: Left Upper Arm, Position: Sitting, Cuff Size: Large Adult)   Pulse 57   Temp 98.4 °F (36.9 °C) (Tympanic)   Ht 5' 5.5\" (1.664 m)   Wt 174 lb (78.9 kg)   SpO2 95%   Breastfeeding No   BMI 28.51 kg/m²     Physical Exam  Vitals reviewed. Constitutional:       Appearance: Normal appearance. She is normal weight. HENT:      Head: Normocephalic. Right Ear: Tympanic membrane normal.      Left Ear: Tympanic membrane normal.      Nose: Nose normal.      Mouth/Throat:      Mouth: Mucous membranes are moist.      Pharynx: Oropharynx is clear. Eyes:      Conjunctiva/sclera: Conjunctivae normal.      Pupils: Pupils are equal, round, and reactive to light. Cardiovascular:      Rate and Rhythm: Normal rate and regular rhythm. Pulses: Normal pulses. Heart sounds: Normal heart sounds. Pulmonary:      Effort: Pulmonary effort is normal.      Breath sounds: Normal breath sounds and air entry. Skin:     General: Skin is warm and dry. Capillary Refill: Capillary refill takes less than 2 seconds. Coloration: Skin is not jaundiced or pale. Findings: No bruising, erythema, lesion or rash. Neurological:      General: No focal deficit present. Mental Status: She is alert and oriented to person, place, and time. Mental status is at baseline. Cranial Nerves: Cranial nerves are intact. No cranial nerve deficit. Sensory: Sensation is intact. No sensory deficit. Motor: Motor function is intact. No weakness. Coordination: Coordination is intact. Coordination normal.      Gait: Gait normal.      Deep Tendon Reflexes: Reflexes normal.         Plan:          1.  Acute right ankle pain  -     XR ANKLE RIGHT (MIN 3 VIEWS)     EXAMINATION:   THREE XRAY VIEWS OF THE RIGHT ANKLE       9/1/2021 3:13 pm       COMPARISON:   25 December 2015       HISTORY:   ORDERING SYSTEM PROVIDED HISTORY: Acute right ankle pain   TECHNOLOGIST PROVIDED HISTORY:   lateral and medial malleolus tenderness   Acuity: Acute   Type of Exam: Initial       FINDINGS:   Mild arthritic changes are present without acute fracture or dislocation is   noted.  Diffuse mild lateral soft tissue swelling is present.  More   significant ventral soft tissue swelling is noted.  Talar dome and talar   walls are intact.  Ankle mortise is uniform.           Impression   Mild arthritic changes.  Soft tissue swelling most significant ventral aspect   of the ankle.  No acute osseous abnormality. Patient's declines her ankle to be ACE bandaged at this time. She states she will do it at home. Follow Up Instructions:      Return if symptoms worsen or fail to improve. No orders of the defined types were placed in this encounter. Ice, compression, and elevation recommended at this time. I also recommend working on some home stretches, which were provided on the AVS.  Motrin as needed for the inflammation/pain (if allowed by Coumadin Doctor)  Patient agreeable to treatment plan. Follow up with PCP if symptoms do not improve. Patient and/or parent given educational materials - see patient instructions. Discussed use, benefit, and side effects of prescribed medications. All patient questions answered. Patient and/or parent voiced understanding.       Electronically signed by DUDLEY Merino 9/2/2021 at 11:18 AM

## 2021-09-02 ASSESSMENT — ENCOUNTER SYMPTOMS
CHEST TIGHTNESS: 0
EYES NEGATIVE: 1
WHEEZING: 0
COUGH: 0
RESPIRATORY NEGATIVE: 1
SHORTNESS OF BREATH: 0

## 2021-09-10 ENCOUNTER — HOSPITAL ENCOUNTER (OUTPATIENT)
Age: 81
Setting detail: SPECIMEN
Discharge: HOME | End: 2021-09-10
Payer: MEDICARE

## 2021-09-10 LAB
POC INR: 2.9
PROTHROMBIN TIME, POC: 34.6 SEC (ref 9.6–14.4)

## 2021-09-29 ENCOUNTER — HOSPITAL ENCOUNTER (OUTPATIENT)
Dept: MAMMOGRAPHY | Age: 81
Discharge: HOME | End: 2021-10-01
Payer: MEDICARE

## 2021-09-29 DIAGNOSIS — Z12.31 ENCOUNTER FOR SCREENING MAMMOGRAM FOR BREAST CANCER: ICD-10-CM

## 2021-09-29 PROCEDURE — 77063 BREAST TOMOSYNTHESIS BI: CPT

## 2021-10-12 ENCOUNTER — HOSPITAL ENCOUNTER (OUTPATIENT)
Age: 81
Setting detail: SPECIMEN
Discharge: HOME | End: 2021-10-12
Payer: MEDICARE

## 2021-10-12 LAB
POC INR: 2.5
PROTHROMBIN TIME, POC: 29.9 SEC (ref 9.6–14.4)

## 2021-11-15 ENCOUNTER — HOSPITAL ENCOUNTER (OUTPATIENT)
Age: 81
Setting detail: SPECIMEN
Discharge: HOME | End: 2021-11-15

## 2021-11-15 LAB
POC INR: 3.3
PROTHROMBIN TIME, POC: 39 SEC (ref 9.6–14.4)

## 2021-12-06 ENCOUNTER — OFFICE VISIT (OUTPATIENT)
Dept: FAMILY MEDICINE CLINIC | Age: 81
End: 2021-12-06
Payer: MEDICARE

## 2021-12-06 ENCOUNTER — HOSPITAL ENCOUNTER (OUTPATIENT)
Dept: VASCULAR LAB | Age: 81
Discharge: HOME | End: 2021-12-06
Payer: MEDICARE

## 2021-12-06 VITALS
DIASTOLIC BLOOD PRESSURE: 78 MMHG | OXYGEN SATURATION: 98 % | HEART RATE: 66 BPM | WEIGHT: 188 LBS | TEMPERATURE: 97 F | HEIGHT: 66 IN | SYSTOLIC BLOOD PRESSURE: 128 MMHG | BODY MASS INDEX: 30.22 KG/M2

## 2021-12-06 DIAGNOSIS — M79.662 PAIN OF LEFT CALF: Primary | ICD-10-CM

## 2021-12-06 DIAGNOSIS — M79.662 PAIN OF LEFT CALF: ICD-10-CM

## 2021-12-06 DIAGNOSIS — Z00.00 MEDICARE ANNUAL WELLNESS VISIT, SUBSEQUENT: Primary | ICD-10-CM

## 2021-12-06 DIAGNOSIS — Z13.29 SCREENING FOR HYPOTHYROIDISM: ICD-10-CM

## 2021-12-06 DIAGNOSIS — Z13.1 SCREENING FOR DIABETES MELLITUS: ICD-10-CM

## 2021-12-06 DIAGNOSIS — Z13.220 SCREENING FOR HYPERLIPIDEMIA: ICD-10-CM

## 2021-12-06 DIAGNOSIS — Z00.00 ROUTINE GENERAL MEDICAL EXAMINATION AT A HEALTH CARE FACILITY: ICD-10-CM

## 2021-12-06 PROCEDURE — 93971 EXTREMITY STUDY: CPT

## 2021-12-06 PROCEDURE — 99213 OFFICE O/P EST LOW 20 MIN: CPT | Performed by: NURSE PRACTITIONER

## 2021-12-06 PROCEDURE — 4040F PNEUMOC VAC/ADMIN/RCVD: CPT | Performed by: NURSE PRACTITIONER

## 2021-12-06 PROCEDURE — G8482 FLU IMMUNIZE ORDER/ADMIN: HCPCS | Performed by: NURSE PRACTITIONER

## 2021-12-06 PROCEDURE — G0439 PPPS, SUBSEQ VISIT: HCPCS | Performed by: NURSE PRACTITIONER

## 2021-12-06 PROCEDURE — 1123F ACP DISCUSS/DSCN MKR DOCD: CPT | Performed by: NURSE PRACTITIONER

## 2021-12-06 ASSESSMENT — PATIENT HEALTH QUESTIONNAIRE - PHQ9
SUM OF ALL RESPONSES TO PHQ QUESTIONS 1-9: 0
2. FEELING DOWN, DEPRESSED OR HOPELESS: 0
SUM OF ALL RESPONSES TO PHQ9 QUESTIONS 1 & 2: 0
SUM OF ALL RESPONSES TO PHQ QUESTIONS 1-9: 0
SUM OF ALL RESPONSES TO PHQ QUESTIONS 1-9: 0
1. LITTLE INTEREST OR PLEASURE IN DOING THINGS: 0

## 2021-12-06 ASSESSMENT — ENCOUNTER SYMPTOMS
SHORTNESS OF BREATH: 0
NAUSEA: 0
ABDOMINAL PAIN: 0
COUGH: 0
VOMITING: 0
CHEST TIGHTNESS: 0

## 2021-12-06 ASSESSMENT — LIFESTYLE VARIABLES: HOW OFTEN DO YOU HAVE A DRINK CONTAINING ALCOHOL: 0

## 2021-12-06 NOTE — PROGRESS NOTES
Medicare Annual Wellness Visit  Name: Pedro Gonzalez Date: 2021   MRN: [id number] Sex: Female   Age: 79 y.o. Ethnicity: Non- / Non    : 1940 Race: White (non-)      Danitza Monreal is here for Medicare AWV and Leg Pain (behind left knee  started   worried about blood clot, no swelling )    Screenings for behavioral, psychosocial and functional/safety risks, and cognitive dysfunction are all negative except as indicated below. These results, as well as other patient data from the [address] form, are documented in Flowsheets linked to this Encounter. No Known Allergies      Prior to Visit Medications    Medication Sig Taking?  Authorizing Provider   pantoprazole (PROTONIX) 40 MG tablet Take 40 mg by mouth daily Yes Historical Provider, MD   calcium carbonate-vitamin D 600-200 MG-UNIT TABS Take 1 tablet by mouth daily Yes Historical Provider, MD   Multiple Vitamins-Minerals (CENTRUM SILVER) TABS Take 1 tablet by mouth daily Yes Historical Provider, MD   famotidine (PEPCID) 40 MG tablet Take 40 mg by mouth daily Yes Historical Provider, MD   fluticasone (FLONASE) 50 MCG/ACT nasal spray 1 spray by Each Nostril route daily Yes Historical Provider, MD   amiodarone (CORDARONE) 200 MG tablet Take 200 mg by mouth daily Yes Historical Provider, MD   fluticasone-umeclidin-vilant (TRELEGY ELLIPTA) 100-62.5-25 MCG/INH AEPB Inhale 1 puff into the lungs daily Yes Historical Provider, MD   Flaxseed, Linseed, (FLAX SEED OIL) 1000 MG CAPS Take 1,000 mg by mouth daily Yes Historical Provider, MD   ferrous sulfate 325 (65 Fe) MG tablet Take 1 tablet by mouth 2 times daily (before meals) Yes Katie Foster MD   montelukast (SINGULAIR) 10 MG tablet Take 10 mg by mouth nightly Yes Historical Provider, MD   CycloSPORINE (RESTASIS OP) Apply 1 drop to eye 2 times daily Yes Historical Provider, MD   triamterene-hydrochlorothiazide (MAXZIDE-25) 37.5-25 MG per tablet Take 1 tablet by mouth daily Yes Historical Provider, MD   diltiazem (CARDIZEM CD) 240 MG ER capsule Take 240 mg by mouth daily. Yes Alease Rosario, APRN - CNP   aspirin 81 MG tablet Take 81 mg by mouth daily Will stop 7 days before surgery Yes Historical Provider, MD   azelastine (ASTELIN) 137 MCG/SPRAY nasal spray 1 spray by Nasal route 2 times daily. Use in each nostril as directed Yes Historical Provider, MD   Calcium-Vitamin D (CALTRATE 600 PLUS-VIT D PO) Take  by mouth. Yes Historical Provider, MD   nitroGLYCERIN (NITRODUR) 0.1 MG/HR Place 1 patch onto the skin daily.  Yes Historical Provider, MD   warfarin (COUMADIN) 5 MG tablet Take 7.5 mg by mouth Will stop 4 day before surgery Yes Historical Provider, MD         Past Medical History:   Diagnosis Date    Allergic rhinitis     Arthritis     Asthma     [facility]rial fibrillation ([facility] [address]     GERD (gastroesophageal reflux disease)     Hypertension     Melanoma ([facility] [address]     on nose    Myocardial infarction [facility]) 2008    Wears glasses     Wears partial dentures     upper       Past Surgical History:   Procedure Laterality Date    APPENDECTOMY      BREAST SURGERY      CARDIAC SURGERY      CARPAL TUNNEL RELEASE Right     COLONOSCOPY N/A 8/8/2019    COLONOSCOPY POLYPECTOMY REMOVAL HOT BIOPSY AND RESOLUTION CLIP performed by Elsie Snellen, MD at [address]      umbilical    HYSTERECTOMY      JOINT REPLACEMENT      NECK SURGERY      C5-C6    NOSE SURGERY      cancer removed    OTHER SURGICAL HISTORY      heart surg/growth in heart sac    TONSILLECTOMY      TOTAL HIP ARTHROPLASTY Right 04/09/2019    TOTAL HIP ARTHROPLASTY Right 4/9/2019    HIP TOTAL ARTHROPLASTY - MEDACTA, C-ARM, MEDACTA TABLE, NSA= GENERAL VS SPINAL, FASCIA ILLIACA BLOCK, *STANDARD performed by Anjelica Clement DO at [facility] 5/6/2019    EGD DIAGNOSTIC ONLY performed by Elsie Snellen, MD at [facility]         Family History Problem Relation Age of Onset    COPD Mother    [facility] Asthma Mother     Cancer Mother     Cancer Father     COPD Sister     Emphysema Sister        CareTeam (Including outside providers/suppliers regularly involved in providing care):   Patient Care Team:  Teodoro Mueller PA-C as PCP - General  Sujey Kaur PA-C as PCP - [facility] Empaneled Provider  Wilner Davis MD as Consulting Physician (Cardiology)  Brittany Edwards MD as Consulting Physician (Allergy & Immunology)    Wt Readings from Last 3 Encounters:   12/06/21 188 lb (85.3 kg)   09/01/21 174 lb (78.9 kg)   04/28/21 174 lb (78.9 kg)     Vitals:    12/06/21 0919   BP: 128/78   Site: Right Upper Arm   Position: Sitting   Cuff Size: Large Adult   Pulse: 66   Temp: 97 °F (36.1 °C)   TempSrc: Tympanic   SpO2: 98%   Weight: 188 lb (85.3 kg)   Height: 5' 5.5\" (1.664 m)     Body mass index is 30.81 kg/m². Based upon direct observation of the patient, evaluation of cognition reveals recent and remote memory intact. Patient's complete Health Risk Assessment and screening values have been reviewed and are found in Flowsheets. The following problems were reviewed today and where indicated follow up appointments were made and/or referrals ordered. Positive Risk Factor Screenings with Interventions:            General Health and ACP:  General  In general, how would you say your health is?: Good  In the past 7 days, have you experienced any of the following?  New or Increased Pain, New or Increased Fatigue, Loneliness, Social Isolation, Stress or Anger?: (!) New or Increased Pain  Do you get the social and emotional support that you need?: Yes  Do you have a Living Will?: Yes  Advance Directives     Power of  Living Will ACP-Advance Directive ACP-Power of     Not on File [place] on 05/13/19 Filed [address] [facility] [place] Risk Interventions:  · Pain issues: see other note  · No Living Will: ACP documents already completed- patient asked to provide copy to the office    Health Habits/Nutrition:  Health Habits/Nutrition  Do you exercise for at least 20 minutes 2-3 times per week?: Yes  Have you lost any weight without trying in the past 3 months?: No  Do you eat only one meal per day?: No  Have you seen the dentist within the past year?: Yes  Body mass index: (!) 30.81  Health Habits/Nutrition Interventions:  · Nutritional issues:  patient is not ready to address his/her nutritional/weight issues at this time       Personalized Preventive Plan   Current Health Maintenance Status  Immunization History   Administered Date(s) Administered    COVID-19, Moderna, Booster, PF, 50mcg/0.25ml 11/22/2021    COVID-19, Moderna, Primary or Immunocompromised, PF, 100mcg/0.5mL 02/03/2021, 03/03/2021    Influenza Vaccine, unspecified formulation 10/14/2012, 10/04/2015    Influenza Virus Vaccine 11/12/2011, 10/14/2012, 10/01/2019, 10/01/2020    Influenza, High Dose (Fluzone 65 yrs and older) 10/28/2014, 10/15/2017, 11/21/2018, 10/08/2020, 10/30/2021    Influenza, High-dose, Joliefawn Black, 65 yrs +, IM (Fluzone) 10/08/2020    Influenza, Triv, inactivated, subunit, adjuvanted, IM (Fluad 65 yrs and older) 11/21/2018, 09/13/2019    PPD Test 06/11/2018    Pneumococcal Conjugate 13-valent (Dany Pancake) 07/02/2018, 11/21/2018    Pneumococcal Polysaccharide ([id number]) 05/19/2014, 10/08/2020    Zoster Live (Zostavax) 06/30/2013    Zoster Recombinant (Shingrix) 08/13/2019, 10/21/2019        Health Maintenance   Topic Date Due    DTaP/Tdap/Td vaccine (1 - Tdap) Never done    TSH testing  06/03/2021    Annual Wellness Visit (AWV)  10/28/2021    Creatinine monitoring  11/10/2021    Potassium monitoring  04/26/2022    DEXA (modify frequency per FRAX score)  Completed    Flu vaccine  Completed    Shingles Vaccine  Completed    Pneumococcal 65+ years Vaccine  Completed    COVID-19 Vaccine  Completed    Hepatitis A vaccine  Aged Out    Hepatitis B vaccine  Aged C/ Norm [address] 19 Hib vaccine  Aged Out    Meningococcal (ACWY) vaccine  Aged Out     Recommendations for [employer] Due: see orders and patient instructions/AVS.  . Recommended screening schedule for the next 5-10 years is provided to the patient in written form: see Patient Instructions/AVS.    Ricki De Jesus was seen today for medicare awv and leg pain. Diagnoses and all orders for this visit:    Medicare annual wellness visit, subsequent    Pain of left calf  -     US DUP LOWER EXTREMITY LEFT BERT; Future    Screening for hyperlipidemia  -     Lipid Panel; Future    Screening for diabetes mellitus  -     Comprehensive Metabolic Panel; Future    Screening for hypothyroidism  -     TSH With Reflex Ft4; Future    Routine general medical examination at a health care facility           [address] for tdap at pharmacy  Update labs when able  Will call with test results  See other note  Continue with other specialists as planned    [address]  [place], [facility] [address] [place]  [phone]      Jana Nunn is a 79 y.o. female who presents today for her  medicalconditions/complaints as noted below. Jana Nunn is c/o of Medicare AWV and Leg Pain (behind left knee  started Sunday  worried about blood clot, no swelling )  . HPI:    Patient here today for evaluation of new onset left posterior knee and calf pain. States this started yesterday with no change in routine or lifestyle. States she does admit she was sitting at [employer] with a keating against this area for almost 2 hours earlier that day and unsure if related. States she does have arthritis in her knees and it is worse on the left. Denies any swelling or redness to her joint of her left knee. Denies shortness of breath foot pain or calf radiating pain. Been using Motrin with mild relief. She does take Coumadin daily for history of A. fib and has her INR checked regularly.       Past Medical History:   Diagnosis Date    Allergic rhinitis     Arthritis     Asthma     Atrial fibrillation (HCC)     GERD (gastroesophageal reflux disease)     Hypertension     Melanoma ([address]     on nose    Myocardial infarction [facility]) 2008    Wears glasses     Wears partial dentures     upper      Past Surgical History:   Procedure Laterality Date    APPENDECTOMY      BREAST SURGERY      CARDIAC SURGERY      CARPAL TUNNEL RELEASE Right     COLONOSCOPY N/A 2019    COLONOSCOPY POLYPECTOMY REMOVAL HOT BIOPSY AND RESOLUTION CLIP performed by Jack Godinez MD at [address]      umbilical    HYSTERECTOMY      JOINT REPLACEMENT      NECK SURGERY      C5-C6    NOSE SURGERY      cancer removed    OTHER SURGICAL HISTORY      heart surg/growth in heart sac    TONSILLECTOMY      TOTAL HIP ARTHROPLASTY Right 2019    TOTAL HIP ARTHROPLASTY Right 2019    HIP TOTAL ARTHROPLASTY - MEDACTA, C-ARM, MEDACTA TABLE, NSA= GENERAL VS SPINAL, FASCIA ILLIACA BLOCK, *STANDARD performed by Alvin Pritchard DO at [address] N/A 2019    EGD DIAGNOSTIC ONLY performed by Jack Godinez MD at [facility]     Family History   Problem Relation Age of Onset   [address] [facility] COPD Mother     Asthma Mother     Cancer Mother     Cancer Father     COPD Sister     Emphysema Sister      Social History     Tobacco Use    Smoking status: Former Smoker     Packs/day: 1.00     Years: 20.00     Pack years: 20.00     Types: Cigarettes     Quit date: 1981     Years since quittin.9    Smokeless tobacco: Never Used   Substance Use Topics    Alcohol use: No      Current Outpatient Medications   Medication Sig Dispense Refill    pantoprazole (PROTONIX) 40 MG tablet Take 40 mg by mouth daily      calcium carbonate-vitamin D 600-200 MG-UNIT TABS Take 1 tablet by mouth daily      Multiple Vitamins-Minerals (CENTRUM SILVER) TABS Take 1 tablet by mouth daily      famotidine (PEPCID) 40 MG tablet Take 40 mg by mouth daily      fluticasone (FLONASE) 50 MCG/ACT nasal spray 1 spray by Each Nostril route daily      amiodarone (CORDARONE) 200 MG tablet Take 200 mg by mouth daily      fluticasone-umeclidin-vilant (TRELEGY ELLIPTA) 100-62.5-25 MCG/INH AEPB Inhale 1 puff into the lungs daily      Flaxseed, Linseed, (FLAX SEED OIL) 1000 MG CAPS Take 1,000 mg by mouth daily      ferrous sulfate 325 (65 Fe) MG tablet Take 1 tablet by mouth 2 times daily (before meals) 60 tablet 6    montelukast (SINGULAIR) 10 MG tablet Take 10 mg by mouth nightly      CycloSPORINE (RESTASIS OP) Apply 1 drop to eye 2 times daily      triamterene-hydrochlorothiazide (MAXZIDE-25) 37.5-25 MG per tablet Take 1 tablet by mouth daily      diltiazem (CARDIZEM CD) 240 MG ER capsule Take 240 mg by mouth daily.  aspirin 81 MG tablet Take 81 mg by mouth daily Will stop 7 days before surgery      azelastine (ASTELIN) 137 MCG/SPRAY nasal spray 1 spray by Nasal route 2 times daily. Use in each nostril as directed      Calcium-Vitamin D (CALTRATE 600 PLUS-VIT D PO) Take  by mouth.  nitroGLYCERIN (NITRODUR) 0.1 MG/HR Place 1 patch onto the skin daily.  warfarin (COUMADIN) 5 MG tablet Take 7.5 mg by mouth Will stop 4 day before surgery       No current facility-administered medications for this visit.      No Known Allergies    Health Maintenance   Topic Date Due    DTaP/Tdap/Td vaccine (1 - Tdap) Never done    TSH testing  06/03/2021    Annual Wellness Visit (AWV)  10/28/2021    Creatinine monitoring  11/10/2021    Potassium monitoring  04/26/2022    DEXA (modify frequency per FRAX score)  Completed    Flu vaccine  Completed    Shingles Vaccine  Completed    Pneumococcal 65+ years Vaccine  Completed    COVID-19 Vaccine  Completed    Hepatitis A vaccine  Aged Out    Hepatitis B vaccine  Aged Out    Hib vaccine  Aged Out    Meningococcal (ACWY) vaccine  Aged Out       Subjective:      Review of Systems Constitutional: Negative for activity change, appetite change, chills, diaphoresis, fatigue and fever. Eyes: Negative for visual disturbance. Respiratory: Negative for cough, chest tightness and shortness of breath. Cardiovascular: Negative for chest pain, palpitations and leg swelling. Gastrointestinal: Negative for abdominal pain, nausea and vomiting. Genitourinary: Negative for difficulty urinating. Musculoskeletal: Positive for arthralgias and myalgias. Skin: Negative for rash. Neurological: Negative for dizziness, weakness, numbness and headaches. Hematological: Negative for adenopathy. Psychiatric/Behavioral: Negative for sleep disturbance. The patient is not nervous/anxious. Objective:      Physical Exam  Vitals and nursing note reviewed. Constitutional:       Appearance: Normal appearance. She is obese. She is not ill-appearing or diaphoretic. HENT:      Head: Normocephalic and atraumatic. Eyes:      Conjunctiva/sclera: Conjunctivae normal.   Cardiovascular:      Pulses: Normal pulses. Pulmonary:      Effort: Pulmonary effort is normal.   Musculoskeletal:      Cervical back: Neck supple. Comments: Posterior left knee and upper left calf pain with mild warmth but no redness or swelling   Skin:     General: Skin is warm and dry. Comments: Multiple varicosities noted on left lower extremity with no swelling to left lower extremity intact pedal pulses negative Homans' sign   Neurological:      General: No focal deficit present. Mental Status: She is alert and oriented to person, place, and time. Mental status is at baseline. Psychiatric:         Mood and Affect: Mood normal.         Behavior: Behavior normal.         Thought Content: Thought content normal.         Judgment: Judgment normal.         Assessment:       Diagnosis Orders   1. Medicare annual wellness visit, subsequent     2.  Pain of left calf  US DUP LOWER EXTREMITY LEFT BERT   3. Screening for hyperlipidemia  Lipid Panel   4. Screening for diabetes mellitus  Comprehensive Metabolic Panel   5. Screening for hypothyroidism  TSH With Reflex Ft4   6. Routine general medical examination at a health care facility       Lab Results   Component Value Date    INR 3.3 11/15/2021    INR 2.5 10/12/2021    INR 2.9 09/10/2021    PROTIME 39.0 (H) 11/15/2021    PROTIME 29.9 (H) 10/12/2021    PROTIME 34.6 (H) 09/10/2021     Wt Readings from Last 3 Encounters:   12/06/21 188 lb (85.3 kg)   09/01/21 174 lb (78.9 kg)   04/28/21 174 lb (78.9 kg)     BP Readings from Last 3 Encounters:   12/06/21 128/78   09/01/21 134/72   04/28/21 (!) 185/50       Plan:      [facility] Orders Placed This Encounter   Procedures    US DUP LOWER EXTREMITY LEFT BERT     Standing Status:   Future     Standing Expiration Date:   12/6/2022     Order Specific Question:   Reason for exam:     Answer:   eval for clot    Comprehensive Metabolic Panel     Standing Status:   Future     Standing Expiration Date:   12/6/2022    Lipid Panel     Standing Status:   Future     Standing Expiration Date:   12/6/2022     Order Specific Question:   Is Patient Fasting?/# of Hours     Answer:   8 hours    TSH With Reflex Ft4     Standing Status:   Future     Standing Expiration Date:   12/6/2022     Check stat venous doppler although I feel she is low risk being on coumadin  Will call with test results  Ok for tylenol, and if negative on doppler ok for massage, stretching and ice/heat  Call INB or worsening  Continue other same meds      Patient given educational materials - see patient instructions. Discussed use,benefit, and side effects of prescribed medications. All patient questions answered. Pt voiced understanding. Reviewed health maintenance. Instructed to continue currentmedications, diet and exercise.     Electronically signed by DUDLEY West CNP,CNP on 12/6/2021 at 9:52 AM

## 2021-12-06 NOTE — PROGRESS NOTES
Visit Information    Have you changed or started any medications since your last visit including any over-the-counter medicines, vitamins, or herbal medicines? no   Are you having any side effects from any of your medications? -  no  Have you stopped taking any of your medications? Is so, why? -  no    Have you seen any other physician or provider since your last visit? No  Have you had any other diagnostic tests since your last visit? No  Have you been seen in the emergency room and/or had an admission to a hospital since we last saw you? No  Have you had your routine dental cleaning in the past 6 months? no    Have you activated your "[employer] account? If not, what are your barriers?  Yes     Patient Care Team:  Rianna Burton PA-C as PCP - General  Rianna Burton PA-C as PCP - [facility]  John Avila MD as Consulting Physician (Cardiology)  Faizan Leach MD as Consulting Physician (Allergy & Immunology)    Medical History Review  Past Medical, Family, and Social History reviewed and  contribute to the patient presenting condition    Health Maintenance   Topic Date Due    DTaP/Tdap/Td vaccine (1 - Tdap) Never done    TSH testing  06/03/2021    COVID-19 Vaccine (3 - Booster for Johnice Minks series) 09/03/2021    Annual Wellness Visit (AWV)  10/28/2021    Creatinine monitoring  11/10/2021    Potassium monitoring  04/26/2022    DEXA (modify frequency per FRAX score)  Completed    Flu vaccine  Completed    Shingles Vaccine  Completed    Pneumococcal 65+ years Vaccine  Completed    Hepatitis A vaccine  Aged Out    Hepatitis B vaccine  Aged Out    Hib vaccine  Aged Out    Meningococcal (ACWY) vaccine  Aged Out

## 2021-12-09 ENCOUNTER — HOSPITAL ENCOUNTER (OUTPATIENT)
Age: 81
Setting detail: SPECIMEN
Discharge: HOME | End: 2021-12-09

## 2021-12-09 DIAGNOSIS — Z13.220 SCREENING FOR HYPERLIPIDEMIA: ICD-10-CM

## 2021-12-09 DIAGNOSIS — Z13.29 SCREENING FOR HYPOTHYROIDISM: ICD-10-CM

## 2021-12-09 DIAGNOSIS — Z13.1 SCREENING FOR DIABETES MELLITUS: ICD-10-CM

## 2021-12-09 LAB
ALBUMIN SERPL-MCNC: 3.9 G/DL (ref 3.5–5.2)
ALBUMIN/GLOBULIN RATIO: 1.3 (ref 1–2.5)
ALP BLD-CCNC: 81 U/L (ref 35–104)
ALT SERPL-CCNC: 13 U/L (ref 5–33)
ANION GAP SERPL CALCULATED.3IONS-SCNC: 13 MMOL/L (ref 9–17)
AST SERPL-CCNC: 20 U/L
BILIRUB SERPL-MCNC: 0.53 MG/DL (ref 0.3–1.2)
BUN BLDV-MCNC: 24 MG/DL (ref 8–23)
BUN/CREAT BLD: ABNORMAL (ref 9–20)
CALCIUM SERPL-MCNC: 9.2 MG/DL (ref 8.6–10.4)
CHLORIDE BLD-SCNC: 101 MMOL/L (ref 98–107)
CHOLESTEROL/HDL RATIO: 3.4
CHOLESTEROL: 198 MG/DL
CO2: 25 MMOL/L (ref 20–31)
CREAT SERPL-MCNC: 1.36 MG/DL (ref 0.5–0.9)
GFR AFRICAN AMERICAN: 45 ML/MIN
GFR NON-AFRICAN AMERICAN: 37 ML/MIN
GFR SERPL CREATININE-BSD FRML MDRD: ABNORMAL ML/MIN/{1.73_M2}
GFR SERPL CREATININE-BSD FRML MDRD: ABNORMAL ML/MIN/{1.73_M2}
GLUCOSE BLD-MCNC: 71 MG/DL (ref 70–99)
HDLC SERPL-MCNC: 58 MG/DL
LDL CHOLESTEROL: 108 MG/DL (ref 0–130)
POC INR: 3.3
POTASSIUM SERPL-SCNC: 4.2 MMOL/L (ref 3.7–5.3)
PROTHROMBIN TIME, POC: 39.8 SEC (ref 9.6–14.4)
SODIUM BLD-SCNC: 139 MMOL/L (ref 135–144)
TOTAL PROTEIN: 7 G/DL (ref 6.4–8.3)
TRIGL SERPL-MCNC: 158 MG/DL
TSH SERPL DL<=0.05 MIU/L-ACNC: 2.22 MIU/L (ref 0.3–5)
VLDLC SERPL CALC-MCNC: ABNORMAL MG/DL (ref 1–30)

## 2022-01-05 ENCOUNTER — HOSPITAL ENCOUNTER (OUTPATIENT)
Age: 82
Setting detail: SPECIMEN
Discharge: HOME | End: 2022-01-05

## 2022-01-05 LAB
POC INR: 2.5
PROTHROMBIN TIME, POC: 30 SEC (ref 9.6–14.4)

## 2022-02-08 ENCOUNTER — HOSPITAL ENCOUNTER (OUTPATIENT)
Age: 82
Setting detail: SPECIMEN
Discharge: HOME | End: 2022-02-08

## 2022-02-08 LAB
POC INR: 2.2
PROTHROMBIN TIME, POC: 26.8 SEC (ref 9.6–14.4)

## 2022-03-08 ENCOUNTER — HOSPITAL ENCOUNTER (OUTPATIENT)
Age: 82
Setting detail: SPECIMEN
Discharge: HOME | End: 2022-03-08

## 2022-03-08 LAB
POC INR: 2
PROTHROMBIN TIME, POC: 24.1 SEC (ref 9.6–14.4)

## 2022-04-07 ENCOUNTER — HOSPITAL ENCOUNTER (OUTPATIENT)
Age: 82
Setting detail: SPECIMEN
Discharge: HOME | End: 2022-04-07

## 2022-04-07 LAB
POC INR: 1.8
PROTHROMBIN TIME, POC: 22.2 SEC (ref 9.6–14.4)

## 2022-05-02 ENCOUNTER — OFFICE VISIT (OUTPATIENT)
Dept: PRIMARY CARE CLINIC | Age: 82
End: 2022-05-02
Payer: MEDICARE

## 2022-05-02 ENCOUNTER — NURSE TRIAGE (OUTPATIENT)
Dept: OTHER | Facility: CLINIC | Age: 82
End: 2022-05-02

## 2022-05-02 VITALS
SYSTOLIC BLOOD PRESSURE: 211 MMHG | DIASTOLIC BLOOD PRESSURE: 82 MMHG | OXYGEN SATURATION: 97 % | TEMPERATURE: 98 F | HEART RATE: 57 BPM

## 2022-05-02 DIAGNOSIS — R03.0 ELEVATED BLOOD PRESSURE READING: ICD-10-CM

## 2022-05-02 DIAGNOSIS — T30.0 BURN: Primary | ICD-10-CM

## 2022-05-02 PROCEDURE — G8399 PT W/DXA RESULTS DOCUMENT: HCPCS | Performed by: NURSE PRACTITIONER

## 2022-05-02 PROCEDURE — 1123F ACP DISCUSS/DSCN MKR DOCD: CPT | Performed by: NURSE PRACTITIONER

## 2022-05-02 PROCEDURE — G8417 CALC BMI ABV UP PARAM F/U: HCPCS | Performed by: NURSE PRACTITIONER

## 2022-05-02 PROCEDURE — 1090F PRES/ABSN URINE INCON ASSESS: CPT | Performed by: NURSE PRACTITIONER

## 2022-05-02 PROCEDURE — 99213 OFFICE O/P EST LOW 20 MIN: CPT | Performed by: NURSE PRACTITIONER

## 2022-05-02 PROCEDURE — 1036F TOBACCO NON-USER: CPT | Performed by: NURSE PRACTITIONER

## 2022-05-02 PROCEDURE — G8427 DOCREV CUR MEDS BY ELIG CLIN: HCPCS | Performed by: NURSE PRACTITIONER

## 2022-05-02 PROCEDURE — 4040F PNEUMOC VAC/ADMIN/RCVD: CPT | Performed by: NURSE PRACTITIONER

## 2022-05-02 RX ORDER — CYCLOSPORINE 0.5 MG/ML
EMULSION OPHTHALMIC
COMMUNITY
Start: 2022-04-11

## 2022-05-02 ASSESSMENT — ENCOUNTER SYMPTOMS
BURN: 1
SHORTNESS OF BREATH: 0
COUGH: 0
RESPIRATORY NEGATIVE: 1
WHEEZING: 0
CHEST TIGHTNESS: 0

## 2022-05-02 NOTE — TELEPHONE ENCOUNTER
Received call from Dominik Salas  at [facility] [employer]. Subjective: Caller states \" I was setting on the heating pad in my chair and I just had a nightgown on and it does have a auto on off switch. I must have dozed off for a while and I burnt myself. My back is really red, blisters, and they are seeping now. My daughter said it looks bad and I need to be seen so I am calling in. \"     Current Symptoms: burn from heating pad on mid to lower back area all the way across - probably at least 5 inches per patient     Onset: 2 days ago; worsening    Associated Symptoms: NA    Pain Severity: 0/10; N/A; none    Temperature: denies     What has been tried:  Lotion on it Saturday but nothing since      Recommended disposition: Go to ED Now    Care advice provided, patient verbalizes understanding; denies any other questions or concerns; instructed to call back for any new or worsening symptoms. Patient/caller agrees to proceed to [facility]  Emergency Department     Attention Provider: Thank you for allowing me to participate in the care of your patient. The patient was connected to triage in response to information provided to the ECC/PSC. Please do not respond through this encounter as the response is not directed to a shared pool.       Reason for Disposition   [1] Blister (intact or ruptured) AND [2] larger than 2 inches (5 cm)    Protocols used: BURNS - THERMAL-ADULT-

## 2022-05-02 NOTE — PATIENT INSTRUCTIONS
Patient Education        [facility]: Care Instructions  Overview     Burns--even minor ones--can be very painful. A minor burn may heal within several days, while a more serious burn may take weeks or even months to healcompletely. You may notice that the burned area feels tight and hard while it is healing. It is important to continue to move the area as the burn heals to prevent lossof motion or loss of function in the area. When your skin is damaged by a burn, you have a greater risk of infection. Keep the wound clean and change the bandages regularly to prevent infection and helpthe burn heal.  Burns can leave permanent scars. Taking good care of the burn as it heals mayhelp prevent bad scars. The doctor has checked you carefully, but problems can develop later. If you notice any problems or new symptoms, get medical treatment right away. Follow-up care is a key part of your treatment and safety. Be sure to make and go to all appointments, and call your doctor if you are having problems. It's also a good idea to know your test results and keep alist of the medicines you take. How can you care for yourself at home?  If your doctor told you how to care for your burn, follow your doctor's instructions. If you did not get instructions, follow this general advice:  ? Wash the burn every day with a mild soap and water. Don't use hydrogen peroxide or alcohol, which can slow healing. ? Gently pat the burn dry after you wash it. ? Apply a thin layer of antibiotic ointment or petroleum jelly on the burn. ? You may cover the burn with a nonstick bandage. There are many bandage products available. Be sure to read the product label for correct use. ? Replace the bandage as needed.  Protect your burn while it is healing. Cover your burn if you are going out in the cold or the sun. ? Wear long sleeves if the burn is on your hands or arms. ? Wear a hat if the burn is on your face.   ? Wear socks and shoes if the burn is on your feet.  Do not break blisters open. This increases the chance of infection. If a blister breaks open by itself, blot up the liquid, and leave the skin that covered the blister. This helps protect the new skin.  If your doctor prescribed antibiotics, take them as directed. Do not stop taking them just because you feel better. You need to take the full course of antibiotics. For pain and itching   Take pain medicines exactly as directed. ? If the doctor gave you a prescription medicine for pain, take it as prescribed. ? If you are not taking a prescription pain medicine, ask your doctor if you can take an over-the-counter medicine.  If the burn itches, try not to scratch it. Try an over-the-counter antihistamine. Be safe with medicines. Read and follow all instructions on the label. When should you call for help? Call your doctor now or seek immediate medical care if:     Your pain gets worse.      You have symptoms of infection, such as:  ? Increased pain, swelling, warmth, or redness near the burn. ? Red streaks leading from the burn. ? Pus draining from the burn. ? A fever. Watch closely for changes in your health, and be sure to contact your doctor if:     You do not get better as expected. Where can you learn more? Go to https://World of Good.Work 'n Gear. org and sign in to your [employer] account. Enter J131 in the [employer]La box to learn more about \"Burns: Care Instructions. \"     If you do not have an account, please click on the \"Sign Up Now\" link. Current as of: July 1, 2021               Content Version: 13.2  © 2006-2022 Healthwise, Incorporated. Care instructions adapted under license by [facility] [employer] [facility] ([facility]). If you have questions about a medical condition or this instruction, always ask your healthcare professional. [address] any warranty or liability for your use of this information.

## 2022-05-02 NOTE — PROGRESS NOTES
[address] [facility]  [address] [place] [address]  Dept: [phone]  Dept Fax: [phone]     Noemi Levy is a 79 y.o. female who presents to the urgent care today for her medicalconditions/complaints as noted below. Noemi Levy is c/o of Burn (on back from heating pad)    HPI:      Burn  The incident occurred 12 to 24 hours ago. The burns occurred at home. The burns were a result of contact with a hot surface (heating pad). The burns are located on the back. The pain is at a severity of 0/10. The patient is experiencing no pain. She has tried nothing for the symptoms. The treatment provided no relief. Patient states that she was laying on a heating pad with a thin nightgown on. She states that she did not feel any discomfort and kept turning the heating pad back on after its auto turn off. States that daughter noticed the burn on her back. Advised her to be seen. Past Medical History:   Diagnosis Date    Allergic rhinitis     Arthritis     Asthma     [facility] ([facility] [address] [facility]     GERD (gastroesophageal reflux disease)     Hypertension     Melanoma ([facility] [address] [facility]     on nose    Myocardial infarction [facility]) 2008    Wears glasses     Wears partial dentures     upper      Current Outpatient Medications   Medication Sig Dispense Refill    RESTASIS MULTIDOSE 0.05 % ophthalmic emulsion       silver sulfADIAZINE (SILVADENE) 1 % cream Apply topically to back twice daily.  50 g 1    pantoprazole (PROTONIX) 40 MG tablet Take 40 mg by mouth daily      calcium carbonate-vitamin D 600-200 MG-UNIT TABS Take 1 tablet by mouth daily      Multiple Vitamins-Minerals (CENTRUM SILVER) TABS Take 1 tablet by mouth daily      famotidine (PEPCID) 40 MG tablet Take 40 mg by mouth daily      fluticasone (FLONASE) 50 MCG/ACT nasal spray 1 spray by Each Nostril route daily      amiodarone (CORDARONE) 200 MG tablet Take 200 mg by mouth daily      fluticasone-umeclidin-vilant (TRELEGY ELLIPTA) 100-62.5-25 MCG/INH AEPB Inhale 1 puff into the lungs daily      Flaxseed, Linseed, (FLAX SEED OIL) 1000 MG CAPS Take 1,000 mg by mouth daily      ferrous sulfate 325 (65 Fe) MG tablet Take 1 tablet by mouth 2 times daily (before meals) 60 tablet 6    montelukast (SINGULAIR) 10 MG tablet Take 10 mg by mouth nightly      CycloSPORINE (RESTASIS OP) Apply 1 drop to eye 2 times daily      triamterene-hydrochlorothiazide (MAXZIDE-25) 37.5-25 MG per tablet Take 1 tablet by mouth daily      diltiazem (CARDIZEM CD) 240 MG ER capsule Take 240 mg by mouth daily.  aspirin 81 MG tablet Take 81 mg by mouth daily Will stop 7 days before surgery      azelastine (ASTELIN) 137 MCG/SPRAY nasal spray 1 spray by Nasal route 2 times daily. Use in each nostril as directed      Calcium-Vitamin D (CALTRATE 600 PLUS-VIT D PO) Take  by mouth.  nitroGLYCERIN (NITRODUR) 0.1 MG/HR Place 1 patch onto the skin daily.  warfarin (COUMADIN) 5 MG tablet Take 7.5 mg by mouth Will stop 4 day before surgery       No current facility-administered medications for this visit. No Known Allergies    Reviewed PMH, SH, and FH with the patient and updated. Subjective:      Review of Systems   Constitutional: Negative for chills, fatigue and fever. Respiratory: Negative. Negative for cough, chest tightness, shortness of breath and wheezing. Cardiovascular: Negative. Negative for chest pain, palpitations and leg swelling. Skin: Positive for rash (burn on the back). Neurological: Negative for dizziness, syncope, weakness, numbness and headaches. All other systems reviewed and are negative. Objective:      Physical Exam  Vitals and nursing note reviewed. Constitutional:       General: She is not in acute distress. Appearance: She is well-developed. She is not diaphoretic. HENT:      Head: Normocephalic and atraumatic.    Cardiovascular:      Rate and Rhythm: Normal rate and regular rhythm. Heart sounds: Normal heart sounds. No murmur heard. Pulmonary:      Effort: Pulmonary effort is normal. No respiratory distress. Breath sounds: Normal breath sounds. No wheezing. Skin:     General: Skin is warm and dry. Findings: Burn (scattered burns noted to the lumbar and thoracic back, blistering noted as well, no drainage at the time of exam) present. Neurological:      Mental Status: She is alert. BP (!) 211/82   Pulse 57   Temp 98 °F (36.7 °C) (Oral)   SpO2 97%     Assessment:       Diagnosis Orders   1. Burn  silver sulfADIAZINE (SILVADENE) 1 % cream   2. Elevated blood pressure reading       Plan:      Silvadene cream sent to the pharmacy. Monitor the area for any signs of infection and report them immediately. Avoid swimming until area is healed. Advised patient to monitor blood pressure, if continues to be elevated-- follow up with PCP for further management. Patient expressed understanding. The patient indicates understanding of these issues and agrees with the plan. Educational materials provided on AVS.  Follow up if symptoms do not improve/worsen. Orders Placed This Encounter   Medications    silver sulfADIAZINE (SILVADENE) 1 % cream     Sig: Apply topically to back twice daily. Dispense:  50 g     Refill:  1        Patient given educational materials - see patient instructions. Discussed use, benefit, and side effects of prescribed medications. All patientquestions answered. Pt voiced understanding.     Electronically signed by Laneta Dandy, APRN - CNP on 5/2/2022at 9:02 AM

## 2022-05-06 ENCOUNTER — HOSPITAL ENCOUNTER (OUTPATIENT)
Age: 82
Setting detail: SPECIMEN
Discharge: HOME | End: 2022-05-06

## 2022-05-06 LAB
POC INR: 6.2
PROTHROMBIN TIME, POC: 74.4 SEC (ref 9.6–14.4)

## 2022-05-09 ENCOUNTER — HOSPITAL ENCOUNTER (OUTPATIENT)
Age: 82
Setting detail: SPECIMEN
Discharge: HOME | End: 2022-05-09

## 2022-05-09 LAB
POC INR: 2.7
PROTHROMBIN TIME, POC: 31.8 SEC (ref 9.6–14.4)

## 2022-05-10 ENCOUNTER — OFFICE VISIT (OUTPATIENT)
Dept: PRIMARY CARE CLINIC | Age: 82
End: 2022-05-10
Payer: MEDICARE

## 2022-05-10 VITALS
HEART RATE: 53 BPM | DIASTOLIC BLOOD PRESSURE: 80 MMHG | TEMPERATURE: 97.5 F | OXYGEN SATURATION: 94 % | SYSTOLIC BLOOD PRESSURE: 154 MMHG

## 2022-05-10 DIAGNOSIS — T30.0 BURN: ICD-10-CM

## 2022-05-10 DIAGNOSIS — L03.312 CELLULITIS OF LOWER BACK: Primary | ICD-10-CM

## 2022-05-10 PROCEDURE — G8417 CALC BMI ABV UP PARAM F/U: HCPCS | Performed by: NURSE PRACTITIONER

## 2022-05-10 PROCEDURE — 1123F ACP DISCUSS/DSCN MKR DOCD: CPT | Performed by: NURSE PRACTITIONER

## 2022-05-10 PROCEDURE — 1036F TOBACCO NON-USER: CPT | Performed by: NURSE PRACTITIONER

## 2022-05-10 PROCEDURE — 1090F PRES/ABSN URINE INCON ASSESS: CPT | Performed by: NURSE PRACTITIONER

## 2022-05-10 PROCEDURE — 99213 OFFICE O/P EST LOW 20 MIN: CPT | Performed by: NURSE PRACTITIONER

## 2022-05-10 PROCEDURE — G8399 PT W/DXA RESULTS DOCUMENT: HCPCS | Performed by: NURSE PRACTITIONER

## 2022-05-10 PROCEDURE — 4040F PNEUMOC VAC/ADMIN/RCVD: CPT | Performed by: NURSE PRACTITIONER

## 2022-05-10 PROCEDURE — G8427 DOCREV CUR MEDS BY ELIG CLIN: HCPCS | Performed by: NURSE PRACTITIONER

## 2022-05-10 RX ORDER — CEPHALEXIN 500 MG/1
500 CAPSULE ORAL 3 TIMES DAILY
Qty: 21 CAPSULE | Refills: 0 | Status: SHIPPED | OUTPATIENT
Start: 2022-05-10 | End: 2022-05-17 | Stop reason: ALTCHOICE

## 2022-05-10 ASSESSMENT — ENCOUNTER SYMPTOMS
RESPIRATORY NEGATIVE: 1
SHORTNESS OF BREATH: 0
COUGH: 0
CHEST TIGHTNESS: 0
WHEEZING: 0

## 2022-05-10 NOTE — PROGRESS NOTES
[facility]  [address] [address]  Dept: [phone]  Dept Fax: [phone]     Wiley Crain is a 79 y.o. female who presents to the urgent care today for her medicalconditions/complaints as noted below. Wiley Crain is c/o of Burn (on back - recheck from visit on 5/2)    HPI:      Rash  This is a new problem. Episode onset: last week. The problem has been gradually worsening since onset. Location: right lower back. The rash is characterized by pain and redness. Associated with: a burn from her heating pad. Pertinent negatives include no cough, fatigue, fever or shortness of breath. Treatments tried: soap and water washes, silvadene cream. The treatment provided no relief. The area in question today has gotten more red and irritated. She is concerned for infection at this time. All the of the other areas that appeared burnt previously have since healed. Past Medical History:   Diagnosis Date    Allergic rhinitis     Arthritis     Asthma     [facility] fibrillation ([facility] [address]     GERD (gastroesophageal reflux disease)     Hypertension     Melanoma ([facility] [address]     on nose    Myocardial infarction [facility]) 2008    Wears glasses     Wears partial dentures     upper      Current Outpatient Medications   Medication Sig Dispense Refill    cephALEXin (KEFLEX) 500 MG capsule Take 1 capsule by mouth 3 times daily for 7 days 21 capsule 0    RESTASIS MULTIDOSE 0.05 % ophthalmic emulsion       silver sulfADIAZINE (SILVADENE) 1 % cream Apply topically to back twice daily.  50 g 1    pantoprazole (PROTONIX) 40 MG tablet Take 40 mg by mouth daily      calcium carbonate-vitamin D 600-200 MG-UNIT TABS Take 1 tablet by mouth daily      Multiple Vitamins-Minerals (CENTRUM SILVER) TABS Take 1 tablet by mouth daily      famotidine (PEPCID) 40 MG tablet Take 40 mg by mouth daily      fluticasone (FLONASE) 50 MCG/ACT nasal spray 1 spray by Each Nostril route daily      amiodarone (CORDARONE) 200 MG tablet Take 200 mg by mouth daily      fluticasone-umeclidin-vilant (TRELEGY ELLIPTA) 100-62.5-25 MCG/INH AEPB Inhale 1 puff into the lungs daily      Flaxseed, Linseed, (FLAX SEED OIL) 1000 MG CAPS Take 1,000 mg by mouth daily      ferrous sulfate 325 (65 Fe) MG tablet Take 1 tablet by mouth 2 times daily (before meals) 60 tablet 6    montelukast (SINGULAIR) 10 MG tablet Take 10 mg by mouth nightly      CycloSPORINE (RESTASIS OP) Apply 1 drop to eye 2 times daily      triamterene-hydrochlorothiazide (MAXZIDE-25) 37.5-25 MG per tablet Take 1 tablet by mouth daily      diltiazem (CARDIZEM CD) 240 MG ER capsule Take 240 mg by mouth daily.  aspirin 81 MG tablet Take 81 mg by mouth daily Will stop 7 days before surgery      azelastine (ASTELIN) 137 MCG/SPRAY nasal spray 1 spray by Nasal route 2 times daily. Use in each nostril as directed      Calcium-Vitamin D (CALTRATE 600 PLUS-VIT D PO) Take  by mouth.  nitroGLYCERIN (NITRODUR) 0.1 MG/HR Place 1 patch onto the skin daily.  warfarin (COUMADIN) 5 MG tablet Take 7.5 mg by mouth Will stop 4 day before surgery       No current facility-administered medications for this visit. No Known Allergies    Subjective:      Review of Systems   Constitutional: Negative for chills, fatigue and fever. Respiratory: Negative. Negative for cough, chest tightness, shortness of breath and wheezing. Cardiovascular: Negative. Negative for chest pain. Skin: Positive for rash (right lower back). All other systems reviewed and are negative. Objective:      Physical Exam  Vitals and nursing note reviewed. Constitutional:       General: She is not in acute distress. Appearance: She is well-developed. She is not diaphoretic. HENT:      Head: Normocephalic and atraumatic. Cardiovascular:      Rate and Rhythm: Normal rate and regular rhythm.       Heart sounds: Normal heart sounds. No murmur heard. Pulmonary:      Effort: Pulmonary effort is normal. No respiratory distress. Breath sounds: Normal breath sounds. No wheezing. Skin:     General: Skin is warm and dry. Findings: Burn (right lower back burn noted with surrounding erythema, base of the burn scabbed) present. Neurological:      Mental Status: She is alert. BP (!) 154/80   Pulse 53   Temp 97.5 °F (36.4 °C) (Tympanic)   SpO2 94%     Assessment:       Diagnosis Orders   1. Cellulitis of lower back  cephALEXin (KEFLEX) 500 MG capsule   2. Burn       Plan:      Patient instructed to complete antibiotic prescription fully. Continue soapy water cleansing and silvadene application. Area of erythema traced with skin marker to watch for progression. Tylenol/Motrin as needed for the discomfort/fever. Patient agreeable to treatment plan. Educational materials provided on AVS.  Follow up if symptoms do not improve/worsen. Orders Placed This Encounter   Medications    cephALEXin (KEFLEX) 500 MG capsule     Sig: Take 1 capsule by mouth 3 times daily for 7 days     Dispense:  21 capsule     Refill:  0        Patient given educational materials - see patient instructions. Discussed use, benefit, and side effects of prescribed medications. All patientquestions answered. Pt voiced understanding.     Electronically signed by DUDLEY Sam CNP on 5/10/2022at 9:29 AM

## 2022-05-10 NOTE — PATIENT INSTRUCTIONS
Patient Education        [place]: Care Instructions  Overview     Burns--even minor ones--can be very painful. A minor burn may heal within several days, while a more serious burn may take weeks or even months to healcompletely. You may notice that the burned area feels tight and hard while it is healing. It is important to continue to move the area as the burn heals to prevent lossof motion or loss of function in the area. When your skin is damaged by a burn, you have a greater risk of infection. Keep the wound clean and change the bandages regularly to prevent infection and helpthe burn heal.  Burns can leave permanent scars. Taking good care of the burn as it heals mayhelp prevent bad scars. The doctor has checked you carefully, but problems can develop later. If you notice any problems or new symptoms, get medical treatment right away. Follow-up care is a key part of your treatment and safety. Be sure to make and go to all appointments, and call your doctor if you are having problems. It's also a good idea to know your test results and keep alist of the medicines you take. How can you care for yourself at home?  If your doctor told you how to care for your burn, follow your doctor's instructions. If you did not get instructions, follow this general advice:  ? Wash the burn every day with a mild soap and water. Don't use hydrogen peroxide or alcohol, which can slow healing. ? Gently pat the burn dry after you wash it. ? Apply a thin layer of antibiotic ointment or petroleum jelly on the burn. ? You may cover the burn with a nonstick bandage. There are many bandage products available. Be sure to read the product label for correct use. ? Replace the bandage as needed.  Protect your burn while it is healing. Cover your burn if you are going out in the cold or the sun. ? Wear long sleeves if the burn is on your hands or arms. ? Wear a hat if the burn is on your face.   ? Wear socks and shoes if the burn is on your feet.  Do not break blisters open. This increases the chance of infection. If a blister breaks open by itself, blot up the liquid, and leave the skin that covered the blister. This helps protect the new skin.  If your doctor prescribed antibiotics, take them as directed. Do not stop taking them just because you feel better. You need to take the full course of antibiotics. For pain and itching   Take pain medicines exactly as directed. ? If the doctor gave you a prescription medicine for pain, take it as prescribed. ? If you are not taking a prescription pain medicine, ask your doctor if you can take an over-the-counter medicine.  If the burn itches, try not to scratch it. Try an over-the-counter antihistamine. Be safe with medicines. Read and follow all instructions on the label. When should you call for help? Call your doctor now or seek immediate medical care if:     Your pain gets worse.      You have symptoms of infection, such as:  ? Increased pain, swelling, warmth, or redness near the burn. ? Red streaks leading from the burn. ? Pus draining from the burn. ? A fever. Watch closely for changes in your health, and be sure to contact your doctor if:     You do not get better as expected. Where can you learn more? Go to https://TapFunder.Wonolo. org and sign in to your Landpoint account. Enter H752 in the [facility] box to learn more about \"Burns: Care Instructions. \"     If you do not have an account, please click on the \"Sign Up Now\" link. Current as of: July 1, 2021               Content Version: 13.2  © 2006-2022 [employer]. Care instructions adapted under license by [address] If you have questions about a medical condition or this instruction, always ask your healthcare professional. [place] [address] any warranty or liability for your use of this information.          Patient Education        Cellulitis: Care Instructions  Your Care Instructions     Cellulitis is a skin infection caused by bacteria, most often strep or staph. It often occurs after a break in the skin from a scrape, cut, bite, orpuncture, or after a rash. Cellulitis may be treated without doing tests to find out what caused it. But your doctor may do tests, if needed, to look for a specific bacteria, like methicillin-resistant Staphylococcus aureus (MRSA). The doctor has checked you carefully, but problems can develop later. If you notice any problems or new symptoms, get medical treatment right away. Follow-up care is a key part of your treatment and safety. Be sure to make and go to all appointments, and call your doctor if you are having problems. It's also a good idea to know your test results and keep alist of the medicines you take. How can you care for yourself at home?  Take your antibiotics as directed. Do not stop taking them just because you feel better. You need to take the full course of antibiotics.  Prop up the infected area on pillows to reduce pain and swelling. Try to keep the area above the level of your heart as often as you can.  If your doctor told you how to care for your wound, follow your doctor's instructions. If you did not get instructions, follow this general advice:  ? Wash the wound with clean water 2 times a day. Don't use hydrogen peroxide or alcohol, which can slow healing. ? You may cover the wound with a thin layer of petroleum jelly, such as Vaseline, and a nonstick bandage. ? Apply more petroleum jelly and replace the bandage as needed.  Be safe with medicines. Take pain medicines exactly as directed. ? If the doctor gave you a prescription medicine for pain, take it as prescribed. ? If you are not taking a prescription pain medicine, ask your doctor if you can take an over-the-counter medicine. To prevent cellulitis in the future   Try to prevent cuts, scrapes, or other injuries to your skin. Cellulitis most often occurs where there is a break in the skin.  If you get a scrape, cut, mild burn, or bite, wash the wound with clean water as soon as you can to help avoid infection. Don't use hydrogen peroxide or alcohol, which can slow healing.  If you have swelling in your legs (edema), support stockings and good skin care may help prevent leg sores and cellulitis.  Take care of your feet, especially if you have diabetes or other conditions that increase the risk of infection. Wear shoes and socks. Do not go barefoot. If you have athlete's foot or other skin problems on your feet, talk to your doctor about how to treat them. When should you call for help? Call your doctor now or seek immediate medical care if:     You have signs that your infection is getting worse, such as:  ? Increased pain, swelling, warmth, or redness. ? Red streaks leading from the area. ? Pus draining from the area. ? A fever.      You get a rash. Watch closely for changes in your health, and be sure to contact your doctor if:     You do not get better as expected. Where can you learn more? Go to https://Ciashop.PageFair. org and sign in to your YesVideo account. Enter B051 in the [facility] box to learn more about \"Cellulitis: Care Instructions. \"     If you do not have an account, please click on the \"Sign Up Now\" link. Current as of: November 15, 2021               Content Version: 13.2  © 2006-2022 [employer], [facility]. Care instructions adapted under license by [facility] ([facility]). If you have questions about a medical condition or this instruction, always ask your healthcare professional. [place] [address] any warranty or liability for your use of this information.

## 2022-05-17 ENCOUNTER — OFFICE VISIT (OUTPATIENT)
Dept: PRIMARY CARE CLINIC | Age: 82
End: 2022-05-17
Payer: MEDICARE

## 2022-05-17 VITALS
HEART RATE: 50 BPM | OXYGEN SATURATION: 96 % | DIASTOLIC BLOOD PRESSURE: 69 MMHG | SYSTOLIC BLOOD PRESSURE: 186 MMHG | TEMPERATURE: 97 F

## 2022-05-17 DIAGNOSIS — L03.312 CELLULITIS OF LOWER BACK: Primary | ICD-10-CM

## 2022-05-17 DIAGNOSIS — T30.0 BURN: ICD-10-CM

## 2022-05-17 PROCEDURE — 1036F TOBACCO NON-USER: CPT | Performed by: NURSE PRACTITIONER

## 2022-05-17 PROCEDURE — 4040F PNEUMOC VAC/ADMIN/RCVD: CPT | Performed by: NURSE PRACTITIONER

## 2022-05-17 PROCEDURE — G8427 DOCREV CUR MEDS BY ELIG CLIN: HCPCS | Performed by: NURSE PRACTITIONER

## 2022-05-17 PROCEDURE — 1090F PRES/ABSN URINE INCON ASSESS: CPT | Performed by: NURSE PRACTITIONER

## 2022-05-17 PROCEDURE — 99213 OFFICE O/P EST LOW 20 MIN: CPT | Performed by: NURSE PRACTITIONER

## 2022-05-17 PROCEDURE — G8399 PT W/DXA RESULTS DOCUMENT: HCPCS | Performed by: NURSE PRACTITIONER

## 2022-05-17 PROCEDURE — 1123F ACP DISCUSS/DSCN MKR DOCD: CPT | Performed by: NURSE PRACTITIONER

## 2022-05-17 PROCEDURE — G8417 CALC BMI ABV UP PARAM F/U: HCPCS | Performed by: NURSE PRACTITIONER

## 2022-05-17 RX ORDER — MUPIROCIN CALCIUM 20 MG/G
CREAM TOPICAL
Qty: 30 G | Refills: 1 | Status: SHIPPED | OUTPATIENT
Start: 2022-05-17 | End: 2022-06-16

## 2022-05-17 RX ORDER — DOXYCYCLINE HYCLATE 100 MG/1
100 CAPSULE ORAL 2 TIMES DAILY
Qty: 10 CAPSULE | Refills: 0 | Status: SHIPPED | OUTPATIENT
Start: 2022-05-17 | End: 2022-05-22

## 2022-05-17 ASSESSMENT — ENCOUNTER SYMPTOMS
WHEEZING: 0
CHEST TIGHTNESS: 0
COUGH: 0
RESPIRATORY NEGATIVE: 1
SHORTNESS OF BREATH: 0

## 2022-05-17 NOTE — PROGRESS NOTES
[address] WALK IN CARE  [address] [address]  Dept: [phone]  Dept Fax: [phone]     Zoe Hawkins is a 79 y.o. female who presents to the urgent care today for her medicalconditions/complaints as noted below. Zoe Hawkins is c/o of Cellulitis (patient was seen 5/10/22 for cellulitis of lower back- pt is back due to it still being red and wanted to make sure that it is healing properly )    HPI:      Rash  Episode onset: 2 weeks ago. The problem is unchanged. Location: right lower back. The rash is characterized by redness. Associated with: a burn. Pertinent negatives include no cough, fatigue, fever or shortness of breath. Treatments tried: silvadene, keflex. The treatment provided mild relief. Past Medical History:   Diagnosis Date    Allergic rhinitis     Arthritis     Asthma     Atrial fibrillation ([address]     GERD (gastroesophageal reflux disease)     Hypertension     [facility]anoma [facility]     on nose    Myocardial infarction [facility]) 2008    Wears glasses     Wears partial dentures     upper           Current Outpatient Medications   Medication Sig Dispense Refill    doxycycline hyclate (VIBRAMYCIN) 100 MG capsule Take 1 capsule by mouth 2 times daily for 5 days 10 capsule 0    mupirocin (BACTROBAN) 2 % cream Apply 3 times daily. 30 g 1    RESTASIS MULTIDOSE 0.05 % ophthalmic emulsion       silver sulfADIAZINE (SILVADENE) 1 % cream Apply topically to back twice daily.  50 g 1    pantoprazole (PROTONIX) 40 MG tablet Take 40 mg by mouth daily      calcium carbonate-vitamin D 600-200 MG-UNIT TABS Take 1 tablet by mouth daily      Multiple Vitamins-Minerals (CENTRUM SILVER) TABS Take 1 tablet by mouth daily      famotidine (PEPCID) 40 MG tablet Take 40 mg by mouth daily      fluticasone (FLONASE) 50 MCG/ACT nasal spray 1 spray by Each Nostril route daily      amiodarone (CORDARONE) 200 MG tablet Take 200 mg by mouth daily      fluticasone-umeclidin-vilant (TRELEGY ELLIPTA) 100-62.5-25 MCG/INH AEPB Inhale 1 puff into the lungs daily      Flaxseed, Linseed, (FLAX SEED OIL) 1000 MG CAPS Take 1,000 mg by mouth daily      ferrous sulfate 325 (65 Fe) MG tablet Take 1 tablet by mouth 2 times daily (before meals) 60 tablet 6    montelukast (SINGULAIR) 10 MG tablet Take 10 mg by mouth nightly      CycloSPORINE (RESTASIS OP) Apply 1 drop to eye 2 times daily      triamterene-hydrochlorothiazide (MAXZIDE-25) 37.5-25 MG per tablet Take 1 tablet by mouth daily      diltiazem (CARDIZEM CD) 240 MG ER capsule Take 240 mg by mouth daily.  aspirin 81 MG tablet Take 81 mg by mouth daily Will stop 7 days before surgery      azelastine (ASTELIN) 137 MCG/SPRAY nasal spray 1 spray by Nasal route 2 times daily. Use in each nostril as directed      Calcium-Vitamin D (CALTRATE 600 PLUS-VIT D PO) Take  by mouth.  nitroGLYCERIN (NITRODUR) 0.1 MG/HR Place 1 patch onto the skin daily.  warfarin (COUMADIN) 5 MG tablet Take 7.5 mg by mouth Will stop 4 day before surgery       No current facility-administered medications for this visit. No Known Allergies    Subjective:      Review of Systems   Constitutional: Negative for chills, fatigue and fever. Respiratory: Negative. Negative for cough, chest tightness, shortness of breath and wheezing. Cardiovascular: Negative. Negative for chest pain. Skin: Positive for rash. All other systems reviewed and are negative. Objective:      Physical Exam  Vitals and nursing note reviewed. Constitutional:       General: She is not in acute distress. Appearance: She is well-developed. She is not diaphoretic. HENT:      Head: Normocephalic and atraumatic. Cardiovascular:      Rate and Rhythm: Normal rate and regular rhythm. Heart sounds: Normal heart sounds. No murmur heard. Pulmonary:      Effort: Pulmonary effort is normal. No respiratory distress. Breath sounds: Normal breath sounds. No wheezing. Skin:     General: Skin is warm and dry. Findings: Rash (right lower back burn noted with surrounding erythema, base of the burn scabbed) present. Neurological:      Mental Status: She is alert. BP (!) 186/69   Pulse 50   Temp 97 °F (36.1 °C) (Tympanic)   SpO2 96%     Assessment:       Diagnosis Orders   1. Cellulitis of lower back  doxycycline hyclate (VIBRAMYCIN) 100 MG capsule    mupirocin (BACTROBAN) 2 % cream   2. Burn       Plan:      Area appears to be slightly improved. Patient instructed to complete antibiotic prescription fully. Bactroban ointment to be applied topically TID. Tylenol/Motrin as needed for the discomfort/fever. Patient agreeable to treatment plan. Educational materials provided on AVS.  Follow up if symptoms do not improve/worsen. Orders Placed This Encounter   Medications    doxycycline hyclate (VIBRAMYCIN) 100 MG capsule     Sig: Take 1 capsule by mouth 2 times daily for 5 days     Dispense:  10 capsule     Refill:  0    mupirocin (BACTROBAN) 2 % cream     Sig: Apply 3 times daily. Dispense:  30 g     Refill:  1        Patient given educational materials - see patient instructions. Discussed use, benefit, and side effects of prescribed medications. All patientquestions answered. Pt voiced understanding.     Electronically signed by DUDLEY Heart CNP on 5/17/2022at 8:54 AM

## 2022-05-17 NOTE — PATIENT INSTRUCTIONS
is on your feet.  Do not break blisters open. This increases the chance of infection. If a blister breaks open by itself, blot up the liquid, and leave the skin that covered the blister. This helps protect the new skin.  If your doctor prescribed antibiotics, take them as directed. Do not stop taking them just because you feel better. You need to take the full course of antibiotics. For pain and itching   Take pain medicines exactly as directed. ? If the doctor gave you a prescription medicine for pain, take it as prescribed. ? If you are not taking a prescription pain medicine, ask your doctor if you can take an over-the-counter medicine.  If the burn itches, try not to scratch it. Try an over-the-counter antihistamine. Be safe with medicines. Read and follow all instructions on the label. When should you call for help? Call your doctor now or seek immediate medical care if:     Your pain gets worse.      You have symptoms of infection, such as:  ? Increased pain, swelling, warmth, or redness near the burn. ? Red streaks leading from the burn. ? Pus draining from the burn. ? A fever. Watch closely for changes in your health, and be sure to contact your doctor if:     You do not get better as expected. Where can you learn more? Go to [email or web address]://Ziplocal.Sosedi. org and sign in to your mySociety account. Enter S858 in the [facility] box to learn more about \"Burns: Care Instructions. \"     If you do not have an account, please click on the \"Sign Up Now\" link. Current as of: July 1, 2021               Content Version: 13.2  © 2006-2022 Healthwise[employer]. Care instructions adapted under license by [facility] ([facility]). If you have questions about a medical condition or this instruction, always ask your healthcare professional. [place] [address] any warranty or liability for your use of this information.          Patient Education        Cellulitis: Care Instructions  Your Care Instructions     Cellulitis is a skin infection caused by bacteria, most often strep or staph. It often occurs after a break in the skin from a scrape, cut, bite, orpuncture, or after a rash. Cellulitis may be treated without doing tests to find out what caused it. But your doctor may do tests, if needed, to look for a specific bacteria, like methicillin-resistant Staphylococcus aureus (MRSA). The doctor has checked you carefully, but problems can develop later. If you notice any problems or new symptoms, get medical treatment right away. Follow-up care is a key part of your treatment and safety. Be sure to make and go to all appointments, and call your doctor if you are having problems. It's also a good idea to know your test results and keep alist of the medicines you take. How can you care for yourself at home?  Take your antibiotics as directed. Do not stop taking them just because you feel better. You need to take the full course of antibiotics.  Prop up the infected area on pillows to reduce pain and swelling. Try to keep the area above the level of your heart as often as you can.  If your doctor told you how to care for your wound, follow your doctor's instructions. If you did not get instructions, follow this general advice:  ? Wash the wound with clean water 2 times a day. Don't use hydrogen peroxide or alcohol, which can slow healing. ? You may cover the wound with a thin layer of petroleum jelly, such as Vaseline, and a nonstick bandage. ? Apply more petroleum jelly and replace the bandage as needed.  Be safe with medicines. Take pain medicines exactly as directed. ? If the doctor gave you a prescription medicine for pain, take it as prescribed. ? If you are not taking a prescription pain medicine, ask your doctor if you can take an over-the-counter medicine. To prevent cellulitis in the future   Try to prevent cuts, scrapes, or other injuries to your skin. Cellulitis most often occurs where there is a break in the skin.  If you get a scrape, cut, mild burn, or bite, wash the wound with clean water as soon as you can to help avoid infection. Don't use hydrogen peroxide or alcohol, which can slow healing.  If you have swelling in your legs (edema), support stockings and good skin care may help prevent leg sores and cellulitis.  Take care of your feet, especially if you have diabetes or other conditions that increase the risk of infection. Wear shoes and socks. Do not go barefoot. If you have athlete's foot or other skin problems on your feet, talk to your doctor about how to treat them. When should you call for help? Call your doctor now or seek immediate medical care if:     You have signs that your infection is getting worse, such as:  ? Increased pain, swelling, warmth, or redness. ? Red streaks leading from the area. ? Pus draining from the area. ? A fever.      You get a rash. Watch closely for changes in your health, and be sure to contact your doctor if:     You do not get better as expected. Where can you learn more? Go to [email or web address]://[employer]peConnect Controlseb.Juxinli. org and sign in to your CitizenDish account. Enter Q510 in the [facility] box to learn more about \"Cellulitis: Care Instructions. \"     If you do not have an account, please click on the \"Sign Up Now\" link. Current as of: November 15, 2021               Content Version: 13.2  © 2006-2022 [facility]. Care instructions adapted under license by [facility] ([facility]). If you have questions about a medical condition or this instruction, always ask your healthcare professional. [place] [address] any warranty or liability for your use of this information.

## 2022-05-24 ENCOUNTER — OFFICE VISIT (OUTPATIENT)
Dept: PRIMARY CARE CLINIC | Age: 82
End: 2022-05-24
Payer: MEDICARE

## 2022-05-24 VITALS
HEART RATE: 56 BPM | TEMPERATURE: 97.1 F | SYSTOLIC BLOOD PRESSURE: 217 MMHG | OXYGEN SATURATION: 97 % | DIASTOLIC BLOOD PRESSURE: 65 MMHG

## 2022-05-24 DIAGNOSIS — Z51.89 VISIT FOR WOUND CHECK: Primary | ICD-10-CM

## 2022-05-24 DIAGNOSIS — R03.0 ELEVATED BLOOD PRESSURE READING: ICD-10-CM

## 2022-05-24 PROCEDURE — G8417 CALC BMI ABV UP PARAM F/U: HCPCS | Performed by: NURSE PRACTITIONER

## 2022-05-24 PROCEDURE — 1036F TOBACCO NON-USER: CPT | Performed by: NURSE PRACTITIONER

## 2022-05-24 PROCEDURE — G8399 PT W/DXA RESULTS DOCUMENT: HCPCS | Performed by: NURSE PRACTITIONER

## 2022-05-24 PROCEDURE — 1090F PRES/ABSN URINE INCON ASSESS: CPT | Performed by: NURSE PRACTITIONER

## 2022-05-24 PROCEDURE — 99212 OFFICE O/P EST SF 10 MIN: CPT | Performed by: NURSE PRACTITIONER

## 2022-05-24 PROCEDURE — G8427 DOCREV CUR MEDS BY ELIG CLIN: HCPCS | Performed by: NURSE PRACTITIONER

## 2022-05-24 PROCEDURE — 1123F ACP DISCUSS/DSCN MKR DOCD: CPT | Performed by: NURSE PRACTITIONER

## 2022-05-24 ASSESSMENT — ENCOUNTER SYMPTOMS
WHEEZING: 0
CHEST TIGHTNESS: 0
COUGH: 0
SHORTNESS OF BREATH: 0
RESPIRATORY NEGATIVE: 1

## 2022-05-24 NOTE — PROGRESS NOTES
[address] WALK IN CARE  [address] [place] [address]  Dept: [phone]  Dept Fax: [phone]     Lokesh Rodriguez is a 79 y.o. female who presents to the urgent care today for her medicalconditions/complaints as noted below. Lokesh Rodriguez is c/o of Cellulitis (patient being treated for cellulitis of lower back, just wants it checked since she does not think it is improving much)    HPI:      Wound Check  She was originally treated more than 14 days ago. Previous treatment included oral antibiotics and wound cleansing or irrigation. Her temperature was unmeasured prior to arrival. There has been no drainage from the wound. The redness has improved. There is no swelling present. There is no pain present. She has no difficulty moving the affected extremity or digit. Patient has completed Keflex, doxycycline, and bactroban. She would like the area to be rechecked for improvement. Past Medical History:   Diagnosis Date    Allergic rhinitis     Arthritis     Asthma     [facility]     GERD (gastroesophageal reflux disease)     Hypertension     Melanoma ([facility] [address]     on nose    Myocardial infarction [facility]) 2008    Wears glasses     Wears partial dentures     upper      Current Outpatient Medications   Medication Sig Dispense Refill    mupirocin (BACTROBAN) 2 % cream Apply 3 times daily. 30 g 1    RESTASIS MULTIDOSE 0.05 % ophthalmic emulsion       silver sulfADIAZINE (SILVADENE) 1 % cream Apply topically to back twice daily.  50 g 1    pantoprazole (PROTONIX) 40 MG tablet Take 40 mg by mouth daily      calcium carbonate-vitamin D 600-200 MG-UNIT TABS Take 1 tablet by mouth daily      Multiple Vitamins-Minerals (CENTRUM SILVER) TABS Take 1 tablet by mouth daily      famotidine (PEPCID) 40 MG tablet Take 40 mg by mouth daily      fluticasone (FLONASE) 50 MCG/ACT nasal spray 1 spray by Each Nostril route daily  amiodarone (CORDARONE) 200 MG tablet Take 200 mg by mouth daily      fluticasone-umeclidin-vilant (TRELEGY ELLIPTA) 100-62.5-25 MCG/INH AEPB Inhale 1 puff into the lungs daily      Flaxseed, Linseed, (FLAX SEED OIL) 1000 MG CAPS Take 1,000 mg by mouth daily      ferrous sulfate 325 (65 Fe) MG tablet Take 1 tablet by mouth 2 times daily (before meals) 60 tablet 6    montelukast (SINGULAIR) 10 MG tablet Take 10 mg by mouth nightly      CycloSPORINE (RESTASIS OP) Apply 1 drop to eye 2 times daily      triamterene-hydrochlorothiazide (MAXZIDE-25) 37.5-25 MG per tablet Take 1 tablet by mouth daily      diltiazem (CARDIZEM CD) 240 MG ER capsule Take 240 mg by mouth daily.  aspirin 81 MG tablet Take 81 mg by mouth daily Will stop 7 days before surgery      azelastine (ASTELIN) 137 MCG/SPRAY nasal spray 1 spray by Nasal route 2 times daily. Use in each nostril as directed      Calcium-Vitamin D (CALTRATE 600 PLUS-VIT D PO) Take  by mouth.  nitroGLYCERIN (NITRODUR) 0.1 MG/HR Place 1 patch onto the skin daily.  warfarin (COUMADIN) 5 MG tablet Take 7.5 mg by mouth Will stop 4 day before surgery       No current facility-administered medications for this visit. No Known Allergies    Reviewed PMH, SH, and FH with the patient and updated. Subjective:      Review of Systems   Constitutional: Negative for chills, fatigue and fever. Respiratory: Negative. Negative for cough, chest tightness, shortness of breath and wheezing. Cardiovascular: Negative. Negative for chest pain. Skin: Positive for wound (right lower back, burn). Negative for rash. All other systems reviewed and are negative. Objective:      Physical Exam  Vitals and nursing note reviewed. Constitutional:       General: She is not in acute distress. Appearance: She is well-developed. She is not diaphoretic. HENT:      Head: Normocephalic and atraumatic.    Cardiovascular:      Rate and Rhythm: Normal rate and regular rhythm. Heart sounds: Normal heart sounds. No murmur heard. Pulmonary:      Effort: Pulmonary effort is normal. No respiratory distress. Breath sounds: Normal breath sounds. No wheezing. Skin:     General: Skin is warm and dry. Findings: Burn (right lower back burn noted with minimal surrounding erythema, base of the burn scabbed) present. Neurological:      Mental Status: She is alert. BP (!) 217/65   Pulse 56   Temp 97.1 °F (36.2 °C) (Tympanic)   SpO2 97%     Assessment:       Diagnosis Orders   1. Visit for wound check     2. Elevated blood pressure reading       Plan:      Wound infection appears to be improved. Continue bactroban ointment TID. Clean with plain soap and water. Follow up with me next week for wound check. Patient given educational materials - see patient instructions. Discussed use, benefit, and side effects of prescribed medications. All patientquestions answered. Pt voiced understanding.     Electronically signed by DUDLEY Heart CNP on 5/24/2022at 8:46 AM

## 2022-06-02 ENCOUNTER — HOSPITAL ENCOUNTER (OUTPATIENT)
Age: 82
Setting detail: SPECIMEN
Discharge: HOME | End: 2022-06-02

## 2022-06-02 LAB
POC INR: 2.2
PROTHROMBIN TIME, POC: 26.6 SEC (ref 9.6–14.4)

## 2022-06-17 DIAGNOSIS — M16.11 PRIMARY OSTEOARTHRITIS OF RIGHT HIP: Primary | ICD-10-CM

## 2022-06-20 ENCOUNTER — OFFICE VISIT (OUTPATIENT)
Dept: ORTHOPEDIC SURGERY | Age: 82
End: 2022-06-20

## 2022-06-20 VITALS — BODY MASS INDEX: 31.32 KG/M2 | WEIGHT: 188 LBS | HEIGHT: 65 IN

## 2022-06-20 DIAGNOSIS — M25.559 HIP PAIN: Primary | ICD-10-CM

## 2022-06-20 PROCEDURE — 1123F ACP DISCUSS/DSCN MKR DOCD: CPT | Performed by: ORTHOPAEDIC SURGERY

## 2022-06-20 PROCEDURE — 1036F TOBACCO NON-USER: CPT | Performed by: ORTHOPAEDIC SURGERY

## 2022-06-20 PROCEDURE — G8427 DOCREV CUR MEDS BY ELIG CLIN: HCPCS | Performed by: ORTHOPAEDIC SURGERY

## 2022-06-20 PROCEDURE — G8399 PT W/DXA RESULTS DOCUMENT: HCPCS | Performed by: ORTHOPAEDIC SURGERY

## 2022-06-20 PROCEDURE — 1090F PRES/ABSN URINE INCON ASSESS: CPT | Performed by: ORTHOPAEDIC SURGERY

## 2022-06-20 PROCEDURE — G8417 CALC BMI ABV UP PARAM F/U: HCPCS | Performed by: ORTHOPAEDIC SURGERY

## 2022-06-20 PROCEDURE — 99214 OFFICE O/P EST MOD 30 MIN: CPT | Performed by: ORTHOPAEDIC SURGERY

## 2022-06-20 ASSESSMENT — ENCOUNTER SYMPTOMS
EYE DISCHARGE: 0
SHORTNESS OF BREATH: 0
ROS SKIN COMMENTS: NEGATIVE FOR RASH
ABDOMINAL PAIN: 0

## 2022-06-20 NOTE — PROGRESS NOTES
[address]  [address] [facility] [address]  Dept: [phone]  Dept Fax: [phone]        Postoperative follow-up note    Subjective:   Brown Aquino is a 79y.o. year old female who presents to our office today for postoperative followup regarding her   1. Hip pain    . Chief Complaint   Patient presents with    Follow-up     right hip      Kyle Carmen is a 79-year-old female who presents to the office today for recheck of her right hip and evaluation of her left hip. She underwent right total hip arthroplasty on 4/9/2019. She notes her right hip does not bother her but occasionally she does have pain along the anterior aspect of the right thigh. She is more concerned with the left hip pain that she is having. She notes groin pain at times. She knows that she has trouble walking long distances and sometimes with the left hip. She denies any trauma to the left hip or previous treatments. Review of Systems   Constitutional: Positive for activity change. Negative for fever. HENT: Negative for dental problem. Eyes: Negative for discharge. Respiratory: Negative for shortness of breath. Cardiovascular: Negative for chest pain. Gastrointestinal: Negative for abdominal pain. Genitourinary: Negative. Musculoskeletal: Positive for arthralgias. Skin:        Negative for rash   Neurological: Positive for weakness. Psychiatric/Behavioral: Negative for confusion. I have reviewed the CC, HPI, ROS, PMH, FHX, Social History, and if not present in this note, I have reviewed in the patient's chart. I agree with the documentation provided by other staff and have reviewed their documentation prior to providing my signature indicating agreement. Objective :   General: Brown Aquino is a 79 y.o. female who is alert and oriented and sitting comfortably in our office.   Ortho Exam  MS:   Patient ambulates without antalgia or short weightbearing phase to the right lower extremity. She has negative hip logroll and Stinchfield test on the right. Motor, sensory, vascular examination of the right lower extremity is grossly intact without focal deficits. Patient ambulates with mild shortening weightbearing phase and antalgic gait to the Left lower extremity. There is no erythema, warmth, skin lesions, signs of infection. Positive hip logroll and Stinchfield test is noted. Patient has full range of motion of the Left knee and ankle. Motor, sensory, and vascular examination to the Left lower extremity is intact without focal deficits. Patient has full range of motion of the lumbosacral spine. Neuro: alert. oriented  Eyes: Extra-ocular muscles intact  Mouth: Oral mucosa moist. No perioral lesions  Pulm: Respirations unlabored and regular. Skin: warm, well perfused  Psych:   Patient has good fund of knowledge and displays understanging of exam, diagnosis, and plan. Radiology:     XR HIP LEFT (2-3 VIEWS)    Result Date: 6/20/2022  History: Left hip pain Findings: Low AP pelvis and frog-leg lateral x-ray of the left hip done the office today shows moderate degenerative changes with a cam lesion, mild to moderate para-articular osteophytosis and mild joint space narrowing as well as subchondral cystic changes to both the acetabulum and femoral head. No further evidence of fracture, subluxation, dislocation, radiopaque foreign body, radiopaque tumors noted. Impression:    XR HIP RIGHT (2-3 VIEWS)    Result Date: 6/20/2022  History:   Status post Right  total hip arthroplasty Findings:    Low AP pelvis, AP Right  hip, cross table lateral xrays Right hip done in the office today shows total hip arthroplasty in good position without signs complication. Leg lengths are approximate. Components are in good position without signs of loosening.     No evidence of fracture, subluxation, dislocation, radioopaque foreign body/tumor is noted. Right femoral component shows good cement mantle from previous cemented component. Impression:  Right  total hip arthroplasty in good position without complication noted. Assessment:      1. Hip pain         Plan:      Patient's right hip is doing well. We discussed treatment for the left. The patient would like conservative treatment at this time in the form of water therapy and a prescription has been written for that. I plan to see her back as needed. If her symptoms persist or worsen on the left, consideration for corticosteroid injection versus left total hip arthroplasty could be made. Follow up: Return in about 7 weeks (around 8/8/2022). No orders of the defined types were placed in this encounter.       Orders Placed This Encounter   Procedures    XR HIP LEFT (2-3 VIEWS)     Standing Status:   Future     Number of Occurrences:   1     Standing Expiration Date:   6/20/2023    External Referral To Physical Therapy     Referral Priority:   Routine     Referral Type:   Eval and Treat     Referral Reason:   Specialty Services Required     Requested Specialty:   Physical Therapist     Number of Visits Requested:   1       This note is created with the assistance of a speech recognition program.  While intending to generate a document that actually reflects the content of the visit, the document can still have some errors including those of syntax and sound a like substitutions which may escape proof reading.  In such instances, actual meaning can be extrapolated by contextual diversion      Electronically signed by Noe Smith on 6/20/2022 at 12:21 PM

## 2022-06-26 NOTE — PROGRESS NOTES
Physical Therapy  Facility/Department: [address] ORTHO/MED SURG  Daily Treatment Note  NAME: Paris Hernandez  : 1940  MRN: [id number]    Date of Service: 2019    Discharge Recommendations:    Further therapy recommended at discharge. Patient Diagnosis(es): The primary encounter diagnosis was Osteoarthritis of right hip, unspecified osteoarthritis type. Diagnoses of Post-op pain and Status post right hip replacement were also pertinent to this visit. has a past medical history of Allergic rhinitis, Arthritis, Atrial fibrillation ([facility] [address] Hypertension, Melanoma ([facility] [address] Myocardial infarction [facility]), Wears glasses, and Wears partial dentures. has a past surgical history that includes Appendectomy; other surgical history; Hysterectomy; Neck surgery; Carpal tunnel release (Right); Nose surgery; hernia repair; Tonsillectomy; joint replacement; Total hip arthroplasty (Right, 2019); and Total hip arthroplasty (Right, 2019). Restrictions  Restrictions/Precautions  Restrictions/Precautions: Weight Bearing, Fall Risk  Required Braces or Orthoses?: No  Lower Extremity Weight Bearing Restrictions  Right Lower Extremity Weight Bearing: Weight Bearing As Tolerated  Position Activity Restriction  Hip Precautions: Posterior hip precautions  Other position/activity restrictions: Up with assist.  Subjective   General  Chart Reviewed: Yes  Response To Previous Treatment: Patient with no complaints from previous session. Family / Caregiver Present: No  Subjective  Subjective: Pt alert in bed; reports 6/10 pain in R hip. Pt also c/o heartburn/indigestion- states RN is aware and will be getting medication  General Comment  Comments: Pt left in chair with alarm activated and call light in reach  Pain Screening  Patient Currently in Pain: Yes  Pain Assessment  Pain Assessment: 0-10  Pain Level: 6  Pain Type: Acute pain;Surgical pain  Pain Location: Hip  Pain Orientation: Right; Anterior  Pain Descriptors: Aching  Pain Frequency: Continuous  Non-Pharmaceutical Pain Intervention(s): Elevation; Emotional support; Ambulation/Increased Activity; Distraction  Vital Signs  Patient Currently in Pain: Yes       Orientation  Orientation  Overall Orientation Status: Within Normal Limits  Cognition      Objective   Bed mobility  Supine to Sit: Minimal assistance(for RLE mgmt)  Scooting: Modified independent  Transfers  Sit to Stand: Minimal Assistance(elevated bed height, cues for hand placement with fair follow thru)  Stand to sit: Minimal Assistance(cues to reach back for safety)  Bed to Chair: Contact guard assistance  Ambulation  Ambulation?: Yes  Ambulation 1  Surface: level tile  Device: Rolling Walker  Assistance:Contact guard assistance  Quality of Gait: decreased pace, mildly antalgic, cues to increase heel strike  Distance: 150ft  Stairs/Curb  Stairs?: Yes  Stairs  # Steps : 3  Stairs Height: 6\"  Rails: Bilateral  Device: No Device  Assistance: Contact guard assistance  Comment: cues for sequencing with fair follow thru, no LOB or buckling noted     Balance  Posture: Good  Sitting - Static: Good  Sitting - Dynamic: Good  Standing - Static: Fair;+  Standing - Dynamic: Fair       Exercises: Total Hip Arthroplasty Exercise Program: Quad sets, glut sets, ankle pumps, heel slides, short arc quads, hip abduction slides. Reps: 10x each, AAROM for heel slides   B gastroc stretch 30''x2     Assessment   Body structures, Functions, Activity limitations: Decreased functional mobility ; Decreased balance;Decreased endurance;Decreased strength  Assessment: Pt ambulating functional distances with RW and CGA; displays no LOB and demos ability to navigate stairs safely.    Prognosis: Good  Patient Education: hip precautions, HEP, safety with stairs  REQUIRES PT FOLLOW UP: Yes  Activity Tolerance  Activity Tolerance: Patient limited by endurance     Goals  Short term goals  Time Frame for Short term goals: 10 visits  Short term goal 1: transfers with SBA  Short term goal 2: amb 200 ft with a RW x SBA with WBAT R LE  Short term goal 3: ascend/descend 4 steps with SBA  Short term goal 4: total hip exercises x SBA  Patient Goals   Patient goals : Return home    Plan    Plan  Times per week: BID  Current Treatment Recommendations: Strengthening, Endurance Training, Gait Training, Functional Mobility Training, Stair training, Safety Education & Training  Safety Devices  Type of devices:  All fall risk precautions in place, Gait belt, Left in chair, Chair alarm in place, Call light within reach, Nurse notified  Restraints  Initially in place: No     Therapy Time   Individual Concurrent Group Co-treatment   Time In 1025         Time Out 1103         Minutes [address], PTA DC instructions

## 2022-06-30 ENCOUNTER — HOSPITAL ENCOUNTER (OUTPATIENT)
Age: 82
Setting detail: SPECIMEN
Discharge: HOME | End: 2022-06-30

## 2022-06-30 LAB
POC INR: 3.6
PROTHROMBIN TIME, POC: 43 SEC (ref 9.6–14.4)

## 2022-07-05 NOTE — PROGRESS NOTES
[facility]  [address] [address]  Dept: [phone]  Dept Fax: [phone]        Postoperative follow-up note    Subjective:   Laure Matos is a 79y.o. year old female who presents to our office today for postoperative followup regarding her   1. Primary osteoarthritis of right hip    2. Status post right hip replacement    . Chief Complaint   Patient presents with    Hip Pain     right RENETTA:4/9/2019     Laure Matos  is a 79y.o. year old female who presents to our office today for postoperative follow up after having undergone a right RENETTA on 4/9/2019. The patient denies fevers, chills, nausea, vomiting, diarrhea. The patient completed physical therapy. The patient is currently not walking with a cane or a walker and is very happy that she had the procedure done. Patient has no right hip pain. Review of Systems   Constitutional: Negative for activity change and fever. HENT: Negative for sneezing. Respiratory: Negative for cough and shortness of breath. Cardiovascular: Negative for chest pain. Gastrointestinal: Negative for vomiting. Musculoskeletal: Negative for arthralgias, joint swelling and myalgias. Skin: Negative for color change. Neurological: Negative for weakness and numbness. Psychiatric/Behavioral: Negative for sleep disturbance. Objective :   General: Laure Matos is a 79 y.o. female who is alert and oriented and sitting comfortably in our office. Ortho Exam  MS:   Patient ambulates independently without a limp noted to the right lower extremity. Evaluation of the right hip shows a fully healed anterior hip surgical incision. No obvious deformity, erythema, skin lesions, signs of infection noted. Small area of decreased sensation over the proximal anterior thigh noted. Patient has full range of motion of the right hip, right knee, right ankle.   Mild hip flexor weakness noted on the right lower Spoke to patient and she is agreeable. Orders for Cardiac cath with FFR given to scheduling to get scheduled. encounter. No orders of the defined types were placed in this encounter.       This note is created with the assistance of a speech recognition program.  While intending to generate a document that actually reflects the content of the visit, the document can still have some errors including those of syntax and sound a like substitutions which may escape proof reading.  In such instances, actual meaning can be extrapolated by contextual diversion      Electronically signed by Wes Huber PA-C on 6/16/2020 at 4:01 PM

## 2022-07-25 ENCOUNTER — HOSPITAL ENCOUNTER (OUTPATIENT)
Age: 82
Setting detail: SPECIMEN
Discharge: HOME | End: 2022-07-25

## 2022-07-25 LAB
POC INR: 3.4
PROTHROMBIN TIME, POC: 40.8 SEC (ref 9.6–14.4)

## 2022-08-23 ENCOUNTER — HOSPITAL ENCOUNTER (OUTPATIENT)
Age: 82
Setting detail: SPECIMEN
Discharge: HOME | End: 2022-08-23

## 2022-08-23 LAB
POC INR: 2.5
PROTHROMBIN TIME, POC: 30.3 SEC (ref 9.6–14.4)

## 2022-08-29 ENCOUNTER — OFFICE VISIT (OUTPATIENT)
Dept: ORTHOPEDIC SURGERY | Age: 82
End: 2022-08-29
Payer: MEDICARE

## 2022-08-29 VITALS — BODY MASS INDEX: 30.82 KG/M2 | HEIGHT: 65 IN | WEIGHT: 185 LBS

## 2022-08-29 DIAGNOSIS — M16.12 ARTHRITIS OF LEFT HIP: Primary | ICD-10-CM

## 2022-08-29 PROCEDURE — G8399 PT W/DXA RESULTS DOCUMENT: HCPCS | Performed by: ORTHOPAEDIC SURGERY

## 2022-08-29 PROCEDURE — 1090F PRES/ABSN URINE INCON ASSESS: CPT | Performed by: ORTHOPAEDIC SURGERY

## 2022-08-29 PROCEDURE — 99213 OFFICE O/P EST LOW 20 MIN: CPT | Performed by: ORTHOPAEDIC SURGERY

## 2022-08-29 PROCEDURE — 1036F TOBACCO NON-USER: CPT | Performed by: ORTHOPAEDIC SURGERY

## 2022-08-29 PROCEDURE — G8427 DOCREV CUR MEDS BY ELIG CLIN: HCPCS | Performed by: ORTHOPAEDIC SURGERY

## 2022-08-29 PROCEDURE — G8417 CALC BMI ABV UP PARAM F/U: HCPCS | Performed by: ORTHOPAEDIC SURGERY

## 2022-08-29 PROCEDURE — 1123F ACP DISCUSS/DSCN MKR DOCD: CPT | Performed by: ORTHOPAEDIC SURGERY

## 2022-08-30 NOTE — PROGRESS NOTES
regular. Symmetric chest excursion without outward deformity is noted. Skin: warm, well perfused  Psych:   Patient has good fund of knowledge and displays understanging of exam, diagnosis, and plan. Radiology: No results found. Assessment:      1. Arthritis of left hip       Plan:      Patient has mild radiographic changes of arthritis left hip. Her pain is reminiscent of hip pain she had with more advanced degenerative changes to the right hip. She would like to consider treatment. We discussed operative versus nonoperative treatment. Ultimately we both decided together to do a left hip arthrogram and intra-articular corticosteroid injection to see if we could help her with her left hip pain. Consideration for total hip arthroplasty could be made in the future if that does not work. The patient notes understanding and wishes to proceed. Follow up:No follow-ups on file. No orders of the defined types were placed in this encounter. No orders of the defined types were placed in this encounter. This note is created with the assistance of a speech recognition program.  While intending to generate a document that actually reflects the content of the visit, the document can still have some errors including those of syntax and sound a like substitutions which may escape proof reading.   In such instances, actual meaning can be extrapolated by contextual diversion      Electronically signed by Mikaela Perry DO, Gregoria Fam on 8/30/2022 at 3:21 PM

## 2022-08-31 DIAGNOSIS — M16.12 ARTHRITIS OF LEFT HIP: Primary | ICD-10-CM

## 2022-09-23 ENCOUNTER — HOSPITAL ENCOUNTER (OUTPATIENT)
Age: 82
Setting detail: SPECIMEN
Discharge: HOME | End: 2022-09-23

## 2022-09-23 LAB
POC INR: 3.1
PROTHROMBIN TIME, POC: 37.2 SEC (ref 9.6–14.4)

## 2022-09-30 ENCOUNTER — HOSPITAL ENCOUNTER (OUTPATIENT)
Dept: MAMMOGRAPHY | Age: 82
Discharge: HOME | End: 2022-10-02
Payer: MEDICARE

## 2022-09-30 DIAGNOSIS — Z12.31 VISIT FOR SCREENING MAMMOGRAM: ICD-10-CM

## 2022-09-30 PROCEDURE — 77063 BREAST TOMOSYNTHESIS BI: CPT

## 2022-10-02 DIAGNOSIS — R92.8 ABNORMAL MAMMOGRAM OF BOTH BREASTS: Primary | ICD-10-CM

## 2022-10-05 ENCOUNTER — HOSPITAL ENCOUNTER (OUTPATIENT)
Dept: GENERAL RADIOLOGY | Age: 82
Discharge: HOME | End: 2022-10-07
Payer: MEDICARE

## 2022-10-05 DIAGNOSIS — M16.12 ARTHRITIS OF LEFT HIP: ICD-10-CM

## 2022-10-05 PROCEDURE — 77002 NEEDLE LOCALIZATION BY XRAY: CPT | Performed by: ORTHOPAEDIC SURGERY

## 2022-10-05 PROCEDURE — 77002 NEEDLE LOCALIZATION BY XRAY: CPT

## 2022-10-05 PROCEDURE — 6360000004 HC RX CONTRAST MEDICATION: Performed by: ORTHOPAEDIC SURGERY

## 2022-10-05 PROCEDURE — 2500000003 HC RX 250 WO HCPCS: Performed by: ORTHOPAEDIC SURGERY

## 2022-10-05 PROCEDURE — 20610 DRAIN/INJ JOINT/BURSA W/O US: CPT

## 2022-10-05 PROCEDURE — 6360000002 HC RX W HCPCS: Performed by: ORTHOPAEDIC SURGERY

## 2022-10-05 PROCEDURE — 20610 DRAIN/INJ JOINT/BURSA W/O US: CPT | Performed by: ORTHOPAEDIC SURGERY

## 2022-10-05 RX ORDER — METHYLPREDNISOLONE ACETATE 80 MG/ML
80 INJECTION, SUSPENSION INTRA-ARTICULAR; INTRALESIONAL; INTRAMUSCULAR; SOFT TISSUE ONCE
Status: COMPLETED | OUTPATIENT
Start: 2022-10-05 | End: 2022-10-05

## 2022-10-05 RX ORDER — LIDOCAINE HYDROCHLORIDE 10 MG/ML
10 INJECTION, SOLUTION INFILTRATION; PERINEURAL ONCE
Status: COMPLETED | OUTPATIENT
Start: 2022-10-05 | End: 2022-10-05

## 2022-10-05 RX ORDER — BUPIVACAINE HYDROCHLORIDE 2.5 MG/ML
2 INJECTION, SOLUTION INFILTRATION; PERINEURAL ONCE
Status: COMPLETED | OUTPATIENT
Start: 2022-10-05 | End: 2022-10-05

## 2022-10-05 RX ADMIN — METHYLPREDNISOLONE ACETATE 80 MG: 80 INJECTION, SUSPENSION INTRA-ARTICULAR; INTRALESIONAL; INTRAMUSCULAR; SOFT TISSUE at 08:27

## 2022-10-05 RX ADMIN — BUPIVACAINE HYDROCHLORIDE 5 MG: 2.5 INJECTION, SOLUTION INFILTRATION; PERINEURAL at 08:25

## 2022-10-05 RX ADMIN — LIDOCAINE HYDROCHLORIDE 10 ML: 10 INJECTION, SOLUTION EPIDURAL; INFILTRATION; INTRACAUDAL; PERINEURAL at 08:24

## 2022-10-05 RX ADMIN — IOPAMIDOL 1 ML: 408 INJECTION, SOLUTION INTRATHECAL at 08:28

## 2022-10-05 ASSESSMENT — PAIN SCALES - GENERAL: PAINLEVEL_OUTOF10: 9

## 2022-10-05 NOTE — DISCHARGE INSTRUCTIONS
Orthopaedic Instructions:  -Weight bearing status: Weight bearing as tolerated with the left leg  -Okay for daily dressing changes until wound/surgical incision site is dry. Dressing changes can be performed with simple Band-aids or gauze pads secured with tape/ace bandages. Once you no longer see drainage from your wounds on your dressings, it is okay to shower. Do not scrub vigorously, just let water run over wound/surgical sites. Additionally, at this point one no longer needs to change dressings daily. It is important that you do not soak the wound/incision site underwater, though. This includes baths, hot tubs, swimming pools, etc.  -Always look for signs of compartment syndrome: pain out of proportion to the injury, pain not controlled with pain medication, numbness in digits, changing of color of digits (paleness). If these signs occur return to ED immediately for reassessment.  -Always work on ankle , toe motion to decrease swelling.  -Ice (20 minutes on and off 1 hour) and elevate to reduce swelling and throbbing pain.  -Call the office or come to Emergency Room if signs of infection appear (hot, swollen, red, draining pus, fever)  -Take medications as prescribed.  -Wean off narcotics (percocet/norco) as soon as possible. Do not take tylenol if still taking narcotics.  -Follow up with Dr. Annika Vera in his office as needed. Call [phone]  to schedule/confirm or with any questions/concerns.

## 2022-10-05 NOTE — OP NOTE
Operative Note      Patient: Kateryna Doherty  YOB: 1940  MRN: [id number]    Date of Procedure: 10/5/2022    Pre-Op Diagnosis: Left hip arthritis    Post-Op Diagnosis: Left hip arthritis       Procedures: Left hip arthrogram with intraarticular corticosteroid injection under fluoroscopy    Anesthesia: Local with 1% lidocaine, 10ml      Surgeons/Assistants: Dr. Lore Friedman DO, Isabela Perdue DO     Estimated Blood Loss: 0 cc     Complications: None     Specimen: Was Not Obtained     Indication for operation:  Patient is a 78 yo female who presented to the clinic with left hip pain. Hip x-rays show osteoarthritis of the left hip. At this time, the risks, benefits and alternatives to a diagnostic and therapeutic corticosteroid injection with local anesthesia was discussed with the patient. All questions were answered and patient wishes to proceed. Operative summary:  Patient was met in the preoperative holding area and the correct extremity was identified and marked. Informed consent was obtained. The risks included but not limited to infection, bleeding, nerve damage, skin pigment changes, and systemic manifestations were discussed in detail. The patient was escorted to the fluoroscopy suite. The patient was positioned then prepped and draped in the standard sterile fashion. 10 mL of 1% plain lidocaine was injected for anesthesia. A 20-gauge spinal needle was introduced under direct fluoroscopic imaging into the hip joint capsule. 2 mL's of radiopaque dye were injected and intra-articular position was confirmed under fluoroscopy. There was good filling visualized with the dye without filling defect. A mixture containing 2 ml 0.25% Marcaine and 80 mg of Depo-Medrol was injected. The injection site was cleaned and a Band-Aid was applied. The patient had immediate improvement of symptoms. Patient tolerated the procedure well and was escorted to the waiting/recover room.      Dr. Bossman Suarez was present during all key portions of the procedure.      Alex Ross DO PGY-1, Orthopedic Surgery Resident  [facility]son, [facility]

## 2022-10-06 PROBLEM — M16.12 ARTHRITIS OF LEFT HIP: Status: ACTIVE | Noted: 2022-10-06

## 2022-10-13 ENCOUNTER — HOSPITAL ENCOUNTER (OUTPATIENT)
Dept: ULTRASOUND IMAGING | Age: 82
Discharge: HOME | End: 2022-10-15
Payer: MEDICARE

## 2022-10-13 ENCOUNTER — HOSPITAL ENCOUNTER (OUTPATIENT)
Dept: MAMMOGRAPHY | Age: 82
Discharge: HOME | End: 2022-10-15
Payer: MEDICARE

## 2022-10-13 DIAGNOSIS — R92.8 ABNORMAL MAMMOGRAM OF BOTH BREASTS: ICD-10-CM

## 2022-10-13 PROCEDURE — G0279 TOMOSYNTHESIS, MAMMO: HCPCS

## 2022-10-13 PROCEDURE — 76642 ULTRASOUND BREAST LIMITED: CPT

## 2022-10-14 DIAGNOSIS — R92.8 ABNORMAL MAMMOGRAM: Primary | ICD-10-CM

## 2022-10-18 ENCOUNTER — HOSPITAL ENCOUNTER (OUTPATIENT)
Age: 82
Setting detail: SPECIMEN
Discharge: HOME | End: 2022-10-18

## 2022-10-18 LAB
POC INR: 3.5
PROTHROMBIN TIME, POC: 42.4 SEC (ref 9.6–14.4)

## 2022-11-07 ENCOUNTER — HOSPITAL ENCOUNTER (OUTPATIENT)
Age: 82
Setting detail: SPECIMEN
Discharge: HOME | End: 2022-11-07

## 2022-11-07 LAB
POC INR: 2
PROTHROMBIN TIME, POC: 23.9 SEC (ref 9.6–14.4)

## 2022-12-05 ENCOUNTER — HOSPITAL ENCOUNTER (OUTPATIENT)
Age: 82
Setting detail: SPECIMEN
Discharge: HOME | End: 2022-12-05

## 2022-12-05 LAB
POC INR: 2.2
PROTHROMBIN TIME, POC: 26.6 SEC (ref 9.6–14.4)

## 2022-12-19 ENCOUNTER — HOSPITAL ENCOUNTER (OUTPATIENT)
Age: 82
Setting detail: SPECIMEN
Discharge: HOME | End: 2022-12-19

## 2022-12-19 ENCOUNTER — NURSE ONLY (OUTPATIENT)
Dept: PRIMARY CARE CLINIC | Age: 82
End: 2022-12-19

## 2022-12-19 ENCOUNTER — OFFICE VISIT (OUTPATIENT)
Dept: FAMILY MEDICINE CLINIC | Age: 82
End: 2022-12-19
Payer: MEDICARE

## 2022-12-19 VITALS
HEIGHT: 65 IN | HEART RATE: 75 BPM | TEMPERATURE: 97.5 F | SYSTOLIC BLOOD PRESSURE: 134 MMHG | BODY MASS INDEX: 30.66 KG/M2 | OXYGEN SATURATION: 96 % | WEIGHT: 184 LBS | DIASTOLIC BLOOD PRESSURE: 80 MMHG

## 2022-12-19 DIAGNOSIS — D58.2 OTHER HEMOGLOBINOPATHIES (HCC): ICD-10-CM

## 2022-12-19 DIAGNOSIS — Z00.00 MEDICARE ANNUAL WELLNESS VISIT, SUBSEQUENT: Primary | ICD-10-CM

## 2022-12-19 DIAGNOSIS — I10 PRIMARY HYPERTENSION: ICD-10-CM

## 2022-12-19 DIAGNOSIS — G89.29 CHRONIC RIGHT SHOULDER PAIN: ICD-10-CM

## 2022-12-19 DIAGNOSIS — M54.9 MID BACK PAIN: ICD-10-CM

## 2022-12-19 DIAGNOSIS — M25.511 CHRONIC RIGHT SHOULDER PAIN: ICD-10-CM

## 2022-12-19 DIAGNOSIS — R79.89 ELEVATED SERUM CREATININE: Primary | ICD-10-CM

## 2022-12-19 DIAGNOSIS — C43.9 MALIGNANT MELANOMA, UNSPECIFIED SITE (HCC): ICD-10-CM

## 2022-12-19 DIAGNOSIS — I48.0 PAROXYSMAL ATRIAL FIBRILLATION (HCC): ICD-10-CM

## 2022-12-19 DIAGNOSIS — M54.9 MID BACK PAIN: Primary | ICD-10-CM

## 2022-12-19 LAB
ABSOLUTE EOS #: 0.2 K/UL (ref 0–0.44)
ABSOLUTE IMMATURE GRANULOCYTE: 0.06 K/UL (ref 0–0.3)
ABSOLUTE LYMPH #: 1.34 K/UL (ref 1.1–3.7)
ABSOLUTE MONO #: 0.65 K/UL (ref 0.1–1.2)
ALBUMIN SERPL-MCNC: 3.8 G/DL (ref 3.5–5.2)
ALBUMIN/GLOBULIN RATIO: 1.3 (ref 1–2.5)
ALP BLD-CCNC: 81 U/L (ref 35–104)
ALT SERPL-CCNC: 19 U/L (ref 5–33)
ANION GAP SERPL CALCULATED.3IONS-SCNC: 14 MMOL/L (ref 9–17)
AST SERPL-CCNC: 23 U/L
BASOPHILS # BLD: 1 % (ref 0–2)
BASOPHILS ABSOLUTE: 0.08 K/UL (ref 0–0.2)
BILIRUB SERPL-MCNC: 0.5 MG/DL (ref 0.3–1.2)
BUN BLDV-MCNC: 34 MG/DL (ref 8–23)
CALCIUM SERPL-MCNC: 9.1 MG/DL (ref 8.6–10.4)
CHLORIDE BLD-SCNC: 102 MMOL/L (ref 98–107)
CHOLESTEROL/HDL RATIO: 3.8
CHOLESTEROL: 202 MG/DL
CO2: 26 MMOL/L (ref 20–31)
CREAT SERPL-MCNC: 1.68 MG/DL (ref 0.5–0.9)
EOSINOPHILS RELATIVE PERCENT: 4 % (ref 1–4)
GFR SERPL CREATININE-BSD FRML MDRD: 30 ML/MIN/1.73M2
GLUCOSE BLD-MCNC: 87 MG/DL (ref 70–99)
HCT VFR BLD CALC: 43.2 % (ref 36.3–47.1)
HDLC SERPL-MCNC: 53 MG/DL
HEMOGLOBIN: 13.4 G/DL (ref 11.9–15.1)
IMMATURE GRANULOCYTES: 1 %
LDL CHOLESTEROL: 121 MG/DL (ref 0–130)
LYMPHOCYTES # BLD: 23 % (ref 24–43)
MCH RBC QN AUTO: 29.4 PG (ref 25.2–33.5)
MCHC RBC AUTO-ENTMCNC: 31 G/DL (ref 28.4–34.8)
MCV RBC AUTO: 94.7 FL (ref 82.6–102.9)
MONOCYTES # BLD: 11 % (ref 3–12)
NRBC AUTOMATED: 0 PER 100 WBC
PDW BLD-RTO: 14.5 % (ref 11.8–14.4)
PLATELET # BLD: 238 K/UL (ref 138–453)
PMV BLD AUTO: 11.5 FL (ref 8.1–13.5)
POTASSIUM SERPL-SCNC: 4.4 MMOL/L (ref 3.7–5.3)
RBC # BLD: 4.56 M/UL (ref 3.95–5.11)
RBC # BLD: ABNORMAL 10*6/UL
SEG NEUTROPHILS: 60 % (ref 36–65)
SEGMENTED NEUTROPHILS ABSOLUTE COUNT: 3.44 K/UL (ref 1.5–8.1)
SODIUM BLD-SCNC: 142 MMOL/L (ref 135–144)
TOTAL PROTEIN: 6.7 G/DL (ref 6.4–8.3)
TRIGL SERPL-MCNC: 141 MG/DL
TSH SERPL DL<=0.05 MIU/L-ACNC: 2.15 UIU/ML (ref 0.3–5)
WBC # BLD: 5.8 K/UL (ref 3.5–11.3)

## 2022-12-19 PROCEDURE — G8484 FLU IMMUNIZE NO ADMIN: HCPCS | Performed by: PHYSICIAN ASSISTANT

## 2022-12-19 PROCEDURE — G0439 PPPS, SUBSEQ VISIT: HCPCS | Performed by: PHYSICIAN ASSISTANT

## 2022-12-19 PROCEDURE — 3078F DIAST BP <80 MM HG: CPT | Performed by: PHYSICIAN ASSISTANT

## 2022-12-19 PROCEDURE — 3074F SYST BP LT 130 MM HG: CPT | Performed by: PHYSICIAN ASSISTANT

## 2022-12-19 PROCEDURE — 99213 OFFICE O/P EST LOW 20 MIN: CPT | Performed by: PHYSICIAN ASSISTANT

## 2022-12-19 PROCEDURE — 1123F ACP DISCUSS/DSCN MKR DOCD: CPT | Performed by: PHYSICIAN ASSISTANT

## 2022-12-19 RX ORDER — CETIRIZINE HYDROCHLORIDE 10 MG/1
10 TABLET ORAL DAILY
COMMUNITY

## 2022-12-19 ASSESSMENT — PATIENT HEALTH QUESTIONNAIRE - PHQ9
SUM OF ALL RESPONSES TO PHQ QUESTIONS 1-9: 0
SUM OF ALL RESPONSES TO PHQ9 QUESTIONS 1 & 2: 0
1. LITTLE INTEREST OR PLEASURE IN DOING THINGS: 0
SUM OF ALL RESPONSES TO PHQ QUESTIONS 1-9: 0
2. FEELING DOWN, DEPRESSED OR HOPELESS: 0

## 2022-12-19 ASSESSMENT — LIFESTYLE VARIABLES
HOW MANY STANDARD DRINKS CONTAINING ALCOHOL DO YOU HAVE ON A TYPICAL DAY: PATIENT DOES NOT DRINK
HOW OFTEN DO YOU HAVE A DRINK CONTAINING ALCOHOL: NEVER

## 2022-12-19 NOTE — PATIENT INSTRUCTIONS
Learning About Vision Tests  What are vision tests? The four most common vision tests are visual acuity tests, refraction, visual field tests, and color vision tests. Visual acuity (sharpness) tests  These tests are used: To see if you need glasses or contact lenses. To monitor an eye problem. To check an eye injury. Visual acuity tests are done as part of routine exams. You may also have this test when you get your 's license or apply for some types of jobs. Visual field tests  These tests are used: To check for vision loss in any area of your range of vision. To screen for certain eye diseases. To look for nerve damage after a stroke, head injury, or other problem that could reduce blood flow to the brain. Refraction and color tests  A refraction test is done to find the right prescription for glasses and contact lenses. A color vision test is done to check for color blindness. Color vision is often tested as part of a routine exam. You may also have this test when you apply for a job where recognizing different colors is important, such as , electronics, or the [employer]. How are vision tests done? Visual acuity test   You cover one eye at a time. You read aloud from a wall chart across the room. You read aloud from a small card that you hold in your hand. Refraction   You look into a special device. The device puts lenses of different strengths in front of each eye to see how strong your glasses or contact lenses need to be. Visual field tests   Your doctor may have you look through special machines. Or your doctor may simply have you stare straight ahead while they move a finger into and out of your field of vision. Color vision test   You look at pieces of printed test patterns in various colors. You say what number or symbol you see. Your doctor may have you trace the number or symbol using a pointer. How do these tests feel?   There is very little chance of having a problem from this test. If dilating drops are used for a vision test, they may make the eyes sting and cause a medicine taste in the mouth. Follow-up care is a key part of your treatment and safety. Be sure to make and go to all appointments, and call your doctor if you are having problems. It's also a good idea to know your test results and keep a list of the medicines you take. Where can you learn more? Go to http://www.mujica.com/ and enter G551 to learn more about \"Learning About Vision Tests. \"  Current as of: October 12, 2022               Content Version: 13.5  © 5394-4189 [employer]. Care instructions adapted under license by [address] If you have questions about a medical condition or this instruction, always ask your healthcare professional. [address] any warranty or liability for your use of this information. Advance Directives: Care Instructions  Overview  An advance directive is a legal way to state your wishes at the end of your life. It tells your family and your doctor what to do if you can't say what you want. There are two main types of advance directives. You can change them any time your wishes change. Living will. This form tells your family and your doctor your wishes about life support and other treatment. The form is also called a declaration. Medical power of . This form lets you name a person to make treatment decisions for you when you can't speak for yourself. This person is called a health care agent (health care proxy, health care surrogate). The form is also called a durable power of  for health care. If you do not have an advance directive, decisions about your medical care may be made by a family member, or by a doctor or a  who doesn't know you. It may help to think of an advance directive as a gift to the people who care for you.  If you have one, they won't have to make tough decisions by themselves. For more information, including forms for your state, see the [address] website (www.caringinfo.org/planning/advance-directives/). Follow-up care is a key part of your treatment and safety. Be sure to make and go to all appointments, and call your doctor if you are having problems. It's also a good idea to know your test results and keep a list of the medicines you take. What should you include in an advance directive? Many states have a unique advance directive form. (It may ask you to address specific issues.) Or you might use a universal form that's approved by many states. If your form doesn't tell you what to address, it may be hard to know what to include in your advance directive. Use the questions below to help you get started. Who do you want to make decisions about your medical care if you are not able to? What life-support measures do you want if you have a serious illness that gets worse over time or can't be cured? What are you most afraid of that might happen? (Maybe you're afraid of having pain, losing your independence, or being kept alive by machines.)  Where would you prefer to die? (Your home? A hospital? A nursing home?)  Do you want to donate your organs when you die? Do you want certain Restorationist practices performed before you die? When should you call for help? Be sure to contact your doctor if you have any questions. Where can you learn more? Go to http://www.mujica.com/ and enter R264 to learn more about \"Advance Directives: Care Instructions. \"  Current as of: June 16, 2022               Content Version: 13.5  © 2239-7701 [employer]. Care instructions adapted under license by [facility] ([facility]). If you have questions about a medical condition or this instruction, always ask your healthcare professional. [address] any warranty or liability for your use of this information.       Personalized Preventive Plan for Lilian Dee - 12/19/2022  Medicare offers a range of preventive health benefits. Some of the tests and screenings are paid in full while other may be subject to a deductible, co-insurance, and/or copay. Some of these benefits include a comprehensive review of your medical history including lifestyle, illnesses that may run in your family, and various assessments and screenings as appropriate. After reviewing your medical record and screening and assessments performed today your provider may have ordered immunizations, labs, imaging, and/or referrals for you. A list of these orders (if applicable) as well as your Preventive Care list are included within your After Visit Summary for your review. Other Preventive Recommendations:    A preventive eye exam performed by an eye specialist is recommended every 1-2 years to screen for glaucoma; cataracts, macular degeneration, and other eye disorders. A preventive dental visit is recommended every 6 months. Try to get at least 150 minutes of exercise per week or 10,000 steps per day on a pedometer . Order or download the FREE \"Exercise & Physical Activity: Your Everyday Guide\" from [employer]. Call 4-130.116.1727 or search [employer] online. You need 4130-0120 mg of calcium and 9651-2813 IU of vitamin D per day. It is possible to meet your calcium requirement with diet alone, but a vitamin D supplement is usually necessary to meet this goal.  When exposed to the sun, use a sunscreen that protects against both UVA and UVB radiation with an SPF of 30 or greater. Reapply every 2 to 3 hours or after sweating, drying off with a towel, or swimming. Always wear a seat belt when traveling in a car. Always wear a helmet when riding a bicycle or motorcycle.

## 2022-12-19 NOTE — PROGRESS NOTES
Visit Information    Have you changed or started any medications since your last visit including any over-the-counter medicines, vitamins, or herbal medicines? no   Are you having any side effects from any of your medications? -  no  Have you stopped taking any of your medications? Is so, why? -  no    Have you seen any other physician or provider since your last visit? No  Have you had any other diagnostic tests since your last visit? No  Have you been seen in the emergency room and/or had an admission to a hospital since we last saw you? No  Have you had your routine dental cleaning in the past 6 months? no    Have you activated your [employer] account? If not, what are your barriers?  Yes     Patient Care Team:  Ez Perace PA-C as PCP - [facility]  Ez Pearce PA-C as PCP - [facility]  Trice Cline MD as Consulting Physician (Cardiology)  Agnes Flowers MD as Consulting Physician (Oral Maxillofacial Surgery)    Medical History Review  Past Medical, Family, and Social History reviewed and  contribute to the patient presenting condition    Health Maintenance   Topic Date Due    COVID-19 Vaccine (5 - Booster for Moderna series) 06/23/2022    Flu vaccine (1) 08/01/2022    Depression Screen  12/06/2022    Annual Wellness Visit (AWV)  12/07/2022    DTaP/Tdap/Td vaccine (2 - Td or Tdap) 06/21/2032    DEXA (modify frequency per FRAX score)  Completed    Shingles vaccine  Completed    Pneumococcal 65+ years Vaccine  Completed    Hepatitis A vaccine  Aged Out    Hib vaccine  Aged Out    Meningococcal (ACWY) vaccine  Aged Out

## 2022-12-19 NOTE — PROGRESS NOTES
Medicare Annual Wellness Visit    Hira Mejia is here for Medicare AWV and Shoulder Pain (Right x about 4 months- no injury to her knowledge)    Assessment & Plan   Medicare annual wellness visit, subsequent  Paroxysmal atrial fibrillation (HCC)  Other hemoglobinopathies ([facility]  Malignant melanoma, unspecified site ([facility]  Chronic right shoulder pain  -     XR SHOULDER RIGHT (MIN 2 VIEWS); Future  Mid back pain  -     XR THORACIC SPINE (3 VIEWS); Future  Primary hypertension  -     Comprehensive Metabolic Panel; Future  -     Lipid Panel; Future  -     CBC with Auto Differential; Future  -     TSH with Reflex; Future        Recommend healthy diet-low carb/calorie diet, healthy whole foods. Regular exercise encouraged. Recommendation for 150min of exercise a week. Can be divided however convenient. Recommend healthy sleep habit. Try and go to bed at the same time and wake up at the same time for the most restfull pattern. Also recommend regular healthy fluid intake. Water is the best at hydrating us. Encourage minimal caffeine and pop/soda use    Continue with cardiologist as planned. Patient has recently seen her dermatologist as well. Update annual labs, order given  Will check x-ray of shoulder and T-spine at this time due to pain  May consider referral to Ortho if arthritis is severe  Patient agreed with treatment plan    Recommendations for Preventive Services Due: see orders and patient instructions/AVS.  Recommended screening schedule for the next 5-10 years is provided to the patient in written form: see Patient Instructions/AVS.     Return for Medicare Annual Wellness Visit in 1 year. Subjective   The following acute and/or chronic problems were also addressed today:  AWV  Right shoulder pain and mid back pain for the last 3 or more months. She has been doing exercises and stretches at the [facility] 3 times a week. She notes the right shoulder ROM is worsening.  Has been using roll on OTC heat medication and tylenol. No noted numbness or tingling. No loss of strength noted. Still walking unassisted    Patient's complete Health Risk Assessment and screening values have been reviewed and are found in Flowsheets. The following problems were reviewed today and where indicated follow up appointments were made and/or referrals ordered. Positive Risk Factor Screenings with Interventions:               General HRA Questions:  Select all that apply: (!) New or Increased Pain    Pain Interventions:  See above       Weight and Activity:  Physical Activity: Sufficiently Active    Days of Exercise per Week: 3 days    Minutes of Exercise per Session: 60 min     On average, how many days per week do you engage in moderate to strenuous exercise (like a brisk walk)?: 3 days  Have you lost any weight without trying in the past 3 months?: No  Body mass index: (!) 30.62    Obesity Interventions:  Patient comments: exercising 3 times a week at the [facility]  See AVS for additional education material  See A/P for plan and any pertinent orders     Hearing Screen:  Do you or your family notice any trouble with your hearing that hasn't been managed with hearing aids?: (!) Yes    Interventions:  Patient declines assessment  See AVS for additional education material  See A/P for any pertinent orders    Vision Screen:  Do you have difficulty driving, watching TV, or doing any of your daily activities because of your eyesight?: No  Have you had an eye exam within the past year?: (!) No  No results found.     Interventions:   Patient encouraged to make appointment with their eye specialist  See AVS for additional education material  See A/P for any pertinent orders                Objective   Vitals:    12/19/22 0815   BP: 134/80   Site: Left Upper Arm   Position: Sitting   Cuff Size: Large Adult   Pulse: 75   Temp: 97.5 °F (36.4 °C)   TempSrc: Tympanic   SpO2: 96%   Weight: 184 lb (83.5 kg)   Height: 5' 5\" (1.651 m)      Body mass index is 30.62 kg/m². General Appearance: alert and oriented to person, place and time, well-developed and well-nourished, in no acute distress  Skin: warm and dry, no rash or erythema  Head: normocephalic and atraumatic  ENT: tympanic membrane, external ear and ear canal normal bilaterally, oropharynx clear and moist with normal mucous membranes  Pulmonary/Chest: clear to auscultation bilaterally- no wheezes, rales or rhonchi, normal air movement, no respiratory distress  Cardiovascular: normal rate, regular rhythm, normal S1 and S2, and no murmurs  Abdomen: soft, non-tender, non-distended, normal bowel sounds, no masses or organomegaly  Extremities: no edema and right shoulder tenderness and decreased ROM due to pain. Crepitus present on exam  Tender mid thoracic paraspinal muscles bilaterally with palpation. No Known Allergies  Prior to Visit Medications    Medication Sig Taking?  Authorizing Provider   FIBER COMPLETE PO Take by mouth Yes Historical Provider, MD   cetirizine (ZYRTEC) 10 MG tablet Take 10 mg by mouth daily Yes Historical Provider, MD   RESTASIS MULTIDOSE 0.05 % ophthalmic emulsion  Yes Historical Provider, MD   pantoprazole (PROTONIX) 40 MG tablet Take 40 mg by mouth daily Yes Historical Provider, MD   calcium carbonate-vitamin D 600-200 MG-UNIT TABS Take 1 tablet by mouth daily Yes Historical Provider, MD   Multiple Vitamins-Minerals (CENTRUM SILVER) TABS Take 1 tablet by mouth daily Yes Historical Provider, MD   fluticasone (FLONASE) 50 MCG/ACT nasal spray 1 spray by Each Nostril route daily Yes Historical Provider, MD   amiodarone (CORDARONE) 200 MG tablet Take 200 mg by mouth daily Yes Historical Provider, MD   fluticasone-umeclidin-vilant (TRELEGY ELLIPTA) 100-62.5-25 MCG/INH AEPB Inhale 1 puff into the lungs daily Yes Historical Provider, MD   ferrous sulfate 325 (65 Fe) MG tablet Take 1 tablet by mouth 2 times daily (before meals) Yes Caprice NurseMD thomas (SINGULAIR) 10 MG tablet Take 10 mg by mouth nightly Yes Historical Provider, MD   triamterene-hydrochlorothiazide (MAXZIDE-25) 37.5-25 MG per tablet Take 1 tablet by mouth daily Yes Historical Provider, MD   diltiazem (CARDIZEM CD) 240 MG ER capsule Take 240 mg by mouth daily. Yes DUDLEY Cortez CNP   aspirin 81 MG tablet Take 81 mg by mouth daily Will stop 7 days before surgery Yes Historical Provider, MD   azelastine (ASTELIN) 137 MCG/SPRAY nasal spray 1 spray by Nasal route 2 times daily. Use in each nostril as directed Yes Historical Provider, MD   nitroGLYCERIN (NITRODUR) 0.1 MG/HR Place 1 patch onto the skin daily.  Yes Historical Provider, MD   warfarin (COUMADIN) 5 MG tablet Take 7.5 mg by mouth Will stop 4 day before surgery Yes Historical Provider, MD Aguirre (Including outside providers/suppliers regularly involved in providing care):   Patient Care Team:  Caleb Denis PA-C as PCP - General  Caleb Denis PA-C as PCP - REHABILITATION [facility] Empaneled Provider  Roma Higginbotham MD as Consulting Physician (Cardiology)  Gunjan Crowley MD as Consulting Physician (Oral Maxillofacial Surgery)     Reviewed and updated this visit:  Tobacco  Allergies  Meds  Problems  Med Hx  Surg Hx  Soc Hx  Fam Hx

## 2022-12-21 DIAGNOSIS — G89.29 CHRONIC RIGHT SHOULDER PAIN: Primary | ICD-10-CM

## 2022-12-21 DIAGNOSIS — M25.511 CHRONIC RIGHT SHOULDER PAIN: Primary | ICD-10-CM

## 2022-12-28 ENCOUNTER — HOSPITAL ENCOUNTER (OUTPATIENT)
Age: 82
Setting detail: SPECIMEN
Discharge: HOME | End: 2022-12-28

## 2022-12-28 DIAGNOSIS — R79.89 ELEVATED SERUM CREATININE: ICD-10-CM

## 2022-12-29 LAB
ANION GAP SERPL CALCULATED.3IONS-SCNC: 13 MMOL/L (ref 9–17)
BUN BLDV-MCNC: 25 MG/DL (ref 8–23)
CALCIUM SERPL-MCNC: 8.6 MG/DL (ref 8.6–10.4)
CHLORIDE BLD-SCNC: 100 MMOL/L (ref 98–107)
CO2: 25 MMOL/L (ref 20–31)
CREAT SERPL-MCNC: 1.28 MG/DL (ref 0.5–0.9)
GFR SERPL CREATININE-BSD FRML MDRD: 42 ML/MIN/1.73M2
GLUCOSE BLD-MCNC: 93 MG/DL (ref 70–99)
POTASSIUM SERPL-SCNC: 3.7 MMOL/L (ref 3.7–5.3)
SODIUM BLD-SCNC: 138 MMOL/L (ref 135–144)

## 2023-01-02 DIAGNOSIS — R79.89 ELEVATED SERUM CREATININE: Primary | ICD-10-CM

## 2023-01-09 ENCOUNTER — HOSPITAL ENCOUNTER (OUTPATIENT)
Age: 83
Setting detail: SPECIMEN
Discharge: HOME | End: 2023-01-09

## 2023-01-09 LAB
POC INR: 3.2
PROTHROMBIN TIME, POC: 37.9 SEC (ref 9.6–14.4)

## 2023-02-01 ENCOUNTER — HOSPITAL ENCOUNTER (OUTPATIENT)
Dept: GENERAL RADIOLOGY | Age: 83
Discharge: HOME | End: 2023-02-03
Payer: MEDICARE

## 2023-02-01 ENCOUNTER — HOSPITAL ENCOUNTER (OUTPATIENT)
Age: 83
Setting detail: SPECIMEN
Discharge: HOME | End: 2023-02-01

## 2023-02-01 ENCOUNTER — OFFICE VISIT (OUTPATIENT)
Dept: FAMILY MEDICINE CLINIC | Age: 83
End: 2023-02-01
Payer: MEDICARE

## 2023-02-01 ENCOUNTER — HOSPITAL ENCOUNTER (OUTPATIENT)
Age: 83
Discharge: HOME | End: 2023-02-03
Payer: MEDICARE

## 2023-02-01 VITALS
BODY MASS INDEX: 29.32 KG/M2 | WEIGHT: 176 LBS | TEMPERATURE: 97 F | SYSTOLIC BLOOD PRESSURE: 130 MMHG | DIASTOLIC BLOOD PRESSURE: 80 MMHG | HEART RATE: 74 BPM | OXYGEN SATURATION: 98 % | HEIGHT: 65 IN

## 2023-02-01 DIAGNOSIS — N30.01 ACUTE CYSTITIS WITH HEMATURIA: ICD-10-CM

## 2023-02-01 DIAGNOSIS — R10.9 FLANK PAIN: Primary | ICD-10-CM

## 2023-02-01 DIAGNOSIS — R10.9 FLANK PAIN: ICD-10-CM

## 2023-02-01 LAB
BILIRUBIN, POC: ABNORMAL
BLOOD URINE, POC: ABNORMAL
CLARITY, POC: ABNORMAL
COLOR, POC: YELLOW
GLUCOSE URINE, POC: ABNORMAL
KETONES, POC: ABNORMAL
LEUKOCYTE EST, POC: ABNORMAL
NITRITE, POC: ABNORMAL
PH, POC: 7
PROTEIN, POC: ABNORMAL
SPECIFIC GRAVITY, POC: 1.02
UROBILINOGEN, POC: 0.2

## 2023-02-01 PROCEDURE — 74018 RADEX ABDOMEN 1 VIEW: CPT

## 2023-02-01 PROCEDURE — 99213 OFFICE O/P EST LOW 20 MIN: CPT | Performed by: PHYSICIAN ASSISTANT

## 2023-02-01 PROCEDURE — G8427 DOCREV CUR MEDS BY ELIG CLIN: HCPCS | Performed by: PHYSICIAN ASSISTANT

## 2023-02-01 PROCEDURE — 1090F PRES/ABSN URINE INCON ASSESS: CPT | Performed by: PHYSICIAN ASSISTANT

## 2023-02-01 PROCEDURE — 3079F DIAST BP 80-89 MM HG: CPT | Performed by: PHYSICIAN ASSISTANT

## 2023-02-01 PROCEDURE — G8399 PT W/DXA RESULTS DOCUMENT: HCPCS | Performed by: PHYSICIAN ASSISTANT

## 2023-02-01 PROCEDURE — G8484 FLU IMMUNIZE NO ADMIN: HCPCS | Performed by: PHYSICIAN ASSISTANT

## 2023-02-01 PROCEDURE — 81002 URINALYSIS NONAUTO W/O SCOPE: CPT | Performed by: PHYSICIAN ASSISTANT

## 2023-02-01 PROCEDURE — 1036F TOBACCO NON-USER: CPT | Performed by: PHYSICIAN ASSISTANT

## 2023-02-01 PROCEDURE — 1123F ACP DISCUSS/DSCN MKR DOCD: CPT | Performed by: PHYSICIAN ASSISTANT

## 2023-02-01 PROCEDURE — G8417 CALC BMI ABV UP PARAM F/U: HCPCS | Performed by: PHYSICIAN ASSISTANT

## 2023-02-01 PROCEDURE — 3075F SYST BP GE 130 - 139MM HG: CPT | Performed by: PHYSICIAN ASSISTANT

## 2023-02-01 RX ORDER — CEPHALEXIN 500 MG/1
500 CAPSULE ORAL 3 TIMES DAILY
Qty: 30 CAPSULE | Refills: 0 | Status: SHIPPED | OUTPATIENT
Start: 2023-02-01 | End: 2023-02-11

## 2023-02-01 SDOH — ECONOMIC STABILITY: FOOD INSECURITY: WITHIN THE PAST 12 MONTHS, YOU WORRIED THAT YOUR FOOD WOULD RUN OUT BEFORE YOU GOT MONEY TO BUY MORE.: OFTEN TRUE

## 2023-02-01 SDOH — ECONOMIC STABILITY: HOUSING INSECURITY
IN THE LAST 12 MONTHS, WAS THERE A TIME WHEN YOU DID NOT HAVE A STEADY PLACE TO SLEEP OR SLEPT IN A SHELTER (INCLUDING NOW)?: NO

## 2023-02-01 SDOH — ECONOMIC STABILITY: FOOD INSECURITY: WITHIN THE PAST 12 MONTHS, THE FOOD YOU BOUGHT JUST DIDN'T LAST AND YOU DIDN'T HAVE MONEY TO GET MORE.: OFTEN TRUE

## 2023-02-01 SDOH — ECONOMIC STABILITY: INCOME INSECURITY: HOW HARD IS IT FOR YOU TO PAY FOR THE VERY BASICS LIKE FOOD, HOUSING, MEDICAL CARE, AND HEATING?: NOT HARD AT ALL

## 2023-02-01 ASSESSMENT — ENCOUNTER SYMPTOMS
COUGH: 0
BLOOD IN STOOL: 0
VOMITING: 0
CONSTIPATION: 0
COLOR CHANGE: 0
ABDOMINAL PAIN: 0
NAUSEA: 0
SHORTNESS OF BREATH: 0
DIARRHEA: 0

## 2023-02-01 ASSESSMENT — PATIENT HEALTH QUESTIONNAIRE - PHQ9
SUM OF ALL RESPONSES TO PHQ QUESTIONS 1-9: 0
SUM OF ALL RESPONSES TO PHQ QUESTIONS 1-9: 0
SUM OF ALL RESPONSES TO PHQ9 QUESTIONS 1 & 2: 0
1. LITTLE INTEREST OR PLEASURE IN DOING THINGS: 0
SUM OF ALL RESPONSES TO PHQ QUESTIONS 1-9: 0
SUM OF ALL RESPONSES TO PHQ QUESTIONS 1-9: 0
2. FEELING DOWN, DEPRESSED OR HOPELESS: 0

## 2023-02-01 NOTE — PROGRESS NOTES
[id number]  [address] [facility]  [facility] [address] [address]  Dept: [phone]  Dept Fax: [phone]    Tamra Hernandez is a 79 y.o. female who presents today for her medical conditions/complaintsas noted below. Tamra Hernandez is c/o of   Chief Complaint   Patient presents with    Back Pain     Starts at bra line and moves around to the left side x 4 days, the day prior to starting he was cleaning her bedroom real good and lifting heavy drawers    Flank Pain     Left side         HPI:     HPI    Patient is here reporting left flank pain and mid back pain for the last 4 days. She had been doing some cleaning in her home before symptoms started but no trauma. Felt fine later that day. She has been using OTC Tylenol which has been providing some benefit. Heat or ice seems to worsen the symptoms. She has not noticed a difference with any change in position. She reports no change in bowel movements or urination. No previous history of frequent UTIs or kidney stones.     No results found for: LABA1C          ( goal A1Cis < 7)   No results found for: LABMICR  LDL Cholesterol (mg/dL)   Date Value   12/19/2022 121   12/09/2021 108   11/10/2020 121       (goal LDL is <100)   AST (U/L)   Date Value   12/19/2022 23     ALT (U/L)   Date Value   12/19/2022 19     BUN (mg/dL)   Date Value   12/28/2022 25 (H)     BP Readings from Last 3 Encounters:   02/01/23 130/80   12/19/22 134/80   05/24/22 (!) 217/65          (goal 120/80)    Past Medical History:   Diagnosis Date    Allergic rhinitis     Arthritis     Asthma     Atrial fibrillation (HCC)     GERD (gastroesophageal reflux disease)     Hypertension     Melanoma ([address]     on nose    Myocardial infarction ([address] 2008    Wears glasses     Wears partial dentures     upper      Past Surgical History:   Procedure Laterality Date    APPENDECTOMY      BREAST SURGERY      CARDIAC SURGERY      CARPAL TUNNEL RELEASE Right COLONOSCOPY N/A 2019    COLONOSCOPY POLYPECTOMY REMOVAL HOT BIOPSY AND RESOLUTION CLIP performed by Vasile Grewal MD at [address]      umbilical    HYSTERECTOMY (CERVIX STATUS UNKNOWN)      JOINT REPLACEMENT      NECK SURGERY      C5-C6    NOSE SURGERY      cancer removed    OTHER SURGICAL HISTORY      heart surg/growth in heart sac    TONSILLECTOMY      TOTAL HIP ARTHROPLASTY Right 2019    TOTAL HIP ARTHROPLASTY Right 2019    HIP TOTAL ARTHROPLASTY - MEDACTA, C-ARM, MEDACTA TABLE, NSA= GENERAL VS SPINAL, FASCIA ILLIACA BLOCK, *STANDARD performed by Karissa Gaytan DO at [address] N/A 2019    EGD DIAGNOSTIC ONLY performed by Vasile Grewal MD at [facility]       Family History   Problem Relation Age of Onset    COPD Mother     Asthma Mother     Cancer Mother     Cancer Father     COPD Sister     Emphysema Sister        Social History     Tobacco Use    Smoking status: Former     Packs/day: 1.00     Years: 20.00     Pack years: 20.00     Types: Cigarettes     Quit date: 1981     Years since quittin.1    Smokeless tobacco: Never   Substance Use Topics    Alcohol use: No      Current Outpatient Medications   Medication Sig Dispense Refill    cephALEXin (KEFLEX) 500 MG capsule Take 1 capsule by mouth 3 times daily for 10 days 30 capsule 0    FIBER COMPLETE PO Take by mouth      cetirizine (ZYRTEC) 10 MG tablet Take 10 mg by mouth daily      RESTASIS MULTIDOSE 0.05 % ophthalmic emulsion       pantoprazole (PROTONIX) 40 MG tablet Take 40 mg by mouth daily      calcium carbonate-vitamin D 600-200 MG-UNIT TABS Take 1 tablet by mouth daily      Multiple Vitamins-Minerals (CENTRUM SILVER) TABS Take 1 tablet by mouth daily      fluticasone (FLONASE) 50 MCG/ACT nasal spray 1 spray by Each Nostril route daily      amiodarone (CORDARONE) 200 MG tablet Take 200 mg by mouth daily      fluticasone-umeclidin-vilant (Nancylee Stapalfredo ELLIPTA) 100-62.5-25 MCG/INH AEPB Inhale 1 puff into the lungs daily      ferrous sulfate 325 (65 Fe) MG tablet Take 1 tablet by mouth 2 times daily (before meals) 60 tablet 6    montelukast (SINGULAIR) 10 MG tablet Take 10 mg by mouth nightly      triamterene-hydrochlorothiazide (MAXZIDE-25) 37.5-25 MG per tablet Take 1 tablet by mouth daily      diltiazem (CARDIZEM CD) 240 MG ER capsule Take 240 mg by mouth daily. aspirin 81 MG tablet Take 81 mg by mouth daily Will stop 7 days before surgery      azelastine (ASTELIN) 137 MCG/SPRAY nasal spray 1 spray by Nasal route 2 times daily. Use in each nostril as directed      nitroGLYCERIN (NITRODUR) 0.1 MG/HR Place 1 patch onto the skin daily. warfarin (COUMADIN) 5 MG tablet Take 7.5 mg by mouth Will stop 4 day before surgery       No current facility-administered medications for this visit. No Known Allergies    Health Maintenance   Topic Date Due    COVID-19 Vaccine (5 - Booster for Moderna series) 06/23/2022    Flu vaccine (1) 08/01/2022    Depression Screen  12/19/2023    DTaP/Tdap/Td vaccine (2 - Td or Tdap) 06/21/2032    DEXA (modify frequency per FRAX score)  Completed    Shingles vaccine  Completed    Pneumococcal 65+ years Vaccine  Completed    Hepatitis A vaccine  Aged Out    Hib vaccine  Aged Out    Meningococcal (ACWY) vaccine  Aged Out       Subjective:     Review of Systems   Constitutional:  Negative for activity change, appetite change, fatigue and fever. /80 (Site: Left Upper Arm, Position: Sitting, Cuff Size: Large Adult)   Pulse 74   Temp 97 °F (36.1 °C) (Tympanic)   Ht 5' 5\" (1.651 m)   Wt 176 lb (79.8 kg)   SpO2 98%   BMI 29.29 kg/m²    Respiratory:  Negative for cough and shortness of breath. Cardiovascular:  Negative for chest pain. Gastrointestinal:  Negative for abdominal pain, blood in stool, constipation, diarrhea, nausea and vomiting. Genitourinary:  Positive for flank pain.  Negative for dysuria, frequency and urgency. Occasional dribbling   Skin:  Negative for color change, pallor, rash and wound. Neurological:  Negative for weakness. Hematological:  Negative for adenopathy. Psychiatric/Behavioral:  The patient is not nervous/anxious. Objective:     Physical Exam  Vitals and nursing note reviewed. Constitutional:       General: She is not in acute distress. Appearance: Normal appearance. She is well-developed. She is not ill-appearing. HENT:      Head: Normocephalic and atraumatic. Abdominal:      General: Abdomen is flat. Bowel sounds are normal. There is no distension. Palpations: Abdomen is soft. There is no mass. Tenderness: There is no abdominal tenderness. There is left CVA tenderness. There is no right CVA tenderness, guarding or rebound. Hernia: No hernia is present. Skin:     General: Skin is warm and dry. Coloration: Skin is not pale. Findings: No erythema or rash. Neurological:      Mental Status: She is alert and oriented to person, place, and time. Motor: No weakness. Gait: Gait normal.   Psychiatric:         Mood and Affect: Mood normal.         Behavior: Behavior normal.         Thought Content: Thought content normal.         Judgment: Judgment normal.     /80 (Site: Left Upper Arm, Position: Sitting, Cuff Size: Large Adult)   Pulse 74   Temp 97 °F (36.1 °C) (Tympanic)   Ht 5' 5\" (1.651 m)   Wt 176 lb (79.8 kg)   SpO2 98%   BMI 29.29 kg/m²     Assessment:       Diagnosis Orders   1. Flank pain  POCT Urinalysis no Micro    XR ABDOMEN (KUB) (SINGLE AP VIEW)      2. Acute cystitis with hematuria  Culture, Urine    XR ABDOMEN (KUB) (SINGLE AP VIEW)    cephALEXin (KEFLEX) 500 MG capsule                Plan:      Return if symptoms worsen or fail to improve. Flank pain present. No skin rash present. UA positive. Will start Keflex at this time. Urine culture sent out.   Encouraged water appropriately daily to help with hydration. We will also have her do a KUB x-ray for evaluation and ruling out stone. Patient agreed with treatment plan  Call or follow-up if symptoms are worsening or not improving    Orders Placed This Encounter   Procedures    Culture, Urine     Standing Status:   Future     Standing Expiration Date:   2/1/2024     Order Specific Question:   Specify (ex-cath, midstream, cysto, etc)? Answer:   mid stream    XR ABDOMEN (KUB) (SINGLE AP VIEW)     Standing Status:   Future     Number of Occurrences:   1     Standing Expiration Date:   2/1/2024    POCT Urinalysis no Micro     Results for orders placed or performed in visit on 02/01/23   POCT Urinalysis no Micro   Result Value Ref Range    Color, UA yellow     Clarity, UA cloudy     Glucose, UA POC Neg     Bilirubin, UA Neg     Ketones, UA Neg     Spec Grav, UA 1.020     Blood, UA POC Trace-lysed     pH, UA 7.0     Protein, UA POC Neg     Urobilinogen, UA 0.2     Leukocytes, UA Small     Nitrite, UA Neg          Patient given educational materials - see patient instructions. Discussed use, benefit, and side effects of prescribed medications. All patientquestions answered. Pt voiced understanding. Reviewed health maintenance. Instructedto continue current medications, diet and exercise. Patient agreed with treatmentplan. Follow up as directed. Please note that this chart was generated using voice recognition Dragon dictation software. Although every effort was made to ensure the accuracy of this automated transcription, some errors in transcription may have occurred.      Electronically signed by Xi Moreno PA-C on 2/1/2023 at 3:29 PM

## 2023-02-02 LAB
MICROORGANISM SPEC CULT: NORMAL
SPECIMEN DESCRIPTION: NORMAL

## 2023-02-03 ENCOUNTER — TELEPHONE (OUTPATIENT)
Dept: FAMILY MEDICINE CLINIC | Age: 83
End: 2023-02-03

## 2023-02-03 DIAGNOSIS — R31.29 OTHER MICROSCOPIC HEMATURIA: ICD-10-CM

## 2023-02-03 DIAGNOSIS — R10.9 FLANK PAIN: Primary | ICD-10-CM

## 2023-02-03 NOTE — TELEPHONE ENCOUNTER
Recommend stat CT to rule out other types of stones or mass. See order, help patient with scheduling.  If any delay or symptoms get worse over weekend use ER

## 2023-02-03 NOTE — TELEPHONE ENCOUNTER
Patient called stating that she was seen by Melany Leyden on Wednesday 2/1/23 and that an antibiotic was prescribed for her flank pain. Patient states that her pain has gotten worse and she wants to know what Melany Leyden suggests or if there is something else that could be sent in. Please advised.

## 2023-02-04 ENCOUNTER — HOSPITAL ENCOUNTER (OUTPATIENT)
Dept: CT IMAGING | Age: 83
End: 2023-02-04
Payer: MEDICARE

## 2023-02-04 DIAGNOSIS — R31.29 OTHER MICROSCOPIC HEMATURIA: ICD-10-CM

## 2023-02-04 DIAGNOSIS — R10.9 FLANK PAIN: ICD-10-CM

## 2023-02-04 PROCEDURE — 2500000003 HC RX 250 WO HCPCS: Performed by: PHYSICIAN ASSISTANT

## 2023-02-04 PROCEDURE — 2580000003 HC RX 258: Performed by: PHYSICIAN ASSISTANT

## 2023-02-04 PROCEDURE — 6360000004 HC RX CONTRAST MEDICATION: Performed by: PHYSICIAN ASSISTANT

## 2023-02-04 PROCEDURE — 74177 CT ABD & PELVIS W/CONTRAST: CPT

## 2023-02-04 RX ORDER — 0.9 % SODIUM CHLORIDE 0.9 %
80 INTRAVENOUS SOLUTION INTRAVENOUS ONCE
Status: COMPLETED | OUTPATIENT
Start: 2023-02-04 | End: 2023-02-04

## 2023-02-04 RX ORDER — SODIUM CHLORIDE 0.9 % (FLUSH) 0.9 %
10 SYRINGE (ML) INJECTION ONCE
Status: COMPLETED | OUTPATIENT
Start: 2023-02-04 | End: 2023-02-04

## 2023-02-04 RX ADMIN — IOPAMIDOL 75 ML: 755 INJECTION, SOLUTION INTRAVENOUS at 09:22

## 2023-02-04 RX ADMIN — BARIUM SULFATE 450 ML: 20 SUSPENSION ORAL at 09:27

## 2023-02-04 RX ADMIN — SODIUM CHLORIDE, PRESERVATIVE FREE 10 ML: 5 INJECTION INTRAVENOUS at 09:27

## 2023-02-04 RX ADMIN — SODIUM CHLORIDE 80 ML: 9 INJECTION, SOLUTION INTRAVENOUS at 09:27

## 2023-02-06 DIAGNOSIS — R10.9 FLANK PAIN: ICD-10-CM

## 2023-02-06 DIAGNOSIS — R10.9 FLANK PAIN: Primary | ICD-10-CM

## 2023-02-06 DIAGNOSIS — M51.36 DEGENERATION, INTERVERTEBRAL DISC, LUMBAR: ICD-10-CM

## 2023-02-06 DIAGNOSIS — K80.20 CALCULUS OF GALLBLADDER WITHOUT CHOLECYSTITIS WITHOUT OBSTRUCTION: ICD-10-CM

## 2023-02-06 DIAGNOSIS — N28.1 BILATERAL RENAL CYSTS: Primary | ICD-10-CM

## 2023-02-06 RX ORDER — PYRAZINAMIDE 500 MG/1
1 TABLET ORAL EVERY 4 HOURS PRN
Qty: 18 TABLET | Refills: 0 | Status: SHIPPED | OUTPATIENT
Start: 2023-02-06 | End: 2023-02-09 | Stop reason: SDUPTHER

## 2023-02-08 ENCOUNTER — HOSPITAL ENCOUNTER (OUTPATIENT)
Dept: ULTRASOUND IMAGING | Age: 83
Discharge: HOME | End: 2023-02-10
Payer: MEDICARE

## 2023-02-08 ENCOUNTER — HOSPITAL ENCOUNTER (OUTPATIENT)
Age: 83
Setting detail: SPECIMEN
Discharge: HOME | End: 2023-02-08

## 2023-02-08 DIAGNOSIS — N28.1 BILATERAL RENAL CYSTS: ICD-10-CM

## 2023-02-08 LAB
INR PPP: 1.5
PROTHROMBIN TIME: 15.4 SEC (ref 9.1–12.3)

## 2023-02-08 PROCEDURE — 76770 US EXAM ABDO BACK WALL COMP: CPT

## 2023-02-09 DIAGNOSIS — R10.9 FLANK PAIN: ICD-10-CM

## 2023-02-09 RX ORDER — PYRAZINAMIDE 500 MG/1
1 TABLET ORAL EVERY 4 HOURS PRN
Qty: 18 TABLET | Refills: 0 | Status: SHIPPED | OUTPATIENT
Start: 2023-02-09 | End: 2023-02-12

## 2023-02-09 NOTE — TELEPHONE ENCOUNTER
Patient is requesting a few mor Tylenol #3 to be sent over to get her though till she see the surgeon on 2/15/23

## 2023-02-10 ENCOUNTER — TELEPHONE (OUTPATIENT)
Dept: FAMILY MEDICINE CLINIC | Age: 83
End: 2023-02-10

## 2023-02-10 NOTE — TELEPHONE ENCOUNTER
Patient called again today stating how much pain she is in. We have ran a number of tests and XR, U/S and we are not coming up with any reason for her pain. She said the Tylenol #3 does help for about 4 hours than it is right back to hurting again. She feels that it may be just masking the pain. She wanted to know if she should go to the ER and seek treatment ? I advised  her that is not a bad idea- maybe they can run some testing and are able to find something. I said maybe an MRI is the next step. She was concerned that she could not do it. I mentioned sedation may be an option. She said she would use the [id number] [facility] ER so they could see everything that had been done already. I advised her I would give Sanjuanita [place] the heads up and watch for any information to come over for her.

## 2023-02-15 ENCOUNTER — APPOINTMENT (OUTPATIENT)
Dept: CT IMAGING | Age: 83
DRG: 982 | End: 2023-02-15
Payer: MEDICARE

## 2023-02-15 ENCOUNTER — APPOINTMENT (OUTPATIENT)
Dept: MRI IMAGING | Age: 83
DRG: 982 | End: 2023-02-15
Payer: MEDICARE

## 2023-02-15 ENCOUNTER — HOSPITAL ENCOUNTER (OUTPATIENT)
Dept: SURGERY | Age: 83
Discharge: HOME | End: 2023-02-15
Payer: MEDICARE

## 2023-02-15 ENCOUNTER — HOSPITAL ENCOUNTER (INPATIENT)
Age: 83
LOS: 7 days | Discharge: HOME | DRG: 982 | End: 2023-02-22
Attending: EMERGENCY MEDICINE | Admitting: INTERNAL MEDICINE
Payer: MEDICARE

## 2023-02-15 ENCOUNTER — TELEPHONE (OUTPATIENT)
Dept: FAMILY MEDICINE CLINIC | Age: 83
End: 2023-02-15

## 2023-02-15 ENCOUNTER — APPOINTMENT (OUTPATIENT)
Dept: ULTRASOUND IMAGING | Age: 83
DRG: 982 | End: 2023-02-15
Payer: MEDICARE

## 2023-02-15 VITALS
DIASTOLIC BLOOD PRESSURE: 90 MMHG | HEIGHT: 65 IN | SYSTOLIC BLOOD PRESSURE: 140 MMHG | WEIGHT: 178 LBS | HEART RATE: 58 BPM | BODY MASS INDEX: 29.66 KG/M2

## 2023-02-15 DIAGNOSIS — R10.10 PAIN OF UPPER ABDOMEN: Primary | ICD-10-CM

## 2023-02-15 DIAGNOSIS — K80.20 CALCULUS OF GALLBLADDER WITHOUT CHOLECYSTITIS WITHOUT OBSTRUCTION: ICD-10-CM

## 2023-02-15 DIAGNOSIS — R10.9 FLANK PAIN: ICD-10-CM

## 2023-02-15 DIAGNOSIS — S22.060G COMPRESSION FRACTURE OF T8 VERTEBRA WITH DELAYED HEALING: ICD-10-CM

## 2023-02-15 DIAGNOSIS — M54.50 ACUTE MIDLINE LOW BACK PAIN WITHOUT SCIATICA: ICD-10-CM

## 2023-02-15 PROBLEM — K83.8 COMMON BILE DUCT DILATATION: Status: ACTIVE | Noted: 2023-02-15

## 2023-02-15 LAB
ABSOLUTE EOS #: 0.21 K/UL (ref 0–0.44)
ABSOLUTE IMMATURE GRANULOCYTE: 0.04 K/UL (ref 0–0.3)
ABSOLUTE LYMPH #: 1.57 K/UL (ref 1.1–3.7)
ABSOLUTE MONO #: 0.85 K/UL (ref 0.1–1.2)
ALBUMIN SERPL-MCNC: 3.6 G/DL (ref 3.5–5.2)
ALP SERPL-CCNC: 89 U/L (ref 35–104)
ALT SERPL-CCNC: 19 U/L (ref 5–33)
ANION GAP SERPL CALCULATED.3IONS-SCNC: 11 MMOL/L (ref 9–17)
AST SERPL-CCNC: 29 U/L
BASOPHILS # BLD: 1 % (ref 0–2)
BASOPHILS ABSOLUTE: 0.07 K/UL (ref 0–0.2)
BILIRUB DIRECT SERPL-MCNC: 0.1 MG/DL
BILIRUB INDIRECT SERPL-MCNC: 0.3 MG/DL (ref 0–1)
BILIRUB SERPL-MCNC: 0.4 MG/DL (ref 0.3–1.2)
BILIRUBIN URINE: NEGATIVE
BUN SERPL-MCNC: 21 MG/DL (ref 8–23)
BUN/CREAT BLD: 14 (ref 9–20)
CALCIUM SERPL-MCNC: 9 MG/DL (ref 8.6–10.4)
CHLORIDE SERPL-SCNC: 101 MMOL/L (ref 98–107)
CO2 SERPL-SCNC: 25 MMOL/L (ref 20–31)
COLOR: YELLOW
CREAT SERPL-MCNC: 1.46 MG/DL (ref 0.5–0.9)
D DIMER BLD IA.RAPID-MCNC: 0.46 MG/L FEU (ref 0–0.59)
EOSINOPHILS RELATIVE PERCENT: 4 % (ref 1–4)
EPITHELIAL CELLS UA: ABNORMAL /HPF (ref 0–5)
GFR SERPL CREATININE-BSD FRML MDRD: 35 ML/MIN/1.73M2
GLUCOSE SERPL-MCNC: 83 MG/DL (ref 70–99)
GLUCOSE UR STRIP.AUTO-MCNC: NEGATIVE MG/DL
HCT VFR BLD AUTO: 38.2 % (ref 36.3–47.1)
HGB BLD-MCNC: 11.9 G/DL (ref 11.9–15.1)
IMMATURE GRANULOCYTES: 1 %
INR PPP: 1.9
KETONES UR STRIP.AUTO-MCNC: NEGATIVE MG/DL
LEUKOCYTE ESTERASE UR QL STRIP.AUTO: ABNORMAL
LYMPHOCYTES # BLD: 30 % (ref 24–43)
MAGNESIUM SERPL-MCNC: 2.1 MG/DL (ref 1.6–2.6)
MCH RBC QN AUTO: 29.1 PG (ref 25.2–33.5)
MCHC RBC AUTO-ENTMCNC: 31.2 G/DL (ref 28.4–34.8)
MCV RBC AUTO: 93.4 FL (ref 82.6–102.9)
MONOCYTES # BLD: 16 % (ref 3–12)
NITRITE UR QL STRIP.AUTO: NEGATIVE
NRBC AUTOMATED: 0 PER 100 WBC
PDW BLD-RTO: 14 % (ref 11.8–14.4)
PLATELET # BLD AUTO: 239 K/UL (ref 138–453)
PMV BLD AUTO: 9.9 FL (ref 8.1–13.5)
POTASSIUM SERPL-SCNC: 4 MMOL/L (ref 3.7–5.3)
PROT SERPL-MCNC: 6.7 G/DL (ref 6.4–8.3)
PROT UR STRIP.AUTO-MCNC: 6.5 MG/DL (ref 5–8)
PROT UR STRIP.AUTO-MCNC: NEGATIVE MG/DL
PROTHROMBIN TIME: 21.8 SEC (ref 11.5–14.2)
RBC # BLD: 4.09 M/UL (ref 3.95–5.11)
RBC CLUMPS #/AREA URNS AUTO: ABNORMAL /HPF (ref 0–2)
SEG NEUTROPHILS: 48 % (ref 36–65)
SEGMENTED NEUTROPHILS ABSOLUTE COUNT: 2.59 K/UL (ref 1.5–8.1)
SODIUM SERPL-SCNC: 137 MMOL/L (ref 135–144)
SPECIFIC GRAVITY UA: 1.01 (ref 1–1.03)
TROPONIN I SERPL DL<=0.01 NG/ML-MCNC: 15 NG/L (ref 0–14)
TURBIDITY: CLEAR
URINE HGB: NEGATIVE
UROBILINOGEN, URINE: NORMAL
WBC # BLD AUTO: 5.3 K/UL (ref 3.5–11.3)
WBC UA: ABNORMAL /HPF (ref 0–5)

## 2023-02-15 PROCEDURE — 83735 ASSAY OF MAGNESIUM: CPT

## 2023-02-15 PROCEDURE — 83690 ASSAY OF LIPASE: CPT

## 2023-02-15 PROCEDURE — 80048 BASIC METABOLIC PNL TOTAL CA: CPT

## 2023-02-15 PROCEDURE — 2580000003 HC RX 258: Performed by: NURSE PRACTITIONER

## 2023-02-15 PROCEDURE — 81001 URINALYSIS AUTO W/SCOPE: CPT

## 2023-02-15 PROCEDURE — 96375 TX/PRO/DX INJ NEW DRUG ADDON: CPT

## 2023-02-15 PROCEDURE — 74176 CT ABD & PELVIS W/O CONTRAST: CPT

## 2023-02-15 PROCEDURE — 85025 COMPLETE CBC W/AUTO DIFF WBC: CPT

## 2023-02-15 PROCEDURE — 93005 ELECTROCARDIOGRAM TRACING: CPT | Performed by: EMERGENCY MEDICINE

## 2023-02-15 PROCEDURE — 99285 EMERGENCY DEPT VISIT HI MDM: CPT

## 2023-02-15 PROCEDURE — 6370000000 HC RX 637 (ALT 250 FOR IP): Performed by: NURSE PRACTITIONER

## 2023-02-15 PROCEDURE — 72128 CT CHEST SPINE W/O DYE: CPT

## 2023-02-15 PROCEDURE — 76705 ECHO EXAM OF ABDOMEN: CPT

## 2023-02-15 PROCEDURE — 96376 TX/PRO/DX INJ SAME DRUG ADON: CPT

## 2023-02-15 PROCEDURE — 99202 OFFICE O/P NEW SF 15 MIN: CPT

## 2023-02-15 PROCEDURE — 84484 ASSAY OF TROPONIN QUANT: CPT

## 2023-02-15 PROCEDURE — 99203 OFFICE O/P NEW LOW 30 MIN: CPT | Performed by: SURGERY

## 2023-02-15 PROCEDURE — 1200000000 HC SEMI PRIVATE

## 2023-02-15 PROCEDURE — 85610 PROTHROMBIN TIME: CPT

## 2023-02-15 PROCEDURE — 72131 CT LUMBAR SPINE W/O DYE: CPT

## 2023-02-15 PROCEDURE — 6360000002 HC RX W HCPCS: Performed by: EMERGENCY MEDICINE

## 2023-02-15 PROCEDURE — 74181 MRI ABDOMEN W/O CONTRAST: CPT

## 2023-02-15 PROCEDURE — 80076 HEPATIC FUNCTION PANEL: CPT

## 2023-02-15 PROCEDURE — 2500000003 HC RX 250 WO HCPCS: Performed by: NURSE PRACTITIONER

## 2023-02-15 PROCEDURE — 6360000002 HC RX W HCPCS: Performed by: NURSE PRACTITIONER

## 2023-02-15 PROCEDURE — 96374 THER/PROPH/DIAG INJ IV PUSH: CPT

## 2023-02-15 PROCEDURE — 85379 FIBRIN DEGRADATION QUANT: CPT

## 2023-02-15 RX ORDER — SODIUM CHLORIDE 9 MG/ML
INJECTION, SOLUTION INTRAVENOUS CONTINUOUS
Status: DISCONTINUED | OUTPATIENT
Start: 2023-02-15 | End: 2023-02-18

## 2023-02-15 RX ORDER — SODIUM CHLORIDE 9 MG/ML
INJECTION, SOLUTION INTRAVENOUS PRN
Status: DISCONTINUED | OUTPATIENT
Start: 2023-02-15 | End: 2023-02-21 | Stop reason: SDUPTHER

## 2023-02-15 RX ORDER — MAGNESIUM SULFATE 1 G/100ML
1000 INJECTION INTRAVENOUS PRN
Status: DISCONTINUED | OUTPATIENT
Start: 2023-02-15 | End: 2023-02-22 | Stop reason: HOSPADM

## 2023-02-15 RX ORDER — BUDESONIDE AND FORMOTEROL FUMARATE DIHYDRATE 160; 4.5 UG/1; UG/1
2 AEROSOL RESPIRATORY (INHALATION) 2 TIMES DAILY
Status: DISCONTINUED | OUTPATIENT
Start: 2023-02-16 | End: 2023-02-22 | Stop reason: HOSPADM

## 2023-02-15 RX ORDER — MORPHINE SULFATE 2 MG/ML
2 INJECTION, SOLUTION INTRAMUSCULAR; INTRAVENOUS ONCE
Status: COMPLETED | OUTPATIENT
Start: 2023-02-15 | End: 2023-02-15

## 2023-02-15 RX ORDER — POTASSIUM CHLORIDE 7.45 MG/ML
10 INJECTION INTRAVENOUS PRN
Status: DISCONTINUED | OUTPATIENT
Start: 2023-02-15 | End: 2023-02-22 | Stop reason: HOSPADM

## 2023-02-15 RX ORDER — ONDANSETRON 4 MG/1
4 TABLET, ORALLY DISINTEGRATING ORAL EVERY 8 HOURS PRN
Status: DISCONTINUED | OUTPATIENT
Start: 2023-02-15 | End: 2023-02-22 | Stop reason: HOSPADM

## 2023-02-15 RX ORDER — LORAZEPAM 2 MG/ML
0.5 INJECTION INTRAMUSCULAR ONCE
Status: COMPLETED | OUTPATIENT
Start: 2023-02-15 | End: 2023-02-15

## 2023-02-15 RX ORDER — ACETAMINOPHEN 650 MG/1
650 SUPPOSITORY RECTAL EVERY 6 HOURS PRN
Status: DISCONTINUED | OUTPATIENT
Start: 2023-02-15 | End: 2023-02-22 | Stop reason: HOSPADM

## 2023-02-15 RX ORDER — POLYETHYLENE GLYCOL 3350 17 G/17G
17 POWDER, FOR SOLUTION ORAL DAILY PRN
Status: DISCONTINUED | OUTPATIENT
Start: 2023-02-15 | End: 2023-02-22 | Stop reason: HOSPADM

## 2023-02-15 RX ORDER — ONDANSETRON 2 MG/ML
4 INJECTION INTRAMUSCULAR; INTRAVENOUS EVERY 6 HOURS PRN
Status: DISCONTINUED | OUTPATIENT
Start: 2023-02-15 | End: 2023-02-22 | Stop reason: HOSPADM

## 2023-02-15 RX ORDER — POTASSIUM CHLORIDE 20 MEQ/1
40 TABLET, EXTENDED RELEASE ORAL PRN
Status: DISCONTINUED | OUTPATIENT
Start: 2023-02-15 | End: 2023-02-22 | Stop reason: HOSPADM

## 2023-02-15 RX ORDER — FLUTICASONE PROPIONATE 50 MCG
1 SPRAY, SUSPENSION (ML) NASAL DAILY
Status: DISCONTINUED | OUTPATIENT
Start: 2023-02-16 | End: 2023-02-22 | Stop reason: HOSPADM

## 2023-02-15 RX ORDER — ACETAMINOPHEN 325 MG/1
650 TABLET ORAL EVERY 6 HOURS PRN
Status: DISCONTINUED | OUTPATIENT
Start: 2023-02-15 | End: 2023-02-22 | Stop reason: HOSPADM

## 2023-02-15 RX ORDER — MORPHINE SULFATE 2 MG/ML
2 INJECTION, SOLUTION INTRAMUSCULAR; INTRAVENOUS
Status: DISCONTINUED | OUTPATIENT
Start: 2023-02-15 | End: 2023-02-22 | Stop reason: HOSPADM

## 2023-02-15 RX ORDER — SODIUM CHLORIDE 0.9 % (FLUSH) 0.9 %
5-40 SYRINGE (ML) INJECTION EVERY 12 HOURS SCHEDULED
Status: DISCONTINUED | OUTPATIENT
Start: 2023-02-15 | End: 2023-02-22 | Stop reason: HOSPADM

## 2023-02-15 RX ORDER — SODIUM CHLORIDE 0.9 % (FLUSH) 0.9 %
10 SYRINGE (ML) INJECTION PRN
Status: DISCONTINUED | OUTPATIENT
Start: 2023-02-15 | End: 2023-02-22 | Stop reason: HOSPADM

## 2023-02-15 RX ORDER — ONDANSETRON 2 MG/ML
4 INJECTION INTRAMUSCULAR; INTRAVENOUS ONCE
Status: COMPLETED | OUTPATIENT
Start: 2023-02-15 | End: 2023-02-15

## 2023-02-15 RX ORDER — HYDRALAZINE HYDROCHLORIDE 20 MG/ML
10 INJECTION INTRAMUSCULAR; INTRAVENOUS ONCE
Status: COMPLETED | OUTPATIENT
Start: 2023-02-15 | End: 2023-02-16

## 2023-02-15 RX ADMIN — MORPHINE SULFATE 2 MG: 2 INJECTION, SOLUTION INTRAMUSCULAR; INTRAVENOUS at 15:36

## 2023-02-15 RX ADMIN — ONDANSETRON 4 MG: 2 INJECTION INTRAMUSCULAR; INTRAVENOUS at 15:36

## 2023-02-15 RX ADMIN — SODIUM CHLORIDE, PRESERVATIVE FREE 20 MG: 5 INJECTION INTRAVENOUS at 21:36

## 2023-02-15 RX ADMIN — SODIUM CHLORIDE, PRESERVATIVE FREE 10 ML: 5 INJECTION INTRAVENOUS at 21:34

## 2023-02-15 RX ADMIN — LORAZEPAM 0.5 MG: 2 INJECTION INTRAMUSCULAR at 18:10

## 2023-02-15 RX ADMIN — SODIUM CHLORIDE: 9 INJECTION, SOLUTION INTRAVENOUS at 21:41

## 2023-02-15 RX ADMIN — ACETAMINOPHEN 650 MG: 325 TABLET ORAL at 23:10

## 2023-02-15 RX ADMIN — MORPHINE SULFATE 2 MG: 2 INJECTION, SOLUTION INTRAMUSCULAR; INTRAVENOUS at 17:56

## 2023-02-15 ASSESSMENT — ENCOUNTER SYMPTOMS
SHORTNESS OF BREATH: 0
EYE PAIN: 0
FACIAL SWELLING: 0
ABDOMINAL DISTENTION: 0
CHEST TIGHTNESS: 0
ABDOMINAL PAIN: 0
EYE DISCHARGE: 0
BACK PAIN: 1

## 2023-02-15 ASSESSMENT — LIFESTYLE VARIABLES
HOW MANY STANDARD DRINKS CONTAINING ALCOHOL DO YOU HAVE ON A TYPICAL DAY: PATIENT DOES NOT DRINK
HOW OFTEN DO YOU HAVE A DRINK CONTAINING ALCOHOL: NEVER
HOW OFTEN DO YOU HAVE A DRINK CONTAINING ALCOHOL: NEVER

## 2023-02-15 ASSESSMENT — PAIN SCALES - GENERAL
PAINLEVEL_OUTOF10: 7
PAINLEVEL_OUTOF10: 3
PAINLEVEL_OUTOF10: 8

## 2023-02-15 ASSESSMENT — PAIN DESCRIPTION - DESCRIPTORS: DESCRIPTORS: BURNING;SHARP

## 2023-02-15 ASSESSMENT — PAIN - FUNCTIONAL ASSESSMENT
PAIN_FUNCTIONAL_ASSESSMENT: PREVENTS OR INTERFERES SOME ACTIVE ACTIVITIES AND ADLS
PAIN_FUNCTIONAL_ASSESSMENT: 0-10

## 2023-02-15 ASSESSMENT — PAIN DESCRIPTION - LOCATION: LOCATION: ABDOMEN;BACK

## 2023-02-15 NOTE — TELEPHONE ENCOUNTER
I called Gilda Dillardmonds and spoke with her today regarding her pain. She states she is struggling with the discomfort from this pain that is nonstop. We reviewed previous imaging. We discussed additional imaging to further investigate this versus using the ER. Patient states due to the severity of the pain she would like to go through the ER. She is going to have her daughter help her with transport to local ER. Await further work-up.   Patient agreed with this plan

## 2023-02-15 NOTE — TELEPHONE ENCOUNTER
Patient called in today and said she saw Dr Guerrero Denson to see if the gallbladder was causing her so much pain. Dr Guerrero Denson said the pain she is having is not the reason for her pain. He is thinking it is more back and maybe PT would help. She is going on 21 days of all day pain. Daughter states she goes from the  bed to the chair. She barbara from the pain. She has always been very active- she would go to the  and swim, she worked and not she is bed ridden. She is wondering if there is any other testing that can be done? She is upset form being in so much pain for this long.  Please advise next steps

## 2023-02-15 NOTE — ED PROVIDER NOTES
EMERGENCY DEPARTMENT ENCOUNTER    Pt Name: Sun Valdovinos  MRN: [id number]  [place] 1940  Date of evaluation: 2/15/23  CHIEF COMPLAINT       Chief Complaint   Patient presents with    Back Pain     Bilateral upper back. Pt reports \"squeezing\" going around to right side under breast area     HISTORY OF PRESENT ILLNESS   HPI   Patient is a 79-year-old female who presented to the emergency department accompanied by her daughter secondary to back pain. Patient has had 3 weeks of ongoing back pain. She has been evaluated by her PCP with outpatient imaging with no clear etiology of her pain. Patient was seen by general surgeon today as she was told that it was likely gallstones but was told it was not gallstones. She called her PCP and was advised to present back to the emergency department. Patient cannot recall any trauma or injury. She is complaining pain localized to her mid back brawl region as a squeezing sensation increases with inspiration. The pain 8 out of 10 on the pain scale she has a history of atrial fibrillation for which she is on Coumadin. Her INR has been therapeutic. The patient had a previous history of back problems. She denies loss of bladder bowel urinary retention. She is able to ambulate without assistance of wheelchair or cane but has increased pain. She has been taken Tylenol with no significant improvement. Patient is accompanied by her daughter who states for the last several weeks patient is physically declining. 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 Patient denies chest pain, nausea, vomiting, fevers or chills. REVIEW OF SYSTEMS     Review of Systems   Constitutional:  Positive for fatigue. Negative for chills, diaphoresis and fever. HENT:  Negative for congestion, ear pain and facial swelling. Eyes:  Negative for pain, discharge and visual disturbance.    Respiratory:  Negative for chest tightness and shortness of breath. Cardiovascular:  Negative for chest pain and palpitations. Gastrointestinal:  Negative for abdominal distention and abdominal pain. Genitourinary:  Negative for difficulty urinating and flank pain. Musculoskeletal:  Positive for back pain. Skin:  Negative for wound. Neurological:  Negative for dizziness, light-headedness and headaches.    PASTMEDICAL HISTORY     Past Medical History:   Diagnosis Date    Allergic rhinitis     Arthritis     Asthma     Atrial fibrillation (HCC)     GERD (gastroesophageal reflux disease)     Hypertension     Melanoma ([facility] [address]     on nose    Myocardial infarction [facility]) 2008    Wears glasses     Wears partial dentures     upper     Past Problem List  Patient Active Problem List   Diagnosis Code    Hypertension I10    Allergic rhinitis J30.9    Atrial fibrillation ([address] I48.91    Malignant melanoma ([facility] [address] C43.9    History of MI (myocardial infarction) I25.2    Wears glasses Z97.3    Sinus bradycardia R00.1    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    GI bleed K92.2    Serologic abnormality R89.4    Chronic cough R05.3    Chronic fatigue syndrome G93.32    Chronic sinusitis J32.9    Gastroesophageal reflux disease K21.9    Moderate persistent asthma without complication L96.71    Degenerative joint disease involving multiple joints on both sides of body M15.9    Primary localized osteoarthritis of pelvic region and thigh M16.10    Primary osteoarthritis of both knees M17.0    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    Hypersomnia G47.10    Sleep apnea G47.30    Smoking F17.200    Arthritis of left hip M16.12    Calculus of gallbladder without cholecystitis without obstruction K80.20    Flank pain R10.9    Common bile duct dilatation K83.8     SURGICAL HISTORY       Past Surgical History:   Procedure Laterality Date    APPENDECTOMY      BREAST SURGERY      CARDIAC SURGERY      CARPAL TUNNEL RELEASE Right     COLONOSCOPY N/A 8/8/2019    COLONOSCOPY POLYPECTOMY REMOVAL HOT BIOPSY AND RESOLUTION CLIP performed by Remigio Brody MD at [address]      umbilical    HYSTERECTOMY (CERVIX STATUS UNKNOWN)      JOINT REPLACEMENT      NECK SURGERY      C5-C6    NOSE SURGERY      cancer removed    OTHER SURGICAL HISTORY      heart surg/growth in heart sac    TONSILLECTOMY      TOTAL HIP ARTHROPLASTY Right 04/09/2019    TOTAL HIP ARTHROPLASTY Right 4/9/2019    HIP TOTAL ARTHROPLASTY - MEDACTA, C-ARM, MEDACTA TABLE, NSA= GENERAL VS SPINAL, FASCIA ILLIACA BLOCK, *STANDARD performed by Mile Blanco DO at [address] 5/6/2019    EGD DIAGNOSTIC ONLY performed by Remigio Brody MD at [facility]     CURRENT MEDICATIONS       Previous Medications    AMIODARONE (CORDARONE) 200 MG TABLET    Take 200 mg by mouth daily    ASPIRIN 81 MG TABLET    Take 81 mg by mouth daily Will stop 7 days before surgery    AZELASTINE (ASTELIN) 137 MCG/SPRAY NASAL SPRAY    1 spray by Nasal route 2 times daily. Use in each nostril as directed    CALCIUM CARBONATE-VITAMIN D 600-200 MG-UNIT TABS    Take 1 tablet by mouth daily    CETIRIZINE (ZYRTEC) 10 MG TABLET    Take 10 mg by mouth daily    DILTIAZEM (CARDIZEM CD) 240 MG ER CAPSULE    Take 240 mg by mouth daily. FERROUS SULFATE 325 (65 FE) MG TABLET    Take 1 tablet by mouth 2 times daily (before meals)    FIBER COMPLETE PO    Take by mouth    FLUTICASONE (FLONASE) 50 MCG/ACT NASAL SPRAY    1 spray by Each Nostril route daily    FLUTICASONE-UMECLIDIN-VILANT (TRELEGY ELLIPTA) 100-62.5-25 MCG/INH AEPB    Inhale 1 puff into the lungs daily    MONTELUKAST (SINGULAIR) 10 MG TABLET    Take 10 mg by mouth nightly    MULTIPLE VITAMINS-MINERALS (CENTRUM SILVER) TABS    Take 1 tablet by mouth daily    NITROGLYCERIN (NITRODUR) 0.1 MG/HR    Place 1 patch onto the skin daily.     PANTOPRAZOLE (PROTONIX) 40 MG TABLET    Take 40 mg by mouth daily    RESTASIS MULTIDOSE 0.05 % OPHTHALMIC EMULSION        TRIAMTERENE-HYDROCHLOROTHIAZIDE (MAXZIDE-25) 37.5-25 MG PER TABLET    Take 1 tablet by mouth daily    WARFARIN (COUMADIN) 5 MG TABLET    Take 7.5 mg by mouth Will stop 4 day before surgery     ALLERGIES     has No Known Allergies. FAMILY HISTORY     She indicated that her mother is . She indicated that her father is . She indicated that the status of her sister is unknown. SOCIAL HISTORY       Social History     Tobacco Use    Smoking status: Former     Packs/day: 1.00     Years: 20.00     Pack years: 20.00     Types: Cigarettes     Quit date: 1981     Years since quittin.1    Smokeless tobacco: Never   Substance Use Topics    Alcohol use: No    Drug use: No     PHYSICAL EXAM     INITIAL VITALS: BP (!) 151/53   Pulse 64   Temp 98.6 °F (37 °C)   Resp 16   Ht 5' 5\" (1.651 m)   Wt 178 lb (80.7 kg)   SpO2 94%   BMI 29.62 kg/m²    Physical Exam  Vitals and nursing note reviewed. Constitutional:       General: She is not in acute distress. Appearance: She is well-developed. She is not diaphoretic. HENT:      Head: Normocephalic and atraumatic. Eyes:      Pupils: Pupils are equal, round, and reactive to light. Cardiovascular:      Rate and Rhythm: Normal rate and regular rhythm. Pulmonary:      Effort: Pulmonary effort is normal.      Breath sounds: Normal breath sounds. Abdominal:      General: Bowel sounds are normal.      Palpations: Abdomen is soft. Musculoskeletal:         General: Tenderness present. Normal range of motion. Cervical back: Normal range of motion and neck supple. Skin:     General: Skin is warm. Capillary Refill: Capillary refill takes less than 2 seconds. Neurological:      Mental Status: She is alert and oriented to person, place, and time.        MEDICAL DECISION MAKING / ED COURSE:   Summary of Patient Presentation:    The patient is a 50-year-old female who presented to the emergency department secondary to back pain. Orders for EKG as well as labs including D-dimer. Orders for CT of the lumbar and thoracic spine as well as CT abdomen and pelvis without contrast.  Orders for chest x-ray. Patient received morphine and Zofran, patient will be reevaluated. 1)  Number and Complexity of Problems  Problem List This Visit: Pain, fatigue    Differential Diagnosis: Compression fracture, kidney stone, gallstone, musculoskeletal strain, anginal equivalent, PE    Diagnoses Considered but Do Not Suspect:  none    Pertinent Comorbid Conditions:  none    2)  Data Reviewed  My EKG interpretation: Sinus rhythm no acute ST wave elevations or findings consistent with STEMI    Decision Rules/Scores utilized:  NA    HEART SCORE: NA       NIH STROKE SCALE         Tests considered but not ordered and why:  none    External Documents Reviewed:  none    Imaging that is independently reviewed and interpreted by me as:  none    See more data below for the lab and radiology tests and orders. 3)  Treatment and Disposition    Patient repeat assessment: Not otherwise upper quadrant concerning for possible passed gallbladder stone also evident on CT with recommendation for MRCP. Patient discussed with GI service recommended MRCP orders for patient to receive Ativan. Patient will be admitted to the medicine service with GI consult. Disposition discussion with patient/family: And daughter in agreement with treatment plan for admission    Case discussed with consulting clinician: GI service, and internal medicine service    MIPS:  NA    Social determinants of health impacting treatment or disposition:  none    Shared Decision Making: The patient was involved in his/her plan of care through shared decision making. The testing that was ordered was discussed with the patient.  Any medications that may have been ordered were discussed with the patient    Code Status Discussion:  full code    \"ED Course\" Notes From Epic Narrator:  ED Course as of 02/15/23 1828   Wed Feb 15, 2023   1711 Discussed with from the GI service recommended patient undergo MRI and be admitted given CT findings. Patient was amendable to this treatment plan. Daughter updated on plan of care. The internal medicine service paged for admission. [AS]      ED Course User Index  [AS] Bethel Palma MD         CRITICAL CARE:       PROCEDURES:    Procedures      DATA FOR LAB AND RADIOLOGY TESTS ORDERED BELOW ARE REVIEWED BY THE ED CLINICIAN:    RADIOLOGY: All x-rays, CT, MRI, and formal ultrasound images (except ED bedside ultrasound) are read by the radiologist, see reports below, unless otherwise noted in MDM or here. Reports below are reviewed by myself. US GALLBLADDER RUQ   Final Result   1. Dilated gallbladder containing sludge and small stones. 2.  Dilated common bile duct. Please note that the bile duct was not dilated   on prior CT scan of 4 February 2023. No definite stones are noted in the   bile duct but it is incompletely visualized. 3.  Liver is of coarse echotexture suggesting fatty infiltration. RECOMMENDATIONS:   Consideration of MRCP. CT ABDOMEN PELVIS WO CONTRAST Additional Contrast? None   Final Result   1. Interval development of bladder distension and central biliary ductal   dilatation since 4 February 2023. Sludge and small stones are present in the   gallbladder. Findings of concern for stones in the common bile duct or edema   in the distal common bowel duct due to a recently passed calculus. Right   upper quadrant ultrasound is advised. 2.  Moderate colonic stool burden. 3.  No urinary obstruction, small bowel obstruction or appendicitis. RECOMMENDATION:   Right upper quadrant ultrasound. CT LUMBAR SPINE WO CONTRAST   Final Result   Thoracic spine CT: Age indeterminate superior endplate compression fracture   of T8 with 40% height loss. No retropulsion.   If there is need for further   evaluation thoracic spine MRI can be obtained      Lumbar spine CT: No acute fracture      Multilevel disc and facet degenerative disease most pronounced at L3-L4 and   L4-L5 at L5-S1. .  Grade 1 anterolisthesis at L3-L4. Moderate bilateral neural   foraminal narrowing at L4-L5 and L5-S1. CT THORACIC SPINE WO CONTRAST   Final Result   Thoracic spine CT: Age indeterminate superior endplate compression fracture   of T8 with 40% height loss. No retropulsion. If there is need for further   evaluation thoracic spine MRI can be obtained      Lumbar spine CT: No acute fracture      Multilevel disc and facet degenerative disease most pronounced at L3-L4 and   L4-L5 at L5-S1. .  Grade 1 anterolisthesis at L3-L4. Moderate bilateral neural   foraminal narrowing at L4-L5 and L5-S1. MRI ABDOMEN WO CONTRAST MRCP    (Results Pending)       LABS: Lab orders shown below, the results are reviewed by myself, and all abnormals are listed below.   Labs Reviewed   BASIC METABOLIC PANEL - Abnormal; Notable for the following components:       Result Value    Creatinine 1.46 (*)     Est, Glom Filt Rate 35 (*)     All other components within normal limits   CBC WITH AUTO DIFFERENTIAL - Abnormal; Notable for the following components:    Monocytes 16 (*)     Immature Granulocytes 1 (*)     All other components within normal limits   PROTIME-INR - Abnormal; Notable for the following components:    Protime 21.8 (*)     All other components within normal limits   TROPONIN - Abnormal; Notable for the following components:    Troponin, High Sensitivity 15 (*)     All other components within normal limits   URINALYSIS WITH MICROSCOPIC - Abnormal; Notable for the following components:    Leukocyte Esterase, Urine SMALL (*)     All other components within normal limits   CULTURE, URINE   MAGNESIUM   D-DIMER, QUANTITATIVE   HEPATIC FUNCTION PANEL       Vitals Reviewed:    Vitals:    02/15/23 1349 02/15/23 1510   BP: (!) 151/53    Pulse: 64    Resp: 16 Temp: 98.6 °F (37 °C)    SpO2: 94%    Weight:  178 lb (80.7 kg)   Height:  5' 5\" (1.651 m)     MEDICATIONS GIVEN TO PATIENT THIS ENCOUNTER:  Orders Placed This Encounter   Medications    morphine (PF) injection 2 mg    ondansetron (ZOFRAN) injection 4 mg    morphine (PF) injection 2 mg    LORazepam (ATIVAN) injection 0.5 mg     DISCHARGE PRESCRIPTIONS:  New Prescriptions    No medications on file     PHYSICIAN CONSULTS ORDERED THIS ENCOUNTER:  IP CONSULT TO GI  IP CONSULT TO INTERNAL MEDICINE  PHARMACY TO DOSE WARFARIN  FINAL IMPRESSION      1. Pain of upper abdomen    2. Acute midline low back pain without sciatica          DISPOSITION/PLAN   DISPOSITION Admitted 02/15/2023 05:57:30 PM      OUTPATIENT FOLLOW UP THE PATIENT:  No follow-up provider specified.     MD Nasreen Inman MD  90/[id number]

## 2023-02-15 NOTE — H&P
[facility] Surgery  Clinic Evaluation  Radha Warner DO    Pt Name: Samir Begum  MRN: [id number]  YOB: 1940  Date of evaluation: 2/15/2023  Primary Care Physician: Kavya Kraft PA-C    Chief Complaint: cholelithiasis, bilateral flank pain      SUBJECTIVE:    History of Present Illness: This is a 79 y.o.  female who presents for evaluation for the above, her daughter is also present. Pt's complains began with feeling of focal pain in her left posterior thorax at the mid-thoracic level several weeks ago, this eventually started to radiate around and to the left anterior chest. Pt then additionally began to experience focal pain of the right posterior thorax that now radiates to the right anterior chest. Pt is unable to wear a bra as the bra-line sits at the level of discomfort. Pt denies abdominal pain or any issues with food intake, denies any bowel trouble above baseline. Denies history of jaundice, no other complaints. Chart review performed to add information to the HPI: Yes    Past Medical History   has a past medical history of Allergic rhinitis, Arthritis, Asthma, Atrial fibrillation ([facility] [address] GERD (gastroesophageal reflux disease), Hypertension, Melanoma ([facility] [address] Myocardial infarction [facility]), Wears glasses, and Wears partial dentures. Past Surgical History   has a past surgical history that includes Appendectomy; other surgical history; Hysterectomy; Neck surgery; Carpal tunnel release (Right); Nose surgery; hernia repair; Tonsillectomy; joint replacement; Total hip arthroplasty (Right, 04/09/2019); Total hip arthroplasty (Right, 4/9/2019); Upper gastrointestinal endoscopy (N/A, 5/6/2019); Colonoscopy (N/A, 8/8/2019); eye surgery; Cardiac surgery; and Breast surgery. Family History  family history includes Asthma in her mother; COPD in her mother and sister; Cancer in her father and mother; Emphysema in her sister.     Social History  Tobacco use:  reports that she quit smoking about 42 years ago. Her smoking use included cigarettes. She has a 20.00 pack-year smoking history. She has never used smokeless tobacco.  Alcohol use:  reports no history of alcohol use. Drug use:  reports no history of drug use. Medications  Current Medications:   Current Outpatient Medications   Medication Sig Dispense Refill    FIBER COMPLETE PO Take by mouth      cetirizine (ZYRTEC) 10 MG tablet Take 10 mg by mouth daily      RESTASIS MULTIDOSE 0.05 % ophthalmic emulsion       pantoprazole (PROTONIX) 40 MG tablet Take 40 mg by mouth daily      calcium carbonate-vitamin D 600-200 MG-UNIT TABS Take 1 tablet by mouth daily      Multiple Vitamins-Minerals (CENTRUM SILVER) TABS Take 1 tablet by mouth daily      fluticasone (FLONASE) 50 MCG/ACT nasal spray 1 spray by Each Nostril route daily      amiodarone (CORDARONE) 200 MG tablet Take 200 mg by mouth daily      fluticasone-umeclidin-vilant (TRELEGY ELLIPTA) 100-62.5-25 MCG/INH AEPB Inhale 1 puff into the lungs daily      ferrous sulfate 325 (65 Fe) MG tablet Take 1 tablet by mouth 2 times daily (before meals) 60 tablet 6    montelukast (SINGULAIR) 10 MG tablet Take 10 mg by mouth nightly      triamterene-hydrochlorothiazide (MAXZIDE-25) 37.5-25 MG per tablet Take 1 tablet by mouth daily      diltiazem (CARDIZEM CD) 240 MG ER capsule Take 240 mg by mouth daily. aspirin 81 MG tablet Take 81 mg by mouth daily Will stop 7 days before surgery      azelastine (ASTELIN) 137 MCG/SPRAY nasal spray 1 spray by Nasal route 2 times daily. Use in each nostril as directed      nitroGLYCERIN (NITRODUR) 0.1 MG/HR Place 1 patch onto the skin daily. warfarin (COUMADIN) 5 MG tablet Take 7.5 mg by mouth Will stop 4 day before surgery       No current facility-administered medications for this encounter. Home Medications:   Prior to Admission medications    Medication Sig Start Date End Date Taking?  Authorizing Provider   FIBER COMPLETE PO Take by mouth Historical Provider, MD   cetirizine (ZYRTEC) 10 MG tablet Take 10 mg by mouth daily    Historical Provider, MD   RESTASIS MULTIDOSE 0.05 % ophthalmic emulsion  4/11/22   Historical Provider, MD   pantoprazole (PROTONIX) 40 MG tablet Take 40 mg by mouth daily    Historical Provider, MD   calcium carbonate-vitamin D 600-200 MG-UNIT TABS Take 1 tablet by mouth daily    Historical Provider, MD   Multiple Vitamins-Minerals (CENTRUM SILVER) TABS Take 1 tablet by mouth daily    Historical Provider, MD   fluticasone (FLONASE) 50 MCG/ACT nasal spray 1 spray by Each Nostril route daily    Historical Provider, MD   amiodarone (CORDARONE) 200 MG tablet Take 200 mg by mouth daily    Historical Provider, MD   fluticasone-umeclidin-vilant (TRELEGY ELLIPTA) 100-62.5-25 MCG/INH AEPB Inhale 1 puff into the lungs daily    Historical Provider, MD   ferrous sulfate 325 (65 Fe) MG tablet Take 1 tablet by mouth 2 times daily (before meals) 5/10/19   Magdy Lainez MD   montelukast (SINGULAIR) 10 MG tablet Take 10 mg by mouth nightly    Historical Provider, MD   triamterene-hydrochlorothiazide (MAXZIDE-25) 37.5-25 MG per tablet Take 1 tablet by mouth daily 12/29/15   Historical Provider, MD   diltiazem (CARDIZEM CD) 240 MG ER capsule Take 240 mg by mouth daily. 8/8/14   DUDLEY Mccoy - CNP   aspirin 81 MG tablet Take 81 mg by mouth daily Will stop 7 days before surgery    Historical Provider, MD   azelastine (ASTELIN) 137 MCG/SPRAY nasal spray 1 spray by Nasal route 2 times daily. Use in each nostril as directed    Historical Provider, MD   nitroGLYCERIN (NITRODUR) 0.1 MG/HR Place 1 patch onto the skin daily. Historical Provider, MD   warfarin (COUMADIN) 5 MG tablet Take 7.5 mg by mouth Will stop 4 day before surgery    Historical Provider, MD       Allergies  Patient has no known allergies. Review of Systems:  General: Denies any fever, chills.   Eyes: Denies any changes in vision, diplopia or eye pain  Ears, Nose, Mouth: Denies changes in hearing/tinnitus or drainage from ears, no rhinorrhea or bloody nose, no difficulty chewing  Throat: no difficulty swallowing, no throat pain  Respiratory: Denies any shortness of breath or cough. Cardiac: Denies any chest pain, palpitations, claudication or edema. Gastrointestinal:  Denies any melena, hematochezia, hematemesis or pyrosis. Genitourinary: Denies any frequency, urgency, hesitancy or incontinence. Musculoskeletal: thoracic MSK pain  Skin: Denies rashes or lesions  Psychiatric: Denies any recent changes in mood or affect  Hematologic: Denies bruising or bleeding easily. PHYSICAL EXAMINATION      General Appearance:  awake, alert, no acute distress, well developed, well nourished   Skin:  Skin color, texture, turgor normal. No rashes or lesions. Head/face:  NCAT, face symmetrical  Eyes:  PERRL, no evidence of conjunctivitis or ptosis bilaterally  Ears:  External ears and canals grossly normal, no evidence of otorrhea. Nose/Sinuses:  Nares normal. Septum midline. Mucosa normal. No external drainage noted. Mouth/Neck:  Mucosa moist.  No external oral lesions. Trachea midline. No visible masses. Lungs:  Normal chest expansion, unlabored breathing without accessory muscle use. No audible rales, rhonchi, or wheezing. Cardiovascular: S1S2. No evidence of JVD. No evidence of pulsatile masses in abdomen  Abdomen:  Soft, non-tender, no organomegaly, no masses. Musculoskeletal: No evidence of bony/muscular deformities, trauma, atrophy of either left/right upper/lower extremity. No evidence of digital clubbing or cyanosis. Neurologic:  CN 2-12 grossly intact without obvious deficits. Grossly normal sensation in all extremities.   Psychiatric: appropriate judgement and insight, appropriate recall of recent and remote memory, no evidence of depression/anxiety/agitation      RADIOLOGY:  The following images and/or reports were personally reviewed with the following significant findings pertinent to the Chief Complaint and/or HPI:     XR ABDOMEN (KUB) (SINGLE AP VIEW)    Result Date: 2/1/2023  EXAMINATION: ONE SUPINE XRAY VIEW(S) OF THE ABDOMEN 2/1/2023 1:14 pm COMPARISON: None. HISTORY: ORDERING SYSTEM PROVIDED HISTORY: Flank pain TECHNOLOGIST PROVIDED HISTORY: Reason for Exam: flank pain x 5 days FINDINGS: Bowel gas pattern nonobstructed. Renal shadows partially obscured by bowel gas and fecal debris. No definite renal or ureteral stones. Degenerative changes spine. No definite renal or ureteral stones. US RENAL COMPLETE    Result Date: 2/8/2023  EXAMINATION: RETROPERITONEAL ULTRASOUND OF THE KIDNEYS AND URINARY BLADDER 2/8/2023 COMPARISON: April levin 2019 HISTORY: ORDERING SYSTEM PROVIDED HISTORY: Bilateral renal cysts TECHNOLOGIST PROVIDED HISTORY: This procedure can be scheduled via [employer] Access your [employer] account by visiting [email or web address]. FINDINGS: Kidneys: The right kidney measures 10 cm in length and the left kidney measures 9.6 cm in length. No hydronephrosis or nephrolithiasis. Simple bilateral renal cysts 20 mm on the right and 10 mm on the left. Bladder: Unremarkable appearance of the bladder. Patient unable to void for postvoid residual..     Simple bilateral renal cysts. Kidneys otherwise unremarkable. Unremarkable appearance urinary bladder. Patient could not void for postvoid residual imaging. CT ABDOMEN PELVIS W IV CONTRAST    Result Date: 2/4/2023  EXAMINATION: CT OF THE ABDOMEN AND PELVIS WITH CONTRAST, 2/4/2023 9:09 am TECHNIQUE: CT of the abdomen and pelvis was performed with the administration of intravenous contrast. Multiplanar reformatted images are provided for review. Automated exposure control, iterative reconstruction, and/or weight based adjustment of the mA/kV was utilized to reduce the radiation dose to as low as reasonably achievable.  COMPARISON: None HISTORY: ORDERING SYSTEM PROVIDED HISTORY:  Flank pain TECHNOLOGIST PROVIDED HISTORY: STAT Creatinine as needed:  Yes Worsening flank pain, micro hematuria. KUB neg for stone. rule out mass. Reason for Exam:  Left-sided low back pain radiating to left side of abd near lower ribs for 6 days. Microhematuria. H/O appy, umbilical hernia repair, hyst, right hip replacement. FINDINGS: Lower Chest: No active disease. Organs: The liver appears unremarkable. There is mild cholelithiasis but no acute cholecystitis. Pancreas and spleen, kidneys, aorta and IVC appear stable. Bilateral renal cysts with the largest in the right lower pole measuring 4 cm. No evidence of renal stones or obstructive uropathy on either side. No evidence of renal inflammation or solid renal masses. GI/Bowel: No evidence of bowel obstruction or perforation. Minimal diverticulosis but no acute diverticulitis. Small bowel appears unremarkable. Pelvis: Urinary bladder appears unremarkable. Status post hysterectomy. Stable right hip replacement. Peritoneum/Retroperitoneum: No evidence of retroperitoneal lymphadenopathy or acute mesenteric findings. Bones/Soft Tissues: No acute abnormality. Moderate multilevel degenerative disc disease. 1. Mild diverticulosis but no acute diverticulitis. No acute gastrointestinal abnormality. 2. No evidence of renal stones or obstructive uropathy. Bilateral renal cysts. 3. Mild cholelithiasis but no acute cholecystitis. DIAGNOSES:  Active Hospital Problems    Diagnosis Date Noted    Calculus of gallbladder without cholecystitis without obstruction [K80.20] 02/15/2023     Priority: Medium    Flank pain [R10.9] 02/15/2023     Priority: Medium         PLAN:  Unlikely that patient is describing issues related to symptomatic cholelithiasis. Unclear etiology for the patient's pain, differential includes postural/MSK issues vs neurologic in origin, pt may benefit from PT vs PMNR vs neurology/neurosurgery vs urology evaluation.  At this time I would not recommend cholecystectomy as the gallbladder does not seem to be the trigger for this issue. She can follow up with me as needed. Advised pt seek evaluation in ED if pain becomes acutely worse. All questions were answered, pt is agreeable to this plan.         Medical Decision Making: low complexity    Electronically signed by Sarahy Boothe DO on 2/15/2023 at 9:27 AM

## 2023-02-15 NOTE — LETTER
[facility]  Surgical Specialties - General Surgery  [address]  [facility], [facility] [address]  Phone: [phone]  Fax: [phone]      2/15/23    Patient: Cassy Penaloza  MRN: [id number]  : 1940  Date of visit: 2/15/2023    Dear Beny Ochoa PA-C:      Thank you for requesting consultation for Cassy Penaolza in regards to evaluation for the issues noted below. Below are the relevant portions of my assessment and plan of care. If you have questions, please do not hesitate to call me. I look forward to following Cassy Penaloza along with you. Sincerely,        Katie Pollock DO      [facility]  Clinic Evaluation  Katie Pollock DO    Pt Name: Cassy Penaloza  MRN: [id number]  YOB: 1940  Date of evaluation: 2/15/2023  Primary Care Physician: Beny Ochoa PA-C    Chief Complaint: cholelithiasis, bilateral flank pain      SUBJECTIVE:    History of Present Illness: This is a 79 y.o.  female who presents for evaluation for the above, her daughter is also present. Pt's complains began with feeling of focal pain in her left posterior thorax at the mid-thoracic level several weeks ago, this eventually started to radiate around and to the left anterior chest. Pt then additionally began to experience focal pain of the right posterior thorax that now radiates to the right anterior chest. Pt is unable to wear a bra as the bra-line sits at the level of discomfort. Pt denies abdominal pain or any issues with food intake, denies any bowel trouble above baseline. Denies history of jaundice, no other complaints. Chart review performed to add information to the HPI: Yes    Past Medical History   has a past medical history of Allergic rhinitis, Arthritis, Asthma, Atrial fibrillation ([facility] [address] GERD (gastroesophageal reflux disease), Hypertension, Melanoma ([facility] [address] Myocardial infarction [facility]), Wears glasses, and Wears partial dentures.     Past Surgical History   has a past surgical history that includes Appendectomy; other surgical history; Hysterectomy; Neck surgery; Carpal tunnel release (Right); Nose surgery; hernia repair; Tonsillectomy; joint replacement; Total hip arthroplasty (Right, 04/09/2019); Total hip arthroplasty (Right, 4/9/2019); Upper gastrointestinal endoscopy (N/A, 5/6/2019); Colonoscopy (N/A, 8/8/2019); eye surgery; Cardiac surgery; and Breast surgery. Family History  family history includes Asthma in her mother; COPD in her mother and sister; Cancer in her father and mother; Emphysema in her sister. Social History  Tobacco use:  reports that she quit smoking about 42 years ago. Her smoking use included cigarettes. She has a 20.00 pack-year smoking history. She has never used smokeless tobacco.  Alcohol use:  reports no history of alcohol use. Drug use:  reports no history of drug use.       Medications  Current Medications:   Current Outpatient Medications   Medication Sig Dispense Refill    FIBER COMPLETE PO Take by mouth      cetirizine (ZYRTEC) 10 MG tablet Take 10 mg by mouth daily      RESTASIS MULTIDOSE 0.05 % ophthalmic emulsion       pantoprazole (PROTONIX) 40 MG tablet Take 40 mg by mouth daily      calcium carbonate-vitamin D 600-200 MG-UNIT TABS Take 1 tablet by mouth daily      Multiple Vitamins-Minerals (CENTRUM SILVER) TABS Take 1 tablet by mouth daily      fluticasone (FLONASE) 50 MCG/ACT nasal spray 1 spray by Each Nostril route daily      amiodarone (CORDARONE) 200 MG tablet Take 200 mg by mouth daily      fluticasone-umeclidin-vilant (TRELEGY ELLIPTA) 100-62.5-25 MCG/INH AEPB Inhale 1 puff into the lungs daily      ferrous sulfate 325 (65 Fe) MG tablet Take 1 tablet by mouth 2 times daily (before meals) 60 tablet 6    montelukast (SINGULAIR) 10 MG tablet Take 10 mg by mouth nightly      triamterene-hydrochlorothiazide (MAXZIDE-25) 37.5-25 MG per tablet Take 1 tablet by mouth daily      diltiazem (CARDIZEM CD) 240 MG ER capsule Take 240 mg by mouth daily.  aspirin 81 MG tablet Take 81 mg by mouth daily Will stop 7 days before surgery      azelastine (ASTELIN) 137 MCG/SPRAY nasal spray 1 spray by Nasal route 2 times daily. Use in each nostril as directed      nitroGLYCERIN (NITRODUR) 0.1 MG/HR Place 1 patch onto the skin daily.  warfarin (COUMADIN) 5 MG tablet Take 7.5 mg by mouth Will stop 4 day before surgery       No current facility-administered medications for this encounter. Home Medications:   Prior to Admission medications    Medication Sig Start Date End Date Taking? Authorizing Provider   FIBER COMPLETE PO Take by mouth    Historical Provider, MD   cetirizine (ZYRTEC) 10 MG tablet Take 10 mg by mouth daily    Historical Provider, MD   RESTASIS MULTIDOSE 0.05 % ophthalmic emulsion  4/11/22   Historical Provider, MD   pantoprazole (PROTONIX) 40 MG tablet Take 40 mg by mouth daily    Historical Provider, MD   calcium carbonate-vitamin D 600-200 MG-UNIT TABS Take 1 tablet by mouth daily    Historical Provider, MD   Multiple Vitamins-Minerals (CENTRUM SILVER) TABS Take 1 tablet by mouth daily    Historical Provider, MD   fluticasone (FLONASE) 50 MCG/ACT nasal spray 1 spray by Each Nostril route daily    Historical Provider, MD   amiodarone (CORDARONE) 200 MG tablet Take 200 mg by mouth daily    Historical Provider, MD   fluticasone-umeclidin-vilant (TRELEGY ELLIPTA) 100-62.5-25 MCG/INH AEPB Inhale 1 puff into the lungs daily    Historical Provider, MD   ferrous sulfate 325 (65 Fe) MG tablet Take 1 tablet by mouth 2 times daily (before meals) 5/10/19   Osito Almeida MD   montelukast (SINGULAIR) 10 MG tablet Take 10 mg by mouth nightly    Historical Provider, MD   triamterene-hydrochlorothiazide (MAXZIDE-25) 37.5-25 MG per tablet Take 1 tablet by mouth daily 12/29/15   Historical Provider, MD   diltiazem (CARDIZEM CD) 240 MG ER capsule Take 240 mg by mouth daily.  8/8/14   DUDLEY Hugo - CNP   aspirin 81 MG tablet Take 81 mg by mouth daily Will stop 7 days before surgery    Historical Provider, MD   azelastine (ASTELIN) 137 MCG/SPRAY nasal spray 1 spray by Nasal route 2 times daily. Use in each nostril as directed    Historical Provider, MD   nitroGLYCERIN (NITRODUR) 0.1 MG/HR Place 1 patch onto the skin daily. Historical Provider, MD   warfarin (COUMADIN) 5 MG tablet Take 7.5 mg by mouth Will stop 4 day before surgery    Historical Provider, MD       Allergies  Patient has no known allergies. Review of Systems:  General: Denies any fever, chills. Eyes: Denies any changes in vision, diplopia or eye pain  Ears, Nose, Mouth: Denies changes in hearing/tinnitus or drainage from ears, no rhinorrhea or bloody nose, no difficulty chewing  Throat: no difficulty swallowing, no throat pain  Respiratory: Denies any shortness of breath or cough. Cardiac: Denies any chest pain, palpitations, claudication or edema. Gastrointestinal:  Denies any melena, hematochezia, hematemesis or pyrosis. Genitourinary: Denies any frequency, urgency, hesitancy or incontinence. Musculoskeletal: thoracic MSK pain  Skin: Denies rashes or lesions  Psychiatric: Denies any recent changes in mood or affect  Hematologic: Denies bruising or bleeding easily. PHYSICAL EXAMINATION      General Appearance:  awake, alert, no acute distress, well developed, well nourished   Skin:  Skin color, texture, turgor normal. No rashes or lesions. Head/face:  NCAT, face symmetrical  Eyes:  PERRL, no evidence of conjunctivitis or ptosis bilaterally  Ears:  External ears and canals grossly normal, no evidence of otorrhea. Nose/Sinuses:  Nares normal. Septum midline. Mucosa normal. No external drainage noted. Mouth/Neck:  Mucosa moist.  No external oral lesions. Trachea midline. No visible masses. Lungs:  Normal chest expansion, unlabored breathing without accessory muscle use.    No audible rales, rhonchi, or wheezing. Cardiovascular: S1S2. No evidence of JVD. No evidence of pulsatile masses in abdomen  Abdomen:  Soft, non-tender, no organomegaly, no masses. Musculoskeletal: No evidence of bony/muscular deformities, trauma, atrophy of either left/right upper/lower extremity. No evidence of digital clubbing or cyanosis. Neurologic:  CN 2-12 grossly intact without obvious deficits. Grossly normal sensation in all extremities. Psychiatric: appropriate judgement and insight, appropriate recall of recent and remote memory, no evidence of depression/anxiety/agitation      RADIOLOGY:  The following images and/or reports were personally reviewed with the following significant findings pertinent to the Chief Complaint and/or HPI:     XR ABDOMEN (KUB) (SINGLE AP VIEW)    Result Date: 2/1/2023  EXAMINATION: ONE SUPINE XRAY VIEW(S) OF THE ABDOMEN 2/1/2023 1:14 pm COMPARISON: None. HISTORY: ORDERING SYSTEM PROVIDED HISTORY: Flank pain TECHNOLOGIST PROVIDED HISTORY: Reason for Exam: flank pain x 5 days FINDINGS: Bowel gas pattern nonobstructed. Renal shadows partially obscured by bowel gas and fecal debris. No definite renal or ureteral stones. Degenerative changes spine. No definite renal or ureteral stones. US RENAL COMPLETE    Result Date: 2/8/2023  EXAMINATION: RETROPERITONEAL ULTRASOUND OF THE KIDNEYS AND URINARY BLADDER 2/8/2023 COMPARISON: April levin 2019 HISTORY: ORDERING SYSTEM PROVIDED HISTORY: Bilateral renal cysts TECHNOLOGIST PROVIDED HISTORY: This procedure can be scheduled via Swarm. Access your Swarm account by visiting [email or web address]. FINDINGS: Kidneys: The right kidney measures 10 cm in length and the left kidney measures 9.6 cm in length. No hydronephrosis or nephrolithiasis. Simple bilateral renal cysts 20 mm on the right and 10 mm on the left. Bladder: Unremarkable appearance of the bladder. Patient unable to void for postvoid residual..     Simple bilateral renal cysts.   Kidneys otherwise unremarkable. Unremarkable appearance urinary bladder. Patient could not void for postvoid residual imaging. CT ABDOMEN PELVIS W IV CONTRAST    Result Date: 2/4/2023  EXAMINATION: CT OF THE ABDOMEN AND PELVIS WITH CONTRAST, 2/4/2023 9:09 am TECHNIQUE: CT of the abdomen and pelvis was performed with the administration of intravenous contrast. Multiplanar reformatted images are provided for review. Automated exposure control, iterative reconstruction, and/or weight based adjustment of the mA/kV was utilized to reduce the radiation dose to as low as reasonably achievable. COMPARISON: None HISTORY: ORDERING SYSTEM PROVIDED HISTORY:  Flank pain TECHNOLOGIST PROVIDED HISTORY: STAT Creatinine as needed:  Yes Worsening flank pain, micro hematuria. KUB neg for stone. rule out mass. Reason for Exam:  Left-sided low back pain radiating to left side of abd near lower ribs for 6 days. Microhematuria. H/O appy, umbilical hernia repair, hyst, right hip replacement. FINDINGS: Lower Chest: No active disease. Organs: The liver appears unremarkable. There is mild cholelithiasis but no acute cholecystitis. Pancreas and spleen, kidneys, aorta and IVC appear stable. Bilateral renal cysts with the largest in the right lower pole measuring 4 cm. No evidence of renal stones or obstructive uropathy on either side. No evidence of renal inflammation or solid renal masses. GI/Bowel: No evidence of bowel obstruction or perforation. Minimal diverticulosis but no acute diverticulitis. Small bowel appears unremarkable. Pelvis: Urinary bladder appears unremarkable. Status post hysterectomy. Stable right hip replacement. Peritoneum/Retroperitoneum: No evidence of retroperitoneal lymphadenopathy or acute mesenteric findings. Bones/Soft Tissues: No acute abnormality. Moderate multilevel degenerative disc disease. 1. Mild diverticulosis but no acute diverticulitis. No acute gastrointestinal abnormality.  2. No evidence of renal stones or obstructive uropathy. Bilateral renal cysts. 3. Mild cholelithiasis but no acute cholecystitis. DIAGNOSES:  Active Hospital Problems    Diagnosis Date Noted    Calculus of gallbladder without cholecystitis without obstruction [K80.20] 02/15/2023     Priority: Medium    Flank pain [R10.9] 02/15/2023     Priority: Medium         PLAN:  · Unlikely that patient is describing issues related to symptomatic cholelithiasis. Unclear etiology for the patient's pain, differential includes postural/MSK issues vs neurologic in origin, pt may benefit from PT vs PMNR vs neurology/neurosurgery vs urology evaluation. At this time I would not recommend cholecystectomy as the gallbladder does not seem to be the trigger for this issue. She can follow up with me as needed. Advised pt seek evaluation in ED if pain becomes acutely worse. All questions were answered, pt is agreeable to this plan.   ·       Medical Decision Making: low complexity    Electronically signed by Guero Mckinney DO on 2/15/2023 at 9:27 AM

## 2023-02-16 PROBLEM — K80.50 CHOLEDOCHOLITHIASIS: Status: ACTIVE | Noted: 2023-02-16

## 2023-02-16 PROBLEM — J30.9 ALLERGIC RHINITIS: Status: ACTIVE | Noted: 2019-04-09

## 2023-02-16 PROBLEM — M16.11 PRIMARY OSTEOARTHRITIS OF RIGHT HIP: Status: ACTIVE | Noted: 2020-06-03

## 2023-02-16 PROBLEM — Z87.891 HISTORY OF SMOKING: Status: ACTIVE | Noted: 2021-03-02

## 2023-02-16 PROBLEM — C43.9 MALIGNANT MELANOMA (HCC): Status: ACTIVE | Noted: 2020-06-03

## 2023-02-16 PROBLEM — I10 HYPERTENSION: Status: ACTIVE | Noted: 2020-06-03

## 2023-02-16 PROBLEM — M54.50 ACUTE MIDLINE LOW BACK PAIN WITHOUT SCIATICA: Status: ACTIVE | Noted: 2023-02-16

## 2023-02-16 PROBLEM — R89.4 SEROLOGIC ABNORMALITY: Status: ACTIVE | Noted: 2019-05-07

## 2023-02-16 PROBLEM — Z97.3 WEARS GLASSES: Status: ACTIVE | Noted: 2019-04-09

## 2023-02-16 PROBLEM — R10.10 PAIN OF UPPER ABDOMEN: Status: ACTIVE | Noted: 2023-02-16

## 2023-02-16 PROBLEM — R79.1 ABNORMAL INR: Status: ACTIVE | Noted: 2023-02-16

## 2023-02-16 PROBLEM — R10.11 ABDOMINAL PAIN, RIGHT UPPER QUADRANT: Status: ACTIVE | Noted: 2023-02-16

## 2023-02-16 LAB
ABSOLUTE EOS #: 0.28 K/UL (ref 0–0.44)
ABSOLUTE IMMATURE GRANULOCYTE: 0.02 K/UL (ref 0–0.3)
ABSOLUTE LYMPH #: 1.08 K/UL (ref 1.1–3.7)
ABSOLUTE MONO #: 0.7 K/UL (ref 0.1–1.2)
ALBUMIN SERPL-MCNC: 3.4 G/DL (ref 3.5–5.2)
ALP SERPL-CCNC: 83 U/L (ref 35–104)
ALT SERPL-CCNC: 16 U/L (ref 5–33)
ANION GAP SERPL CALCULATED.3IONS-SCNC: 10 MMOL/L (ref 9–17)
AST SERPL-CCNC: 21 U/L
BASOPHILS # BLD: 2 % (ref 0–2)
BASOPHILS ABSOLUTE: 0.07 K/UL (ref 0–0.2)
BILIRUB DIRECT SERPL-MCNC: 0.1 MG/DL
BILIRUB INDIRECT SERPL-MCNC: 0.4 MG/DL (ref 0–1)
BILIRUB SERPL-MCNC: 0.5 MG/DL (ref 0.3–1.2)
BUN SERPL-MCNC: 19 MG/DL (ref 8–23)
CALCIUM SERPL-MCNC: 9 MG/DL (ref 8.6–10.4)
CHLORIDE SERPL-SCNC: 106 MMOL/L (ref 98–107)
CO2 SERPL-SCNC: 26 MMOL/L (ref 20–31)
CREAT SERPL-MCNC: 1.4 MG/DL (ref 0.5–0.9)
EKG ATRIAL RATE: 47 BPM
EKG P AXIS: 78 DEGREES
EKG P-R INTERVAL: 210 MS
EKG Q-T INTERVAL: 520 MS
EKG QRS DURATION: 110 MS
EKG QTC CALCULATION (BAZETT): 460 MS
EKG R AXIS: 27 DEGREES
EKG T AXIS: 30 DEGREES
EKG VENTRICULAR RATE: 47 BPM
EOSINOPHILS RELATIVE PERCENT: 7 % (ref 1–4)
GFR SERPL CREATININE-BSD FRML MDRD: 37 ML/MIN/1.73M2
GLUCOSE SERPL-MCNC: 85 MG/DL (ref 70–99)
HCT VFR BLD AUTO: 37.3 % (ref 36.3–47.1)
HGB BLD-MCNC: 11.5 G/DL (ref 11.9–15.1)
IMMATURE GRANULOCYTES: 1 %
INR PPP: 2
LIPASE SERPL-CCNC: 19 U/L (ref 13–60)
LYMPHOCYTES # BLD: 27 % (ref 24–43)
MCH RBC QN AUTO: 29 PG (ref 25.2–33.5)
MCHC RBC AUTO-ENTMCNC: 30.8 G/DL (ref 28.4–34.8)
MCV RBC AUTO: 94.2 FL (ref 82.6–102.9)
MONOCYTES # BLD: 18 % (ref 3–12)
NRBC AUTOMATED: 0 PER 100 WBC
PDW BLD-RTO: 14.1 % (ref 11.8–14.4)
PLATELET # BLD AUTO: 227 K/UL (ref 138–453)
PMV BLD AUTO: 10.1 FL (ref 8.1–13.5)
POTASSIUM SERPL-SCNC: 3.9 MMOL/L (ref 3.7–5.3)
PROT SERPL-MCNC: 6.2 G/DL (ref 6.4–8.3)
PROTHROMBIN TIME: 22.6 SEC (ref 11.5–14.2)
RBC # BLD: 3.96 M/UL (ref 3.95–5.11)
SEG NEUTROPHILS: 45 % (ref 36–65)
SEGMENTED NEUTROPHILS ABSOLUTE COUNT: 1.85 K/UL (ref 1.5–8.1)
SODIUM SERPL-SCNC: 142 MMOL/L (ref 135–144)
WBC # BLD AUTO: 4 K/UL (ref 3.5–11.3)

## 2023-02-16 PROCEDURE — 6370000000 HC RX 637 (ALT 250 FOR IP): Performed by: NURSE PRACTITIONER

## 2023-02-16 PROCEDURE — 97116 GAIT TRAINING THERAPY: CPT

## 2023-02-16 PROCEDURE — 6360000002 HC RX W HCPCS: Performed by: NURSE PRACTITIONER

## 2023-02-16 PROCEDURE — 99223 1ST HOSP IP/OBS HIGH 75: CPT | Performed by: INTERNAL MEDICINE

## 2023-02-16 PROCEDURE — 97530 THERAPEUTIC ACTIVITIES: CPT

## 2023-02-16 PROCEDURE — 97162 PT EVAL MOD COMPLEX 30 MIN: CPT

## 2023-02-16 PROCEDURE — 36415 COLL VENOUS BLD VENIPUNCTURE: CPT

## 2023-02-16 PROCEDURE — 94640 AIRWAY INHALATION TREATMENT: CPT

## 2023-02-16 PROCEDURE — 97166 OT EVAL MOD COMPLEX 45 MIN: CPT

## 2023-02-16 PROCEDURE — 94761 N-INVAS EAR/PLS OXIMETRY MLT: CPT

## 2023-02-16 PROCEDURE — 2580000003 HC RX 258: Performed by: NURSE PRACTITIONER

## 2023-02-16 PROCEDURE — 1200000000 HC SEMI PRIVATE

## 2023-02-16 PROCEDURE — 85610 PROTHROMBIN TIME: CPT

## 2023-02-16 PROCEDURE — 93010 ELECTROCARDIOGRAM REPORT: CPT | Performed by: INTERNAL MEDICINE

## 2023-02-16 PROCEDURE — 97535 SELF CARE MNGMENT TRAINING: CPT

## 2023-02-16 PROCEDURE — 82248 BILIRUBIN DIRECT: CPT

## 2023-02-16 PROCEDURE — 85025 COMPLETE CBC W/AUTO DIFF WBC: CPT

## 2023-02-16 PROCEDURE — APPSS45 APP SPLIT SHARED TIME 31-45 MINUTES: Performed by: NURSE PRACTITIONER

## 2023-02-16 PROCEDURE — 80053 COMPREHEN METABOLIC PANEL: CPT

## 2023-02-16 RX ORDER — HYDRALAZINE HYDROCHLORIDE 20 MG/ML
10 INJECTION INTRAMUSCULAR; INTRAVENOUS EVERY 6 HOURS PRN
Status: DISCONTINUED | OUTPATIENT
Start: 2023-02-16 | End: 2023-02-22 | Stop reason: HOSPADM

## 2023-02-16 RX ORDER — LANOLIN ALCOHOL/MO/W.PET/CERES
325 CREAM (GRAM) TOPICAL
Status: DISCONTINUED | OUTPATIENT
Start: 2023-02-16 | End: 2023-02-21 | Stop reason: DRUGHIGH

## 2023-02-16 RX ORDER — AMIODARONE HYDROCHLORIDE 200 MG/1
100 TABLET ORAL DAILY
Status: DISCONTINUED | OUTPATIENT
Start: 2023-02-17 | End: 2023-02-22 | Stop reason: HOSPADM

## 2023-02-16 RX ORDER — MONTELUKAST SODIUM 10 MG/1
10 TABLET ORAL NIGHTLY
Status: DISCONTINUED | OUTPATIENT
Start: 2023-02-16 | End: 2023-02-22 | Stop reason: HOSPADM

## 2023-02-16 RX ORDER — PANTOPRAZOLE SODIUM 40 MG/1
40 TABLET, DELAYED RELEASE ORAL DAILY
Status: DISCONTINUED | OUTPATIENT
Start: 2023-02-16 | End: 2023-02-22 | Stop reason: HOSPADM

## 2023-02-16 RX ORDER — CETIRIZINE HYDROCHLORIDE 5 MG/1
5 TABLET ORAL DAILY
Status: DISCONTINUED | OUTPATIENT
Start: 2023-02-16 | End: 2023-02-22 | Stop reason: HOSPADM

## 2023-02-16 RX ORDER — CALCIUM CARBONATE/VITAMIN D3 600 MG-10
1 TABLET ORAL DAILY
Status: DISCONTINUED | OUTPATIENT
Start: 2023-02-16 | End: 2023-02-22 | Stop reason: HOSPADM

## 2023-02-16 RX ORDER — ASPIRIN 81 MG/1
81 TABLET ORAL DAILY
Status: DISCONTINUED | OUTPATIENT
Start: 2023-02-16 | End: 2023-02-21

## 2023-02-16 RX ORDER — CETIRIZINE HYDROCHLORIDE 10 MG/1
10 TABLET ORAL DAILY
Status: DISCONTINUED | OUTPATIENT
Start: 2023-02-16 | End: 2023-02-16 | Stop reason: DRUGHIGH

## 2023-02-16 RX ORDER — AZELASTINE 1 MG/ML
2 SPRAY, METERED NASAL 2 TIMES DAILY
Status: DISCONTINUED | OUTPATIENT
Start: 2023-02-16 | End: 2023-02-22 | Stop reason: HOSPADM

## 2023-02-16 RX ORDER — CALCIUM CARBONATE 200(500)MG
500 TABLET,CHEWABLE ORAL 3 TIMES DAILY PRN
Status: DISCONTINUED | OUTPATIENT
Start: 2023-02-16 | End: 2023-02-22 | Stop reason: HOSPADM

## 2023-02-16 RX ORDER — AMIODARONE HYDROCHLORIDE 200 MG/1
200 TABLET ORAL DAILY
Status: DISCONTINUED | OUTPATIENT
Start: 2023-02-16 | End: 2023-02-16

## 2023-02-16 RX ADMIN — Medication 1 TABLET: at 08:02

## 2023-02-16 RX ADMIN — PIPERACILLIN AND TAZOBACTAM 4500 MG: 4; .5 INJECTION, POWDER, FOR SOLUTION INTRAVENOUS at 13:09

## 2023-02-16 RX ADMIN — MORPHINE SULFATE 2 MG: 2 INJECTION, SOLUTION INTRAMUSCULAR; INTRAVENOUS at 20:09

## 2023-02-16 RX ADMIN — MORPHINE SULFATE 2 MG: 2 INJECTION, SOLUTION INTRAMUSCULAR; INTRAVENOUS at 12:10

## 2023-02-16 RX ADMIN — MONTELUKAST 10 MG: 10 TABLET, FILM COATED ORAL at 20:57

## 2023-02-16 RX ADMIN — CETIRIZINE HYDROCHLORIDE 5 MG: 5 TABLET ORAL at 08:02

## 2023-02-16 RX ADMIN — BUDESONIDE AND FORMOTEROL FUMARATE DIHYDRATE 2 PUFF: 160; 4.5 AEROSOL RESPIRATORY (INHALATION) at 20:35

## 2023-02-16 RX ADMIN — AMIODARONE HYDROCHLORIDE 200 MG: 200 TABLET ORAL at 08:02

## 2023-02-16 RX ADMIN — BUDESONIDE AND FORMOTEROL FUMARATE DIHYDRATE 2 PUFF: 160; 4.5 AEROSOL RESPIRATORY (INHALATION) at 07:42

## 2023-02-16 RX ADMIN — FERROUS SULFATE TAB EC 325 MG (65 MG FE EQUIVALENT) 325 MG: 325 (65 FE) TABLET DELAYED RESPONSE at 16:01

## 2023-02-16 RX ADMIN — PANTOPRAZOLE SODIUM 40 MG: 40 TABLET, DELAYED RELEASE ORAL at 06:06

## 2023-02-16 RX ADMIN — TIOTROPIUM BROMIDE INHALATION SPRAY 2 PUFF: 3.12 SPRAY, METERED RESPIRATORY (INHALATION) at 07:42

## 2023-02-16 RX ADMIN — FERROUS SULFATE TAB EC 325 MG (65 MG FE EQUIVALENT) 325 MG: 325 (65 FE) TABLET DELAYED RESPONSE at 06:06

## 2023-02-16 RX ADMIN — AZELASTINE HYDROCHLORIDE 2 SPRAY: 137 SPRAY, METERED NASAL at 20:58

## 2023-02-16 RX ADMIN — POLYETHYLENE GLYCOL 3350 17 G: 17 POWDER, FOR SOLUTION ORAL at 21:03

## 2023-02-16 RX ADMIN — HYDRALAZINE HYDROCHLORIDE 10 MG: 20 INJECTION INTRAMUSCULAR; INTRAVENOUS at 00:00

## 2023-02-16 RX ADMIN — HYDRALAZINE HYDROCHLORIDE 10 MG: 20 INJECTION INTRAMUSCULAR; INTRAVENOUS at 20:05

## 2023-02-16 RX ADMIN — MORPHINE SULFATE 2 MG: 2 INJECTION, SOLUTION INTRAMUSCULAR; INTRAVENOUS at 06:13

## 2023-02-16 RX ADMIN — SODIUM CHLORIDE, PRESERVATIVE FREE 10 ML: 5 INJECTION INTRAVENOUS at 20:58

## 2023-02-16 RX ADMIN — HYDRALAZINE HYDROCHLORIDE 10 MG: 20 INJECTION INTRAMUSCULAR; INTRAVENOUS at 12:08

## 2023-02-16 RX ADMIN — SODIUM CHLORIDE: 9 INJECTION, SOLUTION INTRAVENOUS at 17:07

## 2023-02-16 RX ADMIN — Medication 500 MG: at 11:33

## 2023-02-16 RX ADMIN — PIPERACILLIN AND TAZOBACTAM 3375 MG: 3; .375 INJECTION, POWDER, FOR SOLUTION INTRAVENOUS at 17:06

## 2023-02-16 ASSESSMENT — PAIN DESCRIPTION - PAIN TYPE: TYPE: ACUTE PAIN

## 2023-02-16 ASSESSMENT — ENCOUNTER SYMPTOMS
BACK PAIN: 1
EYES NEGATIVE: 1
RESPIRATORY NEGATIVE: 1
DIARRHEA: 1
NAUSEA: 0
ABDOMINAL PAIN: 1
VOMITING: 0
CONSTIPATION: 1

## 2023-02-16 ASSESSMENT — PAIN DESCRIPTION - FREQUENCY: FREQUENCY: INTERMITTENT

## 2023-02-16 ASSESSMENT — PAIN DESCRIPTION - LOCATION
LOCATION: ABDOMEN;BACK
LOCATION: ABDOMEN;BACK

## 2023-02-16 ASSESSMENT — PAIN DESCRIPTION - DESCRIPTORS
DESCRIPTORS: ACHING
DESCRIPTORS: BURNING;SQUEEZING

## 2023-02-16 ASSESSMENT — PAIN SCALES - GENERAL
PAINLEVEL_OUTOF10: 5
PAINLEVEL_OUTOF10: 4
PAINLEVEL_OUTOF10: 6
PAINLEVEL_OUTOF10: 6

## 2023-02-16 ASSESSMENT — PAIN - FUNCTIONAL ASSESSMENT: PAIN_FUNCTIONAL_ASSESSMENT: PREVENTS OR INTERFERES SOME ACTIVE ACTIVITIES AND ADLS

## 2023-02-16 ASSESSMENT — PAIN DESCRIPTION - ONSET: ONSET: ON-GOING

## 2023-02-16 NOTE — ACP (ADVANCE CARE PLANNING)
Advance Care Planning     Advance Care Planning Activator (Inpatient)  Conversation Note      Date of ACP Conversation: 2/16/2023     Conversation Conducted with: Patient with Decision Making Capacity    ACP Activator: Aarti East RN        Health Care Decision Maker:     Current Designated Health Care Decision Maker:     Primary Decision Maker: Mayda Ku - [place] - [phone]  Click here to complete Healthcare Decision Makers including section of the Healthcare Decision Maker Relationship (ie \"Primary\")  Today we documented Decision Maker(s) consistent with Legal Next of Kin hierarchy. Care Preferences    Ventilation: \"If you were in your present state of health and suddenly became very ill and were unable to breathe on your own, what would your preference be about the use of a ventilator (breathing machine) if it were available to you? \"      Would the patient desire the use of ventilator (breathing machine)?: yes    \"If your health worsens and it becomes clear that your chance of recovery is unlikely, what would your preference be about the use of a ventilator (breathing machine) if it were available to you? \"     Would the patient desire the use of ventilator (breathing machine)?: No does not think so       Resuscitation  \"CPR works best to restart the heart when there is a sudden event, like a heart attack, in someone who is otherwise healthy. Unfortunately, CPR does not typically restart the heart for people who have serious health conditions or who are very sick. \"    \"In the event your heart stopped as a result of an underlying serious health condition, would you want attempts to be made to restart your heart (answer \"yes\" for attempt to resuscitate) or would you prefer a natural death (answer \"no\" for do not attempt to resuscitate)? \" yes       [x] Yes   [] No   Educated Patient / [place] regarding differences between Advance Directives and portable DNR orders.     Length of ACP Conversation in minutes:      Conversation Outcomes:  [x] ACP discussion completed  [] Existing advance directive reviewed with patient; no changes to patient's previously recorded wishes  [] New Advance Directive completed  [] Portable Do Not Rescitate prepared for Provider review and signature  [] POLST/POST/MOLST/MOST prepared for Provider review and signature      Follow-up plan:    [] Schedule follow-up conversation to continue planning  [] Referred individual to Provider for additional questions/concerns   [] Advised patient/agent/surrogate to review completed ACP document and update if needed with changes in condition, patient preferences or care setting    [] This note routed to one or more involved healthcare providers

## 2023-02-16 NOTE — PROGRESS NOTES
Patient cleared for surgery by Dr. Sravani Villagomez. Advises to monitor bradycardia as patient may require atropine intra-op. Per MD, will be adjusting amiodarone dose.

## 2023-02-16 NOTE — CONSULTS
GI Consult Note:    Name: Shay Herzog  MRN: [id number]     Acct: [de-identified]  Room: 2010/2010-01    Admit Date: 2/15/2023  PCP: Kelli Theodore PA-C    Physician Requesting Consult: Melvin Eisenmenger, MD     Reason for Consult:    Choledocholithiasis? Right upper quadrant abdominal pain  Back pain  Weakness and fatigue    Chief Complaint:     Chief Complaint   Patient presents with    Back Pain     Bilateral upper back. Pt reports \"squeezing\" going around to right side under breast area       History Obtained From:     Patient and EMR    History of Present Illness:      Shay Herzog is a  79 y.o.  female who presents with Back Pain (Bilateral upper back. Pt reports \"squeezing\" going around to right side under breast area)    This 79-year-old elderly female was evaluated accompanied by her daughter at bedside  Patient has history significant of multiple chronic medical issues  She has been having some nonspecific pain mainly in the right upper quadrant at bedside of the abdomen for the past few weeks  She says the pain started at the back she normally has back pain but this pain was worse than normal  Then it sort of localized to the right side of the abdomen usually under her breast right side of the upper part of the abdomen as well as right mid abdomen and sometimes right lower abdomen  She had a CT scan of the abdomen and pelvis done which revealed some gallbladder and common bile duct issues underwent an MRCP which has revealed the following findings  1. Cholelithiasis without other findings to suggest acute cholecystitis. 2. Mild extrahepatic biliary ductal dilatation with suspected sludge or small   stones in the distal common bile duct. Consider ERCP for further evaluation.            Her LFTs are grossly normal  Amylase lipase are normal  No known such history is in the past  No alcohol abuse illicit drug usage  She denies rectal bleeding melanotic stools  Has mild nausea but no vomiting hematemesis  Her charts are reviewed  Symptoms:  Onset:  Location:  abdomen  Duration:  day(s)  Severity:  moderate  Quality:  intermittent      Past Medical History:     Past Medical History:   Diagnosis Date    Allergic rhinitis     Arthritis     Asthma     Atrial fibrillation ([facility] [address]     GERD (gastroesophageal reflux disease)     Hypertension     Melanoma ([facility] [address]     on nose    Myocardial infarction [facility]) 2008    Wears glasses     Wears partial dentures     upper        Past Surgical History:     Past Surgical History:   Procedure Laterality Date    APPENDECTOMY      BREAST SURGERY      CARDIAC SURGERY      CARPAL TUNNEL RELEASE Right     COLONOSCOPY N/A 8/8/2019    COLONOSCOPY POLYPECTOMY REMOVAL HOT BIOPSY AND RESOLUTION CLIP performed by Arthur Quevedo MD at [address]      umbilical    HYSTERECTOMY (CERVIX STATUS UNKNOWN)      JOINT REPLACEMENT      NECK SURGERY      C5-C6    NOSE SURGERY      cancer removed    OTHER SURGICAL HISTORY      heart surg/growth in heart sac    TONSILLECTOMY      TOTAL HIP ARTHROPLASTY Right 04/09/2019    TOTAL HIP ARTHROPLASTY Right 4/9/2019    HIP TOTAL ARTHROPLASTY - MEDACTA, C-ARM, MEDACTA TABLE, NSA= GENERAL VS SPINAL, FASCIA ILLIACA BLOCK, *STANDARD performed by Liu Vázquez DO at [facility] 5/6/2019    EGD DIAGNOSTIC ONLY performed by Arthur Quevedo MD at [facility]        Medications Prior to Admission:       Prior to Admission medications    Medication Sig Start Date End Date Taking?  Authorizing Provider   FIBER COMPLETE PO Take by mouth    Historical Provider, MD   cetirizine (ZYRTEC) 10 MG tablet Take 10 mg by mouth daily    Historical Provider, MD   RESTASIS MULTIDOSE 0.05 % ophthalmic emulsion  4/11/22   Historical Provider, MD   pantoprazole (PROTONIX) 40 MG tablet Take 40 mg by mouth daily    Historical Provider, MD   calcium carbonate-vitamin D 600-200 MG-UNIT TABS Take 1 tablet by mouth daily    Historical Provider, MD   Multiple Vitamins-Minerals (CENTRUM SILVER) TABS Take 1 tablet by mouth daily    Historical Provider, MD   fluticasone (FLONASE) 50 MCG/ACT nasal spray 1 spray by Each Nostril route daily    Historical Provider, MD   amiodarone (CORDARONE) 200 MG tablet Take 200 mg by mouth daily    Historical Provider, MD   fluticasone-umeclidin-vilant (TRELEGY ELLIPTA) 100-62.5-25 MCG/INH AEPB Inhale 1 puff into the lungs daily    Historical Provider, MD   ferrous sulfate 325 (65 Fe) MG tablet Take 1 tablet by mouth 2 times daily (before meals) 5/10/19   Maria Luisa Sanabria MD   montelukast (SINGULAIR) 10 MG tablet Take 10 mg by mouth nightly    Historical Provider, MD   triamterene-hydrochlorothiazide (MAXZIDE-25) 37.5-25 MG per tablet Take 1 tablet by mouth daily 12/29/15   Historical Provider, MD   diltiazem (CARDIZEM CD) 240 MG ER capsule Take 240 mg by mouth daily. Patient not taking: Reported on 2/15/2023 8/8/14   DUDLEY Barrios - CNP   aspirin 81 MG tablet Take 81 mg by mouth daily Will stop 7 days before surgery    Historical Provider, MD   azelastine (ASTELIN) 137 MCG/SPRAY nasal spray 1 spray by Nasal route 2 times daily. Use in each nostril as directed    Historical Provider, MD   nitroGLYCERIN (NITRODUR) 0.1 MG/HR Place 1 patch onto the skin daily. Historical Provider, MD   warfarin (COUMADIN) 5 MG tablet Take 7.5 mg by mouth Will stop 4 day before surgery    Historical Provider, MD        Allergies:       Patient has no known allergies. Social History:     Tobacco:    reports that she quit smoking about 42 years ago. Her smoking use included cigarettes. She has a 20.00 pack-year smoking history. She has never used smokeless tobacco.  Alcohol:      reports no history of alcohol use. Drug Use:  reports no history of drug use.     Family History:     Family History   Problem Relation Age of Onset    COPD Mother     Asthma Mother     Cancer Mother     Cancer Father     Stroke Sister     COPD Sister     Emphysema Sister        Review of Systems:     Positive and Negative as described in HPI    Constitutional:  negative for  fevers, chills, sweats, mild fatigue, and weight loss  HEENT:  negative for vision or hearing changes,   Respiratory:  negative for shortness of breath, cough, or congestion  Cardiovascular:  negative for  chest pain, palpitations  Gastrointestinal:  negative for nausea, vomiting, diarrhea, constipation, positive abdominal pain  Genitourinary:  negative for frequency, dysuria  Integument:  negative for rash, skin lesions  Musculoskeletal: Positive for muscle aches or joint pain  Neurological:  negative for headaches, dizziness, lightheadedness, numbness, pain and tingling extrimities  Behavior/Psych:  negative for depression and positive anxiety    Code Status:  Full Code    Physical Exam:     Vitals:  BP (!) 211/61   Pulse 55   Temp 98.1 °F (36.7 °C) (Oral)   Resp 16   Ht 5' 5\" (1.651 m)   Wt 175 lb (79.4 kg)   SpO2 97%   BMI 29.12 kg/m²   Temp (24hrs), Av °F (36.7 °C), Min:97.5 °F (36.4 °C), Max:98.2 °F (36.8 °C)      General appearance - alert, well appearing, and in no acute distress  Mental status - oriented to person, place, and time with anxious affect  Head - normocephalic and atraumatic  Eyes - pupils equal and reactive, extraocular eye movements intact, conjunctiva clear  Ears - hearing appears to be intact  Nose - no drainage noted  Mouth - mucous membranes moist  Neck - supple, no carotid bruits, thyroid not palpable  Chest - clear to auscultation, normal effort  Heart - normal rate, regular rhythm, no murmurs  Abdomen - soft, right upper quadrant right side of the abdomen tenderness, nondistended, bowel sounds present all four quadrants, no masses, hepatomegaly or splenomegaly.  No hernias  Neurological - normal speech, no focal findings or movement disorder noted, cranial nerves not done at this time  Extremities - peripheral pulses palpable, no pedal edema or calf pain with palpation  Skin -nduration noted  Cranial Nerves : Not done at this time  Lymph nodes: not done at this time    Data:   CBC:   Lab Results   Component Value Date/Time    WBC 4.0 02/16/2023 06:15 AM    RBC 3.96 02/16/2023 06:15 AM    HGB 11.5 02/16/2023 06:15 AM    HCT 37.3 02/16/2023 06:15 AM    MCV 94.2 02/16/2023 06:15 AM    MCH 29.0 02/16/2023 06:15 AM    MCHC 30.8 02/16/2023 06:15 AM    RDW 14.1 02/16/2023 06:15 AM     02/16/2023 06:15 AM    MPV 10.1 02/16/2023 06:15 AM     CBC with Differential:    Lab Results   Component Value Date/Time    WBC 4.0 02/16/2023 06:15 AM    RBC 3.96 02/16/2023 06:15 AM    HGB 11.5 02/16/2023 06:15 AM    HCT 37.3 02/16/2023 06:15 AM     02/16/2023 06:15 AM    MCV 94.2 02/16/2023 06:15 AM    MCH 29.0 02/16/2023 06:15 AM    MCHC 30.8 02/16/2023 06:15 AM    RDW 14.1 02/16/2023 06:15 AM    LYMPHOPCT 27 02/16/2023 06:15 AM    MONOPCT 18 02/16/2023 06:15 AM    BASOPCT 2 02/16/2023 06:15 AM    MONOSABS 0.70 02/16/2023 06:15 AM    LYMPHSABS 1.08 02/16/2023 06:15 AM    EOSABS 0.28 02/16/2023 06:15 AM    BASOSABS 0.07 02/16/2023 06:15 AM    DIFFTYPE NOT REPORTED 11/10/2020 08:36 AM     Hemoglobin/Hematocrit:    Lab Results   Component Value Date/Time    HGB 11.5 02/16/2023 06:15 AM    HCT 37.3 02/16/2023 06:15 AM     CMP:    Lab Results   Component Value Date/Time     02/16/2023 06:15 AM    K 3.9 02/16/2023 06:15 AM     02/16/2023 06:15 AM    CO2 26 02/16/2023 06:15 AM    BUN 19 02/16/2023 06:15 AM    CREATININE 1.40 02/16/2023 06:15 AM    GFRAA 45 12/09/2021 08:40 AM    LABGLOM 37 02/16/2023 06:15 AM    GLUCOSE 85 02/16/2023 06:15 AM    PROT 6.2 02/16/2023 06:15 AM    LABALBU 3.4 02/16/2023 06:15 AM    CALCIUM 9.0 02/16/2023 06:15 AM    BILITOT 0.5 02/16/2023 06:15 AM    ALKPHOS 83 02/16/2023 06:15 AM    AST 21 02/16/2023 06:15 AM    ALT 16 02/16/2023 06:15 AM     BMP:    Lab Results   Component Value Date/Time     02/16/2023 06:15 AM    K 3.9 02/16/2023 06:15 AM     02/16/2023 06:15 AM    CO2 26 02/16/2023 06:15 AM    BUN 19 02/16/2023 06:15 AM    LABALBU 3.4 02/16/2023 06:15 AM    CREATININE 1.40 02/16/2023 06:15 AM    CALCIUM 9.0 02/16/2023 06:15 AM    GFRAA 45 12/09/2021 08:40 AM    LABGLOM 37 02/16/2023 06:15 AM    GLUCOSE 85 02/16/2023 06:15 AM     PT/INR:    Lab Results   Component Value Date/Time    PROTIME 22.6 02/16/2023 06:15 AM    PROTIME 37.9 01/09/2023 10:35 AM    INR 2.0 02/16/2023 06:15 AM     PTT:    Lab Results   Component Value Date/Time    APTT 32.8 05/05/2019 02:47 PM   [APTT}    Assesment:     Primary Problem  Common bile duct dilatation    Active Hospital Problems    Diagnosis Date Noted    Choledocholithiasis-suspected [K80.50] 02/16/2023     Priority: Medium    INR therapeutic [R79.1] 02/16/2023     Priority: Medium    Pain of upper abdomen [R10.10] 02/16/2023     Priority: Medium    Common bile duct dilatation [K83.8] 02/15/2023     Priority: Medium    Calculus of gallbladder without cholecystitis without obstruction [K80.20] 02/15/2023     Priority: Medium    Hypertension [I10] 06/03/2020    Gastroesophageal reflux disease [K21.9] 06/03/2020    Atrial fibrillation ([facility] [address] [I48.91] 04/09/2019     Choledocholithiasis? Right upper quadrant abdominal pain  Back pain  Weakness and fatigue  Plan:     IV hydration  IV antibiotics  Plan ERCP to evaluate  Follow-up LFTs  Follow-up WBC count  The Endoscopic procedure was explained to the patient in detail  The prep and NPO were explained  All the Risks, Benefits, and Alternatives were explained  Risk of Bleeding, Perforation and Cardio Respiratory risks were explained  her questions were answered  The patient has verbalized understanding and agreement to this plan. Thank you for allowing me to participate in the care of your patient. Please feel free to contact me with any questions or concerns.      Electronically signed by Dante Herrera MD on 2/16/2023 at 2:56 PM     Copy sent to Dr. Renetta Alcala PA-C

## 2023-02-16 NOTE — PROGRESS NOTES
Occupational Therapy  Facility/Department: [facility] MED SURG  Occupational Therapy Initial Assessment    Name: Berta Ramachandran  : 1940  MRN: [id number]  Date of Service: 2023    Discharge Recommendations:  Patient would benefit from continued therapy after discharge      RN reports patient is medically stable for therapy treatment this date. Chart reviewed prior to treatment and patient is agreeable for therapy. All lines intact and patient positioned comfortably at end of treatment. All patient needs addressed prior to ending therapy session. Patient Diagnosis(es): The primary encounter diagnosis was Pain of upper abdomen. A diagnosis of Acute midline low back pain without sciatica was also pertinent to this visit. Past Medical History:  has a past medical history of Allergic rhinitis, Arthritis, Asthma, Atrial fibrillation ([facility] [address] GERD (gastroesophageal reflux disease), Hypertension, Melanoma ([facility] [address] Myocardial infarction [facility]), Wears glasses, and Wears partial dentures. Past Surgical History:  has a past surgical history that includes Appendectomy; other surgical history; Hysterectomy; Neck surgery; Carpal tunnel release (Right); Nose surgery; hernia repair; Tonsillectomy; joint replacement; Total hip arthroplasty (Right, 2019); Total hip arthroplasty (Right, 2019); Upper gastrointestinal endoscopy (N/A, 2019); Colonoscopy (N/A, 2019); eye surgery; Cardiac surgery; and Breast surgery. Per H&P: Berta Ramachandran is a 79 y.o. Non- / non  female who presents with Back Pain (Bilateral upper back. Pt reports \"squeezing\" going around to right side under breast area)   and is admitted to the hospital for the management of Common bile duct dilatation. Patient says she started having mid back pain that started 3 weeks ago that radiated to the left side, and then started radiating around to the right side.  She has been taking Tylenol #3 for the discomfort, but it has not been effective for the pain, and she has had constipation since taking it. Her last BM was Sunday after taking an OTC laxative and prune juice. she went to see Dr Lane Grande general surgeon yesterday- no cholecystectomy recommended at this time. Prior imaging has revealed renal cysts and mild cholelithiasis, mild diverticulosis, no kidney stones. She has a PMH of appendectomy, arthritis, asthma, atrial fib on warfarin, GERD, HTN, MI, melanoma, and MI. She went to the ED as the pain did not subside. Assessment   Performance deficits / Impairments: Decreased functional mobility ; Decreased strength;Decreased ADL status; Decreased safe awareness;Decreased endurance;Decreased cognition;Decreased balance;Decreased coordination  Assessment: Skilled OT is indicated to increase overall IND and safety with self-care and functional tasks to return to PLOF  Prognosis: Good  Decision Making: Medium Complexity  REQUIRES OT FOLLOW-UP: Yes  Activity Tolerance  Activity Tolerance: Patient Tolerated treatment well        Plan   Occupational Therapy Plan  Times Per Week: 4-5x per week, 1x per day as rik  Current Treatment Recommendations: Strengthening, Balance training, Functional mobility training, Endurance training, Patient/Caregiver education & training, Safety education & training, Positioning, Equipment evaluation, education, & procurement, Cognitive/Perceptual training, Self-Care / ADL     Restrictions  Restrictions/Precautions  Restrictions/Precautions: General Precautions, Fall Risk  Position Activity Restriction  Other position/activity restrictions:  Up with assist, telemetry, RUE IV, NPO except sips water with meds    Subjective   General  Chart Reviewed: Yes  Patient assessed for rehabilitation services?: Yes  Family / Caregiver Present: No  Subjective  Subjective: pt is pleasant and cooperative for OT Eval     Social/Functional History  Social/Functional History  Lives With: Daughter  Type of Home: House  Home Layout: One level  Home Access: Stairs to enter with rails  Entrance Stairs - Number of Steps: 4(front entrance)  Entrance Stairs - Rails: Both  Bathroom Shower/Tub: Tub/Shower unit, Walk-in shower  Bathroom Toilet: Handicap height (pt has support near toilet she can hold)  Bathroom Equipment: Grab bars in shower  Home Equipment: Dewaine Mat, rolling, Reacher, Sock aid, Lift chair  Has the patient had two or more falls in the past year or any fall with injury in the past year?: No (pt denies)  Receives Help From: Family  ADL Assistance: [address]: Independent  Homemaking Responsibilities: Yes  Ambulation Assistance: Independent  Transfer Assistance: Independent  Active : Yes  Mode of Transportation: Car  Occupation: Retired  Type of Occupation: [address]: goes to [employer], helps out at [employer], [employer]       Objective           Observation/Palpation  Observation: RUE IV    Safety Devices  Type of Devices: Call light within reach;Nurse notified;Gait belt;Left in chair;Chair alarm in place      [employer]: Yes  Overall Level of Assistance: Contact-guard assistance  Interventions: Verbal cues (Min cues for proper bed mob tech)  Supine to Sit: Contact-guard assistance  Sit to Supine: Other (comment) (pt in bedside chair upon exit)  Scooting: Contact-guard assistance    Transfer Training  Transfer Training: Yes  Overall Level of Assistance: Contact-guard assistance  Interventions: Verbal cues; Tactile cues; Safety awareness training (Min cues for proper hand placement on stable surface, controlled sit<>stand, upright posture, pacing, and assist with lines)  Sit to Stand: Contact-guard assistance  Stand to Sit: Contact-guard assistance    Functional Mobility  Overall Level of Assistance: Contact-guard assistance;Minimum assistance (to/from bathroom, household distance in room, and gathering/putting away personal items)  Interventions: Verbal cues; Safety awareness training;Weight shifting training/pressure relief; Tactile cues (Min cues for pacing and assist with lines)  Assistive Device: Gait belt       AROM: Within functional limits  PROM: Within functional limits  Strength: Generally decreased, functional (~4/5)  Coordination: Generally decreased, functional (tremors)  Tone: Normal    ADL  Feeding: NPO  Grooming: Contact guard assistance  Grooming Skilled Clinical Factors: pt stood at the sink to comb her hair and complete oral care with CGA. pt washed her face while seated in bedside chair with SUP. UE Bathing: Contact guard assistance  LE Bathing: Contact guard assistance  UE Dressing: Minimal assistance  LE Dressing: Contact guard assistance  LE Dressing Skilled Clinical Factors: CGA while sitting EOB to doff/[facility] socks. Pt with difficulty donning R sock, but was successful  Toileting: Contact guard assistance  Toileting Skilled Clinical Factors: pt denied toileting needs this date                Vision  Vision: Impaired  Vision Exceptions: Wears glasses at all times (pt denies new vison changes)  Hearing  Hearing: Exceptions to [facility]  Hearing Exceptions: Hard of hearing/hearing concerns; No hearing aid    Cognition  Overall Cognitive Status: Exceptions  Arousal/Alertness: Appropriate responses to stimuli  Following Commands: Follows all commands without difficulty  Attention Span: Appears intact  Memory: Decreased short term memory  Safety Judgement: Decreased awareness of need for assistance  Problem Solving: Good awareness of errors made  Insights: Fully aware of deficits  Initiation: Does not require cues  Sequencing: Requires cues for some  Orientation  Overall Orientation Status: Within Functional Limits  Orientation Level: Oriented X4                  Education Given To: Patient  Education Provided: Role of Therapy; ADL Adaptive Strategies; Fall Prevention Strategies; Plan of Care;Transfer Training;Energy Conservation  Education Provided Comments: OT POC, purpose of OT in acute care, safety in function, call light/fall prevention, benefits of OOB activity  Education Method: Demonstration;Verbal  Education Outcome: Continued education needed            Pt instructed to \"move what moves\", such as moving all joints in any comfortable direction, within ROM and pain tolerance. OT provided demonstration of AROM of neck, shoulders, elbows, forearms, wrists, fingers, knees, and ankles. OT recommended while pt is watching TV to use commercials as a guide to initiate AROM of one joint. Pt with Good understanding of importance of AROM to promote circulation, maintain strength/endurance and to prevent overall stiffness. Pt with Good demo of education. AM-PAC Score        AM-PAC Inpatient Daily Activity Raw Score: 19 (02/16/23 1256)  AM-PAC Inpatient ADL T-Scale Score : 40.22 (02/16/23 1256)  ADL Inpatient CMS 0-100% Score: 42.8 (02/16/23 1256)  ADL Inpatient CMS G-Code Modifier : CK (02/16/23 1256)           Goals  Short Term Goals  Time Frame for Short Term Goals: by discharge, pt to demo  Short Term Goal 1: I/MI for bed mob tech without bed rails/controls  Short Term Goal 2: I/MI for ADL transfers and functional mob with AD as needed and Good safety  Short Term Goal 3: I/MI for total body ADLs with AE as needed and Good safety  Short Term Goal 4: I/MI for toileting tasks with AD as needed and Good safety  Short Term Goal 5: pt/family to  be IND with EC/WS, fall prevention tech, pressure relief education, discharge recommendations with use of handouts as needed  Patient Goals   Patient goals : to go home       Therapy Time   Individual Concurrent Group Co-treatment   Time In 0856         Time Out 0931 (+10 chart review)         Minutes 45          Tx time: 55 min    Upon writer exit, call light within reach, pt retired to chair. All lines intact and patient positioned comfortably. All patient needs addressed prior to ending therapy session.  Chart reviewed prior to treatment and patient is agreeable for therapy. RN reports patient is medically stable for therapy treatment this date.         Suzi Faria OTR/L

## 2023-02-16 NOTE — PLAN OF CARE
Problem: Respiratory - Adult  Goal: Achieves optimal ventilation and oxygenation  2/16/2023 0903 by Cornelius Rojas RCP  Outcome: Progressing  2/16/2023 0902 by Cornelius Rojas RCP  Flowsheets (Taken 2/16/2023 0902)  Achieves optimal ventilation and oxygenation: Respiratory therapy support as indicated

## 2023-02-16 NOTE — PLAN OF CARE
Problem: Discharge Planning  Goal: Discharge to home or other facility with appropriate resources  Outcome: Progressing  Flowsheets (Taken 2/16/2023 0330)  Discharge to home or other facility with appropriate resources:   Identify barriers to discharge with patient and caregiver   Arrange for needed discharge resources and transportation as appropriate   Identify discharge learning needs (meds, wound care, etc)     Problem: Pain  Goal: Verbalizes/displays adequate comfort level or baseline comfort level  Outcome: Progressing  Note: Pain level assessment complete.    Patient educated on pain scale and control interventions  PRN pain medication given per patient request  Patient instructed to call out with new onset of pain or unrelieved pain       Problem: ABCDS Injury Assessment  Goal: Absence of physical injury  Outcome: Progressing  Flowsheets (Taken 2/16/2023 0330)  Absence of Physical Injury: Implement safety measures based on patient assessment     Problem: Safety - Adult  Goal: Free from fall injury  Outcome: Progressing  Note: Proper pt identification  Hourly rounding performed  Anticipatory needs met  Non-skid socks worn  Proper transferring technique  2/4 side rails up  Personal necessities within reach  Bed low and locked  Call light in reach  Proper lighting  Room free of clutter

## 2023-02-16 NOTE — PROGRESS NOTES
Pt admitted to room 2010 per cart in good condition from ED  Oriented to room and surroundings  Bed in lowest position, wheels locked, 2/4 side rails up  Call light in reach, room free of clutter, adequate lighting provided  Denies any further questions at this time  Instructed to call out with any questions/concerns/new onset of pain and/or n/v   White board updated  Continue to monitor with hourly rounding  STAY WITH ME protocol initiated   Bed alarm on/Fall Risk signs in place/Fall risk sticker to wrist band  Non-skid socks on/at bedside

## 2023-02-16 NOTE — CONSULTS
Section of Cardiology   Consult Note      Reason for Consult: Preop clearance  Requesting Physician: Ethel Tony MD    CHIEF COMPLAINT: Abdominal pain    History Obtained From:  patient, electronic medical record, office records    HISTORY OF PRESENT ILLNESS:      The patient is a 79 y.o. female with significant past medical history of atrial fibrillation who presents with abdominal discomfort and was seen by GI service and ERCP was recommended and subsequently due to history of cardiac disease clearance was requested. At the time I saw the patient she appeared to be comfortable and pleasant. She is compliant with medications. She is physically active person despite her advanced age and exercises on a regular basis and she still works as a cook 5 days a week. She denies any bleeding. She denies any weakness or numbness in any arm or leg. No edema or claudication. Denies any PND or orthopnea. No significant restriction to her activities. Denies any kind of chest pain. No palpitation. No dizziness or syncope. She has no history of claudication. No edema  Previous diagnostic testing for coronary artery disease includes: echocardiogram, stress thallium. Previous history of cardiac disease includes: atrial fibrillation. Coronary artery disease risk factors include: advanced age (older than 54 for men, 72 for women) and hypertension.     Past Medical History:    Past Medical History:   Diagnosis Date    Allergic rhinitis     Arthritis     Asthma     Atrial fibrillation ([facility] [address]     GERD (gastroesophageal reflux disease)     Hypertension     Melanoma ([facility] [address]     on nose    Myocardial infarction [facility]) 2008    Wears glasses     Wears partial dentures     upper     Past Surgical History:    Past Surgical History:   Procedure Laterality Date    APPENDECTOMY      BREAST SURGERY      CARDIAC SURGERY      CARPAL TUNNEL RELEASE Right     COLONOSCOPY N/A 8/8/2019    COLONOSCOPY POLYPECTOMY REMOVAL HOT BIOPSY AND RESOLUTION CLIP performed by Tierra Karimi MD at [address]      umbilical    HYSTERECTOMY (CERVIX STATUS UNKNOWN)      JOINT REPLACEMENT      NECK SURGERY      C5-C6    NOSE SURGERY      cancer removed    OTHER SURGICAL HISTORY      heart surg/growth in heart sac    TONSILLECTOMY      TOTAL HIP ARTHROPLASTY Right 04/09/2019    TOTAL HIP ARTHROPLASTY Right 4/9/2019    HIP TOTAL ARTHROPLASTY - MEDACTA, C-ARM, MEDACTA TABLE, NSA= GENERAL VS SPINAL, FASCIA ILLIACA BLOCK, *STANDARD performed by Balwinder Reyna DO at [place] [address] 5/6/2019    EGD DIAGNOSTIC ONLY performed by Tierra Karimi MD at [facility]     Home Medications:  Prior to Admission medications    Medication Sig Start Date End Date Taking?  Authorizing Provider   FIBER COMPLETE PO Take by mouth    Historical Provider, MD   cetirizine (ZYRTEC) 10 MG tablet Take 10 mg by mouth daily    Historical Provider, MD   RESTASIS MULTIDOSE 0.05 % ophthalmic emulsion  4/11/22   Historical Provider, MD   pantoprazole (PROTONIX) 40 MG tablet Take 40 mg by mouth daily    Historical Provider, MD   calcium carbonate-vitamin D 600-200 MG-UNIT TABS Take 1 tablet by mouth daily    Historical Provider, MD   Multiple Vitamins-Minerals (CENTRUM SILVER) TABS Take 1 tablet by mouth daily    Historical Provider, MD   fluticasone (FLONASE) 50 MCG/ACT nasal spray 1 spray by Each Nostril route daily    Historical Provider, MD   amiodarone (CORDARONE) 200 MG tablet Take 200 mg by mouth daily    Historical Provider, MD   fluticasone-umeclidin-vilant (TRELEGY ELLIPTA) 100-62.5-25 MCG/INH AEPB Inhale 1 puff into the lungs daily    Historical Provider, MD   ferrous sulfate 325 (65 Fe) MG tablet Take 1 tablet by mouth 2 times daily (before meals) 5/10/19   Jonathan Keane MD   montelukast (SINGULAIR) 10 MG tablet Take 10 mg by mouth nightly    Historical Provider, MD   triamterene-hydrochlorothiazide Fabiola Muck) 37.5-25 MG per tablet Take 1 tablet by mouth daily 12/29/15   Historical Provider, MD   diltiazem (CARDIZEM CD) 240 MG ER capsule Take 240 mg by mouth daily. Patient not taking: Reported on 2/15/2023 8/8/14   DUDLEY Kong CNP   aspirin 81 MG tablet Take 81 mg by mouth daily Will stop 7 days before surgery    Historical Provider, MD   azelastine (ASTELIN) 137 MCG/SPRAY nasal spray 1 spray by Nasal route 2 times daily. Use in each nostril as directed    Historical Provider, MD   nitroGLYCERIN (NITRODUR) 0.1 MG/HR Place 1 patch onto the skin daily.     Historical Provider, MD   warfarin (COUMADIN) 5 MG tablet Take 7.5 mg by mouth Will stop 4 day before surgery    Historical Provider, MD     Current Medications:    Current Facility-Administered Medications   Medication Dose Route Frequency Provider Last Rate Last Admin    amiodarone (CORDARONE) tablet 200 mg  200 mg Oral Daily Danisha Callahan APRN - NP   200 mg at 02/16/23 0802    [Held by provider] aspirin EC tablet 81 mg  81 mg Oral Daily Danisha Callahan APRN - NP        azelastine (ASTELIN) 0.1 % nasal spray 2 spray  2 spray Each Nostril BID Danisha Callahan APRN - NP        ferrous sulfate (FE TABS 325) EC tablet 325 mg  325 mg Oral BID AC Danisha Callahan, APRN - NP   325 mg at 02/16/23 0606    montelukast (SINGULAIR) tablet 10 mg  10 mg Oral Nightly Danisha Callahan APRN - NP        pantoprazole (PROTONIX) tablet 40 mg  40 mg Oral Daily Danisha Callahan, APRN - NP   40 mg at 02/16/23 0606    hydrALAZINE (APRESOLINE) injection 10 mg  10 mg IntraVENous Q6H PRN Danisha Callahan, APRN - NP   10 mg at 02/16/23 1208    cetirizine (ZYRTEC) tablet 5 mg  5 mg Oral Daily Danisha Callahan, APRN - NP   5 mg at 02/16/23 0802    calcium carb-cholecalciferol 600-10 MG-MCG per tab 1 tablet  1 tablet Oral Daily Danisha Callahan APRN - NP   1 tablet at 02/16/23 0802    calcium carbonate (TUMS) chewable tablet 500 mg  500 mg Oral TID PRN DUDLEY Bennett CNP   500 mg at 02/16/23 1134 piperacillin-tazobactam (ZOSYN) 3,375 mg in sodium chloride 0.9 % 50 mL IVPB (mini-bag)  3,375 mg IntraVENous Q8H DUDLEY James CNP        morphine (PF) injection 2 mg  2 mg IntraVENous Q3H PRN DUDLEY Castelan - CNP   2 mg at 02/16/23 1210    fluticasone (FLONASE) 50 MCG/ACT nasal spray 1 spray  1 spray Each Nostril Daily DUDLEY Castelan - CNP        sodium chloride flush 0.9 % injection 5-40 mL  5-40 mL IntraVENous 2 times per day DUDLEY Castelan - CNP   10 mL at 02/15/23 2134    sodium chloride flush 0.9 % injection 10 mL  10 mL IntraVENous PRN Yadi Infante APRN - CNP        0.9 % sodium chloride infusion   IntraVENous PRN DUDLEY Castelan - CNP        potassium chloride (KLOR-CON M) extended release tablet 40 mEq  40 mEq Oral PRN Yadi Infante APRN - CNP        Or    potassium bicarb-citric acid (EFFER-K) effervescent tablet 40 mEq  40 mEq Oral PRN Yadi Infante APRN - CNP        Or    potassium chloride 10 mEq/100 mL IVPB (Peripheral Line)  10 mEq IntraVENous PRN DUDLEY Castelan - CNP        magnesium sulfate 1000 mg in dextrose 5% 100 mL IVPB  1,000 mg IntraVENous PRN DUDLEY Castelan - IRA        ondansetron (ZOFRAN-ODT) disintegrating tablet 4 mg  4 mg Oral Q8H PRN DUDLEY Castelan - IRA        Or    ondansetron (ZOFRAN) injection 4 mg  4 mg IntraVENous Q6H PRN DUDLEY Castelan - CNP        polyethylene glycol (GLYCOLAX) packet 17 g  17 g Oral Daily PRN Yadi Infante APRN - CNP        acetaminophen (TYLENOL) tablet 650 mg  650 mg Oral Q6H PRN Yadi Infante APRN - CNP   650 mg at 02/15/23 2310    Or    acetaminophen (TYLENOL) suppository 650 mg  650 mg Rectal Q6H PRN DUDLEY Castelan - CNP        0.9 % sodium chloride infusion   IntraVENous Continuous DUDLEY Crum - NP 50 mL/hr at 02/16/23 0043 Rate Change at 02/16/23 0043    budesonide-formoterol (SYMBICORT) 160-4.5 MCG/ACT inhaler 2 puff  2 puff Inhalation BID Yadi Infante, APRN - CNP 2 puff at 23 0742    tiotropium (SPIRIVA RESPIMAT) 2.5 MCG/ACT inhaler 2 puff  2 puff Inhalation Daily DUDLEY Jimenez CNP   2 puff at 23 4953    warfarin placeholder: dosing by pharmacy   Other DUDLEY Corado CNP         Allergies:  Patient has no known allergies. Social History:    Social History     Socioeconomic History    Marital status:      Spouse name: Not on file    Number of children: Not on file    Years of education: Not on file    Highest education level: Not on file   Occupational History    Not on file   Tobacco Use    Smoking status: Former     Packs/day: 1.00     Years: 20.00     Pack years: 20.00     Types: Cigarettes     Quit date: 1981     Years since quittin.1    Smokeless tobacco: Never   Vaping Use    Vaping Use: Never used   Substance and Sexual Activity    Alcohol use: No    Drug use: No    Sexual activity: Not on file   Other Topics Concern    Not on file   Social History Narrative    Not on file     Social Determinants of Health     Financial Resource Strain: Low Risk     Difficulty of Paying Living Expenses: Not hard at all   Food Insecurity: Food Insecurity Present    Worried About [address] in the Last Year: Often true    [address] in the Last Year: Often true   Transportation Needs: Unknown    Lack of Transportation (Medical): Not on file    Lack of Transportation (Non-Medical):  No   Physical Activity: Sufficiently Active    Days of Exercise per Week: 3 days    Minutes of Exercise per Session: 60 min   Stress: Not on file   Social Connections: Not on file   Intimate Partner Violence: Not on file   Housing Stability: Unknown    Unable to Pay for Housing in the Last Year: Not on file    Number of Places Lived in the Last Year: Not on file    Unstable Housing in the Last Year: No     Family History:   Family History   Problem Relation Age of Onset    COPD Mother     Asthma Mother     Cancer Mother     Cancer Father     Stroke Sister     COPD Sister     Emphysema Sister        REVIEW OF SYSTEMS   She has no fever chills or cough. Denies any blurred vision. .  No headache. No recent weight loss. PHYSICAL EXAM:    Vitals:    VITALS:  BP (!) 211/61   Pulse 55   Temp 98.1 °F (36.7 °C) (Oral)   Resp 16   Ht 5' 5\" (1.651 m)   Wt 175 lb (79.4 kg)   SpO2 97%   BMI 29.12 kg/m²   24HR INTAKE/OUTPUT:    Intake/Output Summary (Last 24 hours) at 2/16/2023 1411  Last data filed at 2/16/2023 1214  Gross per 24 hour   Intake --   Output 200 ml   Net -200 ml       CONSTITUTIONAL:  awake, alert, cooperative, no apparent distress, and appears stated age  EYES: Pupils equal, round and reactive to light, extra ocular muscles intact, sclera clear, conjunctiva normal  ENT:  normocepalic, without obvious abnormality  NECK:  supple, symmetrical, trachea midline, no carotid bruit ,   No  JVD  BACK:  symmetric  LUNGS: Non-labored, good air exchange, clear to auscultation bilaterally, no crackles or wheezing  CARDIOVASCULAR:  Normal apical impulse, regular rate and rhythm, normal S1 and S2, no S3 or S4, and no murmur noted, no rub.  radial and bilateralpresent 2+  ABDOMEN:  No scars, normal bowel sounds, soft, non-distended, non-tender,   MUSCULOSKELETAL:  there is no redness, warmth, or swelling of the joints   No legs edema. NEUROLOGIC:  Awake, alert, oriented to name, place and time. SKIN:  no bruising or bleeding, normal skin color, texture, turgor and no jaundice    DATA:   ECG: Sinus bradycardia, APC, right bundle branch block pattern, normal QTc  ECHO: Date: 2019 in our office  Reported to have normal LV systolic function without significant valvular disease  Stress Test: 2020 in our office and was normal  Angiography:  Not performed to date    Cardiology Labs:No results for input(s): MYOGLOBIN, CKTOTAL, CKMB, CKMBINDEX, TROPONINT, BNP in the last 72 hours.   Warfarin PT/INR:  Lab Results   Component Value Date/Time    PROTIME 22.6 02/16/2023 06:15 AM    PROTIME 37.9 01/09/2023 10:35 AM    INR 2.0 02/16/2023 06:15 AM     CBC:  Lab Results   Component Value Date/Time    WBC 4.0 02/16/2023 06:15 AM    RBC 3.96 02/16/2023 06:15 AM    HGB 11.5 02/16/2023 06:15 AM    HCT 37.3 02/16/2023 06:15 AM    MCV 94.2 02/16/2023 06:15 AM    MCH 29.0 02/16/2023 06:15 AM    MCHC 30.8 02/16/2023 06:15 AM    RDW 14.1 02/16/2023 06:15 AM     02/16/2023 06:15 AM    MPV 10.1 02/16/2023 06:15 AM     CMP:  Lab Results   Component Value Date/Time     02/16/2023 06:15 AM    K 3.9 02/16/2023 06:15 AM     02/16/2023 06:15 AM    CO2 26 02/16/2023 06:15 AM    BUN 19 02/16/2023 06:15 AM    CREATININE 1.40 02/16/2023 06:15 AM    GFRAA 45 12/09/2021 08:40 AM    LABGLOM 37 02/16/2023 06:15 AM    GLUCOSE 85 02/16/2023 06:15 AM    CALCIUM 9.0 02/16/2023 06:15 AM     Magnesium:    Lab Results   Component Value Date/Time    MG 2.1 02/15/2023 03:37 PM     PTT:    Lab Results   Component Value Date/Time    APTT 32.8 05/05/2019 02:47 PM     TSH:    Lab Results   Component Value Date/Time    TSH 2.15 12/19/2022 08:55 AM     BMP:  Lab Results   Component Value Date/Time     02/16/2023 06:15 AM    K 3.9 02/16/2023 06:15 AM     02/16/2023 06:15 AM    CO2 26 02/16/2023 06:15 AM    BUN 19 02/16/2023 06:15 AM    LABALBU 3.4 02/16/2023 06:15 AM    CREATININE 1.40 02/16/2023 06:15 AM    CALCIUM 9.0 02/16/2023 06:15 AM    GFRAA 45 12/09/2021 08:40 AM    LABGLOM 37 02/16/2023 06:15 AM    GLUCOSE 85 02/16/2023 06:15 AM     LIVER PROFILE:  Recent Labs     02/15/23  1537 02/16/23  0615   AST 29 21   ALT 19 16   LABALBU 3.6 3.4*   ALKPHOS 89 83   BILITOT 0.4 0.5   BILIDIR 0.1 0.1   IBILI 0.3 0.4   PROT 6.7 6.2*     FLP:    Lab Results   Component Value Date/Time    CHOL 202 12/19/2022 08:55 AM    TRIG 141 12/19/2022 08:55 AM    HDL 53 12/19/2022 08:55 AM    LDLCHOLESTEROL 121 12/19/2022 08:55 AM       IMPRESSION  1. Requested preop cardiac clearance  2.   Paroxysmal atrial fibrillation  3. Sinus bradycardia secondary to medications. 4.  Systemic hypertension    Patient Active Problem List   Diagnosis    Hypertension    Allergic rhinitis    Atrial fibrillation ([facility] [address]    Malignant melanoma ([facility] [address]    History of MI (myocardial infarction)    Wears glasses    Sinus bradycardia    Primary osteoarthritis of right hip    GI bleed    Serologic abnormality    Chronic cough    Chronic fatigue syndrome    Chronic sinusitis    Gastroesophageal reflux disease    Moderate persistent asthma without complication    Degenerative joint disease involving multiple joints on both sides of body    Primary localized osteoarthritis of pelvic region and thigh    Primary osteoarthritis of both knees    Other hemoglobinopathies (HCC)    Hypersomnia    Sleep apnea    History of smoking    Arthritis of left hip    Calculus of gallbladder without cholecystitis without obstruction    Flank pain    Common bile duct dilatation           RECOMMENDATIONS:     Continue current medications  Management plan was discussed with patient     Patient's cardiac status appears to be stable to proceed with ERCP with low cardiac risk for complications. She has no stigmata of CHF or angina. I am recommending to decrease amiodarone dosage due to her persistent bradycardia. We will follow-up postoperatively and monitor her heart rhythm and rate. Discussed with the patient and her nurse  Thank you for the consultation.       Electronically signed by Jong Pearson MD on 2/16/2023 at 2:11 PM     CC: Mily Hernandez PA-C

## 2023-02-16 NOTE — CARE COORDINATION
Case Management Assessment  Initial Evaluation    Date/Time of Evaluation: 2/16/2023 10:02 AM  Assessment Completed by: Shilpi Wilkerson RN    If patient is discharged prior to next notation, then this note serves as note for discharge by case management. Patient Name: Tamra Hernandez                   YOB: 1940  Diagnosis: Common bile duct dilatation [K83.8]  Pain of upper abdomen [R10.10]  Acute midline low back pain without sciatica [M54.50]                   Date / Time: 2/15/2023  2:17 PM    Patient Admission Status: Inpatient   Readmission Risk (Low < 19, Mod (19-27), High > 27): Readmission Risk Score: 14.2    Current PCP: Debra Donato PA-C  PCP verified by CM? Yes    Chart Reviewed: Yes      History Provided by: Patient  Patient Orientation: Alert and Oriented    Patient Cognition: Alert    Hospitalization in the last 30 days (Readmission):  Yes    If yes, Readmission Assessment in CM Navigator will be completed. Advance Directives:      Code Status: Full Code   Patient's Primary Decision Maker is: Legal Next of Kin    [facility] [phone]    Discharge Planning:    Patient lives with: Children Type of Home: House  Primary Care Giver: Self  Patient Support Systems include: Children   Current Financial resources:    Current community resources: None  Current services prior to admission: None            Current DME:              Type of Home Care services:  None    ADLS  Prior functional level: Independent in ADLs/IADLs  Current functional level: Independent in ADLs/IADLs    PT AM-PAC:   /24  OT AM-PAC:   /24    Family can provide assistance at DC: Would you like Case Management to discuss the discharge plan with any other family members/significant others, and if so, who?  No  Plans to Return to Present Housing: Yes  Other Identified Issues/Barriers to RETURNING to current housing: none   Potential Assistance needed at discharge: N/A Potential DME:    Patient expects to discharge to: [facility] for transportation at discharge:      Financial    Payor: MEDICARE / Plan: MEDICARE PART A AND B / Product Type: *No Product type* /     Does insurance require precert for SNF: No    Potential assistance Purchasing Medications: No  Meds-to-Beds request: Heidi      [facility] [id number] [id number] - [place], [address] [phone] - F [phone]  [address] [place] [address] [place]  [id number]  Phone: [phone] Fax: [phone]    [address], [address] [phone] - f [phone] [address] [address]  Phone: [phone] Fax: [phone]      Notes:    Factors facilitating achievement of predicted outcomes: Family support, Motivated, Cooperative, Pleasant, and Has needed Durable Medical Equipment at home    Barriers to discharge: none     Additional Case Management Notes:   PCP is Juarez Cheney with [facility] 2/1  DME has RW, lift chair. Reacher     Patient working with therapy in room during assessment. Up with use of rolling walker, steady on feet. She lives with her daughter Yonis Boo but is very independent. She drives and still goes to the gym    Patient is admitted with  CBD dilatation and GI is consulted. She is NPO. Will follow but do not anticipate any skilled needs at this time . The Plan for Transition of Care is related to the following treatment goals of Common bile duct dilatation [K83.8]  Pain of upper abdomen [R10.10]  Acute midline low back pain without sciatica [L76.77]    IF APPLICABLE: The Patient and/or patient representative Bibiana Dominguez and her family were provided with a choice of provider and agrees with the discharge plan.  Freedom of choice list with basic dialogue that supports the patient's individualized plan of care/goals and shares the quality data associated with the providers was provided to:     Patient Representative Name:       The Patient and/or Patient Representative Agree with the Discharge Plan?       Ellen Mata RN  Case Management Department  Ph: [phone] Fax: [phone]

## 2023-02-16 NOTE — PROGRESS NOTES
Pharmacy medication reconciliation services requested via:    [x] Perfect Serve Message sent to: Liza Ozuna Pharmacist  [] Phone call/message to [phone]  [] Order placed for Pharmacy Consult with 'Med Rec' in the order comments  [] Other       To Assist with Medication Reconciliation:   [] Request made to Family member to bring medications into the hospital  [] Request made to Family member to call hospital with medication list  [] Message left with physician office  [] Request for medical records made to   [] Other

## 2023-02-16 NOTE — H&P
[facility]  Office: [address], DO, Serenity Marte, DO, Yoli Marshall, DO, Rakan Verma, DO, Derik Nascimento MD, Merissa Teran MD, Chandu Cruz MD, Elsie Gore MD,  Graciela Goldstein MD, Virginia Sullivan MD, Nazia Cooper DO, Natalee Galindo MD,  Jose Carlos Liang MD, Mika Medrano MD, Shannan Davila DO, Nixon Jo MD, Cynthia Burns MD, John Mortensen DO, Rios Lugo MD, Conchis Gutierrez MD, Marko Yousif MD, Subhash Valentin MD, Margaret Fernandez DO, Toya Perrin MD, Giovanni Hu MD, Mathew Hilton, [facility],  Lili Richard, CNP, Quique Rodriguez, CNP, Ladonna Mckeon, CNP,  Franki Lundborg, [facility], Elen Fang, CNP, Donna Page, CNP, Blanca Rivera, CNP, Agustina Mims, CNP, Alona Agarwal, CNP, Natalia Trujillo PA-C, Matt Vázquez, CNS, Devante Rodriguez, CNP, Reji Adam, [facility]    HISTORY AND PHYSICAL EXAMINATION            Date:   2/16/2023  Patient name:  Joe Torres  Date of admission:  2/15/2023  2:17 PM  MRN:   [id number]  Account:  [de-identified]  YOB: 1940  PCP:    Iliana Arevalo PA-C  Room:   2010/2010-01  Code Status:    Full Code    Chief Complaint:     Chief Complaint   Patient presents with    Back Pain     Bilateral upper back. Pt reports \"squeezing\" going around to right side under breast area       History Obtained From:     patient    History of Present Illness:     Joe Torres is a 79 y.o. Non- / non  female who presents with Back Pain (Bilateral upper back. Pt reports \"squeezing\" going around to right side under breast area)   and is admitted to the hospital for the management of Common bile duct dilatation. Patient says she started having mid back pain that started 3 weeks ago that radiated to the left side, and then started radiating around to the right side.  She has been taking Tylenol #3 for the discomfort, but it has not been effective for the pain, and she has had constipation since taking it. Her last BM was Sunday after taking an OTC laxative and prune juice. she went to see Dr Rachael Mckee general surgeon yesterday- no cholecystectomy recommended at this time. Prior imaging has revealed renal cysts and mild cholelithiasis, mild diverticulosis, no kidney stones. She has a PMH of appendectomy, arthritis, asthma, atrial fib on warfarin, GERD, HTN, MI, melanoma, and MI. She went to the ED as the pain did not subside. While in ED, Creat was 1.46. MRI abdomen revealed cholelithiasis and mild extrahepatic biliary ductal dilatation with suspected sludge/ small stones in the CBD. She was given morphine for pain and hydralazine for elevated BP. GI was consulted. She was admitted for further work up of dilated common bile duct and abdominal pain.     Past Medical History:     Past Medical History:   Diagnosis Date    Allergic rhinitis     Arthritis     Asthma     Atrial fibrillation ([address]     GERD (gastroesophageal reflux disease)     Hypertension     Melanoma ([facility] [address]     on nose    Myocardial infarction [facility]) 2008    Wears glasses     Wears partial dentures     upper        Past Surgical History:     Past Surgical History:   Procedure Laterality Date    APPENDECTOMY      BREAST SURGERY      CARDIAC SURGERY      CARPAL TUNNEL RELEASE Right     COLONOSCOPY N/A 8/8/2019    COLONOSCOPY POLYPECTOMY REMOVAL HOT BIOPSY AND RESOLUTION CLIP performed by Jenny Anderson MD at [address]      umbilical    HYSTERECTOMY (CERVIX STATUS UNKNOWN)      JOINT REPLACEMENT      NECK SURGERY      C5-C6    NOSE SURGERY      cancer removed    OTHER SURGICAL HISTORY      heart surg/growth in heart sac    TONSILLECTOMY      TOTAL HIP ARTHROPLASTY Right 04/09/2019    TOTAL HIP ARTHROPLASTY Right 4/9/2019    HIP TOTAL ARTHROPLASTY - MEDACTA, C-ARM, MEDACTA TABLE, NSA= GENERAL VS SPINAL, FASCIA ILLIACA BLOCK, *STANDARD performed by Derick Lal Brii Saba DO at [address] [facility] N/A 5/6/2019    EGD DIAGNOSTIC ONLY performed by Cristela Simon MD at [facility]        Medications Prior to Admission:     Prior to Admission medications    Medication Sig Start Date End Date Taking? Authorizing Provider   FIBER COMPLETE PO Take by mouth    Historical Provider, MD   cetirizine (ZYRTEC) 10 MG tablet Take 10 mg by mouth daily    Historical Provider, MD   RESTASIS MULTIDOSE 0.05 % ophthalmic emulsion  4/11/22   Historical Provider, MD   pantoprazole (PROTONIX) 40 MG tablet Take 40 mg by mouth daily    Historical Provider, MD   calcium carbonate-vitamin D 600-200 MG-UNIT TABS Take 1 tablet by mouth daily    Historical Provider, MD   Multiple Vitamins-Minerals (CENTRUM SILVER) TABS Take 1 tablet by mouth daily    Historical Provider, MD   fluticasone (FLONASE) 50 MCG/ACT nasal spray 1 spray by Each Nostril route daily    Historical Provider, MD   amiodarone (CORDARONE) 200 MG tablet Take 200 mg by mouth daily    Historical Provider, MD   fluticasone-umeclidin-vilant (TRELEGY ELLIPTA) 100-62.5-25 MCG/INH AEPB Inhale 1 puff into the lungs daily    Historical Provider, MD   ferrous sulfate 325 (65 Fe) MG tablet Take 1 tablet by mouth 2 times daily (before meals) 5/10/19   Kenny Tilley MD   montelukast (SINGULAIR) 10 MG tablet Take 10 mg by mouth nightly    Historical Provider, MD   triamterene-hydrochlorothiazide (MAXZIDE-25) 37.5-25 MG per tablet Take 1 tablet by mouth daily 12/29/15   Historical Provider, MD   diltiazem (CARDIZEM CD) 240 MG ER capsule Take 240 mg by mouth daily. Patient not taking: Reported on 2/15/2023 8/8/14   Jonn Mail, APRN - CNP   aspirin 81 MG tablet Take 81 mg by mouth daily Will stop 7 days before surgery    Historical Provider, MD   azelastine (ASTELIN) 137 MCG/SPRAY nasal spray 1 spray by Nasal route 2 times daily.  Use in each nostril as directed    Historical Provider, MD   nitroGLYCERIN (NITRODUR) 0.1 MG/HR Place 1 patch onto the skin daily. Historical Provider, MD   warfarin (COUMADIN) 5 MG tablet Take 7.5 mg by mouth Will stop 4 day before surgery    Historical Provider, MD        Allergies:     Patient has no known allergies. Social History:     Tobacco:    reports that she quit smoking about 42 years ago. Her smoking use included cigarettes. She has a 20.00 pack-year smoking history. She has never used smokeless tobacco.  Alcohol:      reports no history of alcohol use. Drug Use:  reports no history of drug use. Family History:     Family History   Problem Relation Age of Onset    COPD Mother     Asthma Mother     Cancer Mother     Cancer Father     COPD Sister     Emphysema Sister        Review of Systems:     Positive and Negative as described in HPI. Review of Systems   Constitutional:  Positive for activity change and appetite change. Negative for chills, fatigue and fever. HENT: Negative. Eyes: Negative. Respiratory: Negative. Cardiovascular: Negative. Gastrointestinal:  Positive for abdominal pain, constipation and diarrhea. Negative for nausea and vomiting. Genitourinary: Negative. Musculoskeletal:  Positive for back pain. Negative for arthralgias and myalgias. Skin: Negative. Neurological: Negative. Psychiatric/Behavioral: Negative. Physical Exam:   BP (!) 134/58   Pulse 51   Temp 98.1 °F (36.7 °C)   Resp 16   Ht 5' 5\" (1.651 m)   Wt 175 lb (79.4 kg)   SpO2 93%   BMI 29.12 kg/m²   Temp (24hrs), Av.1 °F (36.7 °C), Min:97.5 °F (36.4 °C), Max:98.6 °F (37 °C)    No results for input(s): POCGLU in the last 72 hours. No intake or output data in the 24 hours ending 23 0231    Physical Exam  Vitals and nursing note reviewed. Constitutional:       General: She is not in acute distress. Appearance: She is ill-appearing. She is not toxic-appearing or diaphoretic. HENT:      Head: Normocephalic and atraumatic.       Right Ear: External ear normal.      Left Ear: External ear normal.      Nose: Nose normal. No rhinorrhea.      Mouth/Throat:      Mouth: Mucous membranes are moist.   Eyes:      General: No scleral icterus.        Right eye: No discharge.         Left eye: No discharge.      Extraocular Movements: Extraocular movements intact.      Conjunctiva/sclera: Conjunctivae normal.      Pupils: Pupils are equal, round, and reactive to light.   Cardiovascular:      Rate and Rhythm: Normal rate and regular rhythm.      Pulses: Normal pulses.      Heart sounds: Murmur heard.     No friction rub. No gallop.   Pulmonary:      Effort: Pulmonary effort is normal. No respiratory distress.      Breath sounds: Rales present. No wheezing or rhonchi.      Comments: Crackles left base  Abdominal:      General: Bowel sounds are normal. There is no distension.      Palpations: Abdomen is soft.      Tenderness: There is no abdominal tenderness. There is no guarding.      Hernia: No hernia is present.   Musculoskeletal:         General: Normal range of motion.      Cervical back: Normal range of motion and neck supple.      Right lower leg: No edema.      Left lower leg: No edema.   Skin:     General: Skin is warm and dry.      Coloration: Skin is not jaundiced.      Findings: No bruising, erythema or lesion.   Neurological:      General: No focal deficit present.      Mental Status: She is alert and oriented to person, place, and time.   Psychiatric:         Mood and Affect: Mood normal.         Behavior: Behavior normal.         Thought Content: Thought content normal.         Judgment: Judgment normal.       Investigations:      Laboratory Testing:  Recent Results (from the past 24 hour(s))   EKG 12 Lead    Collection Time: 02/15/23  3:29 PM   Result Value Ref Range    Ventricular Rate 47 BPM    Atrial Rate 47 BPM    P-R Interval 210 ms    QRS Duration 110 ms    Q-T Interval 520 ms    QTc Calculation (Bazett) 460 ms    P Axis 78 degrees    R Axis 27 degrees  T Axis 30 degrees   BMP    Collection Time: 02/15/23  3:37 PM   Result Value Ref Range    Glucose 83 70 - 99 mg/dL    BUN 21 8 - 23 mg/dL    Creatinine 1.46 (H) 0.50 - 0.90 mg/dL    Est, Glom Filt Rate 35 (L) >60 mL/min/1.73m2    Bun/Cre Ratio 14 9 - 20    Calcium 9.0 8.6 - 10.4 mg/dL    Sodium 137 135 - 144 mmol/L    Potassium 4.0 3.7 - 5.3 mmol/L    Chloride 101 98 - 107 mmol/L    CO2 25 20 - 31 mmol/L    Anion Gap 11 9 - 17 mmol/L   CBC with Auto Differential    Collection Time: 02/15/23  3:37 PM   Result Value Ref Range    WBC 5.3 3.5 - 11.3 k/uL    RBC 4.09 3.95 - 5.11 m/uL    Hemoglobin 11.9 11.9 - 15.1 g/dL    Hematocrit 38.2 36.3 - 47.1 %    MCV 93.4 82.6 - 102.9 fL    MCH 29.1 25.2 - 33.5 pg    MCHC 31.2 28.4 - 34.8 g/dL    RDW 14.0 11.8 - 14.4 %    Platelets 751 296 - 850 k/uL    MPV 9.9 8.1 - 13.5 fL    NRBC Automated 0.0 0.0 per 100 WBC    Seg Neutrophils 48 36 - 65 %    Lymphocytes 30 24 - 43 %    Monocytes 16 (H) 3 - 12 %    Eosinophils % 4 1 - 4 %    Basophils 1 0 - 2 %    Immature Granulocytes 1 (H) 0 %    Segs Absolute 2.59 1.50 - 8.10 k/uL    Absolute Lymph # 1.57 1.10 - 3.70 k/uL    Absolute Mono # 0.85 0.10 - 1.20 k/uL    Absolute Eos # 0.21 0.00 - 0.44 k/uL    Basophils Absolute 0.07 0.00 - 0.20 k/uL    Absolute Immature Granulocyte 0.04 0.00 - 0.30 k/uL   Magnesium    Collection Time: 02/15/23  3:37 PM   Result Value Ref Range    Magnesium 2.1 1.6 - 2.6 mg/dL   Protime-INR    Collection Time: 02/15/23  3:37 PM   Result Value Ref Range    Protime 21.8 (H) 11.5 - 14.2 sec    INR 1.9    Troponin    Collection Time: 02/15/23  3:37 PM   Result Value Ref Range    Troponin, High Sensitivity 15 (H) 0 - 14 ng/L   D-Dimer, Quantitative    Collection Time: 02/15/23  3:37 PM   Result Value Ref Range    D-Dimer, Quant 0.46 0.00 - 0.59 mg/L U   Hepatic Function Panel    Collection Time: 02/15/23  3:37 PM   Result Value Ref Range    Albumin 3.6 3.5 - 5.2 g/dL    Alkaline Phosphatase 89 35 - 104 U/L ALT 19 5 - 33 U/L    AST 29 <32 U/L    Total Bilirubin 0.4 0.3 - 1.2 mg/dL    Bilirubin, Direct 0.1 <0.3 mg/dL    Bilirubin, Indirect 0.3 0.0 - 1.0 mg/dL    Total Protein 6.7 6.4 - 8.3 g/dL   Lipase    Collection Time: 02/15/23  3:37 PM   Result Value Ref Range    Lipase 19 13 - 60 U/L   Urinalysis with Microscopic    Collection Time: 02/15/23  4:58 PM   Result Value Ref Range    Color, UA Yellow Yellow    Turbidity UA Clear Clear    Glucose, Ur NEGATIVE NEGATIVE    Bilirubin Urine NEGATIVE NEGATIVE    Ketones, Urine NEGATIVE NEGATIVE    Specific Gravity, UA 1.009 1.005 - 1.030    Urine Hgb NEGATIVE NEGATIVE    pH, UA 6.5 5.0 - 8.0    Protein, UA NEGATIVE NEGATIVE    Urobilinogen, Urine Normal Normal    Nitrite, Urine NEGATIVE NEGATIVE    Leukocyte Esterase, Urine SMALL (A) NEGATIVE    WBC, UA 10 TO 20 0 - 5 /HPF    RBC, UA 2 TO 5 0 - 2 /HPF    Epithelial Cells UA 10 TO 20 0 - 5 /HPF       Imaging/Diagnostics:  CT ABDOMEN PELVIS WO CONTRAST Additional Contrast? None    Result Date: 2/15/2023  1. Interval development of bladder distension and central biliary ductal dilatation since 4 February 2023. Sludge and small stones are present in the gallbladder. Findings of concern for stones in the common bile duct or edema in the distal common bowel duct due to a recently passed calculus. Right upper quadrant ultrasound is advised. 2.  Moderate colonic stool burden. 3.  No urinary obstruction, small bowel obstruction or appendicitis. RECOMMENDATION: Right upper quadrant ultrasound. CT THORACIC SPINE WO CONTRAST    Result Date: 2/15/2023  Thoracic spine CT: Age indeterminate superior endplate compression fracture of T8 with 40% height loss. No retropulsion. If there is need for further evaluation thoracic spine MRI can be obtained Lumbar spine CT: No acute fracture Multilevel disc and facet degenerative disease most pronounced at L3-L4 and L4-L5 at L5-S1. .  Grade 1 anterolisthesis at L3-L4.  Moderate bilateral neural foraminal narrowing at L4-L5 and L5-S1. CT LUMBAR SPINE WO CONTRAST    Result Date: 2/15/2023  Thoracic spine CT: Age indeterminate superior endplate compression fracture of T8 with 40% height loss. No retropulsion. If there is need for further evaluation thoracic spine MRI can be obtained Lumbar spine CT: No acute fracture Multilevel disc and facet degenerative disease most pronounced at L3-L4 and L4-L5 at L5-S1. .  Grade 1 anterolisthesis at L3-L4. Moderate bilateral neural foraminal narrowing at L4-L5 and L5-S1. US GALLBLADDER RUQ    Result Date: 2/15/2023  1. Dilated gallbladder containing sludge and small stones. 2.  Dilated common bile duct. Please note that the bile duct was not dilated on prior CT scan of 4 February 2023. No definite stones are noted in the bile duct but it is incompletely visualized. 3.  Liver is of coarse echotexture suggesting fatty infiltration. RECOMMENDATIONS: Consideration of MRCP. MRI ABDOMEN WO CONTRAST MRCP    Result Date: 2/15/2023  1. Cholelithiasis without other findings to suggest acute cholecystitis. 2. Mild extrahepatic biliary ductal dilatation with suspected sludge or small stones in the distal common bile duct. Consider ERCP for further evaluation. The findings were sent to the Radiology Results [address] at 7:57 pm on 2/15/2023 to be communicated to a licensed caregiver. Assessment :      Hospital Problems             Last Modified POA    * (Principal) Common bile duct dilatation 2/16/2023 Yes    Hypertension 2/16/2023 Yes    Atrial fibrillation ([address] 2/16/2023 Yes    Gastroesophageal reflux disease 2/16/2023 Yes       Plan:     Patient status inpatient in the  Med/Surge    CBD dilatation: Pain control. GI consult. NPO except meds. HTN: Hold maxzide while receiving IVF. Hydralazine prn for SBP > 150. CMP in AM  Atrial fib: Pharmacy to dose warfarin. Telemetry- monitor rate. Daily INR. Daily BMP.   GERD: Protonix daily    Consultations: IP CONSULT TO GI  IP CONSULT TO INTERNAL MEDICINE  PHARMACY TO DOSE WARFARIN     Patient is admitted as inpatient status because of co-morbidities listed above, severity of signs and symptoms as outlined, requirement for current medical therapies and most importantly because of direct risk to patient if care not provided in a hospital setting. Expected length of stay > 48 hours.     Total 32 mins spent in the completion of this H&P    DUDLEY Crum NP  2/16/2023  2:31 AM    Copy sent to Dr. Mily Hernandez PA-C

## 2023-02-16 NOTE — ED NOTES
ED to inpatient nurses report     Chief Complaint   Patient presents with    Back Pain     Bilateral upper back. Pt reports \"squeezing\" going around to right side under breast area      Present to ED from home  LOC: alert and orientated to name, place, date  Vital signs   Vitals:    02/15/23 1349 02/15/23 1510 02/15/23 1911 02/15/23 1914   BP: (!) 151/53  (!) 193/63    Pulse: 64   (!) 49   Resp: 16   14   Temp: 98.6 °F (37 °C)      SpO2: 94%   95%   Weight:  178 lb (80.7 kg)     Height:  5' 5\" (1.651 m)        Oxygen Baseline RA    Current needs required RA     [] Outstanding Orders: NA    LDAs:   Peripheral IV Proximal;Right Antecubital (Active)     Mobility: Independent  Fall Risk: [place] 1 Fall Risk  Presents to emergency department  because of falls (Syncope, seizure, or loss of consciousness): No (02/15/23 1510)  Age > 79: Yes (02/15/23 1510)  Altered Mental Status, Intoxication with alcohol or substance confusion (Disorientation, impaired judgment, poor safety awaremess, or inability to follow instructions): No (02/15/23 1510)  Impaired Mobility: Ambulates or transfers with assistive devices or assistance;  Unable to ambulate or transer.: No (02/15/23 1510)  Nursing Judgement: No (02/15/23 1510)  Pending ED orders: NA  Present condition: stable  Code Status: full  Consults: IP CONSULT TO GI  IP CONSULT TO INTERNAL MEDICINE  PHARMACY TO DOSE WARFARIN  []  Hospitalist  Completed  [] yes [] no Who:   []  Medicine  Completed  [] yes [] No Who:   []  Cardiology  Completed  [] yes [] No Who:   []  GI   Completed  [] yes [] No Who:   []  Neurology  Completed  [] yes [] No Who:   []  Nephrology Completed  [] yes [] No Who:    []  Vascular  Completed  [] yes [] No Who:   []  Ortho  Completed  [] yes [] No Who:     []  Surgery  Completed  [] yes [] No Who:    []  Urology  Completed  [] yes [] No Who:    []  CT Surgery Completed  [] yes [] No Who:   []  Podiatry  Completed  [] yes [] No Who:    []  Other    Completed  [] yes [] No Who:  Interventions: IV medications  Important Events: None        Electronically signed by Loulou Martinez RN on 2/15/2023 at 7:14 PM       Loulou Martinez RN  02/15/23 9439

## 2023-02-16 NOTE — PLAN OF CARE
Problem: Discharge Planning  Goal: Discharge to home or other facility with appropriate resources  2/16/2023 1100 by Lary Lopez RN  Outcome: Progressing     Problem: Pain  Goal: Verbalizes/displays adequate comfort level or baseline comfort level  2/16/2023 1100 by Lary Lopez RN  Outcome: Progressing   Patient with mid-epigastric pain with radiation to back  GI planning for ERCP tomorrow  TUMS prn for indigestion    Problem: ABCDS Injury Assessment  Goal: Absence of physical injury  2/16/2023 1100 by Lary Lopez RN  Outcome: Progressing     Problem: Safety - Adult  Goal: Free from fall injury  2/16/2023 1100 by Lary Lopez RN  Outcome: Progressing     Problem: Gastrointestinal - Adult  Goal: Minimal or absence of nausea and vomiting  Outcome: Progressing     Problem: Gastrointestinal - Adult  Goal: Maintains or returns to baseline bowel function  Outcome: Progressing     Problem: Gastrointestinal - Adult  Goal: Maintains adequate nutritional intake  Outcome: Progressing

## 2023-02-16 NOTE — PROGRESS NOTES
Physical Therapy  Facility/Department: [facility] SURG  Physical Therapy Initial Assessment    Name: Joe Torres  : 1940  MRN: [id number]  Date of Service: 2023    Discharge Recommendations:  Home with assist PRN     Pt presented to ED on 2/15/23 secondary to back pain, has had 3 weeks of ongoing back pain. She has been evaluated by her PCP with outpatient imaging with no clear etiology of her pain. Pt seen by general surgeon today as she was told that it was likely gallstones but was told it was not gallstones. She called her PCP and was advised to present back to the emergency department. Pt didn't recall any trauma or injury. She is complaining pain localized to her mid back brawl region as a squeezing sensation increases with inspiration. The pain 8 out of 10 on the pain scale she has a history of atrial fibrillation for which she is on Coumadin. Her INR has been therapeutic. The patient had a previous history of back problems. She denies loss of bladder bowel urinary retention. She is able to ambulate without assistance of wheelchair or cane but has increased pain. She has been taken Tylenol with no significant improvement. Patient is accompanied by her daughter who states for the last several weeks patient is physically declining. 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 MRI abdomen revealed cholelithiasis and mild extrahepatic biliary ductal dilatation with suspected sludge/ small stones in the CBD  Pt admitted to the hospital for the management of Common bile duct dilatation. Patient Diagnosis(es): The primary encounter diagnosis was Pain of upper abdomen. A diagnosis of Acute midline low back pain without sciatica was also pertinent to this visit.   Past Medical History:  has a past medical history of Allergic rhinitis, Arthritis, Asthma, Atrial fibrillation ([address] GERD (gastroesophageal reflux disease), Hypertension, Melanoma ([facility] [address] Myocardial infarction [facility]), Wears glasses, and Wears partial dentures. Past Surgical History:  has a past surgical history that includes Appendectomy; other surgical history; Hysterectomy; Neck surgery; Carpal tunnel release (Right); Nose surgery; hernia repair; Tonsillectomy; joint replacement; Total hip arthroplasty (Right, 04/09/2019); Total hip arthroplasty (Right, 4/9/2019); Upper gastrointestinal endoscopy (N/A, 5/6/2019); Colonoscopy (N/A, 8/8/2019); eye surgery; Cardiac surgery; and Breast surgery. Assessment   Body Structures, Functions, Activity Limitations Requiring Skilled Therapeutic Intervention: Decreased functional mobility ; Decreased ADL status; Decreased strength;Decreased safe awareness;Decreased endurance;Decreased posture  Assessment: Pt tolerated session well with minor deficits noted in bed mobility, transfers, ambulation, balance, and endurance this session. Pt currently functioning below baseline with AM-PAC mobility score of 18/24. Recommend daily inpatient skilled therapy at time of discharge to maximize long term outcomes and prevent re-admission. Pt requires continued IP PT to maximize independence with functional mobility, balance, safety awareness & activity tolerance & anticipate Pt should be appropriate to D/C Home with assist at D/C.   Exam: ROM, MMT, functional mobility, activity tolerance, Balance, & [address] [facility] [address] [address] [facility] 6 Clicks Basic Mobility  Clinical Presentation: evolving  Requires PT Follow-Up: Yes  Activity Tolerance  Activity Tolerance: Patient limited by endurance     Plan   Physcial Therapy Plan  General Plan: 5-7 times per week  Current Treatment Recommendations: Strengthening, Balance training, Functional mobility training, Transfer training, Endurance training, Gait training, Home exercise program, Safety education & training, Patient/Caregiver education & training, Neuromuscular re-education  Safety Devices  Type of Devices: Call light within reach, Nurse notified, Gait belt, Left in chair, Chair alarm in place     Restrictions  Restrictions/Precautions  Restrictions/Precautions: General Precautions, Fall Risk  Position Activity Restriction  Other position/activity restrictions: Up with assist, telemetry, RUE IV, NPO except sips water with meds     Subjective   General  Chart Reviewed: Yes  Patient assessed for rehabilitation services?: Yes  Additional Pertinent Hx: appendectomy, arthritis, asthma, atrial fib on warfarin, GERD, HTN, MI, melanoma, and MI  Response To Previous Treatment: Not applicable  Subjective  Subjective: c/o RLQ pain which radiates around to her back         Social/Functional History  Social/Functional History  Lives With: Daughter  Type of Home: House  Home Layout: One level  Home Access: Stairs to enter with rails  Entrance Stairs - Number of Steps: 4(front entrance)  Entrance Stairs - Rails: Both  Bathroom Shower/Tub: Tub/Shower unit, Walk-in shower  Bathroom Toilet: Handicap height (pt has support near toilet she can hold)  Bathroom Equipment: Grab bars [facility]: Teresa Nohelia, rolling, Reacher, Sock aid, Lift chair  Has the patient had two or more falls in the past year or any fall with injury in the past year?: No (pt denies)  Receives Help From: Family  ADL Assistance: Independent  Homemaking Assistance: Independent  Homemaking Responsibilities: Yes  Ambulation Assistance: Independent  Transfer Assistance: Independent  Active : Yes  Mode of Transportation: Car  Occupation: Retired  Type of Occupation: [employer]  Leisure & Hobbies: goes to [employer], helps out at [employer]: Impaired  Vision Exceptions: Wears glasses at all times (pt denies new vison changes)  Hearing  Hearing: Exceptions to [facility]  Hearing Exceptions: Hard of hearing/hearing concerns; No hearing aid    Cognition   Orientation  Overall Orientation Status: Within Functional Limits  Orientation Level: Oriented X4     Objective   Heart Rate: 55  Heart Rate Source: Monitor  BP: (!) 211/61  BP Location: Left upper arm  MAP (Calculated): 111  Resp: 16  SpO2: 97 %  O2 Device: None (Room air)     Observation/Palpation  Observation: RUE IV  Gross Assessment  Sensation: Intact (pt denies paresthesias)     AROM RLE (degrees)  RLE AROM: WFL  AROM LLE (degrees)  LLE AROM : WFL  AROM RUE (degrees)  RUE General AROM: See OT assessment  AROM LUE (degrees)  LUE General AROM: See OT assessment  Strength RUE  Comment: See OT assessment  Strength LUE  Comment: See OT assessment          Bed mobility  Rolling to Right: Contact guard assistance  Supine to Sit: Contact guard assistance  Scooting: Contact guard assistance  Bed Mobility Comments: MIN cues for hand placement on bed rail as needed, to slow down movements/pacing & pursed lip breathing tech, and use of BUE's to scoot fully out to EOB as well as awareness/assist with line mgt to increase safety & reduce fall risk  Transfers  Sit to Stand: Contact guard assistance  Stand to Sit: Minimal Assistance  Stand Pivot Transfers: Minimal Assistance  Lateral Transfers: Minimal Assistance  Comment: MIN VC on correct use of upper body for safe sit/stand, nose over toes tech, upright posture, pacing + to back all way back to surface until she feels touch behind legs, & to ensure she reaches with UB support to arms of chair AND to slow down & take time for transitions to make sure she has no feeling of unsteadiness or dizziness to REDUCE fall risk  Ambulation  Surface: Level tile  Device: No Device  Assistance: Minimal assistance  Distance: 20ft x 2  Comments: Pt amb to BR and stood @ sink for hand hygiene x 2 minutes then ambulated 15 more ft with R/walker & sat to chair with Contact Guard Assistance     Balance  Posture: Fair  Sitting - Static: Good  Sitting - Dynamic: Good;-  Standing - Static: Good;-  Standing - Dynamic: Fair;+  Single Leg Stance R Le (with UB support)  Single Leg Stance L Le (with UB support)  Circulation/Endurance Exercises: ankle pumps x 20  Dynamic Sitting Balance Exercises: Core strengthening sitting EOB with bilat UE support, then UE support x 1, & alternately lifting feet up off floor x 5 reps  Static Standing Balance Exercises: stood at sink x 3 minutes with CGA  Postural Correction Exercises: STS x3 with standing postural correction 1 mins each tx        OutComes Score                                                  AM-PAC Score  AM-PAC Inpatient Mobility Raw Score : 18 (23)  AM-PAC Inpatient T-Scale Score : 43.63 (23)  Mobility Inpatient CMS 0-100% Score: 46.58 (23)  Mobility Inpatient CMS G-Code Modifier : CK (23)          Tinneti Score       Goals  Short Term Goals  Time Frame for Short Term Goals: 8 visits  Short Term Goal 1: Inc bed-mobility & transfers to independent to enable pt to safely get in/OOB & chair to return to PLOF & decrease risk for falls; Short Term Goal 2: Inc gait to amb 350ft or > indep to enable pt to return to previous level of independence & able to demonstrate indep/ safe functional activities including approaching surfaces and turning to sit. Short Term Goal 3: Pt able to go up/down 4 steps with Jass rails indep  Short Term Goal 4: Inc standing balance to good with device to facilitate pt independence for performance of ADL's & functional mobility, & reduce fall risk  Short Term Goal 5: Pt able to tolerate 30-40 min of activity to include 15-20 reps of ex, NMR & functional mobility with device to facilitate activity tolerance to [facility]       Education  Patient Education  Education Given To: Patient  Education Provided: Role of Therapy; Fall Prevention Strategies; Plan of Care;Transfer Training;Precautions; Energy Conservation  Education Provided Comments: Pt educated on purpose of acute PT eval, importance of continued mobility throughout admission, safety awareness, fall risk prevention, safe bed mobility, transfers & ambulation,  circulation ex's, pressure relief/breathing techniques, prevention of sedentary complications, and PT POC. Pt requires continued reinforcement of education.   Education Method: Demonstration  Education Outcome: Continued education needed      Therapy Time   Individual Concurrent Group Co-treatment   Time In 1873         Time Out 6974         Minutes 35+10=45             Treatment time: 30 minutes  Additional 10 minutes for chart review          [address], PT

## 2023-02-17 ENCOUNTER — APPOINTMENT (OUTPATIENT)
Dept: GENERAL RADIOLOGY | Age: 83
DRG: 982 | End: 2023-02-17
Payer: MEDICARE

## 2023-02-17 ENCOUNTER — ANESTHESIA (OUTPATIENT)
Dept: OPERATING ROOM | Age: 83
End: 2023-02-17
Payer: MEDICARE

## 2023-02-17 ENCOUNTER — ANESTHESIA EVENT (OUTPATIENT)
Dept: OPERATING ROOM | Age: 83
End: 2023-02-17
Payer: MEDICARE

## 2023-02-17 LAB
ABO/RH: NORMAL
ANTIBODY SCREEN: NEGATIVE
ARM BAND NUMBER: NORMAL
EXPIRATION DATE: NORMAL
INR PPP: 1.5
INR PPP: 2.1
PROTHROMBIN TIME: 18.5 SEC (ref 11.5–14.2)
PROTHROMBIN TIME: 23.3 SEC (ref 11.5–14.2)

## 2023-02-17 PROCEDURE — C1769 GUIDE WIRE: HCPCS | Performed by: INTERNAL MEDICINE

## 2023-02-17 PROCEDURE — 6360000002 HC RX W HCPCS: Performed by: NURSE PRACTITIONER

## 2023-02-17 PROCEDURE — 36415 COLL VENOUS BLD VENIPUNCTURE: CPT

## 2023-02-17 PROCEDURE — P9017 PLASMA 1 DONOR FRZ W/IN 8 HR: HCPCS

## 2023-02-17 PROCEDURE — 86927 PLASMA FRESH FROZEN: CPT

## 2023-02-17 PROCEDURE — 7100000000 HC PACU RECOVERY - FIRST 15 MIN: Performed by: INTERNAL MEDICINE

## 2023-02-17 PROCEDURE — 85610 PROTHROMBIN TIME: CPT

## 2023-02-17 PROCEDURE — 7100000001 HC PACU RECOVERY - ADDTL 15 MIN: Performed by: INTERNAL MEDICINE

## 2023-02-17 PROCEDURE — 6360000002 HC RX W HCPCS: Performed by: INTERNAL MEDICINE

## 2023-02-17 PROCEDURE — 6360000002 HC RX W HCPCS: Performed by: ANESTHESIOLOGY

## 2023-02-17 PROCEDURE — 3609018800 HC ERCP DX COLLECTION SPECIMEN BRUSHING/WASHING: Performed by: INTERNAL MEDICINE

## 2023-02-17 PROCEDURE — 43235 EGD DIAGNOSTIC BRUSH WASH: CPT | Performed by: INTERNAL MEDICINE

## 2023-02-17 PROCEDURE — 6370000000 HC RX 637 (ALT 250 FOR IP): Performed by: NURSE PRACTITIONER

## 2023-02-17 PROCEDURE — 2580000003 HC RX 258: Performed by: NURSE PRACTITIONER

## 2023-02-17 PROCEDURE — 1200000000 HC SEMI PRIVATE

## 2023-02-17 PROCEDURE — 86901 BLOOD TYPING SEROLOGIC RH(D): CPT

## 2023-02-17 PROCEDURE — 2709999900 HC NON-CHARGEABLE SUPPLY: Performed by: INTERNAL MEDICINE

## 2023-02-17 PROCEDURE — 86850 RBC ANTIBODY SCREEN: CPT

## 2023-02-17 PROCEDURE — 6370000000 HC RX 637 (ALT 250 FOR IP): Performed by: INTERNAL MEDICINE

## 2023-02-17 PROCEDURE — 36430 TRANSFUSION BLD/BLD COMPNT: CPT

## 2023-02-17 PROCEDURE — 2500000003 HC RX 250 WO HCPCS: Performed by: ANESTHESIOLOGY

## 2023-02-17 PROCEDURE — 99231 SBSQ HOSP IP/OBS SF/LOW 25: CPT | Performed by: INTERNAL MEDICINE

## 2023-02-17 PROCEDURE — 6360000004 HC RX CONTRAST MEDICATION: Performed by: INTERNAL MEDICINE

## 2023-02-17 PROCEDURE — 2580000003 HC RX 258: Performed by: INTERNAL MEDICINE

## 2023-02-17 PROCEDURE — 3700000000 HC ANESTHESIA ATTENDED CARE: Performed by: INTERNAL MEDICINE

## 2023-02-17 PROCEDURE — 86900 BLOOD TYPING SEROLOGIC ABO: CPT

## 2023-02-17 PROCEDURE — 94761 N-INVAS EAR/PLS OXIMETRY MLT: CPT

## 2023-02-17 PROCEDURE — 3700000001 HC ADD 15 MINUTES (ANESTHESIA): Performed by: INTERNAL MEDICINE

## 2023-02-17 PROCEDURE — 94640 AIRWAY INHALATION TREATMENT: CPT

## 2023-02-17 PROCEDURE — 0FJB8ZZ INSPECTION OF HEPATOBILIARY DUCT, VIA NATURAL OR ARTIFICIAL OPENING ENDOSCOPIC: ICD-10-PCS | Performed by: INTERNAL MEDICINE

## 2023-02-17 PROCEDURE — 3209999900 FLUORO FOR SURGICAL PROCEDURES

## 2023-02-17 RX ORDER — FENTANYL CITRATE 0.05 MG/ML
25 INJECTION, SOLUTION INTRAMUSCULAR; INTRAVENOUS EVERY 5 MIN PRN
Status: DISCONTINUED | OUTPATIENT
Start: 2023-02-17 | End: 2023-02-17

## 2023-02-17 RX ORDER — SODIUM CHLORIDE 9 MG/ML
INJECTION, SOLUTION INTRAVENOUS PRN
Status: DISCONTINUED | OUTPATIENT
Start: 2023-02-17 | End: 2023-02-17

## 2023-02-17 RX ORDER — SODIUM CHLORIDE 0.9 % (FLUSH) 0.9 %
5-40 SYRINGE (ML) INJECTION PRN
Status: DISCONTINUED | OUTPATIENT
Start: 2023-02-17 | End: 2023-02-17

## 2023-02-17 RX ORDER — LANOLIN ALCOHOL/MO/W.PET/CERES
325 CREAM (GRAM) TOPICAL
COMMUNITY

## 2023-02-17 RX ORDER — PROPOFOL 10 MG/ML
INJECTION, EMULSION INTRAVENOUS PRN
Status: DISCONTINUED | OUTPATIENT
Start: 2023-02-17 | End: 2023-02-17 | Stop reason: SDUPTHER

## 2023-02-17 RX ORDER — PROCHLORPERAZINE EDISYLATE 5 MG/ML
10 INJECTION INTRAMUSCULAR; INTRAVENOUS ONCE
Status: COMPLETED | OUTPATIENT
Start: 2023-02-17 | End: 2023-02-17

## 2023-02-17 RX ORDER — ACETAMINOPHEN AND CODEINE PHOSPHATE 300; 30 MG/1; MG/1
1 TABLET ORAL EVERY 4 HOURS PRN
COMMUNITY

## 2023-02-17 RX ORDER — DILTIAZEM HYDROCHLORIDE 240 MG/1
240 CAPSULE, COATED, EXTENDED RELEASE ORAL DAILY
Status: DISCONTINUED | OUTPATIENT
Start: 2023-02-17 | End: 2023-02-19

## 2023-02-17 RX ORDER — ROCURONIUM BROMIDE 10 MG/ML
INJECTION, SOLUTION INTRAVENOUS PRN
Status: DISCONTINUED | OUTPATIENT
Start: 2023-02-17 | End: 2023-02-17 | Stop reason: SDUPTHER

## 2023-02-17 RX ORDER — DEXAMETHASONE SODIUM PHOSPHATE 10 MG/ML
INJECTION, SOLUTION INTRAMUSCULAR; INTRAVENOUS PRN
Status: DISCONTINUED | OUTPATIENT
Start: 2023-02-17 | End: 2023-02-17 | Stop reason: SDUPTHER

## 2023-02-17 RX ORDER — PHYTONADIONE 10 MG/ML
2 INJECTION, EMULSION INTRAMUSCULAR; INTRAVENOUS; SUBCUTANEOUS ONCE
Status: DISCONTINUED | OUTPATIENT
Start: 2023-02-17 | End: 2023-02-17

## 2023-02-17 RX ORDER — SODIUM CHLORIDE 0.9 % (FLUSH) 0.9 %
5-40 SYRINGE (ML) INJECTION EVERY 12 HOURS SCHEDULED
Status: DISCONTINUED | OUTPATIENT
Start: 2023-02-17 | End: 2023-02-17

## 2023-02-17 RX ORDER — FAMOTIDINE 40 MG/1
40 TABLET, FILM COATED ORAL NIGHTLY
COMMUNITY

## 2023-02-17 RX ORDER — HYDRALAZINE HYDROCHLORIDE 20 MG/ML
20 INJECTION INTRAMUSCULAR; INTRAVENOUS ONCE
Status: COMPLETED | OUTPATIENT
Start: 2023-02-17 | End: 2023-02-17

## 2023-02-17 RX ORDER — ONDANSETRON 2 MG/ML
4 INJECTION INTRAMUSCULAR; INTRAVENOUS
Status: DISCONTINUED | OUTPATIENT
Start: 2023-02-17 | End: 2023-02-17

## 2023-02-17 RX ORDER — LIDOCAINE HYDROCHLORIDE 20 MG/ML
INJECTION, SOLUTION EPIDURAL; INFILTRATION; INTRACAUDAL; PERINEURAL PRN
Status: DISCONTINUED | OUTPATIENT
Start: 2023-02-17 | End: 2023-02-17 | Stop reason: SDUPTHER

## 2023-02-17 RX ORDER — ONDANSETRON 2 MG/ML
INJECTION INTRAMUSCULAR; INTRAVENOUS PRN
Status: DISCONTINUED | OUTPATIENT
Start: 2023-02-17 | End: 2023-02-17 | Stop reason: SDUPTHER

## 2023-02-17 RX ORDER — FENTANYL CITRATE 50 UG/ML
INJECTION, SOLUTION INTRAMUSCULAR; INTRAVENOUS PRN
Status: DISCONTINUED | OUTPATIENT
Start: 2023-02-17 | End: 2023-02-17 | Stop reason: SDUPTHER

## 2023-02-17 RX ADMIN — PIPERACILLIN AND TAZOBACTAM 3375 MG: 3; .375 INJECTION, POWDER, FOR SOLUTION INTRAVENOUS at 02:27

## 2023-02-17 RX ADMIN — HYDRALAZINE HYDROCHLORIDE 20 MG: 20 INJECTION INTRAMUSCULAR; INTRAVENOUS at 15:38

## 2023-02-17 RX ADMIN — MONTELUKAST 10 MG: 10 TABLET, FILM COATED ORAL at 21:04

## 2023-02-17 RX ADMIN — PROCHLORPERAZINE EDISYLATE 10 MG: 5 INJECTION INTRAMUSCULAR; INTRAVENOUS at 20:27

## 2023-02-17 RX ADMIN — FENTANYL CITRATE 25 MCG: 50 INJECTION INTRAMUSCULAR; INTRAVENOUS at 18:11

## 2023-02-17 RX ADMIN — MORPHINE SULFATE 2 MG: 2 INJECTION, SOLUTION INTRAMUSCULAR; INTRAVENOUS at 09:25

## 2023-02-17 RX ADMIN — FERROUS SULFATE TAB EC 325 MG (65 MG FE EQUIVALENT) 325 MG: 325 (65 FE) TABLET DELAYED RESPONSE at 05:31

## 2023-02-17 RX ADMIN — FLUTICASONE PROPIONATE 1 SPRAY: 50 SPRAY, METERED NASAL at 09:23

## 2023-02-17 RX ADMIN — PROPOFOL 50 MG: 10 INJECTION, EMULSION INTRAVENOUS at 17:26

## 2023-02-17 RX ADMIN — ROCURONIUM BROMIDE 40 MG: 10 INJECTION, SOLUTION INTRAVENOUS at 17:26

## 2023-02-17 RX ADMIN — SODIUM CHLORIDE, PRESERVATIVE FREE 10 ML: 5 INJECTION INTRAVENOUS at 14:52

## 2023-02-17 RX ADMIN — PIPERACILLIN AND TAZOBACTAM 3375 MG: 3; .375 INJECTION, POWDER, FOR SOLUTION INTRAVENOUS at 09:34

## 2023-02-17 RX ADMIN — CETIRIZINE HYDROCHLORIDE 5 MG: 5 TABLET ORAL at 09:24

## 2023-02-17 RX ADMIN — HYDRALAZINE HYDROCHLORIDE 10 MG: 20 INJECTION INTRAMUSCULAR; INTRAVENOUS at 21:20

## 2023-02-17 RX ADMIN — AZELASTINE HYDROCHLORIDE 2 SPRAY: 137 SPRAY, METERED NASAL at 09:22

## 2023-02-17 RX ADMIN — MORPHINE SULFATE 2 MG: 2 INJECTION, SOLUTION INTRAMUSCULAR; INTRAVENOUS at 14:51

## 2023-02-17 RX ADMIN — SODIUM CHLORIDE: 9 INJECTION, SOLUTION INTRAVENOUS at 12:50

## 2023-02-17 RX ADMIN — SODIUM CHLORIDE, PRESERVATIVE FREE 10 ML: 5 INJECTION INTRAVENOUS at 15:38

## 2023-02-17 RX ADMIN — DEXAMETHASONE SODIUM PHOSPHATE 10 MG: 10 INJECTION INTRAMUSCULAR; INTRAVENOUS at 17:43

## 2023-02-17 RX ADMIN — SODIUM CHLORIDE: 9 INJECTION, SOLUTION INTRAVENOUS at 19:25

## 2023-02-17 RX ADMIN — HYDRALAZINE HYDROCHLORIDE 10 MG: 20 INJECTION INTRAMUSCULAR; INTRAVENOUS at 11:59

## 2023-02-17 RX ADMIN — SODIUM CHLORIDE, PRESERVATIVE FREE 10 ML: 5 INJECTION INTRAVENOUS at 09:27

## 2023-02-17 RX ADMIN — ONDANSETRON 4 MG: 2 INJECTION INTRAMUSCULAR; INTRAVENOUS at 17:59

## 2023-02-17 RX ADMIN — FENTANYL CITRATE 25 MCG: 50 INJECTION INTRAMUSCULAR; INTRAVENOUS at 17:26

## 2023-02-17 RX ADMIN — PHYTONADIONE 2 MG: 10 INJECTION, EMULSION INTRAMUSCULAR; INTRAVENOUS; SUBCUTANEOUS at 12:52

## 2023-02-17 RX ADMIN — Medication 1 TABLET: at 09:24

## 2023-02-17 RX ADMIN — BUDESONIDE AND FORMOTEROL FUMARATE DIHYDRATE 2 PUFF: 160; 4.5 AEROSOL RESPIRATORY (INHALATION) at 08:06

## 2023-02-17 RX ADMIN — LIDOCAINE HYDROCHLORIDE 4 ML: 20 INJECTION, SOLUTION EPIDURAL; INFILTRATION; INTRACAUDAL; PERINEURAL at 17:26

## 2023-02-17 RX ADMIN — PANTOPRAZOLE SODIUM 40 MG: 40 TABLET, DELAYED RELEASE ORAL at 05:31

## 2023-02-17 RX ADMIN — TIOTROPIUM BROMIDE INHALATION SPRAY 2 PUFF: 3.12 SPRAY, METERED RESPIRATORY (INHALATION) at 08:06

## 2023-02-17 ASSESSMENT — PAIN DESCRIPTION - DESCRIPTORS: DESCRIPTORS: SQUEEZING;TIGHTNESS

## 2023-02-17 ASSESSMENT — PAIN DESCRIPTION - LOCATION
LOCATION: BACK
LOCATION: ABDOMEN;BACK

## 2023-02-17 ASSESSMENT — PAIN DESCRIPTION - ORIENTATION: ORIENTATION: RIGHT

## 2023-02-17 ASSESSMENT — PAIN SCALES - GENERAL
PAINLEVEL_OUTOF10: 5
PAINLEVEL_OUTOF10: 7

## 2023-02-17 NOTE — PROGRESS NOTES
Pt states she still takes cardizem and maxzide at home and is requesting for these meds to be prescribed here since her blood pressure has been elevated during hospital stay. Pt daughter confirms that pt takes these meds at home. Home medication list in Epic says that outside pharmacy states patient is no longer taking these medications. RN sent message to med rec pharmacist to investigate this further.

## 2023-02-17 NOTE — PROGRESS NOTES
Comprehensive Nutrition Assessment    Type and Reason for Visit:  Positive Nutrition Screen (Unsure of amount of weight loss, decreased appetite)    Nutrition Recommendations/Plan:   NPO diet  Advance diet when medically appropriate  Monitor labs and GI function     Malnutrition Assessment:  Malnutrition Status: Moderate malnutrition (02/17/23 1619)    Context:  Acute Illness     Findings of the 6 clinical characteristics of malnutrition:  Energy Intake:  50% or less of estimated energy requirements for 5 or more days  Weight Loss:  1% to 2% over 1 week     Body Fat Loss:  Unable to assess     Muscle Mass Loss:  Unable to assess    Fluid Accumulation:  No significant fluid accumulation Extremities   Strength:  Not Performed    Nutrition Assessment:    Patient admission is related to coomon bile duct dilation and upper abdmen pain. Patient reports for the past three weeks she has lost weight due to abdminal pain and poor oral intakes. Patient states usual body weight is around 184 lbs. Admission weight is recorded as 178 lbs and will be used to estimate caloric needs. Patient has lost 3.3% of weight in 3 weeks. Patient is moderately malnourished. Patient is NPO today for surgery. Advance diet when medically appropriate. Nutrition Related Findings:    No edema. Active bowel sounds. Constipation. Cholelithiasis Wound Type: None       Current Nutrition Intake & Therapies:    Average Meal Intake: NPO  Average Supplements Intake: NPO  Diet NPO Exceptions are: Sips of Water with Meds    Anthropometric Measures:  Height: 5' 5\" (165.1 cm)  Ideal Body Weight (IBW): 125 lbs (57 kg)       Current Body Weight: 178 lb (80.7 kg) (2/15/23), 142.4 % IBW. Weight Source: Other (Comment) (actual)  Current BMI (kg/m2): 29.6  Usual Body Weight: 184 lb (83.5 kg)  % Weight Change (Calculated): -3.3                    BMI Categories: Overweight (BMI 25.0-29. 9)    Estimated Daily Nutrient Needs:  Energy Requirements Based On: Kcal/kg  Weight Used for Energy Requirements: Admission  Energy (kcal/day): 7021-8492 kcal (20-22 kcal/kg)  Weight Used for Protein Requirements: Ideal  Protein (g/day): 68-74 gm of protein (1.2-1.3 gm/kg)      Nutrition Diagnosis:   Altered GI function related to altered GI function (cholelithiasis) as evidenced by GI abnormality, nausea, weight loss (abdominal pain)    Nutrition Interventions:   Food and/or Nutrient Delivery: Continue NPO  Nutrition Education/Counseling: Education not indicated  Coordination of Nutrition Care: Continue to monitor while inpatient       Goals:     Goals: Initiate PO diet, within 2 days       Nutrition Monitoring and Evaluation:      Food/Nutrient Intake Outcomes: Diet Advancement/Tolerance  Physical Signs/Symptoms Outcomes: Biochemical Data, Skin, Weight, GI Status, Nausea or Vomiting    Discharge Planning:     Too soon to determine           Sheryle Michaels MFN, RDN, LDN  Lead Clinical Dietitian  RD Office Phone [phone]

## 2023-02-17 NOTE — CONSENT
Informed Consent for Blood Component Transfusion Note    I have discussed with the patient the rationale for blood component transfusion; its benefits in treating or preventing fatigue, organ damage, or death; and its risk which includes mild transfusion reactions, rare risk of blood borne infection, or more serious but rare reactions. I have discussed the alternatives to transfusion, including the risk and consequences of not receiving transfusion. The patient had an opportunity to ask questions and had agreed to proceed with transfusion of blood components.     Electronically signed by DUDLEY James CNP on 2/17/23 at 11:37 AM EST

## 2023-02-17 NOTE — ANESTHESIA POSTPROCEDURE EVALUATION
Department of Anesthesiology  Postprocedure Note    Patient: Francesca Garcia  MRN: [id number]  YOB: 1940  Date of evaluation: 2/17/2023      Procedure Summary     Date: 02/17/23 Room / Location: [facility] - INPATIENT    Anesthesia Start: 8973 Anesthesia Stop: 4068    Procedure: ERCP ENDOSCOPIC RETROGRADE CHOLANGIOPANCREATOGRAPHY Diagnosis:       Common bile duct stone      (Common bile duct stone [K80.50])    Surgeons: Karleen Rubinstein, MD Responsible Provider: Stevo Blake MD    Anesthesia Type: general ASA Status: 3          Anesthesia Type: No value filed.     Zeke Phase I:      Zeke Phase II:        Anesthesia Post Evaluation    Patient location during evaluation: PACU  Patient participation: complete - patient participated  Level of consciousness: awake  Airway patency: patent  Nausea & Vomiting: no nausea  Complications: no  Cardiovascular status: blood pressure returned to baseline  Respiratory status: acceptable  Hydration status: euvolemic  Comments: Multimodal analgesia pain management as indicated by procedure  Multimodal analgesia pain management approach

## 2023-02-17 NOTE — PROGRESS NOTES
Transitions of Care Pharmacy Service   Medication Review    The patient's list of current home medications was reviewed with her earlier today. Her PCP office prescribes in [address] Her warfarin is managed by [facility]. Medication list was addressed with attending physician earlier today. Source(s) of information: patient, personal med list, Epic, Surescripts refill report        Please feel free to call me with any questions about this encounter. Thank you.     Pelayo Flor [facility]   Transitions of Care Pharmacy Service  Phone:  [phone]  Fax: [phone]      Electronically signed by Pierce Ray [facility] on 2/17/2023 at 6:55 PM           Medications Prior to Admission:   acetaminophen-codeine (TYLENOL #3) 300-30 MG per tablet, Take 1 tablet by mouth every 4 hours as needed for Pain.  famotidine (PEPCID) 40 MG tablet, Take 40 mg by mouth nightly  Multiple Vitamins-Minerals (HAIR SKIN AND NAILS FORMULA) TABS, Take 2 tablets by mouth daily  ferrous sulfate (FE TABS 325) 325 (65 Fe) MG EC tablet, Take 325 mg by mouth daily (with breakfast)  FIBER COMPLETE PO, Take by mouth daily  cetirizine (ZYRTEC) 10 MG tablet, Take 10 mg by mouth daily  RESTASIS MULTIDOSE 0.05 % ophthalmic emulsion, Place 1 drop into both eyes nightly  pantoprazole (PROTONIX) 40 MG tablet, Take 40 mg by mouth daily  calcium carbonate-vitamin D 600-200 MG-UNIT TABS, Take 1 tablet by mouth daily  Multiple Vitamins-Minerals (CENTRUM SILVER) TABS, Take 1 tablet by mouth daily  fluticasone (FLONASE) 50 MCG/ACT nasal spray, 1 spray by Each Nostril route nightly  amiodarone (CORDARONE) 200 MG tablet, Take 200 mg by mouth daily  fluticasone-umeclidin-vilant (TRELEGY ELLIPTA) 100-62.5-25 MCG/INH AEPB, Inhale 1 puff into the lungs daily  [DISCONTINUED] ferrous sulfate 325 (65 Fe) MG tablet, Take 1 tablet by mouth 2 times daily (before meals)  montelukast (SINGULAIR) 10 MG tablet, Take 10 mg by mouth nightly  triamterene-hydrochlorothiazide (MAXZIDE-25) 37.5-25 MG per tablet, Take 1 tablet by mouth daily  diltiazem (CARDIZEM CD) 240 MG ER capsule, Take 240 mg by mouth daily  aspirin 81 MG tablet, Take 81 mg by mouth daily Will stop 7 days before surgery  azelastine (ASTELIN) 137 MCG/SPRAY nasal spray, 1 spray by Nasal route 2 times daily. Use in each nostril as directed  nitroGLYCERIN (NITRODUR) 0.1 MG/HR, Place 1 patch onto the skin daily.   warfarin (COUMADIN) 5 MG tablet, Take 2.5-5 mg by mouth See Admin Instructions Indications: 5mg Sun/Tues/Thurs, 2.5mg all other days

## 2023-02-17 NOTE — PROGRESS NOTES
Section of Cardiology  Progress Note      Date:  2/17/2023  Patient: Armond May  Admission:  2/15/2023  2:17 PM  Admit DX: Common bile duct dilatation [K83.8]  Pain of upper abdomen [R10.10]  Acute midline low back pain without sciatica [M54.50]  Age:  79 y.o., 1940     LOS: 2 days     Reason for evaluation:   arrhythmia      SUBJECTIVE:     The patient was seen and examined. Notes and labs reviewed. There were not complications over night. Patient seen and examined in her room with her daughter at bedside as well as her nurse. I was informed that the patient still has abdominal pain which is worse than yesterday. She has very high blood pressure and did not get her home medications. Patient's cardiac review of systems: negative for chest pain, dyspnea, irregular heart beat, orthopnea, palpitations, and headache or blurred vision . The patient is generally feeling unchanged. OBJECTIVE:    Telemetry: Sinus bradycardia  BP (!) 210/80   Pulse 60   Temp 97.6 °F (36.4 °C) (Oral)   Resp 14   Ht 5' 5\" (1.651 m)   Wt 187 lb (84.8 kg)   SpO2 97%   BMI 31.12 kg/m²     Intake/Output Summary (Last 24 hours) at 2/17/2023 1546  Last data filed at 2/17/2023 1425  Gross per 24 hour   Intake 306.03 ml   Output 1050 ml   Net -743.97 ml       EXAM:   CONSTITUTIONAL:  awake, alert, cooperative, no apparent distress, and appears stated age. HEENT: Normal jugular venous pulsations, no carotid bruits. Head is atraumatic, normocephalic. Eyes and oral mucosa are normal.  LUNGS: Good respiratory effort. No for increased work of breathing. On auscultation: clear to auscultation bilaterally  CARDIOVASCULAR:  Normal apical impulse, regular rate and rhythm, normal S1 and S2,   ABDOMEN: Soft, nontender, nondistended. Bowel sounds present. SKIN: Warm and dry. EXTREMITIES: No lower extremities edema. No cyanosis or clubbing.     Current Inpatient Medications:   dilTIAZem  240 mg Oral Daily    [Held by provider] aspirin  81 mg Oral Daily    azelastine  2 spray Each Nostril BID    ferrous sulfate  325 mg Oral BID AC    montelukast  10 mg Oral Nightly    pantoprazole  40 mg Oral Daily    cetirizine  5 mg Oral Daily    calcium carb-cholecalciferol  1 tablet Oral Daily    piperacillin-tazobactam  3,375 mg IntraVENous Q8H    amiodarone  100 mg Oral Daily    fluticasone  1 spray Each Nostril Daily    sodium chloride flush  5-40 mL IntraVENous 2 times per day    budesonide-formoterol  2 puff Inhalation BID    tiotropium  2 puff Inhalation Daily    warfarin placeholder: dosing by pharmacy   Other RX Placeholder       IV Infusions (if any):   sodium chloride      sodium chloride      sodium chloride 50 mL/hr at 02/17/23 1250       Diagnostics:   EKG: . ECHO: .   Stress Test: .  Cardiac Angiography: .    Labs:   CBC:   Recent Labs     02/15/23  1537 02/16/23  0615   WBC 5.3 4.0   HGB 11.9 11.5*   HCT 38.2 37.3    227     BMP:   Recent Labs     02/15/23  1537 02/16/23  0615    142   K 4.0 3.9   CO2 25 26   BUN 21 19   CREATININE 1.46* 1.40*   LABGLOM 35* 37*   GLUCOSE 83 85     Lab Results   Component Value Date/Time    BNP NOT REPORTED 06/12/2014 01:20 PM     PT/INR:   Recent Labs     02/16/23  0615 02/17/23  0532   PROTIME 22.6* 23.3*   INR 2.0 2.1     APTT:No results for input(s): APTT in the last 72 hours. CARDIAC ENZYMES:No results for input(s): CKTOTAL, CKMB, CKMBINDEX in the last 72 hours. Invalid input(s):  TROPONIN, HIGH SENSITIVITY  FASTING LIPID PANEL:  Lab Results   Component Value Date/Time    HDL 53 12/19/2022 08:55 AM    TRIG 141 12/19/2022 08:55 AM     LIVER PROFILE:  Recent Labs     02/15/23  1537 02/16/23  0615   AST 29 21   ALT 19 16   LABALBU 3.6 3.4*       ASSESSMENT:  1. Requested preop cardiac clearance  2. Paroxysmal atrial fibrillation  3. Sinus bradycardia secondary to medications.   4.  Systemic hypertension  Patient Active Problem List   Diagnosis    Hypertension    Allergic rhinitis Atrial fibrillation ([facility] [address]    Malignant melanoma ([facility] [address]    History of MI (myocardial infarction)    Wears glasses    Sinus bradycardia    Primary osteoarthritis of right hip    GI bleed    Serologic abnormality    Chronic cough    Chronic fatigue syndrome    Chronic sinusitis    Gastroesophageal reflux disease    Moderate persistent asthma without complication    Degenerative joint disease involving multiple joints on both sides of body    Primary localized osteoarthritis of pelvic region and thigh    Primary osteoarthritis of both knees    Other hemoglobinopathies (HCC)    Hypersomnia    Sleep apnea    History of smoking    Arthritis of left hip    Calculus of gallbladder without cholecystitis without obstruction    Flank pain    Common bile duct dilatation    Choledocholithiasis-suspected    INR therapeutic    Pain of upper abdomen    Acute midline low back pain without sciatica       PLAN:    Medications reviewed  Continue current medications  Patient's blood pressure is extremely elevated despite IV medication given earlier this morning. It is highly likely related to pain and anxiety as well as not getting her oral home medication with Cardizem. Discussed with Dr. Aarti Solano from GI service. Recommend to wait before taking the patient for ERCP until blood pressure is at least below 160. I will give her her pain medication in the edition I will give her hydralazine intravenously at 20 mg. Postoperatively we will resume her oral Cardizem similar to the home dose. I explained to the patient daughter who was present that I lowered her amiodarone dosage and place parameters and hopefully in the future will be able to stop this medication altogether. Please see orders. Discussed with patient and adult child and nursing.     Chely Kelly MD, MD

## 2023-02-17 NOTE — PLAN OF CARE
Problem: Discharge Planning  Goal: Discharge to home or other facility with appropriate resources  Outcome: Progressing     Problem: Pain  Goal: Verbalizes/displays adequate comfort level or baseline comfort level  Outcome: Progressing     Problem: Safety - Adult  Goal: Free from fall injury  Outcome: Progressing     Problem: Gastrointestinal - Adult  Goal: Minimal or absence of nausea and vomiting  Outcome: Progressing     Problem: Gastrointestinal - Adult  Goal: Maintains or returns to baseline bowel function  Outcome: Progressing

## 2023-02-17 NOTE — PROGRESS NOTES
[facility] NOTE    Room # 2010/2010-01   Name: Armond May            Protestant: Rosaline Lee     Reason for visit: Routine    I visited the patient. Admit Date & Time: 2/15/2023  2:17 PM    Assessment:  Armond May is a 79 y.o. female in the hospital because \"bile duct dilation\". Upon entering the room pt was sleeping      Intervention    This writer offered brief silent prayer for pt    Outcome:  Pt remained sleeping    Plan:  Chaplains will remain available to offer spiritual and emotional support as needed.     Electronically signed by Alaina Mann on 2/17/2023 at 2:40 PM.  Kiesha       02/17/23 1434   Encounter Summary   Service Provided For: Patient not available  (pt sleeping)   Referral/Consult From: [facility]   Support System Unknown   Last Encounter  02/17/23   Complexity of Encounter Low   Begin Time 1425   End Time  1427   Total Time Calculated 2 min   Assessment/Intervention/Outcome   Assessment Unable to assess   Intervention Prayer (assurance of)/Sab[place]

## 2023-02-17 NOTE — OP NOTE
Operative Note      Patient: Barbara Galicia  YOB: 1940  MRN: [id number]    Date of Procedure: 2/17/2023    Pre-Op Diagnosis: Questionable Common bile duct stone [K80.50]    Post-Op Diagnosis:     Difficult Cannulation attempted ERCP  Common bile duct was not cannulated       Procedure(s):  ERCP ENDOSCOPIC RETROGRADE CHOLANGIOPANCREATOGRAPHY    Surgeon(s):  Gera Toro MD    Assistant:   * No surgical staff found *    Anesthesia: General    Estimated Blood Loss (mL): Minimal    Complications: None    Specimens:   * No specimens in log *    Implants:  * No implants in log *      Drains: * No LDAs found *    Findings: As abovE        Detailed Description of Procedure:   Procedure was explained to the patient all the risk-benefit alternatives including bleeding perforation cardiorespiratory problem pancreatitis were all explained to them  Patient brought to the endoscopy suite placed in appropriate ERCP position patient was sedated by anesthesia    A side-viewing ERCP scope was then passed to the back of throat esophagus intubated descending duodenum was reached ampulla was identified  Multiple attempts to cannulate the common bile duct was unsuccessful  It appears that some contrast material was injected into the duodenal wall which caused a little swelling which further made the cannulation difficult  After little while further attempts were terminated tells the swelling sinus down  Multiple Polaroid and fluoroscopic pictures were taken    We will continue to follow-up LFTs and patient clinical status  Discussed with family  Electronically signed by Gera Toro MD on 2/17/2023 at 6:01 PM

## 2023-02-17 NOTE — ANESTHESIA PRE PROCEDURE
Department of Anesthesiology  Preprocedure Note       Name:  Cassy Penaloza   Age:  79 y.o.  :  1940                                          MRN:  [id number]         Date:  2023      Surgeon: Edwin Segovia):  Barbra Dickey MD    Procedure: ERCP ENDOSCOPIC RETROGRADE CHOLANGIOPANCREATOGRAPHY    Medications prior to admission:   Prior to Admission medications    Medication Sig Start Date End Date Taking? Authorizing Provider   acetaminophen-codeine (TYLENOL #3) 300-30 MG per tablet Take 1 tablet by mouth every 4 hours as needed for Pain.     Historical Provider, MD   famotidine (PEPCID) 40 MG tablet Take 40 mg by mouth nightly    Historical Provider, MD   Multiple Vitamins-Minerals (HAIR SKIN AND NAILS FORMULA) TABS Take 2 tablets by mouth daily    Historical Provider, MD   ferrous sulfate (FE TABS 325) 325 (65 Fe) MG EC tablet Take 325 mg by mouth daily (with breakfast)    Historical Provider, MD   FIBER COMPLETE PO Take by mouth daily    Historical Provider, MD   cetirizine (ZYRTEC) 10 MG tablet Take 10 mg by mouth daily    Historical Provider, MD   RESTASIS MULTIDOSE 0.05 % ophthalmic emulsion Place 1 drop into both eyes nightly 22   Historical Provider, MD   pantoprazole (PROTONIX) 40 MG tablet Take 40 mg by mouth daily    Historical Provider, MD   calcium carbonate-vitamin D 600-200 MG-UNIT TABS Take 1 tablet by mouth daily    Historical Provider, MD   Multiple Vitamins-Minerals (CENTRUM SILVER) TABS Take 1 tablet by mouth daily    Historical Provider, MD   fluticasone (FLONASE) 50 MCG/ACT nasal spray 1 spray by Each Nostril route nightly    Historical Provider, MD   amiodarone (CORDARONE) 200 MG tablet Take 200 mg by mouth daily    Historical Provider, MD   fluticasone-umeclidin-vilant (TRELEGY ELLIPTA) 100-62.5-25 MCG/INH AEPB Inhale 1 puff into the lungs daily    Historical Provider, MD   montelukast (SINGULAIR) 10 MG tablet Take 10 mg by mouth nightly    Historical Provider, MD triamterene-hydrochlorothiazide (MAXZIDE-25) 37.5-25 MG per tablet Take 1 tablet by mouth daily 12/29/15   Historical Provider, MD   diltiazem (CARDIZEM CD) 240 MG ER capsule Take 240 mg by mouth daily 8/8/14   DUDLEY Fernandez CNP   aspirin 81 MG tablet Take 81 mg by mouth daily Will stop 7 days before surgery    Historical Provider, MD   azelastine (ASTELIN) 137 MCG/SPRAY nasal spray 1 spray by Nasal route 2 times daily. Use in each nostril as directed    Historical Provider, MD   nitroGLYCERIN (NITRODUR) 0.1 MG/HR Place 1 patch onto the skin daily. Historical Provider, MD   warfarin (COUMADIN) 5 MG tablet Take 2.5-5 mg by mouth See Admin Instructions Indications: 5mg Sun/Tues/Thurs, 2.5mg all other days    Historical Provider, MD       Current medications:    No current facility-administered medications for this visit. No current outpatient medications on file.      Facility-Administered Medications Ordered in Other Visits   Medication Dose Route Frequency Provider Last Rate Last Admin    0.9 % sodium chloride infusion   IntraVENous PRN DUDLEY Pringle CNP        [Held by provider] aspirin EC tablet 81 mg  81 mg Oral Daily DUDLEY Cordova - TRISTAN        azelastine (ASTELIN) 0.1 % nasal spray 2 spray  2 spray Each Nostril BID DUDLEY Cordova - TRISTAN   2 spray at 02/17/23 1897    ferrous sulfate (FE TABS 325) EC tablet 325 mg  325 mg Oral BID AC Jemma Pisano APRN - NP   325 mg at 02/17/23 0531    montelukast (SINGULAIR) tablet 10 mg  10 mg Oral Nightly Jemma Pisano APRN - NP   10 mg at 02/16/23 2057    pantoprazole (PROTONIX) tablet 40 mg  40 mg Oral Daily Jemma Pisano APRN - NP   40 mg at 02/17/23 0531    hydrALAZINE (APRESOLINE) injection 10 mg  10 mg IntraVENous Q6H PRN Jemma Pisano APRN - NP   10 mg at 02/17/23 1159    cetirizine (ZYRTEC) tablet 5 mg  5 mg Oral Daily DUDLEY Cordova - NP   5 mg at 02/17/23 0924    calcium carb-cholecalciferol 600-10 MG-MCG per tab 1 tablet  1 tablet Oral Daily DUDLEY Haddad NP   1 tablet at 02/17/23 0924    calcium carbonate (TUMS) chewable tablet 500 mg  500 mg Oral TID PRN DUDLEY Chamorro CNP   500 mg at 02/16/23 1133    piperacillin-tazobactam (ZOSYN) 3,375 mg in sodium chloride 0.9 % 50 mL IVPB (mini-bag)  3,375 mg IntraVENous Q8H DUDLEY Chamorro CNP 12.5 mL/hr at 02/17/23 0934 3,375 mg at 02/17/23 0934    amiodarone (CORDARONE) tablet 100 mg  100 mg Oral Daily Kirsten Barraza MD        morphine (PF) injection 2 mg  2 mg IntraVENous Q3H PRN Suki Botello APRN - CNP   2 mg at 02/17/23 0925    fluticasone (FLONASE) 50 MCG/ACT nasal spray 1 spray  1 spray Each Nostril Daily DUDLEY Cali - CNP   1 spray at 02/17/23 9226    sodium chloride flush 0.9 % injection 5-40 mL  5-40 mL IntraVENous 2 times per day Suki Botello APRN - CNP   10 mL at 02/17/23 1008    sodium chloride flush 0.9 % injection 10 mL  10 mL IntraVENous PRN Suki Botello APRN - CNP        0.9 % sodium chloride infusion   IntraVENous PRN Suki Botello, APRN - CNP        potassium chloride (KLOR-CON M) extended release tablet 40 mEq  40 mEq Oral PRN Suki Botello APRN - CNP        Or    potassium bicarb-citric acid (EFFER-K) effervescent tablet 40 mEq  40 mEq Oral PRN Suki Sagjeanette, APRN - CNP        Or    potassium chloride 10 mEq/100 mL IVPB (Peripheral Line)  10 mEq IntraVENous PRN Suki Botello, APRN - CNP        magnesium sulfate 1000 mg in dextrose 5% 100 mL IVPB  1,000 mg IntraVENous PRN Suki Botello, APRN - CNP        ondansetron (ZOFRAN-ODT) disintegrating tablet 4 mg  4 mg Oral Q8H PRN Suki Botello, APRN - CNP        Or    ondansetron (ZOFRAN) injection 4 mg  4 mg IntraVENous Q6H PRN Suki Botello, APRN - CNP        polyethylene glycol (GLYCOLAX) packet 17 g  17 g Oral Daily PRN DUDLEY Cali - CNP   17 g at 02/16/23 2103    acetaminophen (TYLENOL) tablet 650 mg  650 mg Oral Q6H PRN Suki Botello, APRN - CNP   650 mg at 02/15/23 2310    Or    acetaminophen (TYLENOL) suppository 650 mg  650 mg Rectal Q6H PRN Lorletha Hayward APRN - CNP        0.9 % sodium chloride infusion   IntraVENous Continuous Valiant Plume, APRN - NP 50 mL/hr at 02/17/23 1250 New Bag at 02/17/23 1250    budesonide-formoterol (SYMBICORT) 160-4.5 MCG/ACT inhaler 2 puff  2 puff Inhalation BID Lorayne Mainor, APRN - CNP   2 puff at 02/17/23 0806    tiotropium (SPIRIVA RESPIMAT) 2.5 MCG/ACT inhaler 2 puff  2 puff Inhalation Daily Loraycarlos Hayward APRN - CNP   2 puff at 02/17/23 5853    warfarin placeholder: dosing by pharmacy   Other RX Placeholder Christi Garcia APRN Drea ROTH           Allergies:  No Known Allergies    Problem List:    Patient Active Problem List   Diagnosis Code    Hypertension I10    Allergic rhinitis J30.9    Atrial fibrillation (HCC) I48.91    Malignant melanoma ([facility] [address] C43.9    History of MI (myocardial infarction) I25.2    Wears glasses Z97.3    Sinus bradycardia R00.1    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    GI bleed K92.2    Serologic abnormality R89.4    Chronic cough R05.3    Chronic fatigue syndrome G93.32    Chronic sinusitis J32.9    Gastroesophageal reflux disease K21.9    Moderate persistent asthma without complication X78.47    Degenerative joint disease involving multiple joints on both sides of body M15.9    Primary localized osteoarthritis of pelvic region and thigh M16.10    Primary osteoarthritis of both knees M17.0    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    Hypersomnia G47.10    Sleep apnea G47.30    History of smoking Z87.891    Arthritis of left hip M16.12    Calculus of gallbladder without cholecystitis without obstruction K80.20    Flank pain R10.9    Common bile duct dilatation K83.8    Choledocholithiasis-suspected K80.50    INR therapeutic R79.1    Pain of upper abdomen R10.10    Acute midline low back pain without sciatica M54.50       Past Medical History: Diagnosis Date    Allergic rhinitis     Arthritis     Asthma     Atrial fibrillation (HCC)     GERD (gastroesophageal reflux disease)     Hypertension     Melanoma ([address]     on nose    Myocardial infarction [facility]) 2008    Wears glasses     Wears partial dentures     upper       Past Surgical History:        Procedure Laterality Date    APPENDECTOMY      BREAST SURGERY      CARDIAC SURGERY      CARPAL TUNNEL RELEASE Right     COLONOSCOPY N/A 2019    COLONOSCOPY POLYPECTOMY REMOVAL HOT BIOPSY AND RESOLUTION CLIP performed by Dinesh Roque MD at [address]      umbilical    HYSTERECTOMY (CERVIX STATUS UNKNOWN)      JOINT REPLACEMENT      NECK SURGERY      C5-C6    NOSE SURGERY      cancer removed    OTHER SURGICAL HISTORY      heart surg/growth in heart sac    TONSILLECTOMY      TOTAL HIP ARTHROPLASTY Right 2019    TOTAL HIP ARTHROPLASTY Right 2019    HIP TOTAL ARTHROPLASTY - MEDACTA, C-ARM, MEDACTA TABLE, NSA= GENERAL VS SPINAL, FASCIA ILLIACA BLOCK, *STANDARD performed by Babatunde Allen DO at [address] [facility] N/A 2019    EGD DIAGNOSTIC ONLY performed by Dinesh Roque MD at [facility]       Social History:    Social History     Tobacco Use    Smoking status: Former     Packs/day: 1.00     Years: 20.00     Pack years: 20.00     Types: Cigarettes     Quit date: 1981     Years since quittin.1    Smokeless tobacco: Never   Substance Use Topics    Alcohol use: No                                Counseling given: Not Answered      Vital Signs (Current): There were no vitals filed for this visit.                                            BP Readings from Last 3 Encounters:   23 (!) 197/117   02/15/23 (!) 140/90   23 130/80       NPO Status:                                                                                 BMI:   Wt Readings from Last 3 Encounters:   23 187 lb (84.8 kg)   02/15/23 178 lb (80.7 kg)   02/01/23 176 lb (79.8 kg)     There is no height or weight on file to calculate BMI.    CBC:   Lab Results   Component Value Date/Time    WBC 4.0 02/16/2023 06:15 AM    RBC 3.96 02/16/2023 06:15 AM    HGB 11.5 02/16/2023 06:15 AM    HCT 37.3 02/16/2023 06:15 AM    MCV 94.2 02/16/2023 06:15 AM    RDW 14.1 02/16/2023 06:15 AM     02/16/2023 06:15 AM       CMP:   Lab Results   Component Value Date/Time     02/16/2023 06:15 AM    K 3.9 02/16/2023 06:15 AM     02/16/2023 06:15 AM    CO2 26 02/16/2023 06:15 AM    BUN 19 02/16/2023 06:15 AM    CREATININE 1.40 02/16/2023 06:15 AM    GFRAA 45 12/09/2021 08:40 AM    LABGLOM 37 02/16/2023 06:15 AM    GLUCOSE 85 02/16/2023 06:15 AM    PROT 6.2 02/16/2023 06:15 AM    CALCIUM 9.0 02/16/2023 06:15 AM    BILITOT 0.5 02/16/2023 06:15 AM    ALKPHOS 83 02/16/2023 06:15 AM    AST 21 02/16/2023 06:15 AM    ALT 16 02/16/2023 06:15 AM       POC Tests: No results for input(s): POCGLU, POCNA, POCK, POCCL, POCBUN, POCHEMO, POCHCT in the last 72 hours.     Coags:   Lab Results   Component Value Date/Time    PROTIME 23.3 02/17/2023 05:32 AM    PROTIME 37.9 01/09/2023 10:35 AM    INR 2.1 02/17/2023 05:32 AM    APTT 32.8 05/05/2019 02:47 PM       HCG (If Applicable): No results found for: PREGTESTUR, PREGSERUM, HCG, HCGQUANT     ABGs: No results found for: PHART, PO2ART, PXS3UCH, NNV7FWR, BEART, W9DQHAFY     Type & Screen (If Applicable):  No results found for: LABABO, LABRH    Anesthesia Evaluation    Airway: Mallampati: III     Neck ROM: limited     Dental:    (+) partials      Pulmonary:normal exam    (+) sleep apnea:  asthma:                            Cardiovascular:    (+) hypertension:, past MI:, CAD:, dysrhythmias: atrial fibrillation,     (-)  angina    ECG reviewed                        Neuro/Psych:               GI/Hepatic/Renal:   (+) GERD:, renal disease: CRI,           Endo/Other:    (+) : arthritis: OA., malignancy/cancer (melanoma). Abdominal:             Vascular: Other Findings:             Anesthesia Plan      general     ASA 3     (168/46 bp in holding bay ,discussed with dr Shaw Butterfield, his only hesitency in OR is the bp being over 656 systolic, bp much improved now  DR larose aware inr is pending and says he wants to proceed  Pt and family aware of and accept risks  )  Induction: intravenous. MIPS: Postoperative opioids intended and Prophylactic antiemetics administered. Anesthetic plan and risks discussed with patient. Attending anesthesiologist reviewed and agrees with Preprocedure content          Department of Anesthesiology  Preprocedure Note       Name:  Ad Franz   Age:  79 y.o.  :  1940                                          MRN:  [id number]         Date:  2023      Surgeon: Martha Petersen):  Jun Norris MD    Procedure: ERCP ENDOSCOPIC RETROGRADE CHOLANGIOPANCREATOGRAPHY    Medications prior to admission:   Prior to Admission medications    Medication Sig Start Date End Date Taking? Authorizing Provider   acetaminophen-codeine (TYLENOL #3) 300-30 MG per tablet Take 1 tablet by mouth every 4 hours as needed for Pain.     Historical Provider, MD   famotidine (PEPCID) 40 MG tablet Take 40 mg by mouth nightly    Historical Provider, MD   Multiple Vitamins-Minerals (HAIR SKIN AND NAILS FORMULA) TABS Take 2 tablets by mouth daily    Historical Provider, MD   ferrous sulfate (FE TABS 325) 325 (65 Fe) MG EC tablet Take 325 mg by mouth daily (with breakfast)    Historical Provider, MD   FIBER COMPLETE PO Take by mouth daily    Historical Provider, MD   cetirizine (ZYRTEC) 10 MG tablet Take 10 mg by mouth daily    Historical Provider, MD   RESTASIS MULTIDOSE 0.05 % ophthalmic emulsion Place 1 drop into both eyes nightly 22   Historical Provider, MD   pantoprazole (PROTONIX) 40 MG tablet Take 40 mg by mouth daily    Historical Provider, MD   calcium carbonate-vitamin D 600-200 MG-UNIT TABS Take 1 tablet by mouth daily    Historical Provider, MD   Multiple Vitamins-Minerals (CENTRUM SILVER) TABS Take 1 tablet by mouth daily    Historical Provider, MD   fluticasone (FLONASE) 50 MCG/ACT nasal spray 1 spray by Each Nostril route nightly    Historical Provider, MD   amiodarone (CORDARONE) 200 MG tablet Take 200 mg by mouth daily    Historical Provider, MD   fluticasone-umeclidin-vilant (TRELEGY ELLIPTA) 100-62.5-25 MCG/INH AEPB Inhale 1 puff into the lungs daily    Historical Provider, MD   montelukast (SINGULAIR) 10 MG tablet Take 10 mg by mouth nightly    Historical Provider, MD   triamterene-hydrochlorothiazide (MAXZIDE-25) 37.5-25 MG per tablet Take 1 tablet by mouth daily 12/29/15   Historical Provider, MD   diltiazem (CARDIZEM CD) 240 MG ER capsule Take 240 mg by mouth daily 8/8/14   DUDLEY Hernandez CNP   aspirin 81 MG tablet Take 81 mg by mouth daily Will stop 7 days before surgery    Historical Provider, MD   azelastine (ASTELIN) 137 MCG/SPRAY nasal spray 1 spray by Nasal route 2 times daily. Use in each nostril as directed    Historical Provider, MD   nitroGLYCERIN (NITRODUR) 0.1 MG/HR Place 1 patch onto the skin daily. Historical Provider, MD   warfarin (COUMADIN) 5 MG tablet Take 2.5-5 mg by mouth See Admin Instructions Indications: 5mg Sun/Tues/Thurs, 2.5mg all other days    Historical Provider, MD       Current medications:    No current facility-administered medications for this visit. No current outpatient medications on file.      Facility-Administered Medications Ordered in Other Visits   Medication Dose Route Frequency Provider Last Rate Last Admin    0.9 % sodium chloride infusion   IntraVENous PRN DUDLEY Rodríguez CNP        [Held by provider] aspirin EC tablet 81 mg  81 mg Oral Daily DUDLEY Ac NP        azelastine (ASTELIN) 0.1 % nasal spray 2 spray  2 spray Each Nostril BID DUDLEY Ac NP   2 spray at 02/17/23 0712    ferrous sulfate (FE TABS 325) EC tablet 325 mg  325 mg Oral BID AC Drena Rummage, APRN - NP   325 mg at 02/17/23 0531    montelukast (SINGULAIR) tablet 10 mg  10 mg Oral Nightly Drena Rummage, APRN - NP   10 mg at 02/16/23 2057    pantoprazole (PROTONIX) tablet 40 mg  40 mg Oral Daily Drena Rummage, APRN - NP   40 mg at 02/17/23 0531    hydrALAZINE (APRESOLINE) injection 10 mg  10 mg IntraVENous Q6H PRN Drena Rummage, APRN - NP   10 mg at 02/17/23 1159    cetirizine (ZYRTEC) tablet 5 mg  5 mg Oral Daily Drena Rummage, APRN - NP   5 mg at 02/17/23 0924    calcium carb-cholecalciferol 600-10 MG-MCG per tab 1 tablet  1 tablet Oral Daily Drena Rummage, APRN - NP   1 tablet at 02/17/23 0924    calcium carbonate (TUMS) chewable tablet 500 mg  500 mg Oral TID PRN DUDLEY Burkett - CNP   500 mg at 02/16/23 1133    piperacillin-tazobactam (ZOSYN) 3,375 mg in sodium chloride 0.9 % 50 mL IVPB (mini-bag)  3,375 mg IntraVENous Q8H DUDLEY Burkett - CNP 12.5 mL/hr at 02/17/23 0934 3,375 mg at 02/17/23 0934    amiodarone (CORDARONE) tablet 100 mg  100 mg Oral Daily Iona Hawkins MD        morphine (PF) injection 2 mg  2 mg IntraVENous Q3H PRN DUDLEY Lincoln CNP   2 mg at 02/17/23 0925    fluticasone (FLONASE) 50 MCG/ACT nasal spray 1 spray  1 spray Each Nostril Daily DUDLEY Lincoln CNP   1 spray at 02/17/23 8655    sodium chloride flush 0.9 % injection 5-40 mL  5-40 mL IntraVENous 2 times per day DUDLEY Lincoln CNP   10 mL at 02/17/23 9433    sodium chloride flush 0.9 % injection 10 mL  10 mL IntraVENous PRN Kaitlin Dagmar, APRN - CNP        0.9 % sodium chloride infusion   IntraVENous PRN Kaitlin Bryce, APRN - CNP        potassium chloride (KLOR-CON M) extended release tablet 40 mEq  40 mEq Oral PRN Kaitlin Dagmar, APRN - CNP        Or    potassium bicarb-citric acid (EFFER-K) effervescent tablet 40 mEq  40 mEq Oral PRN Kaitlin Dagmar, APRN - CNP        Or    potassium chloride 10 mEq/100 mL IVPB (Peripheral Line)  10 mEq IntraVENous PRN Ledora Glatter, APRN - CNP        magnesium sulfate 1000 mg in dextrose 5% 100 mL IVPB  1,000 mg IntraVENous PRN Ledora Glatter, APRN - CNP        ondansetron (ZOFRAN-ODT) disintegrating tablet 4 mg  4 mg Oral Q8H PRN Ledora Glatter, APRN - CNP        Or    ondansetron [facility]) injection 4 mg  4 mg IntraVENous Q6H PRN Ledora Glatter, APRN - CNP        polyethylene glycol (GLYCOLAX) packet 17 g  17 g Oral Daily PRN Ledora Glatter, APRN - CNP   17 g at 02/16/23 2103    acetaminophen (TYLENOL) tablet 650 mg  650 mg Oral Q6H PRN Ledora Glatter, APRN - CNP   650 mg at 02/15/23 2310    Or    acetaminophen (TYLENOL) suppository 650 mg  650 mg Rectal Q6H PRN Ledora Glatter, APRN - CNP        0.9 % sodium chloride infusion   IntraVENous Continuous DUDLEY Hinds - NP 50 mL/hr at 02/17/23 1250 New Bag at 02/17/23 1250    budesonide-formoterol (SYMBICORT) 160-4.5 MCG/ACT inhaler 2 puff  2 puff Inhalation BID Ledora Glatter, APRN - CNP   2 puff at 02/17/23 0806    tiotropium (SPIRIVA RESPIMAT) 2.5 MCG/ACT inhaler 2 puff  2 puff Inhalation Daily Ledora Glatter, APRN - CNP   2 puff at 02/17/23 9108    warfarin placeholder: dosing by pharmacy   Other RX Placeholder DUDLEY Valadez CNP           Allergies:  No Known Allergies    Problem List:    Patient Active Problem List   Diagnosis Code    Hypertension I10    Allergic rhinitis J30.9    Atrial fibrillation (HCC) I48.91    Malignant melanoma ([facility] [address] C43.9    History of MI (myocardial infarction) I25.2    Wears glasses Z97.3    Sinus bradycardia R00.1    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    GI bleed K92.2    Serologic abnormality R89.4    Chronic cough R05.3    Chronic fatigue syndrome G93.32    Chronic sinusitis J32.9    Gastroesophageal reflux disease K21.9    Moderate persistent asthma without complication R89.24    Degenerative joint disease involving multiple joints on both sides of body M15.9    Primary localized osteoarthritis of pelvic region and thigh M16.10    Primary osteoarthritis of both knees M17.0    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    Hypersomnia G47.10    Sleep apnea G47.30    History of smoking Z87.891    Arthritis of left hip M16.12    Calculus of gallbladder without cholecystitis without obstruction K80.20    Flank pain R10.9    Common bile duct dilatation K83.8    Choledocholithiasis-suspected K80.50    INR therapeutic R79.1    Pain of upper abdomen R10.10    Acute midline low back pain without sciatica M54.50       Past Medical History:        Diagnosis Date    Allergic rhinitis     Arthritis     Asthma     Atrial fibrillation (HCC)     GERD (gastroesophageal reflux disease)     Hypertension     Melanoma ([facility] [address]     on nose    Myocardial infarction ([facility] [address] 2008    Wears glasses     Wears partial dentures     upper       Past Surgical History:        Procedure Laterality Date    APPENDECTOMY      BREAST SURGERY      CARDIAC SURGERY      CARPAL TUNNEL RELEASE Right     COLONOSCOPY N/A 8/8/2019    COLONOSCOPY POLYPECTOMY REMOVAL HOT BIOPSY AND RESOLUTION CLIP performed by Angela Edwards MD at [address]      umbilical    HYSTERECTOMY (CERVIX STATUS UNKNOWN)      JOINT REPLACEMENT      NECK SURGERY      C5-C6    NOSE SURGERY      cancer removed    OTHER SURGICAL HISTORY      heart surg/growth in heart sac    TONSILLECTOMY      TOTAL HIP ARTHROPLASTY Right 04/09/2019    TOTAL HIP ARTHROPLASTY Right 4/9/2019    HIP TOTAL ARTHROPLASTY - MEDACTA, C-ARM, MEDACTA TABLE, NSA= GENERAL VS SPINAL, FASCIA ILLIACA BLOCK, *STANDARD performed by Ramirez Echevarria DO at [address] N/A 5/6/2019    EGD DIAGNOSTIC ONLY performed by Angela Edwards MD at [facility]       Social History:    Social History     Tobacco Use    Smoking status: Former     Packs/day: 1.00     Years: 20.00     Pack years: 20.00     Types: Cigarettes     Quit date: 1981     Years since quittin.1    Smokeless tobacco: Never   Substance Use Topics    Alcohol use: No                                Counseling given: Not Answered      Vital Signs (Current): There were no vitals filed for this visit. BP Readings from Last 3 Encounters:   23 (!) 197/117   02/15/23 (!) 140/90   23 130/80       NPO Status:                                                                                 BMI:   Wt Readings from Last 3 Encounters:   23 187 lb (84.8 kg)   02/15/23 178 lb (80.7 kg)   23 176 lb (79.8 kg)     There is no height or weight on file to calculate BMI.    CBC:   Lab Results   Component Value Date/Time    WBC 4.0 2023 06:15 AM    RBC 3.96 2023 06:15 AM    HGB 11.5 2023 06:15 AM    HCT 37.3 2023 06:15 AM    MCV 94.2 2023 06:15 AM    RDW 14.1 2023 06:15 AM     2023 06:15 AM       CMP:   Lab Results   Component Value Date/Time     2023 06:15 AM    K 3.9 2023 06:15 AM     2023 06:15 AM    CO2 26 2023 06:15 AM    BUN 19 2023 06:15 AM    CREATININE 1.40 2023 06:15 AM    GFRAA 45 2021 08:40 AM    LABGLOM 37 2023 06:15 AM    GLUCOSE 85 2023 06:15 AM    PROT 6.2 2023 06:15 AM    CALCIUM 9.0 2023 06:15 AM    BILITOT 0.5 2023 06:15 AM    ALKPHOS 83 2023 06:15 AM    AST 21 2023 06:15 AM    ALT 16 2023 06:15 AM       POC Tests: No results for input(s): POCGLU, POCNA, POCK, POCCL, POCBUN, POCHEMO, POCHCT in the last 72 hours.     Coags:   Lab Results   Component Value Date/Time    PROTIME 23.3 2023 05:32 AM    PROTIME 37.9 2023 10:35 AM    INR 2.1 2023 05:32 AM    APTT 32.8 2019 02:47 PM       HCG (If Applicable): No results found for: PREGTESTUR, PREGSERUM, HCG, HCGQUANT     ABGs: No results found for: PHART, PO2ART, JBP3VWR, DJS5KMF, BEART, [id number]     Type & Screen (If Applicable):  No results found for: LABABO, [address]    Anesthesia Evaluation  Patient summary reviewed and Nursing notes reviewed no history of anesthetic complications:   Airway: Mallampati: I  TM distance: >3 FB   Neck ROM: full  Mouth opening: > = 3 FB Dental:    (+) lower dentures, upper dentures and partials      Pulmonary:Negative Pulmonary ROS breath sounds clear to auscultation                             Cardiovascular:  Exercise tolerance: good (>4 METS),   (+) hypertension:, past MI:, dysrhythmias: atrial fibrillation,       ECG reviewed  Rhythm: regular  Rate: normal           Beta Blocker:  Not on Beta Blocker         Neuro/Psych:   Negative Neuro/Psych ROS  (+) depression/anxiety             GI/Hepatic/Renal: Neg GI/Hepatic/Renal ROS            Endo/Other: Negative Endo/Other ROS                    Abdominal:       Abdomen: soft. Vascular: negative vascular ROS. Anesthesia Plan      MAC     ASA 3       Induction: intravenous. Anesthetic plan and risks discussed with patient. Plan discussed with CRNA.                   David Sy MD   2/17/2023                David Sy MD   2/17/2023

## 2023-02-17 NOTE — PROGRESS NOTES
[facility]  Office: [address], DO, Teresa Castillo, DO, Veronica Duggan, DO, Damon Meza Blood, DO, Gisele Bal MD, Pritesh Kaminski MD, Mario Quintana MD, Nettie Toure MD,  Trinity De La Rosa MD, Adrián Cesar MD, Hadley Lima, DO, Dina Mariee MD,  Donte Mendoza MD, Toña Coyle MD, Nikki Valle DO, Shan Knight MD, Chandler Lombard, MD, Coty Fraire, DO, Maryjane Beebe MD, Sarahy Pelletier MD, Jolly Solano MD, Trevon Soriano MD, Kierra Norwood DO, Josey Oropeza MD, Caleb Hidalgo MD, Kiara Jama, CNP,  Delia Ruiz, CNP, Obdulio Haywood, CNP, Lorena Arauz, CNP,  Gerardo Gerardo, [facility], Lindsey Olea, CNP, Maria Alejandra Hernandez, CNP, Devin Bhat, CNP, Uday Abreu, CNP, Guillermo Suero, CNP, Lang Rebolledo PA-C, Marimar Alejandro, CNS, Ondina Escalona, [facility], Rosaura Desais [facility]    Progress Note    2/17/2023    3:59 PM    Name:   Eddie Lynn  MRN:     [id number]     Acct:      [de-identified]   Room:   2010/2010-01  IP Day:  2  Admit Date:  2/15/2023  2:17 PM    PCP:   Maria Fernanda So PA-C  Code Status:  Full Code    Subjective:     C/C:   Chief Complaint   Patient presents with    Back Pain     Bilateral upper back. Pt reports \"squeezing\" going around to right side under breast area     Interval History Status: . Patient is off Coumadin for the last 2 days  GI plans to do ERCP today afternoon however INR is 2.1 today  Blood pressure is also running high, cardiology evaluating  Denies flank or abdominal pain today  Brief History:     Eddie Lynn is a 79 y.o. Non- / non  female who presents with Back Pain (Bilateral upper back. Pt reports \"squeezing\" going around to right side under breast area)   and is admitted to the hospital for the management of Common bile duct dilatation.      Patient says she started having mid back pain that started 3 weeks ago that radiated to the left side, and then started radiating around to the right side. She has been taking Tylenol #3 for the discomfort, but it has not been effective for the pain, and she has had constipation since taking it. Her last BM was Sunday after taking an OTC laxative and prune juice. she went to see Dr Rachael Mckee general surgeon yesterday- no cholecystectomy recommended at this time. Prior imaging has revealed renal cysts and mild cholelithiasis, mild diverticulosis, no kidney stones. She has a PMH of appendectomy, arthritis, asthma, atrial fib on warfarin, GERD, HTN, MI, melanoma, and MI. She went to the ED as the pain did not subside. While in ED, Creat was 1.46. MRI abdomen revealed cholelithiasis and mild extrahepatic biliary ductal dilatation with suspected sludge/ small stones in the CBD. She was given morphine for pain and hydralazine for elevated BP. GI was consulted. She was admitted for further work up of dilated common bile duct and abdominal pain. Review of Systems:     Constitutional:  negative for chills, fevers, sweats  Respiratory:  negative for cough, dyspnea on exertion, shortness of breath, wheezing  Cardiovascular:  negative for chest pain, chest pressure/discomfort, lower extremity edema, palpitations  Gastrointestinal:  negative for abdominal pain, constipation, diarrhea, nausea, vomiting  Neurological:  negative for dizziness, headache    Medications:      Allergies:  No Known Allergies    Current Meds:   Scheduled Meds:    dilTIAZem  240 mg Oral Daily    [Held by provider] aspirin  81 mg Oral Daily    azelastine  2 spray Each Nostril BID    ferrous sulfate  325 mg Oral BID AC    montelukast  10 mg Oral Nightly    pantoprazole  40 mg Oral Daily    cetirizine  5 mg Oral Daily    calcium carb-cholecalciferol  1 tablet Oral Daily    piperacillin-tazobactam  3,375 mg IntraVENous Q8H    amiodarone  100 mg Oral Daily    fluticasone  1 spray Each Nostril Daily    sodium chloride flush  5-40 mL IntraVENous 2 times per day    budesonide-formoterol  2 puff Inhalation BID    tiotropium  2 puff Inhalation Daily    warfarin placeholder: dosing by pharmacy   Other RX Placeholder     Continuous Infusions:    sodium chloride      sodium chloride      sodium chloride 50 mL/hr at 23 1250     PRN Meds: sodium chloride, hydrALAZINE, calcium carbonate, morphine, sodium chloride flush, sodium chloride, potassium chloride **OR** potassium alternative oral replacement **OR** potassium chloride, magnesium sulfate, ondansetron **OR** ondansetron, polyethylene glycol, acetaminophen **OR** acetaminophen    Data:     Past Medical History:   has a past medical history of Allergic rhinitis, Arthritis, Asthma, Atrial fibrillation (HCC), GERD (gastroesophageal reflux disease), Hypertension, Melanoma (HCC), Myocardial infarction (HCC), Wears glasses, and Wears partial dentures.    Social History:   reports that she quit smoking about 42 years ago. Her smoking use included cigarettes. She has a 20.00 pack-year smoking history. She has never used smokeless tobacco. She reports that she does not drink alcohol and does not use drugs.     Family History:   Family History   Problem Relation Age of Onset    COPD Mother     Asthma Mother     Cancer Mother     Cancer Father     Stroke Sister     COPD Sister     Emphysema Sister        Vitals:  BP (!) 208/70   Pulse 60   Temp 97.6 °F (36.4 °C) (Oral)   Resp 14   Ht 5' 5\" (1.651 m)   Wt 187 lb (84.8 kg)   SpO2 97%   BMI 31.12 kg/m²   Temp (24hrs), Av.9 °F (36.6 °C), Min:97.6 °F (36.4 °C), Max:98.1 °F (36.7 °C)    No results for input(s): POCGLU in the last 72 hours.    I/O (24Hr):    Intake/Output Summary (Last 24 hours) at 2023 1559  Last data filed at 2023 1425  Gross per 24 hour   Intake 306.03 ml   Output 1050 ml   Net -743.97 ml       Labs:  Hematology:  Recent Labs     02/15/23  1537 23  0615 23  0532   WBC 5.3 4.0  --    RBC 4.09 3.96  --    HGB 11.9 11.5*  --   HCT 38.2 37.3  --    MCV 93.4 94.2  --    MCH 29.1 29.0  --    MCHC 31.2 30.8  --    RDW 14.0 14.1  --     227  --    MPV 9.9 10.1  --    INR 1.9 2.0 2.1   DDIMER 0.46  --   --      Chemistry:  Recent Labs     02/15/23  1537 02/16/23  0615    142   K 4.0 3.9    106   CO2 25 26   GLUCOSE 83 85   BUN 21 19   CREATININE 1.46* 1.40*   MG 2.1  --    ANIONGAP 11 10   LABGLOM 35* 37*   CALCIUM 9.0 9.0   TROPHS 15*  --      Recent Labs     02/15/23  1537 02/16/23  0615   PROT 6.7 6.2*   LABALBU 3.6 3.4*   AST 29 21   ALT 19 16   ALKPHOS 89 83   BILITOT 0.4 0.5   BILIDIR 0.1 0.1   LIPASE 19  --      ABG:No results found for: POCPH, PHART, PH, POCPCO2, VUY2DRN, PCO2, POCPO2, PO2ART, PO2, POCHCO3, TOJ8XXU, HCO3, NBEA, PBEA, BEART, BE, THGBART, THB, QLH3VXE, EBII1RGM, K9VHFIVK, O2SAT, FIO2  No results found for: SPECIAL  Lab Results   Component Value Date/Time    CULTURE NO SIGNIFICANT GROWTH 02/01/2023 04:53 PM       Radiology:  CT ABDOMEN PELVIS WO CONTRAST Additional Contrast? None    Result Date: 2/15/2023  1. Interval development of bladder distension and central biliary ductal dilatation since 4 February 2023. Sludge and small stones are present in the gallbladder. Findings of concern for stones in the common bile duct or edema in the distal common bowel duct due to a recently passed calculus. Right upper quadrant ultrasound is advised. 2.  Moderate colonic stool burden. 3.  No urinary obstruction, small bowel obstruction or appendicitis. RECOMMENDATION: Right upper quadrant ultrasound. CT THORACIC SPINE WO CONTRAST    Result Date: 2/15/2023  Thoracic spine CT: Age indeterminate superior endplate compression fracture of T8 with 40% height loss. No retropulsion. If there is need for further evaluation thoracic spine MRI can be obtained Lumbar spine CT: No acute fracture Multilevel disc and facet degenerative disease most pronounced at L3-L4 and L4-L5 at L5-S1. .  Grade 1 anterolisthesis at L3-L4. Moderate bilateral neural foraminal narrowing at L4-L5 and L5-S1. CT LUMBAR SPINE WO CONTRAST    Result Date: 2/15/2023  Thoracic spine CT: Age indeterminate superior endplate compression fracture of T8 with 40% height loss. No retropulsion. If there is need for further evaluation thoracic spine MRI can be obtained Lumbar spine CT: No acute fracture Multilevel disc and facet degenerative disease most pronounced at L3-L4 and L4-L5 at L5-S1. .  Grade 1 anterolisthesis at L3-L4. Moderate bilateral neural foraminal narrowing at L4-L5 and L5-S1. US GALLBLADDER RUQ    Result Date: 2/15/2023  1. Dilated gallbladder containing sludge and small stones. 2.  Dilated common bile duct. Please note that the bile duct was not dilated on prior CT scan of 4 February 2023. No definite stones are noted in the bile duct but it is incompletely visualized. 3.  Liver is of coarse echotexture suggesting fatty infiltration. RECOMMENDATIONS: Consideration of MRCP. MRI ABDOMEN WO CONTRAST MRCP    Result Date: 2/15/2023  1. Cholelithiasis without other findings to suggest acute cholecystitis. 2. Mild extrahepatic biliary ductal dilatation with suspected sludge or small stones in the distal common bile duct. Consider ERCP for further evaluation. The findings were sent to the Radiology Results [address] at 7:57 pm on 2/15/2023 to be communicated to a licensed caregiver.        Physical Examination:        General appearance:  alert, cooperative and no distress  Mental Status:  oriented to person, place and time and normal affect  Lungs:  clear to auscultation bilaterally, normal effort  Heart:  regular rate and rhythm, no murmur  Abdomen:  soft, nontender, nondistended, normal bowel sounds, no masses, hepatomegaly, splenomegaly  Extremities:  no edema, redness, tenderness in the calves  Skin:  no gross lesions, rashes, induration    Assessment:        Hospital Problems             Last Modified POA    * (Principal) Common bile duct dilatation 2/16/2023 Yes    Calculus of gallbladder without cholecystitis without obstruction 2/16/2023 Yes    Choledocholithiasis-suspected 2/16/2023 Yes    INR therapeutic 2/16/2023 Yes    Pain of upper abdomen 2/16/2023 Yes    Acute midline low back pain without sciatica 2/16/2023 Yes    Hypertension 2/16/2023 Yes    Atrial fibrillation ([address] 2/16/2023 Yes    Gastroesophageal reflux disease 2/16/2023 Yes       Plan:        Keep aspirin and Coumadin on hold  Resume oral home blood pressure medicines when allowed p.o., use IV hydralazine as needed for blood pressure control  Discussed with nurse to notify GI regarding elevated INR to decide further regarding procedure  Cardiology evaluation in progress  Discussed with patient at bedside    [address] MD  2/17/2023  3:59 PM

## 2023-02-17 NOTE — PLAN OF CARE
Problem: Respiratory - Adult  Goal: Able to breathe comfortably  2/17/2023 0809 by Luis Schroeder RCP  Outcome: Progressing     Problem: Respiratory - Adult  Goal: Patient's breath sounds will be clear and equal  2/17/2023 0809 by Luis Schroeder RCP  Outcome: Progressing

## 2023-02-17 NOTE — PLAN OF CARE
Inr 2.1 today, d/w dr Patrick Mcqueen, will order one unit of ffp and vit k IV x1 dose. Wallace Engle, APRN - CNP

## 2023-02-17 NOTE — PLAN OF CARE
Problem: Respiratory - Adult  Goal: Able to breathe comfortably  Outcome: Progressing     Problem: Respiratory - Adult  Goal: Patient's breath sounds will be clear and equal  Outcome: Progressing         BRONCHOSPASM/BRONCHOCONSTRICTION    IMPROVE  AERATION/BREATHSOUNDS  ADMINISTER BRONCHODILATOR THERAPY AS APPROPRIATE  ASSESS BREATH SOUNDS  PATIENT EDUCATION AS NEEDED

## 2023-02-17 NOTE — PROGRESS NOTES
Physical Therapy  DATE: 2023    NAME: Valentina Kang  MRN: [id number]   : 1940    Patient not seen this date for Physical Therapy due to:      [] Cancel by RN or physician due to:    [] Hemodialysis    [] Critical Lab Value Level     [] Blood transfusion in progress    [] Acute or unstable cardiovascular status   _MAP < 55 or more than >115  _HR < 40 or > 130    [] Acute or unstable pulmonary status   -FiO2 > 60%   _RR < 5 or >40    _O2 sats < 85%    [] Strict Bedrest    [] Off Unit for surgery or procedure    [x] Testing(ERCP)      [] Pending imaging to R/O fracture    [] Refusal by Patient      [] Other      [] PT being discontinued at this time. Patient independent. No further needs. [] PT being discontinued at this time as the patient has been transferred to hospice care. No further needs.       [facility], PT

## 2023-02-18 LAB
ABSOLUTE EOS #: 0 K/UL (ref 0–0.4)
ABSOLUTE IMMATURE GRANULOCYTE: 0.04 K/UL (ref 0–0.3)
ABSOLUTE LYMPH #: 0.48 K/UL (ref 1–4.8)
ABSOLUTE MONO #: 0.04 K/UL (ref 0.2–0.8)
ALBUMIN SERPL-MCNC: 3.3 G/DL (ref 3.5–5.2)
ALP SERPL-CCNC: 73 U/L (ref 35–104)
ALT SERPL-CCNC: 12 U/L (ref 5–33)
ANION GAP SERPL CALCULATED.3IONS-SCNC: 10 MMOL/L (ref 9–17)
AST SERPL-CCNC: 17 U/L
BASOPHILS # BLD: 0 %
BASOPHILS ABSOLUTE: 0 K/UL (ref 0–0.2)
BILIRUB DIRECT SERPL-MCNC: 0.2 MG/DL
BILIRUB INDIRECT SERPL-MCNC: 0.4 MG/DL (ref 0–1)
BILIRUB SERPL-MCNC: 0.6 MG/DL (ref 0.3–1.2)
BLD PROD TYP BPU: NORMAL
BPU ID: NORMAL
BUN SERPL-MCNC: 22 MG/DL (ref 8–23)
CALCIUM SERPL-MCNC: 8.7 MG/DL (ref 8.6–10.4)
CHLORIDE SERPL-SCNC: 109 MMOL/L (ref 98–107)
CO2 SERPL-SCNC: 23 MMOL/L (ref 20–31)
CREAT SERPL-MCNC: 1.28 MG/DL (ref 0.5–0.9)
DISPENSE STATUS BLOOD BANK: NORMAL
EOSINOPHILS RELATIVE PERCENT: 0 % (ref 1–4)
GFR SERPL CREATININE-BSD FRML MDRD: 41 ML/MIN/1.73M2
GLUCOSE SERPL-MCNC: 115 MG/DL (ref 70–99)
HCT VFR BLD AUTO: 36.3 % (ref 36.3–47.1)
HGB BLD-MCNC: 11.5 G/DL (ref 11.9–15.1)
IMMATURE GRANULOCYTES: 1 %
INR PPP: 1.3
LYMPHOCYTES # BLD: 13 % (ref 24–44)
MCH RBC QN AUTO: 30.1 PG (ref 25.2–33.5)
MCHC RBC AUTO-ENTMCNC: 31.7 G/DL (ref 28.4–34.8)
MCV RBC AUTO: 95 FL (ref 82.6–102.9)
MONOCYTES # BLD: 1 % (ref 1–7)
MORPHOLOGY: ABNORMAL
NRBC AUTOMATED: 0 PER 100 WBC
PDW BLD-RTO: 14.6 % (ref 11.8–14.4)
PLATELET # BLD AUTO: 218 K/UL (ref 138–453)
PMV BLD AUTO: 9.7 FL (ref 8.1–13.5)
POTASSIUM SERPL-SCNC: 4.1 MMOL/L (ref 3.7–5.3)
PROT SERPL-MCNC: 6 G/DL (ref 6.4–8.3)
PROTHROMBIN TIME: 16.2 SEC (ref 11.5–14.2)
RBC # BLD: 3.82 M/UL (ref 3.95–5.11)
SEG NEUTROPHILS: 85 % (ref 36–66)
SEGMENTED NEUTROPHILS ABSOLUTE COUNT: 3.14 K/UL (ref 1.8–7.7)
SODIUM SERPL-SCNC: 142 MMOL/L (ref 135–144)
TRANSFUSION STATUS: NORMAL
UNIT DIVISION: 0
WBC # BLD AUTO: 3.7 K/UL (ref 3.5–11.3)

## 2023-02-18 PROCEDURE — 94640 AIRWAY INHALATION TREATMENT: CPT

## 2023-02-18 PROCEDURE — 6370000000 HC RX 637 (ALT 250 FOR IP): Performed by: NURSE PRACTITIONER

## 2023-02-18 PROCEDURE — 6360000002 HC RX W HCPCS: Performed by: INTERNAL MEDICINE

## 2023-02-18 PROCEDURE — 2580000003 HC RX 258: Performed by: INTERNAL MEDICINE

## 2023-02-18 PROCEDURE — 80053 COMPREHEN METABOLIC PANEL: CPT

## 2023-02-18 PROCEDURE — 6370000000 HC RX 637 (ALT 250 FOR IP): Performed by: INTERNAL MEDICINE

## 2023-02-18 PROCEDURE — 85025 COMPLETE CBC W/AUTO DIFF WBC: CPT

## 2023-02-18 PROCEDURE — 36415 COLL VENOUS BLD VENIPUNCTURE: CPT

## 2023-02-18 PROCEDURE — 85610 PROTHROMBIN TIME: CPT

## 2023-02-18 PROCEDURE — 94761 N-INVAS EAR/PLS OXIMETRY MLT: CPT

## 2023-02-18 PROCEDURE — 99233 SBSQ HOSP IP/OBS HIGH 50: CPT | Performed by: INTERNAL MEDICINE

## 2023-02-18 PROCEDURE — 99232 SBSQ HOSP IP/OBS MODERATE 35: CPT | Performed by: NURSE PRACTITIONER

## 2023-02-18 PROCEDURE — 1200000000 HC SEMI PRIVATE

## 2023-02-18 PROCEDURE — 82248 BILIRUBIN DIRECT: CPT

## 2023-02-18 PROCEDURE — 2700000000 HC OXYGEN THERAPY PER DAY

## 2023-02-18 PROCEDURE — APPSS45 APP SPLIT SHARED TIME 31-45 MINUTES: Performed by: NURSE PRACTITIONER

## 2023-02-18 RX ORDER — BISACODYL 10 MG
10 SUPPOSITORY, RECTAL RECTAL DAILY PRN
Status: DISCONTINUED | OUTPATIENT
Start: 2023-02-18 | End: 2023-02-22 | Stop reason: HOSPADM

## 2023-02-18 RX ORDER — WARFARIN SODIUM 5 MG/1
5 TABLET ORAL
Status: COMPLETED | OUTPATIENT
Start: 2023-02-18 | End: 2023-02-18

## 2023-02-18 RX ADMIN — FERROUS SULFATE TAB EC 325 MG (65 MG FE EQUIVALENT) 325 MG: 325 (65 FE) TABLET DELAYED RESPONSE at 09:14

## 2023-02-18 RX ADMIN — AZELASTINE HYDROCHLORIDE 2 SPRAY: 137 SPRAY, METERED NASAL at 20:10

## 2023-02-18 RX ADMIN — Medication 1 TABLET: at 09:14

## 2023-02-18 RX ADMIN — ASPIRIN 81 MG: 81 TABLET, COATED ORAL at 09:17

## 2023-02-18 RX ADMIN — TIOTROPIUM BROMIDE INHALATION SPRAY 2 PUFF: 3.12 SPRAY, METERED RESPIRATORY (INHALATION) at 09:46

## 2023-02-18 RX ADMIN — BISACODYL 10 MG: 10 SUPPOSITORY RECTAL at 20:10

## 2023-02-18 RX ADMIN — PIPERACILLIN AND TAZOBACTAM 3375 MG: 3; .375 INJECTION, POWDER, FOR SOLUTION INTRAVENOUS at 02:26

## 2023-02-18 RX ADMIN — PIPERACILLIN AND TAZOBACTAM 3375 MG: 3; .375 INJECTION, POWDER, FOR SOLUTION INTRAVENOUS at 09:30

## 2023-02-18 RX ADMIN — BUDESONIDE AND FORMOTEROL FUMARATE DIHYDRATE 2 PUFF: 160; 4.5 AEROSOL RESPIRATORY (INHALATION) at 21:32

## 2023-02-18 RX ADMIN — FERROUS SULFATE TAB EC 325 MG (65 MG FE EQUIVALENT) 325 MG: 325 (65 FE) TABLET DELAYED RESPONSE at 18:43

## 2023-02-18 RX ADMIN — FLUTICASONE PROPIONATE 1 SPRAY: 50 SPRAY, METERED NASAL at 09:15

## 2023-02-18 RX ADMIN — CETIRIZINE HYDROCHLORIDE 5 MG: 5 TABLET ORAL at 09:14

## 2023-02-18 RX ADMIN — DILTIAZEM HYDROCHLORIDE 240 MG: 240 CAPSULE, COATED, EXTENDED RELEASE ORAL at 09:14

## 2023-02-18 RX ADMIN — PIPERACILLIN AND TAZOBACTAM 3375 MG: 3; .375 INJECTION, POWDER, FOR SOLUTION INTRAVENOUS at 17:31

## 2023-02-18 RX ADMIN — AMIODARONE HYDROCHLORIDE 100 MG: 200 TABLET ORAL at 09:13

## 2023-02-18 RX ADMIN — HYDRALAZINE HYDROCHLORIDE 10 MG: 20 INJECTION INTRAMUSCULAR; INTRAVENOUS at 09:13

## 2023-02-18 RX ADMIN — MONTELUKAST 10 MG: 10 TABLET, FILM COATED ORAL at 20:10

## 2023-02-18 RX ADMIN — POLYETHYLENE GLYCOL 3350 17 G: 17 POWDER, FOR SOLUTION ORAL at 17:37

## 2023-02-18 RX ADMIN — PANTOPRAZOLE SODIUM 40 MG: 40 TABLET, DELAYED RELEASE ORAL at 09:13

## 2023-02-18 RX ADMIN — AZELASTINE HYDROCHLORIDE 2 SPRAY: 137 SPRAY, METERED NASAL at 09:14

## 2023-02-18 RX ADMIN — BUDESONIDE AND FORMOTEROL FUMARATE DIHYDRATE 2 PUFF: 160; 4.5 AEROSOL RESPIRATORY (INHALATION) at 09:46

## 2023-02-18 RX ADMIN — WARFARIN SODIUM 5 MG: 5 TABLET ORAL at 17:32

## 2023-02-18 NOTE — PROGRESS NOTES
[facility]  Office: [address], DO, Paul Swift, DO, Morgan Bhagat, DO, Barb Verma, DO, Dante Al MD, Fredrick Mock MD, Meghan Vivar MD, Mercedes Desir MD,  Cassandra Roman MD, Janie Justice MD, Claudia Lawrence, DO, Michelle Rhodes MD,  Sujey Dallas MD, Marina Hernandez MD, Ruth Golden DO, Robina Bonner MD, Fede Benitez MD, Kadi Alexander DO, Milly Snyder MD, Connor Blandon MD, Angela Leahy MD, Pedro Perry MD, Madeleine Beth, DO, Carrington Arreaga MD, Nithya Johnson MD, Bud Carrasquillo, CNP,  Yisel Joshua, CNP, Severiano Baptist, [facility], Carolyne Mixon, CNP,  Phan Turner, [facility], Marleny Burton, CNP, Chasity England, CNP, Rachael Laird, CNP, Anna Juárez, CNP, Fiona Mehta, [facility], Kalina Giron PA-C, Rhonda Eli, CNS, Elizabeth Llamas, CNP, Santiago Aparicio, [facility]    Progress Note    2/18/2023    2:00 PM    Name:   Lilian Dee  MRN:     [id number]     Acct:      [de-identified]   Room:   2010/2010-01   Day:  3  Admit Date:  2/15/2023  2:17 PM    PCP:   Cassandra Armendariz PA-C  Code Status:  Full Code    Subjective:     C/C:   Chief Complaint   Patient presents with    Back Pain     Bilateral upper back. Pt reports \"squeezing\" going around to right side under breast area     Interval History Status: significantly improved. Dramatic improvement in condition overnight. Patient reports that her epigastric discomfort, nausea, and stabbing right upper quadrant pain have resolved. Disease course is explained in detail to the patient and her daughter. Plan is to slowly advance diet throughout the day. Case is discussed with gastroenterology and they advised that the patient may need repeat ERCP on Monday pending hospital progress.     Brief History:     2/16 - Patient says she started having mid back pain that started 3 weeks ago that radiated to the left side, and then started radiating around to the right side. She has been taking Tylenol #3 for the discomfort, but it has not been effective for the pain, and she has had constipation since taking it. Her last BM was Sunday after taking an OTC laxative and prune juice. she went to see Dr Rachael Mckee general surgeon yesterday- no cholecystectomy recommended at this time. Prior imaging has revealed renal cysts and mild cholelithiasis, mild diverticulosis, no kidney stones. She has a PMH of appendectomy, arthritis, asthma, atrial fib on warfarin, GERD, HTN, MI, melanoma, and MI. She went to the ED as the pain did not subside. 2/17-2/18 -significant improvement. ERCP completed and patient was found to have significant biliary dilatation. Plan to slowly advance diet, continue hydration, continue pain control, possibly repeat ERCP on Monday. Review of Systems:     Constitutional:  negative for chills, fevers, sweats  Respiratory:  negative for cough, dyspnea on exertion, shortness of breath, wheezing  Cardiovascular:  negative for chest pain, chest pressure/discomfort, lower extremity edema, palpitations  Gastrointestinal:  negative for abdominal pain, constipation, diarrhea, nausea, vomiting  Neurological:  negative for dizziness, headache    Medications:      Allergies:  No Known Allergies    Current Meds:   Scheduled Meds:    dilTIAZem  240 mg Oral Daily    aspirin  81 mg Oral Daily    azelastine  2 spray Each Nostril BID    ferrous sulfate  325 mg Oral BID AC    montelukast  10 mg Oral Nightly    pantoprazole  40 mg Oral Daily    cetirizine  5 mg Oral Daily    calcium carb-cholecalciferol  1 tablet Oral Daily    piperacillin-tazobactam  3,375 mg IntraVENous Q8H    amiodarone  100 mg Oral Daily    fluticasone  1 spray Each Nostril Daily    sodium chloride flush  5-40 mL IntraVENous 2 times per day    budesonide-formoterol  2 puff Inhalation BID    tiotropium  2 puff Inhalation Daily    warfarin placeholder: dosing by pharmacy   Other RX Placeholder     Continuous Infusions:    sodium chloride      sodium chloride 40 mL/hr at 23 0416     PRN Meds: hydrALAZINE, calcium carbonate, morphine, sodium chloride flush, sodium chloride, potassium chloride **OR** potassium alternative oral replacement **OR** potassium chloride, magnesium sulfate, ondansetron **OR** ondansetron, polyethylene glycol, acetaminophen **OR** acetaminophen    Data:     Past Medical History:   has a past medical history of Allergic rhinitis, Arthritis, Asthma, Atrial fibrillation ([facility] [address] GERD (gastroesophageal reflux disease), Hypertension, Melanoma ([facility] [address] Myocardial infarction ([facility] [address] Wears glasses, and Wears partial dentures. Social History:   reports that she quit smoking about 42 years ago. Her smoking use included cigarettes. She has a 20.00 pack-year smoking history. She has never used smokeless tobacco. She reports that she does not drink alcohol and does not use drugs. Family History:   Family History   Problem Relation Age of Onset    COPD Mother     Asthma Mother     Cancer Mother     Cancer Father     Stroke Sister     COPD Sister     Emphysema Sister        Vitals:  BP (!) 145/50   Pulse 73   Temp 97.5 °F (36.4 °C) (Oral)   Resp 17   Ht 5' 5\" (1.651 m)   Wt 188 lb 6.4 oz (85.5 kg)   SpO2 96%   BMI 31.35 kg/m²   Temp (24hrs), Av.8 °F (36.6 °C), Min:97.3 °F (36.3 °C), Max:98.2 °F (36.8 °C)    No results for input(s): POCGLU in the last 72 hours. I/O (24Hr):     Intake/Output Summary (Last 24 hours) at 2023 1400  Last data filed at 2023 0433  Gross per 24 hour   Intake 1040.94 ml   Output 500 ml   Net 540.94 ml       Labs:  Hematology:  Recent Labs     02/15/23  1537 23  0615 23  0532 23  1640 23  0604 23  0849   WBC 5.3 4.0  --   --   --  3.7   RBC 4.09 3.96  --   --   --  3.82*   HGB 11.9 11.5*  --   --   --  11.5*   HCT 38.2 37.3  --   --   --  36.3   MCV 93.4 94.2  --   --   --  95.0   MCH 29.1 29.0  --   -- --  30.1   MCHC 31.2 30.8  --   --   --  31.7   RDW 14.0 14.1  --   --   --  14.6*    227  --   --   --  218   MPV 9.9 10.1  --   --   --  9.7   INR 1.9 2.0 2.1 1.5 1.3  --    DDIMER 0.46  --   --   --   --   --      Chemistry:  Recent Labs     02/15/23  1537 02/16/23  0615 02/18/23  0849    142 142   K 4.0 3.9 4.1    106 109*   CO2 25 26 23   GLUCOSE 83 85 115*   BUN 21 19 22   CREATININE 1.46* 1.40* 1.28*   MG 2.1  --   --    ANIONGAP 11 10 10   LABGLOM 35* 37* 41*   CALCIUM 9.0 9.0 8.7   TROPHS 15*  --   --      Recent Labs     02/15/23  1537 02/16/23  0615 02/18/23  0849   PROT 6.7 6.2* 6.0*   LABALBU 3.6 3.4* 3.3*   AST 29 21 17   ALT 19 16 12   ALKPHOS 89 83 73   BILITOT 0.4 0.5 0.6   BILIDIR 0.1 0.1 0.2   LIPASE 19  --   --      ABG:No results found for: POCPH, PHART, PH, POCPCO2, XBY6UFC, PCO2, POCPO2, PO2ART, PO2, POCHCO3, JCC6JKZ, HCO3, NBEA, PBEA, BEART, BE, THGBART, THB, GPJ5PNK, RDNE2UWP, P0JYNMLB, O2SAT, FIO2  No results found for: SPECIAL  Lab Results   Component Value Date/Time    CULTURE NO SIGNIFICANT GROWTH 02/01/2023 04:53 PM       Radiology:  CT ABDOMEN PELVIS WO CONTRAST Additional Contrast? None    Result Date: 2/15/2023  1. Interval development of bladder distension and central biliary ductal dilatation since 4 February 2023. Sludge and small stones are present in the gallbladder. Findings of concern for stones in the common bile duct or edema in the distal common bowel duct due to a recently passed calculus. Right upper quadrant ultrasound is advised. 2.  Moderate colonic stool burden. 3.  No urinary obstruction, small bowel obstruction or appendicitis. RECOMMENDATION: Right upper quadrant ultrasound. CT THORACIC SPINE WO CONTRAST    Result Date: 2/15/2023  Thoracic spine CT: Age indeterminate superior endplate compression fracture of T8 with 40% height loss. No retropulsion.   If there is need for further evaluation thoracic spine MRI can be obtained Lumbar spine CT: No acute fracture Multilevel disc and facet degenerative disease most pronounced at L3-L4 and L4-L5 at L5-S1. .  Grade 1 anterolisthesis at L3-L4. Moderate bilateral neural foraminal narrowing at L4-L5 and L5-S1. CT LUMBAR SPINE WO CONTRAST    Result Date: 2/15/2023  Thoracic spine CT: Age indeterminate superior endplate compression fracture of T8 with 40% height loss. No retropulsion. If there is need for further evaluation thoracic spine MRI can be obtained Lumbar spine CT: No acute fracture Multilevel disc and facet degenerative disease most pronounced at L3-L4 and L4-L5 at L5-S1. .  Grade 1 anterolisthesis at L3-L4. Moderate bilateral neural foraminal narrowing at L4-L5 and L5-S1. US GALLBLADDER RUQ    Result Date: 2/15/2023  1. Dilated gallbladder containing sludge and small stones. 2.  Dilated common bile duct. Please note that the bile duct was not dilated on prior CT scan of 4 February 2023. No definite stones are noted in the bile duct but it is incompletely visualized. 3.  Liver is of coarse echotexture suggesting fatty infiltration. RECOMMENDATIONS: Consideration of MRCP. MRI ABDOMEN WO CONTRAST MRCP    Result Date: 2/15/2023  1. Cholelithiasis without other findings to suggest acute cholecystitis. 2. Mild extrahepatic biliary ductal dilatation with suspected sludge or small stones in the distal common bile duct. Consider ERCP for further evaluation. The findings were sent to the Radiology Results [address] at 7:57 pm on 2/15/2023 to be communicated to a licensed caregiver.        Physical Examination:        General appearance:  alert, cooperative and no distress  Mental Status:  oriented to person, place and time and normal affect  Lungs:  clear to auscultation bilaterally, normal effort  Heart:  regular rate and rhythm, no murmur  Abdomen:  soft, nontender, nondistended, normal bowel sounds, no masses, hepatomegaly, splenomegaly  Extremities:  no edema, redness, tenderness in the calves  Skin:  no gross lesions, rashes, induration    Assessment:        Hospital Problems             Last Modified POA    * (Principal) Common bile duct dilatation 2/16/2023 Yes    Calculus of gallbladder without cholecystitis without obstruction 2/16/2023 Yes    Choledocholithiasis-suspected 2/16/2023 Yes    INR therapeutic 2/16/2023 Yes    Pain of upper abdomen 2/17/2023 Yes    Acute midline low back pain without sciatica 2/16/2023 Yes    Hypertension 2/16/2023 Yes    Atrial fibrillation ([address] 2/16/2023 Yes    Gastroesophageal reflux disease 2/16/2023 Yes       Plan:        Restart aspirin and Coumadin  Slowly advance diet, restart p.o. medications  Gradually advance diet, patient may require additional ERCP  Cardiology on board and we defer antiarrhythmic regimen to their team.    DUDLEY Cunningham - NP  2/18/2023  2:00 PM

## 2023-02-18 NOTE — PROGRESS NOTES
Physical Therapy  DATE: 2023    NAME: Gladis Man  MRN: [id number]   : 1940    Patient not seen this date for Physical Therapy due to:      [] Cancel by RN or physician due to:    [] Hemodialysis    [] Critical Lab Value Level     [] Blood transfusion in progress    [] Acute or unstable cardiovascular status   _MAP < 55 or more than >115  _HR < 40 or > 130    [] Acute or unstable pulmonary status   -FiO2 > 60%   _RR < 5 or >40    _O2 sats < 85%    [] Strict Bedrest    [] Off Unit for surgery or procedure    [] Off Unit for testing       [] Pending imaging to R/O fracture    [x] Refusal by Patient      [] Other      [] PT being discontinued at this time. Patient independent. No further needs. [] PT being discontinued at this time as the patient has been transferred to hospice care. No further needs.       Ting Schirmer LPTA

## 2023-02-18 NOTE — PROGRESS NOTES
GI Progress notes    2/18/2023   10:14 AM    Name:  Valentina Kang  MRN:    [id number]     Acct:     [de-identified]   Room:  2010/2010-01  IP Day: 3     Admit Date: 2/15/2023  2:17 PM  PCP: Naveen Henderson PA-C    Subjective:     C/C:   Chief Complaint   Patient presents with    Back Pain     Bilateral upper back. Pt reports \"squeezing\" going around to right side under breast area       Interval History: Status: improved. Patient seen and examined  No acute events overnight  S/p attempted ERCP to evaluate biliary ductal dilation. CBD was not able to be cannulated. Some contrast material was injected into the duodenal wall which caused a little swelling which made cannulation more difficult. Repeat LFTs this am were WNL. Clinically she is doing much better. No abdominal pain. No nausea, vomiting. ROS:  Constitutional: negative for chills, fevers and sweats  Gastrointestinal: negative for abdominal pain, constipation, diarrhea, nausea and vomiting  Neurological: negative for dizziness and headaches    Medications:      Allergies: No Known Allergies    Current Meds: dilTIAZem (CARDIZEM CD) extended release capsule 240 mg, Daily  aspirin EC tablet 81 mg, Daily  azelastine (ASTELIN) 0.1 % nasal spray 2 spray, BID  ferrous sulfate (FE TABS 325) EC tablet 325 mg, BID AC  montelukast (SINGULAIR) tablet 10 mg, Nightly  pantoprazole (PROTONIX) tablet 40 mg, Daily  hydrALAZINE (APRESOLINE) injection 10 mg, Q6H PRN  cetirizine (ZYRTEC) tablet 5 mg, Daily  calcium carb-cholecalciferol 600-10 MG-MCG per tab 1 tablet, Daily  calcium carbonate (TUMS) chewable tablet 500 mg, TID PRN  piperacillin-tazobactam (ZOSYN) 3,375 mg in sodium chloride 0.9 % 50 mL IVPB (mini-bag), Q8H  amiodarone (CORDARONE) tablet 100 mg, Daily  morphine (PF) injection 2 mg, Q3H PRN  fluticasone (FLONASE) 50 MCG/ACT nasal spray 1 spray, Daily  sodium chloride flush 0.9 % injection 5-40 mL, 2 times per day  sodium chloride flush 0.9 % injection 10 mL, PRN  0.9 % sodium chloride infusion, PRN  potassium chloride (KLOR-CON M) extended release tablet 40 mEq, PRN   Or  potassium bicarb-citric acid (EFFER-K) effervescent tablet 40 mEq, PRN   Or  potassium chloride 10 mEq/100 mL IVPB (Peripheral Line), PRN  magnesium sulfate 1000 mg in dextrose 5% 100 mL IVPB, PRN  ondansetron (ZOFRAN-ODT) disintegrating tablet 4 mg, Q8H PRN   Or  ondansetron (ZOFRAN) injection 4 mg, Q6H PRN  polyethylene glycol (GLYCOLAX) packet 17 g, Daily PRN  acetaminophen (TYLENOL) tablet 650 mg, Q6H PRN   Or  acetaminophen (TYLENOL) suppository 650 mg, Q6H PRN  0.9 % sodium chloride infusion, Continuous  budesonide-formoterol (SYMBICORT) 160-4.5 MCG/ACT inhaler 2 puff, BID  tiotropium (SPIRIVA RESPIMAT) 2.5 MCG/ACT inhaler 2 puff, Daily  warfarin placeholder: dosing by pharmacy, RX Placeholder        Data:     Code Status:  Full Code    Family History   Problem Relation Age of Onset    COPD Mother     Asthma Mother     Cancer Mother     Cancer Father     Stroke Sister     COPD Sister     Emphysema Sister        Social History     Socioeconomic History    Marital status:      Spouse name: Not on file    Number of children: Not on file    Years of education: Not on file    Highest education level: Not on file   Occupational History    Not on file   Tobacco Use    Smoking status: Former     Packs/day: 1.00     Years: 20.00     Pack years: 20.00     Types: Cigarettes     Quit date: 1981     Years since quittin.1    Smokeless tobacco: Never   Vaping Use    Vaping Use: Never used   Substance and Sexual Activity    Alcohol use: No    Drug use: No    Sexual activity: Not on file   Other Topics Concern    Not on file   Social History Narrative    Not on file     Social Determinants of Health     Financial Resource Strain: Low Risk     Difficulty of Paying Living Expenses: Not hard at all   Food Insecurity: Food Insecurity Present    Worried About Running Out of Food in the Last Year: Often true    [address] in the Last Year: Often true   Transportation Needs: Unknown    Lack of Transportation (Medical): Not on file    Lack of Transportation (Non-Medical): No   Physical Activity: Sufficiently Active    Days of Exercise per Week: 3 days    Minutes of Exercise per Session: 60 min   Stress: Not on file   Social Connections: Not on file   Intimate Partner Violence: Not on file   Housing Stability: Unknown    Unable to Pay for Housing in the Last Year: Not on file    Number of Places Lived in the Last Year: Not on file    Unstable Housing in the Last Year: No       Vitals:  BP (!) 156/55   Pulse 58   Temp 98.2 °F (36.8 °C) (Oral)   Resp 16   Ht 5' 5\" (1.651 m)   Wt 188 lb 6.4 oz (85.5 kg)   SpO2 94%   BMI 31.35 kg/m²   Temp (24hrs), Av.9 °F (36.6 °C), Min:97.3 °F (36.3 °C), Max:98.2 °F (36.8 °C)    No results for input(s): POCGLU in the last 72 hours. I/O (24Hr):     Intake/Output Summary (Last 24 hours) at 2023 1014  Last data filed at 2023 0433  Gross per 24 hour   Intake 1040.94 ml   Output 900 ml   Net 140.94 ml       Labs:      CBC:   Lab Results   Component Value Date/Time    WBC 3.7 2023 08:49 AM    RBC 3.82 2023 08:49 AM    HGB 11.5 2023 08:49 AM    HCT 36.3 2023 08:49 AM    MCV 95.0 2023 08:49 AM    MCH 30.1 2023 08:49 AM    MCHC 31.7 2023 08:49 AM    RDW 14.6 2023 08:49 AM     2023 08:49 AM    MPV 9.7 2023 08:49 AM     CBC with Differential:    Lab Results   Component Value Date/Time    WBC 3.7 2023 08:49 AM    RBC 3.82 2023 08:49 AM    HGB 11.5 2023 08:49 AM    HCT 36.3 2023 08:49 AM     2023 08:49 AM    MCV 95.0 2023 08:49 AM    MCH 30.1 2023 08:49 AM    MCHC 31.7 2023 08:49 AM    RDW 14.6 2023 08:49 AM    LYMPHOPCT 13 2023 08:49 AM    MONOPCT 1 2023 08:49 AM    BASOPCT 0 2023 08:49 AM    MONOSABS 0.04 02/18/2023 08:49 AM    LYMPHSABS 0.48 02/18/2023 08:49 AM    EOSABS 0.00 02/18/2023 08:49 AM    BASOSABS 0.00 02/18/2023 08:49 AM    DIFFTYPE NOT REPORTED 11/10/2020 08:36 AM     Hemoglobin/Hematocrit:    Lab Results   Component Value Date/Time    HGB 11.5 02/18/2023 08:49 AM    HCT 36.3 02/18/2023 08:49 AM     CMP:    Lab Results   Component Value Date/Time     02/18/2023 08:49 AM    K 4.1 02/18/2023 08:49 AM     02/18/2023 08:49 AM    CO2 23 02/18/2023 08:49 AM    BUN 22 02/18/2023 08:49 AM    CREATININE 1.28 02/18/2023 08:49 AM    GFRAA 45 12/09/2021 08:40 AM    LABGLOM 41 02/18/2023 08:49 AM    GLUCOSE 115 02/18/2023 08:49 AM    PROT 6.0 02/18/2023 08:49 AM    LABALBU 3.3 02/18/2023 08:49 AM    CALCIUM 8.7 02/18/2023 08:49 AM    BILITOT 0.6 02/18/2023 08:49 AM    ALKPHOS 73 02/18/2023 08:49 AM    AST 17 02/18/2023 08:49 AM    ALT 12 02/18/2023 08:49 AM     BMP:    Lab Results   Component Value Date/Time     02/18/2023 08:49 AM    K 4.1 02/18/2023 08:49 AM     02/18/2023 08:49 AM    CO2 23 02/18/2023 08:49 AM    BUN 22 02/18/2023 08:49 AM    LABALBU 3.3 02/18/2023 08:49 AM    CREATININE 1.28 02/18/2023 08:49 AM    CALCIUM 8.7 02/18/2023 08:49 AM    GFRAA 45 12/09/2021 08:40 AM    LABGLOM 41 02/18/2023 08:49 AM    GLUCOSE 115 02/18/2023 08:49 AM     PT/INR:    Lab Results   Component Value Date/Time    PROTIME 16.2 02/18/2023 06:04 AM    PROTIME 37.9 01/09/2023 10:35 AM    INR 1.3 02/18/2023 06:04 AM     PTT:    Lab Results   Component Value Date/Time    APTT 32.8 05/05/2019 02:47 PM   [APTT}    Physical Examination:        General appearance: alert, cooperative and no distress  Mental Status: oriented to person, place and time and normal affect  Abdomen: soft, nontender, nondistended, bowel sounds present   Extremities: no edema, redness or tenderness in the calves  Skin: no gross lesions, rashes, or induration    Assessment:        Primary Problem  Common bile duct dilatation     Active Hospital Problems    Diagnosis Date Noted    Choledocholithiasis-suspected [K80.50] 02/16/2023     Priority: Medium    INR therapeutic [R79.1] 02/16/2023     Priority: Medium    Pain of upper abdomen [R10.10] 02/16/2023     Priority: Medium    Acute midline low back pain without sciatica [M54.50] 02/16/2023     Priority: Medium    Common bile duct dilatation [K83.8] 02/15/2023     Priority: Medium    Calculus of gallbladder without cholecystitis without obstruction [K80.20] 02/15/2023     Priority: Medium    Hypertension [I10] 06/03/2020    Gastroesophageal reflux disease [K21.9] 06/03/2020    Atrial fibrillation ([facility] [address] [I48.91] 04/09/2019     Past Medical History:   Diagnosis Date    Allergic rhinitis     Arthritis     Asthma     Atrial fibrillation ([address]     GERD (gastroesophageal reflux disease)     Hypertension     Melanoma ([facility] [address]     on nose    Myocardial infarction ([facility] [address] 2008    Wears glasses     Wears partial dentures     upper        Plan:        Abdominal pain, ? Sludge/choledocholithiasis on MRCP, s/p attempted ERCP aborted d/t inability to cannulate  LFTs normal  Clinically improved  Discussed with patient and sister  Plan to attempt ERCP on Monday  Soft diet as tolerated  Time spent reviewing the chart, seeing the patient, and discussing with the attending MD around 30 minutes. Explained to the patient and d/W Nursing Staff  Will F/U with you  Please call or Page for any issues or change in status  Thanks    Electronically signed by DUDLEY Carlson NP on 2/18/2023 at 10:14 AM       GI attending physician note. Patient seen with DUDLEY     I independently performed an evaluation on [facility]. I have reviewed the above documentation completed by the Nurse Practitioner and agree with the history, examination, and management plan. Recommendations discussed. History discussed with the patient and the patient's daughter. Patient had several days of back pain and abdominal pain.   No fever or chills. No significant nausea vomiting. Imaging studies did reveal questionable sludge or small stones in the distal common bile duct. However her transaminases persistently normal.  Does have gallstones. During my visit she is asymptomatic. Abdominal examination benign. Given patient presentation and abnormal transaminases, I question the diagnosis of CBD stones. At present some of her symptoms may be suggestive of intermittent acute cystic duct obstruction. We will start her on soft diet and see how she does. We will monitor liver tests. Also advised surgical consultation. Discussed with the patient's daughter.

## 2023-02-18 NOTE — PROGRESS NOTES
Warfarin Dosing - Pharmacy Consult Note  Consulting Provider: Delia Ruiz NP, [facility]    Indication:  Atrial Fibrillation  Warfarin Dose prior to admission: 5mg sun/tues/thurs and 2.5mg all other days   Concurrent anticoagulants/antiplatelets: aspirin  Significant Drug Interactions: amiodarone (incr INR)  Recent Labs     02/16/23  0615 02/17/23  0532 02/17/23  1640 02/18/23  0604 02/18/23  0849   INR 2.0 2.1 1.5 1.3  --    HGB 11.5*  --   --   --  11.5*     --   --   --  218   LABALBU 3.4*  --   --   --  3.3*     Recent warfarin administrations        No warfarin orders with administrations found. Orders not given:            warfarin placeholder: dosing by pharmacy                   Date   INR    Dose  2/18       1.3           Assessment/Plan  (Goal INR: 2 - 3)  OK to resume warfarin tonight per Marta Shah NP. Patient had ERCP yesterday and was given Vitamin K 2mg beforehand for reversal. I will resume 5mg tonight. Active problem list reviewed. INR orders are placed. Chart reviewed for pertinent labs, drug/diet interactions, and past doses. Documentation of patient's clinical condition was reviewed. Pharmacy Dosing:  Pharmacy will continue to follow.

## 2023-02-18 NOTE — PROGRESS NOTES
Section of Cardiology  Progress Note      Date:  2/18/2023  Patient: Nadine Albarran  Admission:  2/15/2023  2:17 PM  Admit DX: Common bile duct dilatation [K83.8]  Pain of upper abdomen [R10.10]  Acute midline low back pain without sciatica [M54.50]  Age:  83 y.o., 1940     LOS: 3 days     Reason for evaluation:   atrial fibrillation and hypertension      SUBJECTIVE:     The patient was seen and examined. Notes and labs reviewed.  There were not complications over night.  Patient seen and examined in her room with her nurse and daughter at bedside.  Events from last evening noted.  Her pressure improved and ended up having her procedure however it was not successful.  At the time I saw her today she states that she is feeling much better and no abdominal pain.  No nausea or vomiting.  Denies any headache or blurred vision.  No chest pain or shortness of breath.  She is sitting in the chair comfortably.  Blood pressure improved substantially comparing to yesterday afternoon and she is tolerating her medications.  Patient's cardiac review of systems: negative.  The patient is generally feeling rapidly improving.    OBJECTIVE:    Telemetry: Sinus  BP (!) 156/55   Pulse 58   Temp 98.2 °F (36.8 °C) (Oral)   Resp 16   Ht 5' 5\" (1.651 m)   Wt 188 lb 6.4 oz (85.5 kg)   SpO2 94%   BMI 31.35 kg/m²     Intake/Output Summary (Last 24 hours) at 2/18/2023 1007  Last data filed at 2/18/2023 0433  Gross per 24 hour   Intake 1040.94 ml   Output 900 ml   Net 140.94 ml       EXAM:   CONSTITUTIONAL:  awake, alert, cooperative, no apparent distress, and appears stated age.  HEENT: Normal jugular venous pulsations, no carotid bruits. Head is atraumatic, normocephalic. Eyes and oral mucosa are normal.  LUNGS: Good respiratory effort. No for increased work of breathing. On auscultation: clear to auscultation bilaterally  CARDIOVASCULAR:  Normal apical impulse, regular rate and rhythm, normal S1 and S2,   ABDOMEN: Soft,  nontender, nondistended. Bowel sounds present. SKIN: Warm and dry. EXTREMITIES: No lower extremities edema. No cyanosis or clubbing. Current Inpatient Medications:   dilTIAZem  240 mg Oral Daily    aspirin  81 mg Oral Daily    azelastine  2 spray Each Nostril BID    ferrous sulfate  325 mg Oral BID AC    montelukast  10 mg Oral Nightly    pantoprazole  40 mg Oral Daily    cetirizine  5 mg Oral Daily    calcium carb-cholecalciferol  1 tablet Oral Daily    piperacillin-tazobactam  3,375 mg IntraVENous Q8H    amiodarone  100 mg Oral Daily    fluticasone  1 spray Each Nostril Daily    sodium chloride flush  5-40 mL IntraVENous 2 times per day    budesonide-formoterol  2 puff Inhalation BID    tiotropium  2 puff Inhalation Daily    warfarin placeholder: dosing by pharmacy   Other RX Placeholder       IV Infusions (if any):   sodium chloride      sodium chloride 40 mL/hr at 02/18/23 0416       Diagnostics:   EKG: . ECHO: .   Stress Test: .  Cardiac Angiography: .    Labs:   CBC:   Recent Labs     02/16/23  0615 02/18/23  0849   WBC 4.0 3.7   HGB 11.5* 11.5*   HCT 37.3 36.3    218     BMP:   Recent Labs     02/16/23  0615 02/18/23  0849    142   K 3.9 4.1   CO2 26 23   BUN 19 22   CREATININE 1.40* 1.28*   LABGLOM 37* 41*   GLUCOSE 85 115*     Lab Results   Component Value Date/Time    BNP NOT REPORTED 06/12/2014 01:20 PM     PT/INR:   Recent Labs     02/17/23  1640 02/18/23  0604   PROTIME 18.5* 16.2*   INR 1.5 1.3     APTT:No results for input(s): APTT in the last 72 hours. CARDIAC ENZYMES:No results for input(s): CKTOTAL, CKMB, CKMBINDEX in the last 72 hours. Invalid input(s):  TROPONIN, HIGH SENSITIVITY  FASTING LIPID PANEL:  Lab Results   Component Value Date/Time    HDL 53 12/19/2022 08:55 AM    TRIG 141 12/19/2022 08:55 AM     LIVER PROFILE:  Recent Labs     02/16/23  0615 02/18/23  0849   AST 21 17   ALT 16 12   LABALBU 3.4* 3.3*       ASSESSMENT:  1. Requested preop cardiac clearance  2. Paroxysmal atrial fibrillation  3. Sinus bradycardia secondary to medications. 4.  Systemic hypertension  Patient Active Problem List   Diagnosis    Hypertension    Allergic rhinitis    Atrial fibrillation ([facility] [address]    Malignant melanoma ([facility] [address]    History of MI (myocardial infarction)    Wears glasses    Sinus bradycardia    Primary osteoarthritis of right hip    GI bleed    Serologic abnormality    Chronic cough    Chronic fatigue syndrome    Chronic sinusitis    Gastroesophageal reflux disease    Moderate persistent asthma without complication    Degenerative joint disease involving multiple joints on both sides of body    Primary localized osteoarthritis of pelvic region and thigh    Primary osteoarthritis of both knees    Other hemoglobinopathies (HCC)    Hypersomnia    Sleep apnea    History of smoking    Arthritis of left hip    Calculus of gallbladder without cholecystitis without obstruction    Flank pain    Common bile duct dilatation    Choledocholithiasis-suspected    INR therapeutic    Pain of upper abdomen    Acute midline low back pain without sciatica       PLAN:    Medications reviewed  Continue current medications  DC IV fluid if patient's oral intake is adequate  Ambulate in the hallway as tolerated. Please see orders. Discussed with patient, adult child, and other GI service and nursing.     Tanmay Mederos MD, MD

## 2023-02-18 NOTE — PLAN OF CARE
Problem: Respiratory - Adult  Goal: Able to breathe comfortably  Outcome: Progressing  Goal: Patient's breath sounds will be clear and equal  Outcome: Progressing

## 2023-02-18 NOTE — PLAN OF CARE
Problem: Discharge Planning  Goal: Discharge to home or other facility with appropriate resources  Outcome: Progressing  Flowsheets (Taken 2/18/2023 1442)  Discharge to home or other facility with appropriate resources:   Identify barriers to discharge with patient and caregiver   Arrange for needed discharge resources and transportation as appropriate  Note: Progressing     Problem: Pain  Goal: Verbalizes/displays adequate comfort level or baseline comfort level  Outcome: Progressing  Flowsheets (Taken 2/18/2023 1442)  Verbalizes/displays adequate comfort level or baseline comfort level:   Assess pain using appropriate pain scale   Encourage patient to monitor pain and request assistance  Note: Progressing     Problem: ABCDS Injury Assessment  Goal: Absence of physical injury  Outcome: Progressing  Note: Progressing     Problem: Safety - Adult  Goal: Free from fall injury  Outcome: Progressing  Note: Progressing     Problem: Gastrointestinal - Adult  Goal: Minimal or absence of nausea and vomiting  Outcome: Progressing  Flowsheets (Taken 2/18/2023 1442)  Minimal or absence of nausea and vomiting:   Advance diet as tolerated, if ordered   Administer ordered antiemetic medications as needed  Note: Progressing

## 2023-02-18 NOTE — PROGRESS NOTES
Physician Progress Note      Ender Rubin  CSN #:                  [id number]  :                       1940  ADMIT DATE:       2/15/2023 2:17 PM  [address] [place] DATE:  RESPONDING  PROVIDER #:        Catracho Cueto MD          QUERY TEXT:    Patient admitted with cholelithiasis, noted to have paroxysmal atrial   fibrillation and is maintained on aspirin. If possible, please document in   progress notes and discharge summary if you are evaluating and/or treating any   of the following: The medical record reflects the following:  Risk Factors: Advanced age, Female, HT  Clinical Indicators: [address], patient with paroxysmal afib - taking   aspirin and amiodarone  Treatment: Cardiology consult, Aspirin and Coumadin    Thank you,  Charlotte Das RN  Options provided:  -- Secondary hypercoagulable state in a patient with atrial fibrillation  -- Other - I will add my own diagnosis  -- Disagree - Not applicable / Not valid  -- Disagree - Clinically unable to determine / Unknown  -- Refer to Clinical Documentation Reviewer    PROVIDER RESPONSE TEXT:    This patient has secondary hypercoagulable state in a patient with atrial   fibrillation. Query created by:  Roddy Moss on 2023 10:57 AM      Electronically signed by:  Catracho Cueto MD 2023 2:02 PM + chills

## 2023-02-19 PROBLEM — S22.060A CLOSED WEDGE COMPRESSION FRACTURE OF T8 VERTEBRA (HCC): Status: ACTIVE | Noted: 2023-02-19

## 2023-02-19 LAB
ALBUMIN SERPL-MCNC: 3.4 G/DL (ref 3.5–5.2)
ALP SERPL-CCNC: 64 U/L (ref 35–104)
ALT SERPL-CCNC: 11 U/L (ref 5–33)
ANION GAP SERPL CALCULATED.3IONS-SCNC: 10 MMOL/L (ref 9–17)
AST SERPL-CCNC: 15 U/L
BILIRUB SERPL-MCNC: 0.5 MG/DL (ref 0.3–1.2)
BUN SERPL-MCNC: 37 MG/DL (ref 8–23)
BUN/CREAT BLD: 24 (ref 9–20)
CALCIUM SERPL-MCNC: 8.9 MG/DL (ref 8.6–10.4)
CHLORIDE SERPL-SCNC: 106 MMOL/L (ref 98–107)
CO2 SERPL-SCNC: 24 MMOL/L (ref 20–31)
CREAT SERPL-MCNC: 1.52 MG/DL (ref 0.5–0.9)
GFR SERPL CREATININE-BSD FRML MDRD: 34 ML/MIN/1.73M2
GLUCOSE SERPL-MCNC: 107 MG/DL (ref 70–99)
INR PPP: 1.2
LIPASE SERPL-CCNC: 21 U/L (ref 13–60)
POTASSIUM SERPL-SCNC: 4 MMOL/L (ref 3.7–5.3)
PROT SERPL-MCNC: 6 G/DL (ref 6.4–8.3)
PROTHROMBIN TIME: 15.3 SEC (ref 11.5–14.2)
SODIUM SERPL-SCNC: 140 MMOL/L (ref 135–144)

## 2023-02-19 PROCEDURE — 2580000003 HC RX 258: Performed by: INTERNAL MEDICINE

## 2023-02-19 PROCEDURE — 6360000002 HC RX W HCPCS: Performed by: INTERNAL MEDICINE

## 2023-02-19 PROCEDURE — 94761 N-INVAS EAR/PLS OXIMETRY MLT: CPT

## 2023-02-19 PROCEDURE — 99231 SBSQ HOSP IP/OBS SF/LOW 25: CPT | Performed by: INTERNAL MEDICINE

## 2023-02-19 PROCEDURE — 99232 SBSQ HOSP IP/OBS MODERATE 35: CPT | Performed by: INTERNAL MEDICINE

## 2023-02-19 PROCEDURE — 6370000000 HC RX 637 (ALT 250 FOR IP): Performed by: INTERNAL MEDICINE

## 2023-02-19 PROCEDURE — 36415 COLL VENOUS BLD VENIPUNCTURE: CPT

## 2023-02-19 PROCEDURE — 1200000000 HC SEMI PRIVATE

## 2023-02-19 PROCEDURE — 85610 PROTHROMBIN TIME: CPT

## 2023-02-19 PROCEDURE — 83690 ASSAY OF LIPASE: CPT

## 2023-02-19 PROCEDURE — 80053 COMPREHEN METABOLIC PANEL: CPT

## 2023-02-19 PROCEDURE — 94640 AIRWAY INHALATION TREATMENT: CPT

## 2023-02-19 RX ORDER — DILTIAZEM HYDROCHLORIDE 180 MG/1
180 CAPSULE, COATED, EXTENDED RELEASE ORAL DAILY
Status: DISCONTINUED | OUTPATIENT
Start: 2023-02-19 | End: 2023-02-22 | Stop reason: HOSPADM

## 2023-02-19 RX ORDER — OXYCODONE HYDROCHLORIDE AND ACETAMINOPHEN 5; 325 MG/1; MG/1
1 TABLET ORAL EVERY 6 HOURS PRN
Status: DISCONTINUED | OUTPATIENT
Start: 2023-02-19 | End: 2023-02-22

## 2023-02-19 RX ADMIN — MONTELUKAST 10 MG: 10 TABLET, FILM COATED ORAL at 19:57

## 2023-02-19 RX ADMIN — SODIUM CHLORIDE, PRESERVATIVE FREE 10 ML: 5 INJECTION INTRAVENOUS at 11:48

## 2023-02-19 RX ADMIN — MORPHINE SULFATE 2 MG: 2 INJECTION, SOLUTION INTRAMUSCULAR; INTRAVENOUS at 11:46

## 2023-02-19 RX ADMIN — Medication 500 MG: at 10:03

## 2023-02-19 RX ADMIN — AZELASTINE HYDROCHLORIDE 2 SPRAY: 137 SPRAY, METERED NASAL at 19:57

## 2023-02-19 RX ADMIN — MORPHINE SULFATE 2 MG: 2 INJECTION, SOLUTION INTRAMUSCULAR; INTRAVENOUS at 19:58

## 2023-02-19 RX ADMIN — Medication 1 TABLET: at 10:02

## 2023-02-19 RX ADMIN — DILTIAZEM HYDROCHLORIDE 180 MG: 180 CAPSULE, COATED, EXTENDED RELEASE ORAL at 10:02

## 2023-02-19 RX ADMIN — MORPHINE SULFATE 2 MG: 2 INJECTION, SOLUTION INTRAMUSCULAR; INTRAVENOUS at 15:41

## 2023-02-19 RX ADMIN — HYDRALAZINE HYDROCHLORIDE 10 MG: 20 INJECTION INTRAMUSCULAR; INTRAVENOUS at 11:52

## 2023-02-19 RX ADMIN — POLYETHYLENE GLYCOL 3350 17 G: 17 POWDER, FOR SOLUTION ORAL at 06:16

## 2023-02-19 RX ADMIN — FLUTICASONE PROPIONATE 1 SPRAY: 50 SPRAY, METERED NASAL at 10:03

## 2023-02-19 RX ADMIN — PIPERACILLIN AND TAZOBACTAM 3375 MG: 3; .375 INJECTION, POWDER, FOR SOLUTION INTRAVENOUS at 02:01

## 2023-02-19 RX ADMIN — TIOTROPIUM BROMIDE INHALATION SPRAY 2 PUFF: 3.12 SPRAY, METERED RESPIRATORY (INHALATION) at 10:05

## 2023-02-19 RX ADMIN — AMIODARONE HYDROCHLORIDE 100 MG: 200 TABLET ORAL at 10:02

## 2023-02-19 RX ADMIN — PIPERACILLIN AND TAZOBACTAM 3375 MG: 3; .375 INJECTION, POWDER, FOR SOLUTION INTRAVENOUS at 17:27

## 2023-02-19 RX ADMIN — BUDESONIDE AND FORMOTEROL FUMARATE DIHYDRATE 2 PUFF: 160; 4.5 AEROSOL RESPIRATORY (INHALATION) at 10:05

## 2023-02-19 RX ADMIN — AZELASTINE HYDROCHLORIDE 2 SPRAY: 137 SPRAY, METERED NASAL at 10:04

## 2023-02-19 RX ADMIN — FERROUS SULFATE TAB EC 325 MG (65 MG FE EQUIVALENT) 325 MG: 325 (65 FE) TABLET DELAYED RESPONSE at 15:40

## 2023-02-19 RX ADMIN — OXYCODONE AND ACETAMINOPHEN 1 TABLET: 5; 325 TABLET ORAL at 14:18

## 2023-02-19 RX ADMIN — PIPERACILLIN AND TAZOBACTAM 3375 MG: 3; .375 INJECTION, POWDER, FOR SOLUTION INTRAVENOUS at 11:48

## 2023-02-19 RX ADMIN — PANTOPRAZOLE SODIUM 40 MG: 40 TABLET, DELAYED RELEASE ORAL at 06:09

## 2023-02-19 RX ADMIN — FERROUS SULFATE TAB EC 325 MG (65 MG FE EQUIVALENT) 325 MG: 325 (65 FE) TABLET DELAYED RESPONSE at 06:16

## 2023-02-19 RX ADMIN — BUDESONIDE AND FORMOTEROL FUMARATE DIHYDRATE 2 PUFF: 160; 4.5 AEROSOL RESPIRATORY (INHALATION) at 21:10

## 2023-02-19 ASSESSMENT — PAIN SCALES - GENERAL
PAINLEVEL_OUTOF10: 8
PAINLEVEL_OUTOF10: 7
PAINLEVEL_OUTOF10: 4
PAINLEVEL_OUTOF10: 6

## 2023-02-19 ASSESSMENT — PAIN DESCRIPTION - LOCATION
LOCATION: BACK

## 2023-02-19 NOTE — PROGRESS NOTES
GI Progress notes    2/19/2023   8:59 AM    Name:  Beatriz Marin  MRN:    [id number]     Acct:     [de-identified]   Room:  2010/2010-01  IP Day: 4     Admit Date: 2/15/2023  2:17 PM  PCP: Nidhi Dick PA-C    Subjective:     C/C:   Chief Complaint   Patient presents with    Back Pain     Bilateral upper back. Pt reports \"squeezing\" going around to right side under breast area       Interval History: Status: improved. Patient seen and examined  No acute events overnight  Continues to have some mild pain to the upper back. No abdominal pain, nausea, vomiting  Tolerating diet  Reports small BM after stool softeners. LFTs WNL    ROS:  Constitutional: negative for chills, fevers and sweats  Gastrointestinal: negative for abdominal pain, constipation, diarrhea, nausea and vomiting  Neurological: negative for dizziness and headaches    Medications:      Allergies: No Known Allergies    Current Meds: warfarin placeholder: dosing by pharmacy, RX Placeholder  bisacodyl (DULCOLAX) suppository 10 mg, Daily PRN  dilTIAZem (CARDIZEM CD) extended release capsule 240 mg, Daily  [Held by provider] aspirin EC tablet 81 mg, Daily  azelastine (ASTELIN) 0.1 % nasal spray 2 spray, BID  ferrous sulfate (FE TABS 325) EC tablet 325 mg, BID AC  montelukast (SINGULAIR) tablet 10 mg, Nightly  pantoprazole (PROTONIX) tablet 40 mg, Daily  hydrALAZINE (APRESOLINE) injection 10 mg, Q6H PRN  cetirizine (ZYRTEC) tablet 5 mg, Daily  calcium carb-cholecalciferol 600-10 MG-MCG per tab 1 tablet, Daily  calcium carbonate (TUMS) chewable tablet 500 mg, TID PRN  piperacillin-tazobactam (ZOSYN) 3,375 mg in sodium chloride 0.9 % 50 mL IVPB (mini-bag), Q8H  amiodarone (CORDARONE) tablet 100 mg, Daily  morphine (PF) injection 2 mg, Q3H PRN  fluticasone (FLONASE) 50 MCG/ACT nasal spray 1 spray, Daily  sodium chloride flush 0.9 % injection 5-40 mL, 2 times per day  sodium chloride flush 0.9 % injection 10 mL, PRN  0.9 % sodium chloride infusion, PRN  potassium chloride (KLOR-CON M) extended release tablet 40 mEq, PRN   Or  potassium bicarb-citric acid (EFFER-K) effervescent tablet 40 mEq, PRN   Or  potassium chloride 10 mEq/100 mL IVPB (Peripheral Line), PRN  magnesium sulfate 1000 mg in dextrose 5% 100 mL IVPB, PRN  ondansetron (ZOFRAN-ODT) disintegrating tablet 4 mg, Q8H PRN   Or  ondansetron (ZOFRAN) injection 4 mg, Q6H PRN  polyethylene glycol (GLYCOLAX) packet 17 g, Daily PRN  acetaminophen (TYLENOL) tablet 650 mg, Q6H PRN   Or  acetaminophen (TYLENOL) suppository 650 mg, Q6H PRN  budesonide-formoterol (SYMBICORT) 160-4.5 MCG/ACT inhaler 2 puff, BID  tiotropium (SPIRIVA RESPIMAT) 2.5 MCG/ACT inhaler 2 puff, Daily        Data:     Code Status:  Full Code    Family History   Problem Relation Age of Onset    COPD Mother     Asthma Mother     Cancer Mother     Cancer Father     Stroke Sister     COPD Sister     Emphysema Sister        Social History     Socioeconomic History    Marital status:      Spouse name: Not on file    Number of children: Not on file    Years of education: Not on file    Highest education level: Not on file   Occupational History    Not on file   Tobacco Use    Smoking status: Former     Packs/day: 1.00     Years: 20.00     Pack years: 20.00     Types: Cigarettes     Quit date: 1981     Years since quittin.1    Smokeless tobacco: Never   Vaping Use    Vaping Use: Never used   Substance and Sexual Activity    Alcohol use: No    Drug use: No    Sexual activity: Not on file   Other Topics Concern    Not on file   Social History Narrative    Not on file     Social Determinants of Health     Financial Resource Strain: Low Risk     Difficulty of Paying Living Expenses: Not hard at all   Food Insecurity: Food Insecurity Present    Worried About Running Out of Food in the Last Year: Often true    Ran Out of Food in the Last Year: Often true   Transportation Needs: Unknown    Lack of Transportation (Medical):  Not on file Lack of Transportation (Non-Medical): No   Physical Activity: Sufficiently Active    Days of Exercise per Week: 3 days    Minutes of Exercise per Session: 60 min   Stress: Not on file   Social Connections: Not on file   Intimate Partner Violence: Not on file   Housing Stability: Unknown    Unable to Pay for Housing in the Last Year: Not on file    Number of Places Lived in the Last Year: Not on file    Unstable Housing in the Last Year: No       Vitals:  BP (!) 144/57   Pulse 53   Temp 97.9 °F (36.6 °C)   Resp 16   Ht 5' 5\" (1.651 m)   Wt 192 lb 8 oz (87.3 kg)   SpO2 93%   BMI 32.03 kg/m²   Temp (24hrs), Av.8 °F (36.6 °C), Min:97.5 °F (36.4 °C), Max:98.2 °F (36.8 °C)    No results for input(s): POCGLU in the last 72 hours. I/O (24Hr):     Intake/Output Summary (Last 24 hours) at 2023 0859  Last data filed at 2023 2350  Gross per 24 hour   Intake 1085.02 ml   Output 200 ml   Net 885.02 ml       Labs:      CBC:   Lab Results   Component Value Date/Time    WBC 3.7 2023 08:49 AM    RBC 3.82 2023 08:49 AM    HGB 11.5 2023 08:49 AM    HCT 36.3 2023 08:49 AM    MCV 95.0 2023 08:49 AM    MCH 30.1 2023 08:49 AM    MCHC 31.7 2023 08:49 AM    RDW 14.6 2023 08:49 AM     2023 08:49 AM    MPV 9.7 2023 08:49 AM     CBC with Differential:    Lab Results   Component Value Date/Time    WBC 3.7 2023 08:49 AM    RBC 3.82 2023 08:49 AM    HGB 11.5 2023 08:49 AM    HCT 36.3 2023 08:49 AM     2023 08:49 AM    MCV 95.0 2023 08:49 AM    MCH 30.1 2023 08:49 AM    MCHC 31.7 2023 08:49 AM    RDW 14.6 2023 08:49 AM    LYMPHOPCT 13 2023 08:49 AM    MONOPCT 1 2023 08:49 AM    BASOPCT 0 2023 08:49 AM    MONOSABS 0.04 2023 08:49 AM    LYMPHSABS 0.48 2023 08:49 AM    EOSABS 0.00 2023 08:49 AM    BASOSABS 0.00 2023 08:49 AM    DIFFTYPE NOT REPORTED 11/10/2020 08:36 AM     Hemoglobin/Hematocrit:    Lab Results   Component Value Date/Time    HGB 11.5 02/18/2023 08:49 AM    HCT 36.3 02/18/2023 08:49 AM     CMP:    Lab Results   Component Value Date/Time     02/19/2023 07:36 AM    K 4.0 02/19/2023 07:36 AM     02/19/2023 07:36 AM    CO2 24 02/19/2023 07:36 AM    BUN 37 02/19/2023 07:36 AM    CREATININE 1.52 02/19/2023 07:36 AM    GFRAA 45 12/09/2021 08:40 AM    LABGLOM 34 02/19/2023 07:36 AM    GLUCOSE 107 02/19/2023 07:36 AM    PROT 6.0 02/19/2023 07:36 AM    LABALBU 3.4 02/19/2023 07:36 AM    CALCIUM 8.9 02/19/2023 07:36 AM    BILITOT 0.5 02/19/2023 07:36 AM    ALKPHOS 64 02/19/2023 07:36 AM    AST 15 02/19/2023 07:36 AM    ALT 11 02/19/2023 07:36 AM     BMP:    Lab Results   Component Value Date/Time     02/19/2023 07:36 AM    K 4.0 02/19/2023 07:36 AM     02/19/2023 07:36 AM    CO2 24 02/19/2023 07:36 AM    BUN 37 02/19/2023 07:36 AM    LABALBU 3.4 02/19/2023 07:36 AM    CREATININE 1.52 02/19/2023 07:36 AM    CALCIUM 8.9 02/19/2023 07:36 AM    GFRAA 45 12/09/2021 08:40 AM    LABGLOM 34 02/19/2023 07:36 AM    GLUCOSE 107 02/19/2023 07:36 AM     PT/INR:    Lab Results   Component Value Date/Time    PROTIME 15.3 02/19/2023 06:03 AM    PROTIME 37.9 01/09/2023 10:35 AM    INR 1.2 02/19/2023 06:03 AM     PTT:    Lab Results   Component Value Date/Time    APTT 32.8 05/05/2019 02:47 PM   [APTT}    Physical Examination:        General appearance: alert, cooperative and no distress  Mental Status: oriented to person, place and time and normal affect  Lungs: clear to auscultation bilaterally, normal effort  Heart: regular rate and rhythm, no murmur,  Abdomen: soft, nontender, nondistended, bowel sounds present all four quadrants, no masses, hepatomegaly or splenomegaly  Extremities: no edema, redness or tenderness in the calves  Skin: no gross lesions, rashes, or induration    Assessment:        Primary Problem  Common bile duct dilatation     Active Hospital Problems    Diagnosis Date Noted    Choledocholithiasis-suspected [K80.50] 02/16/2023     Priority: Medium    INR therapeutic [R79.1] 02/16/2023     Priority: Medium    Pain of upper abdomen [R10.10] 02/16/2023     Priority: Medium    Acute midline low back pain without sciatica [M54.50] 02/16/2023     Priority: Medium    Common bile duct dilatation [K83.8] 02/15/2023     Priority: Medium    Calculus of gallbladder without cholecystitis without obstruction [K80.20] 02/15/2023     Priority: Medium    Hypertension [I10] 06/03/2020    Gastroesophageal reflux disease [K21.9] 06/03/2020    Atrial fibrillation ([facility] [address] [I48.91] 04/09/2019     Past Medical History:   Diagnosis Date    Allergic rhinitis     Arthritis     Asthma     Atrial fibrillation ([address]     GERD (gastroesophageal reflux disease)     Hypertension     Melanoma ([facility] [address]     on nose    Myocardial infarction ([facility] [address] 2008    Wears glasses     Wears partial dentures     upper        Plan:        Abdominal pain, ? Sludge/choledocholithiasis on MRCP, s/p attempted ERCP aborted d/t inability to cannulate  LFTs normal  Clinically improved  Discussed with patient  Request surgical consult  May have intermittent acute cystic duct obstruction  Soft diet as tolerated today   NPO after midnight  Possible ERCP tomorrow      Time spent reviewing the chart, seeing the patient, and discussing with the attending MD around 30 minutes. Explained to the patient and d/W Nursing Staff  Will F/U with you  Please call or Page for any issues or change in status  Thanks    Electronically signed by DUDLEY Waite NP on 2/19/2023 at 8:59 AM     GI attending physician note. Patient seen with DUDLEY at about 930 AM    I independently performed an evaluation on Sun Valdovinos. I have reviewed the above documentation completed by the Nurse Practitioner and agree with the history, examination, and management plan. Recommendations discussed.        Patient transaminases, lipase normal.  Patient is asymptomatic. Discussed with admitting physician regarding this patient. If she has CBD stones causing the symptoms, expect to see transaminases elevation, however in this patient transaminases has been normal.    I am not sure whether she had transient cystic duct obstruction which appears to be resolved. Will need surgical consultation for evaluation and opinion. Basing on that opinion we will decide whether she needs ERCP.   Also will discuss with the patient and family regarding potential risks of ERCP including pancreatitis, bleeding, perforation etc.

## 2023-02-19 NOTE — PLAN OF CARE
Problem: Respiratory - Adult  Goal: Able to breathe comfortably  2/18/2023 2131 by Jessica Byrd RCP  Outcome: Progressing     Problem: Respiratory - Adult  Goal: Patient's breath sounds will be clear and equal  2/18/2023 2131 by Jessica Byrd RCP  Outcome: Progressing     BRONCHOSPASM/BRONCHOCONSTRICTION    IMPROVE  AERATION/BREATHSOUNDS  ADMINISTER BRONCHODILATOR THERAPY AS APPROPRIATE  ASSESS BREATH SOUNDS  PATIENT EDUCATION AS NEEDED

## 2023-02-19 NOTE — PLAN OF CARE
Problem: Respiratory - Adult  Goal: Able to breathe comfortably  2/19/2023 1007 by Samuel Lees RCP  Outcome: Progressing  2/18/2023 2131 by Martell Okeefe RCP  Outcome: Progressing

## 2023-02-19 NOTE — CONSULTS
Was asked to see the patient for evaluation for cholelithiasis and choledocholithiasis post aborted ERCP on 2/17/23, patient's daughter is also present. Patient is known to me, she presented with her daughter to my [address] [facility] clinic 2/15 for evaluation regarding finding of cholelithiasis on CT abd/pelv done 2/4/23, this imaging was ordered as part of an ongoing workup for patient's main complaints of left flank, bilateral chest wall and lower abdominal pains. We had a long discussion regarding the role of cholelithiasis in the patient's complaints, I advised them that the finding of cholelithiasis did not seem to have relation to the patient's complaints, she had no radiographic evidence of cholecystitis nor history of transaminitis, she did not complain of RUQ pain or even persistent heartburn to clinically suggest that gallstones were symptomatic. I did review the patient's clinical course since her admission on 2/15, on today's interview her main complaints are still left flank and upper chest pain although she reports feeling significantly better with pain medications received in the hospital. MRCP shows cholelithiasis without evidence cholecystitis, CBD is 11mm with a small filling defect near the ampulla, current lab work shows normal leukocytosis and normal transaminases. I attempted several times to explain the patient's clinical course and the reason for my consultation. The patient's daughter was moderately upset. Among other things, she felt that her mother's complaints had been \"dismissed\" during our clinic encounter and that the finding of gallstones on CT scan had been overlooked even though we had a lengthy discussion about this finding, Nadine's clinical complaints, the relationship between the two as well as potential risks/implications of cholelithiasis.     It's clear at this point that the patient and her daughter had a significant misunderstanding about what was discussed during their clinic visit with me. I was eventually able to explain the current clinical picture and the need for additional procedure and/or cholecystectomy given the presence of stones/sludge in the CBD. I did also reiterate that cholecystectomy may not affect her main complaints of back/flank/chest wall pain, at this time MRI of the T-spine has been ordered. They did ultimately express appreciation and understanding of our conversation. Given the level of misunderstanding as well as the emotional upset that our encounters have generated, I feel it is best at this time to defer surgical evaluation and care to another provider who may be able to better communicate with the patient and her family. I discussed this case with Dr. Karma Nolan who is willing to take over the surgical care of this patient. I agree with attempt at repeat ERCP, if successful and the CBD can be cleared then pt would be able to proceed with cholecystectomy, if otherwise clinically normal at that point then it could be arranged as an outpatient. If repeat ERCP is unsuccessful then she may need to have operative CBD exploration, at that point I would recommend consultation with Dr. Eliana Ross (hepatobiliary) to have him assist/perform that portion of the case, possibly as a transfer to [facility] for operation. Continue supportive care. Will be available as needed.     Electronically signed by Tavares Boewr DO on 2/19/2023 at 2:55 PM

## 2023-02-19 NOTE — PLAN OF CARE
Problem: Discharge Planning  Goal: Discharge to home or other facility with appropriate resources  2/19/2023 0037 by Sania Vega RN  Outcome: Progressing  Flowsheets (Taken 2/18/2023 2000)  Discharge to home or other facility with appropriate resources:   Identify barriers to discharge with patient and caregiver   Arrange for needed discharge resources and transportation as appropriate   Identify discharge learning needs (meds, wound care, etc)   Refer to discharge planning if patient needs post-hospital services based on physician order or complex needs related to functional status, cognitive ability or social support system     Problem: Pain  Goal: Verbalizes/displays adequate comfort level or baseline comfort level  2/19/2023 0037 by Sania Vega RN  Outcome: Progressing     Problem: ABCDS Injury Assessment  Goal: Absence of physical injury  2/19/2023 0037 by Sania Vega RN  Outcome: Progressing     Problem: Safety - Adult  Goal: Free from fall injury  2/19/2023 0037 by Saina Vega RN  Outcome: Progressing     Problem: Gastrointestinal - Adult  Goal: Minimal or absence of nausea and vomiting  2/19/2023 0037 by Sania Vega RN  Outcome: Progressing  Flowsheets (Taken 2/18/2023 2000)  Minimal or absence of nausea and vomiting:   Provide nonpharmacologic comfort measures as appropriate   Administer IV fluids as ordered to ensure adequate hydration     Problem: Gastrointestinal - Adult  Goal: Maintains or returns to baseline bowel function  Outcome: Progressing  Flowsheets (Taken 2/18/2023 2000)  Maintains or returns to baseline bowel function:   Assess bowel function   Encourage oral fluids to ensure adequate hydration   Administer ordered medications as needed   Encourage mobilization and activity   Nutrition consult to assist patient with appropriate food choices     Problem: Gastrointestinal - Adult  Goal: Maintains adequate nutritional intake  Outcome: Progressing  Flowsheets (Taken 2/18/2023 2000)  Maintains adequate nutritional intake:   Monitor percentage of each meal consumed   Identify factors contributing to decreased intake, treat as appropriate   Monitor intake and output, weight and lab values     Problem: Nutrition Deficit:  Goal: Optimize nutritional status  Outcome: Progressing

## 2023-02-19 NOTE — PROGRESS NOTES
Section of Cardiology  Progress Note      Date:  2/19/2023  Patient: Jabari Villatoro  Admission:  2/15/2023  2:17 PM  Admit DX: Common bile duct dilatation [K83.8]  Pain of upper abdomen [R10.10]  Acute midline low back pain without sciatica [M54.50]  Age:  79 y.o., 1940     LOS: 4 days     Reason for evaluation:   atrial fibrillation and hypertension      SUBJECTIVE:     The patient was seen and examined. Notes and labs reviewed. Patient reports that this morning she had mid back pain with radiation around to the lower chest without any nausea or vomiting. It responded to pain medication. She was evaluated by general surgery. MRI of the T-spine was ordered. Also she is going to have repeat ERCP tomorrow in another attempt to clear the common bile duct. She denies chest pain or shortness of breath or dizziness or palpitation. Monitor shows normal sinus rhythm    OBJECTIVE:    Telemetry: Sinus  BP (!) 147/53   Pulse 68   Temp 97.5 °F (36.4 °C)   Resp 18   Ht 5' 5\" (1.651 m)   Wt 192 lb 8 oz (87.3 kg)   SpO2 98%   BMI 32.03 kg/m²     Intake/Output Summary (Last 24 hours) at 2/19/2023 1501  Last data filed at 2/18/2023 2350  Gross per 24 hour   Intake 484.09 ml   Output 200 ml   Net 284.09 ml       EXAM:   CONSTITUTIONAL:  awake, alert, cooperative, no apparent distress, and appears stated age. HEENT: Normal jugular venous pulsations, no carotid bruits. Head is atraumatic, normocephalic. Eyes and oral mucosa are normal.  LUNGS: Nonlabored, clear to auscultation  CARDIOVASCULAR:  Normal apical impulse, regular rate and rhythm, normal S1 and S2,   ABDOMEN: Soft, nontender, nondistended. Bowel sounds present. SKIN: Warm and dry. EXTREMITIES: Trace edema. No cyanosis or clubbing.     Current Inpatient Medications:   warfarin placeholder: dosing by pharmacy   Other RX Placeholder    dilTIAZem  180 mg Oral Daily    [Held by provider] aspirin  81 mg Oral Daily    azelastine  2 spray Each Nostril BID ferrous sulfate  325 mg Oral BID AC    montelukast  10 mg Oral Nightly    pantoprazole  40 mg Oral Daily    cetirizine  5 mg Oral Daily    calcium carb-cholecalciferol  1 tablet Oral Daily    piperacillin-tazobactam  3,375 mg IntraVENous Q8H    amiodarone  100 mg Oral Daily    fluticasone  1 spray Each Nostril Daily    sodium chloride flush  5-40 mL IntraVENous 2 times per day    budesonide-formoterol  2 puff Inhalation BID    tiotropium  2 puff Inhalation Daily       IV Infusions (if any):   sodium chloride         Diagnostics:   EKG: .  2/15/2023 showed sinus bradycardia with first-degree AV block and PACs, no T or ST change      Labs:   CBC:   Recent Labs     02/18/23  0849   WBC 3.7   HGB 11.5*   HCT 36.3        BMP:   Recent Labs     02/18/23  0849 02/19/23  0736    140   K 4.1 4.0   CO2 23 24   BUN 22 37*   CREATININE 1.28* 1.52*   LABGLOM 41* 34*   GLUCOSE 115* 107*     Lab Results   Component Value Date/Time    BNP NOT REPORTED 06/12/2014 01:20 PM     PT/INR:   Recent Labs     02/18/23  0604 02/19/23  0603   PROTIME 16.2* 15.3*   INR 1.3 1.2     APTT:No results for input(s): APTT in the last 72 hours. CARDIAC ENZYMES:No results for input(s): CKTOTAL, CKMB, CKMBINDEX in the last 72 hours. Invalid input(s):  TROPONIN, HIGH SENSITIVITY  FASTING LIPID PANEL:  Lab Results   Component Value Date/Time    HDL 53 12/19/2022 08:55 AM    TRIG 141 12/19/2022 08:55 AM     LIVER PROFILE:  Recent Labs     02/18/23  0849 02/19/23  0736   AST 17 15   ALT 12 11   LABALBU 3.3* 3.4*       ASSESSMENT:  1. Requested preop cardiac clearance for ERCP due to common bile duct dilatation  2. Paroxysmal atrial fibrillation, on Cardizem, Cordarone, and Coumadin prior to admission  3. Sinus bradycardia secondary to medications. 4.  Systemic hypertension, on Cardizem and Maxide  5. Chronic kidney disease  6. Chronic anemia      PLAN:    Continue current medications  ambulate in the hallway as tolerated.   Resume Coumadin when the GI/surgical work-up is done      Discussed with patient, and her nurse    Anuja Tran MD,

## 2023-02-19 NOTE — PROGRESS NOTES
[facility]  Office: [address], DO, Shadia Diaz, DO, Tonya Porras, DO, Brett Barrios Blood, DO, Radha Wong MD, Wenceslao Chirinos MD, Arian Linder MD, Federica Hu MD,  Rosalba Walsh MD, Julia Gordon MD, Filiberto Dodd DO, Daisy Yancey MD,  Marleny Blake MD, Faby Omer MD, Day Mcgrath DO, Jamshid Lainez MD, Henna Baron MD, Sylvia Saeed DO, Jorge Hassan MD, Polly Badillo MD, Jay Archibald MD, Paula Dawson MD, Fernando Prieto DO, Patricio Browne MD, Concetta Villalta MD, Edil Schaefer, CNP,  Saira Joyner, CNP, Abdullahi Balderrama, CNP, Donell Abreu, CNP,  Sergey Thomas, [facility], Joselin Alejandro, CNP, Ivan Kemp, CNP, William Elise, CNP, Fer Black, CNP, Ginger Sanchez, CNP, Sharyn Alejo PA-C, Madelaine Haas, CNS, Wolf Stratton, [facility], [facility]    Progress Note    2/19/2023    12:27 PM    Name:   Gladis Man  MRN:     [id number]     [place]:      [de-identified]   Room:   2010/2010-01   Day:  4  Admit Date:  2/15/2023  2:17 PM    PCP:   Ann Marie Burns PA-C  Code Status:  Full Code    Subjective:     C/C:   Chief Complaint   Patient presents with    Back Pain     Bilateral upper back. Pt reports \"squeezing\" going around to right side under breast area     Interval History Status: . Patient had ERCP done on 2/17/2023, bile duct could not be cannulated due to edema of the duodenal wall secondary to injected contrast material and the wall, GI plans to repeat ERCP possibly on Monday. Surgery consult has been recommended by GI before deciding for repeat ERCP  Patient denies any flank pain, has history of midthoracic pain with feeling a bandlike sensation, CT thoracic spine done at admission showed endplate T8 compression fracture, MRI is being ordered  Aspirin and Coumadin will be kept on hold as discussed with GI  Brief History:     Gladis Man is a 79 y. o. Non- / non  female who presents with Back Pain (Bilateral upper back. Pt reports \"squeezing\" going around to right side under breast area)   and is admitted to the hospital for the management of Common bile duct dilatation. Patient says she started having mid back pain that started 3 weeks ago that radiated to the left side, and then started radiating around to the right side. She has been taking Tylenol #3 for the discomfort, but it has not been effective for the pain, and she has had constipation since taking it. Her last BM was Sunday after taking an OTC laxative and prune juice. she went to see Dr Caprice Connelly general surgeon yesterday- no cholecystectomy recommended at this time. Prior imaging has revealed renal cysts and mild cholelithiasis, mild diverticulosis, no kidney stones. She has a PMH of appendectomy, arthritis, asthma, atrial fib on warfarin, GERD, HTN, MI, melanoma, and MI. She went to the ED as the pain did not subside. While in ED, Creat was 1.46. MRI abdomen revealed cholelithiasis and mild extrahepatic biliary ductal dilatation with suspected sludge/ small stones in the CBD. She was given morphine for pain and hydralazine for elevated BP. GI was consulted. She was admitted for further work up of dilated common bile duct and abdominal pain. Review of Systems:     Constitutional:  negative for chills, fevers, sweats  Respiratory:  negative for cough, dyspnea on exertion, shortness of breath, wheezing  Cardiovascular:  negative for chest pain, chest pressure/discomfort, lower extremity edema, palpitations  Gastrointestinal:  negative for abdominal pain, constipation, diarrhea, nausea, vomiting  Neurological:  negative for dizziness, headache    Medications:      Allergies:  No Known Allergies    Current Meds:   Scheduled Meds:    warfarin placeholder: dosing by pharmacy   Other RX Placeholder    dilTIAZem  180 mg Oral Daily    [Held by provider] aspirin  81 mg Oral Daily azelastine  2 spray Each Nostril BID    ferrous sulfate  325 mg Oral BID AC    montelukast  10 mg Oral Nightly    pantoprazole  40 mg Oral Daily    cetirizine  5 mg Oral Daily    calcium carb-cholecalciferol  1 tablet Oral Daily    piperacillin-tazobactam  3,375 mg IntraVENous Q8H    amiodarone  100 mg Oral Daily    fluticasone  1 spray Each Nostril Daily    sodium chloride flush  5-40 mL IntraVENous 2 times per day    budesonide-formoterol  2 puff Inhalation BID    tiotropium  2 puff Inhalation Daily     Continuous Infusions:    sodium chloride       PRN Meds: bisacodyl, hydrALAZINE, calcium carbonate, morphine, sodium chloride flush, sodium chloride, potassium chloride **OR** potassium alternative oral replacement **OR** potassium chloride, magnesium sulfate, ondansetron **OR** ondansetron, polyethylene glycol, acetaminophen **OR** acetaminophen    Data:     Past Medical History:   has a past medical history of Allergic rhinitis, Arthritis, Asthma, Atrial fibrillation ([facility] [address] GERD (gastroesophageal reflux disease), Hypertension, Melanoma ([facility] [address] Myocardial infarction ([facility] [address] Wears glasses, and Wears partial dentures. Social History:   reports that she quit smoking about 42 years ago. Her smoking use included cigarettes. She has a 20.00 pack-year smoking history. She has never used smokeless tobacco. She reports that she does not drink alcohol and does not use drugs. Family History:   Family History   Problem Relation Age of Onset    COPD Mother     Asthma Mother     Cancer Mother     Cancer Father     Stroke Sister     COPD Sister     Emphysema Sister        Vitals:  BP (!) 185/59   Pulse 58   Temp 97.5 °F (36.4 °C)   Resp 18   Ht 5' 5\" (1.651 m)   Wt 192 lb 8 oz (87.3 kg)   SpO2 98%   BMI 32.03 kg/m²   Temp (24hrs), Av.8 °F (36.6 °C), Min:97.5 °F (36.4 °C), Max:98.2 °F (36.8 °C)    No results for input(s): POCGLU in the last 72 hours. I/O (24Hr):     Intake/Output Summary (Last 24 hours) at 2/19/2023 1227  Last data filed at 2/18/2023 2350  Gross per 24 hour   Intake 1085.02 ml   Output 200 ml   Net 885.02 ml       Labs:  Hematology:  Recent Labs     02/17/23  1640 02/18/23  0604 02/18/23  0849 02/19/23  0603   WBC  --   --  3.7  --    RBC  --   --  3.82*  --    HGB  --   --  11.5*  --    HCT  --   --  36.3  --    MCV  --   --  95.0  --    MCH  --   --  30.1  --    MCHC  --   --  31.7  --    RDW  --   --  14.6*  --    PLT  --   --  218  --    MPV  --   --  9.7  --    INR 1.5 1.3  --  1.2     Chemistry:  Recent Labs     02/18/23  0849 02/19/23  0736    140   K 4.1 4.0   * 106   CO2 23 24   GLUCOSE 115* 107*   BUN 22 37*   CREATININE 1.28* 1.52*   ANIONGAP 10 10   LABGLOM 41* 34*   CALCIUM 8.7 8.9     Recent Labs     02/18/23  0849 02/19/23  0736   PROT 6.0* 6.0*   LABALBU 3.3* 3.4*   AST 17 15   ALT 12 11   ALKPHOS 73 64   BILITOT 0.6 0.5   BILIDIR 0.2  --    LIPASE  --  21     ABG:No results found for: POCPH, PHART, PH, POCPCO2, NYM1RIL, PCO2, POCPO2, PO2ART, PO2, POCHCO3, QUH0RGR, HCO3, NBEA, PBEA, BEART, BE, THGBART, THB, ISN3FQX, FTGF2DOY, K8UCFNBB, O2SAT, FIO2  No results found for: SPECIAL  Lab Results   Component Value Date/Time    CULTURE NO SIGNIFICANT GROWTH 02/01/2023 04:53 PM       Radiology:  CT ABDOMEN PELVIS WO CONTRAST Additional Contrast? None    Result Date: 2/15/2023  1. Interval development of bladder distension and central biliary ductal dilatation since 4 February 2023. Sludge and small stones are present in the gallbladder. Findings of concern for stones in the common bile duct or edema in the distal common bowel duct due to a recently passed calculus. Right upper quadrant ultrasound is advised. 2.  Moderate colonic stool burden. 3.  No urinary obstruction, small bowel obstruction or appendicitis. RECOMMENDATION: Right upper quadrant ultrasound.      CT THORACIC SPINE WO CONTRAST    Result Date: 2/15/2023  Thoracic spine CT: Age indeterminate superior endplate compression fracture of T8 with 40% height loss. No retropulsion. If there is need for further evaluation thoracic spine MRI can be obtained Lumbar spine CT: No acute fracture Multilevel disc and facet degenerative disease most pronounced at L3-L4 and L4-L5 at L5-S1. .  Grade 1 anterolisthesis at L3-L4. Moderate bilateral neural foraminal narrowing at L4-L5 and L5-S1. CT LUMBAR SPINE WO CONTRAST    Result Date: 2/15/2023  Thoracic spine CT: Age indeterminate superior endplate compression fracture of T8 with 40% height loss. No retropulsion. If there is need for further evaluation thoracic spine MRI can be obtained Lumbar spine CT: No acute fracture Multilevel disc and facet degenerative disease most pronounced at L3-L4 and L4-L5 at L5-S1. .  Grade 1 anterolisthesis at L3-L4. Moderate bilateral neural foraminal narrowing at L4-L5 and L5-S1. US GALLBLADDER RUQ    Result Date: 2/15/2023  1. Dilated gallbladder containing sludge and small stones. 2.  Dilated common bile duct. Please note that the bile duct was not dilated on prior CT scan of 4 February 2023. No definite stones are noted in the bile duct but it is incompletely visualized. 3.  Liver is of coarse echotexture suggesting fatty infiltration. RECOMMENDATIONS: Consideration of MRCP. MRI ABDOMEN WO CONTRAST MRCP    Result Date: 2/15/2023  1. Cholelithiasis without other findings to suggest acute cholecystitis. 2. Mild extrahepatic biliary ductal dilatation with suspected sludge or small stones in the distal common bile duct. Consider ERCP for further evaluation. The findings were sent to the Radiology Results [address] at 7:57 pm on 2/15/2023 to be communicated to a licensed caregiver.        Physical Examination:        General appearance:  alert, cooperative and no distress  Mental Status:  oriented to person, place and time and normal affect  Lungs:  clear to auscultation bilaterally, normal effort  Heart:  regular rate and rhythm, no murmur  Abdomen:  soft, nontender, nondistended, normal bowel sounds, no masses, hepatomegaly, splenomegaly  Extremities:  no edema, redness, tenderness in the calves  Skin:  no gross lesions, rashes, induration    Assessment:        Hospital Problems             Last Modified POA    * (Principal) Common bile duct dilatation 2/16/2023 Yes    Calculus of gallbladder without cholecystitis without obstruction 2/16/2023 Yes    Choledocholithiasis-suspected 2/16/2023 Yes    INR therapeutic 2/16/2023 Yes    Pain of upper abdomen 2/17/2023 Yes    Acute midline low back pain without sciatica 2/16/2023 Yes    Closed wedge compression fracture of T8 vertebra ([address] 2/19/2023 Yes    Hypertension 2/16/2023 Yes    Atrial fibrillation ([address] 2/16/2023 Yes    Gastroesophageal reflux disease 2/16/2023 Yes     Plan:        Keep aspirin and Coumadin on hold as discussed with GI for possible ERCP of tomorrow on Monday  MRI thoracic spine to evaluate T8 compression fracture further, if confirmed will consult orthopedics  Percocet as needed for pain  Cardiology and GI evaluation in progress  Discussed with patient at bedside    Ignacio Nagy MD  2/19/2023  12:27 PM

## 2023-02-19 NOTE — PLAN OF CARE
Problem: Discharge Planning  Goal: Discharge to home or other facility with appropriate resources  Outcome: Progressing     Problem: Gastrointestinal - Adult  Goal: Minimal or absence of nausea and vomiting  Outcome: Progressing  Goal: Maintains adequate nutritional intake  Outcome: Progressing

## 2023-02-20 ENCOUNTER — APPOINTMENT (OUTPATIENT)
Dept: MRI IMAGING | Age: 83
DRG: 982 | End: 2023-02-20
Payer: MEDICARE

## 2023-02-20 PROBLEM — Z96.641 HISTORY OF TOTAL RIGHT HIP REPLACEMENT: Status: ACTIVE | Noted: 2023-02-20

## 2023-02-20 PROBLEM — M80.88XA PATHOLOGICAL FRACTURE OF THORACIC VERTEBRA DUE TO SECONDARY OSTEOPOROSIS (HCC): Status: ACTIVE | Noted: 2023-02-20

## 2023-02-20 PROBLEM — M54.9 INTRACTABLE BACK PAIN: Status: ACTIVE | Noted: 2023-02-20

## 2023-02-20 PROBLEM — M80.00XA AGE-RELATED OSTEOPOROSIS WITH CURRENT PATHOLOGICAL FRACTURE: Status: ACTIVE | Noted: 2023-02-20

## 2023-02-20 LAB
25(OH)D3 SERPL-MCNC: 41.3 NG/ML
ALBUMIN SERPL-MCNC: 4 G/DL (ref 3.5–5.2)
ALP SERPL-CCNC: 79 U/L (ref 35–104)
ALT SERPL-CCNC: 16 U/L (ref 5–33)
AST SERPL-CCNC: 18 U/L
BILIRUB DIRECT SERPL-MCNC: 0.2 MG/DL
BILIRUB INDIRECT SERPL-MCNC: 0.7 MG/DL (ref 0–1)
BILIRUB SERPL-MCNC: 0.9 MG/DL (ref 0.3–1.2)
INR PPP: 1.4
PROT SERPL-MCNC: 7 G/DL (ref 6.4–8.3)
PROTHROMBIN TIME: 17.2 SEC (ref 11.5–14.2)

## 2023-02-20 PROCEDURE — 94640 AIRWAY INHALATION TREATMENT: CPT

## 2023-02-20 PROCEDURE — 99231 SBSQ HOSP IP/OBS SF/LOW 25: CPT | Performed by: INTERNAL MEDICINE

## 2023-02-20 PROCEDURE — 6370000000 HC RX 637 (ALT 250 FOR IP): Performed by: INTERNAL MEDICINE

## 2023-02-20 PROCEDURE — 6360000002 HC RX W HCPCS: Performed by: INTERNAL MEDICINE

## 2023-02-20 PROCEDURE — 97116 GAIT TRAINING THERAPY: CPT

## 2023-02-20 PROCEDURE — 94761 N-INVAS EAR/PLS OXIMETRY MLT: CPT

## 2023-02-20 PROCEDURE — 85610 PROTHROMBIN TIME: CPT

## 2023-02-20 PROCEDURE — 36415 COLL VENOUS BLD VENIPUNCTURE: CPT

## 2023-02-20 PROCEDURE — 2580000003 HC RX 258: Performed by: INTERNAL MEDICINE

## 2023-02-20 PROCEDURE — 72146 MRI CHEST SPINE W/O DYE: CPT

## 2023-02-20 PROCEDURE — 82306 VITAMIN D 25 HYDROXY: CPT

## 2023-02-20 PROCEDURE — 99233 SBSQ HOSP IP/OBS HIGH 50: CPT | Performed by: INTERNAL MEDICINE

## 2023-02-20 PROCEDURE — 1200000000 HC SEMI PRIVATE

## 2023-02-20 PROCEDURE — 80076 HEPATIC FUNCTION PANEL: CPT

## 2023-02-20 RX ORDER — ENOXAPARIN SODIUM 100 MG/ML
80 INJECTION SUBCUTANEOUS 2 TIMES DAILY
Status: DISCONTINUED | OUTPATIENT
Start: 2023-02-20 | End: 2023-02-21

## 2023-02-20 RX ORDER — LORAZEPAM 1 MG/1
1 TABLET ORAL ONCE
Status: COMPLETED | OUTPATIENT
Start: 2023-02-20 | End: 2023-02-20

## 2023-02-20 RX ADMIN — Medication 1 TABLET: at 09:14

## 2023-02-20 RX ADMIN — PANTOPRAZOLE SODIUM 40 MG: 40 TABLET, DELAYED RELEASE ORAL at 05:11

## 2023-02-20 RX ADMIN — DILTIAZEM HYDROCHLORIDE 180 MG: 180 CAPSULE, COATED, EXTENDED RELEASE ORAL at 09:14

## 2023-02-20 RX ADMIN — PIPERACILLIN AND TAZOBACTAM 3375 MG: 3; .375 INJECTION, POWDER, FOR SOLUTION INTRAVENOUS at 09:18

## 2023-02-20 RX ADMIN — CETIRIZINE HYDROCHLORIDE 5 MG: 5 TABLET ORAL at 09:15

## 2023-02-20 RX ADMIN — FERROUS SULFATE TAB EC 325 MG (65 MG FE EQUIVALENT) 325 MG: 325 (65 FE) TABLET DELAYED RESPONSE at 05:11

## 2023-02-20 RX ADMIN — MONTELUKAST 10 MG: 10 TABLET, FILM COATED ORAL at 20:32

## 2023-02-20 RX ADMIN — AZELASTINE HYDROCHLORIDE 2 SPRAY: 137 SPRAY, METERED NASAL at 20:32

## 2023-02-20 RX ADMIN — OXYCODONE AND ACETAMINOPHEN 1 TABLET: 5; 325 TABLET ORAL at 09:23

## 2023-02-20 RX ADMIN — TIOTROPIUM BROMIDE INHALATION SPRAY 2 PUFF: 3.12 SPRAY, METERED RESPIRATORY (INHALATION) at 07:27

## 2023-02-20 RX ADMIN — LORAZEPAM 1 MG: 1 TABLET ORAL at 10:57

## 2023-02-20 RX ADMIN — BUDESONIDE AND FORMOTEROL FUMARATE DIHYDRATE 2 PUFF: 160; 4.5 AEROSOL RESPIRATORY (INHALATION) at 07:28

## 2023-02-20 RX ADMIN — PIPERACILLIN AND TAZOBACTAM 3375 MG: 3; .375 INJECTION, POWDER, FOR SOLUTION INTRAVENOUS at 17:52

## 2023-02-20 RX ADMIN — PIPERACILLIN AND TAZOBACTAM 3375 MG: 3; .375 INJECTION, POWDER, FOR SOLUTION INTRAVENOUS at 02:23

## 2023-02-20 RX ADMIN — AZELASTINE HYDROCHLORIDE 2 SPRAY: 137 SPRAY, METERED NASAL at 09:15

## 2023-02-20 RX ADMIN — FLUTICASONE PROPIONATE 1 SPRAY: 50 SPRAY, METERED NASAL at 09:15

## 2023-02-20 RX ADMIN — FERROUS SULFATE TAB EC 325 MG (65 MG FE EQUIVALENT) 325 MG: 325 (65 FE) TABLET DELAYED RESPONSE at 15:35

## 2023-02-20 RX ADMIN — POLYETHYLENE GLYCOL 3350 17 G: 17 POWDER, FOR SOLUTION ORAL at 15:35

## 2023-02-20 RX ADMIN — HYDRALAZINE HYDROCHLORIDE 10 MG: 20 INJECTION INTRAMUSCULAR; INTRAVENOUS at 05:32

## 2023-02-20 ASSESSMENT — PAIN SCALES - GENERAL
PAINLEVEL_OUTOF10: 6
PAINLEVEL_OUTOF10: 0
PAINLEVEL_OUTOF10: 0
PAINLEVEL_OUTOF10: 6

## 2023-02-20 ASSESSMENT — PAIN DESCRIPTION - LOCATION
LOCATION: BACK
LOCATION: BACK

## 2023-02-20 NOTE — PLAN OF CARE
Problem: Discharge Planning  Goal: Discharge to home or other facility with appropriate resources  2/20/2023 0248 by Adam Naranjo RN  Outcome: Progressing     Problem: Pain  Goal: Verbalizes/displays adequate comfort level or baseline comfort level  Outcome: Progressing     Problem: Safety - Adult  Goal: Free from fall injury  Outcome: Progressing     Problem: Gastrointestinal - Adult  Goal: Maintains adequate nutritional intake  2/20/2023 0248 by Adam Naranjo RN  Outcome: Progressing

## 2023-02-20 NOTE — PLAN OF CARE
Problem: Respiratory - Adult  Goal: Able to breathe comfortably  2/19/2023 2108 by Martell Okeefe RCP  Outcome: Progressing     Problem: Respiratory - Adult  Goal: Patient's breath sounds will be clear and equal  2/19/2023 2108 by Martell Okeefe RCP  Outcome: Progressing       BRONCHOSPASM/BRONCHOCONSTRICTION    IMPROVE  AERATION/BREATHSOUNDS  ADMINISTER BRONCHODILATOR THERAPY AS APPROPRIATE  ASSESS BREATH SOUNDS  PATIENT EDUCATION AS NEEDED

## 2023-02-20 NOTE — PROGRESS NOTES
[facility]  Office: [address], DO, Alradha Blood, DO, Menendez Girt, DO, Patel Craven Blood, DO, Kim Munoz MD, Carine Pisano MD, Alexsandra Sanders MD, Terell Ernst MD,  Lanre Littlejohn MD, Heena Chin MD, Donna Nelson, DO, Drew Davison MD,  Yuki Bolivar MD, Stacey Newman MD, Darrin Galaviz DO, Tre Sosa MD, Gladis Duffy MD, Evelia Navarro DO, Nidhi Quezada MD, Judy Diaz MD, Markie Lerma MD, Ernie Brown MD, Blessing Estes DO, Jer Boston MD, Charo Pearce MD, Vanessa Pitts, CNP,  Alka Ryan CNP, Sivakumar Newsome, CNP, Catherine Johnson, CNP,  Lyn Castaneda, [facility], Sundar Gipson, CNP, Kaity Valiente, CNP, Sandeep Nails, CNP, Mark Zaldivar, CNP, Ryan Melton, CNP, Osiris Wagner PA-C, Ray Paredes, CNS, Kim Dunn, CNP, Lyn Hollingsworth, [facility]    Progress Note    2/20/2023    10:53 AM    Name:   Jabari Villatoro  MRN:     [id number]     Acct:      [de-identified]   Room:   2010/2010-01  IP Day:  5  Admit Date:  2/15/2023  2:17 PM    PCP:   Santiago Nevarez PA-C  Code Status:  Full Code    Subjective:     C/C:   Chief Complaint   Patient presents with    Back Pain     Bilateral upper back. Pt reports \"squeezing\" going around to right side under breast area     Interval History Status: . Patient had ERCP done on 2/17/2023, bile duct could not be cannulated due to edema of the duodenal wall secondary to injected contrast material and the wall, GI plans to repeat ERCP possibly on Monday.   Surgery consult was taken as recommended by GI who recommends repeat ERCP with possible and then do cholecystectomy  Patient denies any flank pain, has history of midthoracic pain with feeling a bandlike sensation, CT thoracic spine done at admission showed endplate T8 compression fracture, MRI is  ordered, will be done today  Aspirin and Coumadin will be kept on hold as discussed with GI with plan for possible ERCP after MRI results  Brief History:     Gladis Man is a 79 y.o. Non- / non  female who presents with Back Pain (Bilateral upper back. Pt reports \"squeezing\" going around to right side under breast area)   and is admitted to the hospital for the management of Common bile duct dilatation. Patient says she started having mid back pain that started 3 weeks ago that radiated to the left side, and then started radiating around to the right side. She has been taking Tylenol #3 for the discomfort, but it has not been effective for the pain, and she has had constipation since taking it. Her last BM was Sunday after taking an OTC laxative and prune juice. she went to see Dr Laila Nazario general surgeon yesterday- no cholecystectomy recommended at this time. Prior imaging has revealed renal cysts and mild cholelithiasis, mild diverticulosis, no kidney stones. She has a PMH of appendectomy, arthritis, asthma, atrial fib on warfarin, GERD, HTN, MI, melanoma, and MI. She went to the ED as the pain did not subside. While in ED, Creat was 1.46. MRI abdomen revealed cholelithiasis and mild extrahepatic biliary ductal dilatation with suspected sludge/ small stones in the CBD. She was given morphine for pain and hydralazine for elevated BP. GI was consulted. She was admitted for further work up of dilated common bile duct and abdominal pain. Review of Systems:     Constitutional:  negative for chills, fevers, sweats  Respiratory:  negative for cough, dyspnea on exertion, shortness of breath, wheezing  Cardiovascular:  negative for chest pain, chest pressure/discomfort, lower extremity edema, palpitations  Gastrointestinal:  negative for abdominal pain, constipation, diarrhea, nausea, vomiting  Neurological:  negative for dizziness, headache    Medications:      Allergies:  No Known Allergies    Current Meds:   Scheduled Meds:    LORazepam  1 mg Oral Once    warfarin placeholder: dosing by pharmacy   Other RX Placeholder    dilTIAZem  180 mg Oral Daily    [Held by provider] aspirin  81 mg Oral Daily    azelastine  2 spray Each Nostril BID    ferrous sulfate  325 mg Oral BID AC    montelukast  10 mg Oral Nightly    pantoprazole  40 mg Oral Daily    cetirizine  5 mg Oral Daily    calcium carb-cholecalciferol  1 tablet Oral Daily    piperacillin-tazobactam  3,375 mg IntraVENous Q8H    amiodarone  100 mg Oral Daily    fluticasone  1 spray Each Nostril Daily    sodium chloride flush  5-40 mL IntraVENous 2 times per day    budesonide-formoterol  2 puff Inhalation BID    tiotropium  2 puff Inhalation Daily     Continuous Infusions:    sodium chloride       PRN Meds: oxyCODONE-acetaminophen, bisacodyl, hydrALAZINE, calcium carbonate, morphine, sodium chloride flush, sodium chloride, potassium chloride **OR** potassium alternative oral replacement **OR** potassium chloride, magnesium sulfate, ondansetron **OR** ondansetron, polyethylene glycol, acetaminophen **OR** acetaminophen    Data:     Past Medical History:   has a past medical history of Allergic rhinitis, Arthritis, Asthma, Atrial fibrillation ([facility] [address] GERD (gastroesophageal reflux disease), Hypertension, Melanoma ([facility] [address] Myocardial infarction ([facility] [address] Wears glasses, and Wears partial dentures. Social History:   reports that she quit smoking about 42 years ago. Her smoking use included cigarettes. She has a 20.00 pack-year smoking history. She has never used smokeless tobacco. She reports that she does not drink alcohol and does not use drugs.      Family History:   Family History   Problem Relation Age of Onset    COPD Mother     Asthma Mother     Cancer Mother     Cancer Father     Stroke Sister     COPD Sister     Emphysema Sister        Vitals:  BP (!) 152/74   Pulse 55   Temp 97.9 °F (36.6 °C) (Oral)   Resp 16   Ht 5' 5\" (1.651 m)   Wt 191 lb 12.8 oz (87 kg)   SpO2 95%   BMI 31.92 kg/m²   Temp (24hrs), Av.9 °F (36.6 °C), Min:97.5 °F (36.4 °C), Max:98.2 °F (36.8 °C)    No results for input(s): POCGLU in the last 72 hours. I/O (24Hr): No intake or output data in the 24 hours ending 02/20/23 1053      Labs:  Hematology:  Recent Labs     02/18/23  0604 02/18/23  0849 02/19/23  0603 02/20/23  0534   WBC  --  3.7  --   --    RBC  --  3.82*  --   --    HGB  --  11.5*  --   --    HCT  --  36.3  --   --    MCV  --  95.0  --   --    MCH  --  30.1  --   --    MCHC  --  31.7  --   --    RDW  --  14.6*  --   --    PLT  --  218  --   --    MPV  --  9.7  --   --    INR 1.3  --  1.2 1.4     Chemistry:  Recent Labs     02/18/23  0849 02/19/23  0736    140   K 4.1 4.0   * 106   CO2 23 24   GLUCOSE 115* 107*   BUN 22 37*   CREATININE 1.28* 1.52*   ANIONGAP 10 10   LABGLOM 41* 34*   CALCIUM 8.7 8.9     Recent Labs     02/18/23  0849 02/19/23  0736 02/20/23  0846   PROT 6.0* 6.0* 7.0   LABALBU 3.3* 3.4* 4.0   AST 17 15 18   ALT 12 11 16   ALKPHOS 73 64 79   BILITOT 0.6 0.5 0.9   BILIDIR 0.2  --  0.2   LIPASE  --  21  --      ABG:No results found for: POCPH, PHART, PH, POCPCO2, YDK0VNQ, PCO2, POCPO2, PO2ART, PO2, POCHCO3, APA4WQH, HCO3, NBEA, PBEA, BEART, BE, THGBART, THB, UNL5UHE, MYVM8RUD, O4BGPRPH, O2SAT, FIO2  No results found for: SPECIAL  Lab Results   Component Value Date/Time    CULTURE NO SIGNIFICANT GROWTH 02/01/2023 04:53 PM       Radiology:  CT ABDOMEN PELVIS WO CONTRAST Additional Contrast? None    Result Date: 2/15/2023  1. Interval development of bladder distension and central biliary ductal dilatation since 4 February 2023. Sludge and small stones are present in the gallbladder. Findings of concern for stones in the common bile duct or edema in the distal common bowel duct due to a recently passed calculus. Right upper quadrant ultrasound is advised. 2.  Moderate colonic stool burden. 3.  No urinary obstruction, small bowel obstruction or appendicitis. RECOMMENDATION: Right upper quadrant ultrasound.      CT THORACIC SPINE WO CONTRAST    Result Date: 2/15/2023  Thoracic spine CT: Age indeterminate superior endplate compression fracture of T8 with 40% height loss. No retropulsion. If there is need for further evaluation thoracic spine MRI can be obtained Lumbar spine CT: No acute fracture Multilevel disc and facet degenerative disease most pronounced at L3-L4 and L4-L5 at L5-S1. .  Grade 1 anterolisthesis at L3-L4. Moderate bilateral neural foraminal narrowing at L4-L5 and L5-S1. CT LUMBAR SPINE WO CONTRAST    Result Date: 2/15/2023  Thoracic spine CT: Age indeterminate superior endplate compression fracture of T8 with 40% height loss. No retropulsion. If there is need for further evaluation thoracic spine MRI can be obtained Lumbar spine CT: No acute fracture Multilevel disc and facet degenerative disease most pronounced at L3-L4 and L4-L5 at L5-S1. .  Grade 1 anterolisthesis at L3-L4. Moderate bilateral neural foraminal narrowing at L4-L5 and L5-S1. US GALLBLADDER RUQ    Result Date: 2/15/2023  1. Dilated gallbladder containing sludge and small stones. 2.  Dilated common bile duct. Please note that the bile duct was not dilated on prior CT scan of 4 February 2023. No definite stones are noted in the bile duct but it is incompletely visualized. 3.  Liver is of coarse echotexture suggesting fatty infiltration. RECOMMENDATIONS: Consideration of MRCP. MRI ABDOMEN WO CONTRAST MRCP    Result Date: 2/15/2023  1. Cholelithiasis without other findings to suggest acute cholecystitis. 2. Mild extrahepatic biliary ductal dilatation with suspected sludge or small stones in the distal common bile duct. Consider ERCP for further evaluation. The findings were sent to the Radiology Results [id number] at 7:57 pm on 2/15/2023 to be communicated to a licensed caregiver.        Physical Examination:        General appearance:  alert, cooperative and no distress  Mental Status:  oriented to person, place and time and normal affect  Lungs:  clear to auscultation bilaterally, normal effort  Heart:  regular rate and rhythm, no murmur  Abdomen:  soft, nontender, nondistended, normal bowel sounds, no masses, hepatomegaly, splenomegaly  Extremities:  no edema, redness, tenderness in the calves  Skin:  no gross lesions, rashes, induration    Assessment:        Hospital Problems             Last Modified POA    * (Principal) Common bile duct dilatation 2/16/2023 Yes    Calculus of gallbladder without cholecystitis without obstruction 2/16/2023 Yes    Choledocholithiasis-suspected 2/16/2023 Yes    INR therapeutic 2/16/2023 Yes    Pain of upper abdomen 2/17/2023 Yes    Acute midline low back pain without sciatica 2/16/2023 Yes    Closed wedge compression fracture of T8 vertebra ([address] 2/19/2023 Yes    Hypertension 2/16/2023 Yes    Atrial fibrillation ([address] 2/16/2023 Yes    Gastroesophageal reflux disease 2/16/2023 Yes     Plan:        Keep aspirin and Coumadin on hold as discussed with GI for possible ERCP after evaluating MRI results  MRI thoracic spine to evaluate T8 compression fracture further, if confirmed will consult orthopedics  Percocet as needed for pain  Cardiology and GI evaluation in progress  Discussed with patient at bedside    Quita Day MD  2/20/2023  10:53 AM

## 2023-02-20 NOTE — PROGRESS NOTES
Section of Cardiology  Progress Note      Date:  2/20/2023  Patient: Jabari Villatoro  Admission:  2/15/2023  2:17 PM  Admit DX: Common bile duct dilatation [K83.8]  Pain of upper abdomen [R10.10]  Acute midline low back pain without sciatica [M54.50]  Age:  79 y.o., 1940     LOS: 5 days     Reason for evaluation:   atrial fibrillation and hypertension      SUBJECTIVE:     The patient was seen and examined. Notes and labs reviewed. Patient still having the mid back pain with radiation to the front. The T-spine MRI showed compression fracture of T8.  GI saw her and after discussion with Radiology they did not feel that she needs a are CP particularly that her liver enzymes are normal and she does not have any further pain. She denies chest pain or shortness of breath or dizziness or palpitation. Monitor shows normal sinus rhythm    OBJECTIVE:    Telemetry: Sinus  BP (!) 152/74   Pulse 55   Temp 97.9 °F (36.6 °C) (Oral)   Resp 18   Ht 5' 5\" (1.651 m)   Wt 191 lb 12.8 oz (87 kg)   SpO2 95%   BMI 31.92 kg/m²   No intake or output data in the 24 hours ending 02/20/23 1319      EXAM:   CONSTITUTIONAL:  awake, alert, cooperative, no apparent distress, and appears stated age. HEENT: Normal jugular venous pulsations, no carotid bruits. Head is atraumatic, normocephalic. Eyes and oral mucosa are normal.  LUNGS: Nonlabored, clear to auscultation  CARDIOVASCULAR:  Normal apical impulse, regular rate and rhythm, normal S1 and S2,   ABDOMEN: Soft, nontender, nondistended. Bowel sounds present. SKIN: Warm and dry. EXTREMITIES: Trace edema. No cyanosis or clubbing.   MUSCULOSKELETAL:  There is tenderness of the thoracic spine over T8    Current Inpatient Medications:   warfarin placeholder: dosing by pharmacy   Other RX Placeholder    dilTIAZem  180 mg Oral Daily    [Held by provider] aspirin  81 mg Oral Daily    azelastine  2 spray Each Nostril BID    ferrous sulfate  325 mg Oral BID AC    montelukast  10 mg Oral Nightly    pantoprazole  40 mg Oral Daily    cetirizine  5 mg Oral Daily    calcium carb-cholecalciferol  1 tablet Oral Daily    piperacillin-tazobactam  3,375 mg IntraVENous Q8H    amiodarone  100 mg Oral Daily    fluticasone  1 spray Each Nostril Daily    sodium chloride flush  5-40 mL IntraVENous 2 times per day    budesonide-formoterol  2 puff Inhalation BID    tiotropium  2 puff Inhalation Daily       IV Infusions (if any):   sodium chloride         Diagnostics:   EKG: .  2/15/2023 showed sinus bradycardia with first-degree AV block and PACs, no T or ST change      Labs:   CBC:   Recent Labs     02/18/23  0849   WBC 3.7   HGB 11.5*   HCT 36.3        BMP:   Recent Labs     02/18/23  0849 02/19/23  0736    140   K 4.1 4.0   CO2 23 24   BUN 22 37*   CREATININE 1.28* 1.52*   LABGLOM 41* 34*   GLUCOSE 115* 107*     Lab Results   Component Value Date/Time    BNP NOT REPORTED 06/12/2014 01:20 PM     PT/INR:   Recent Labs     02/19/23  0603 02/20/23  0534   PROTIME 15.3* 17.2*   INR 1.2 1.4     APTT:No results for input(s): APTT in the last 72 hours. CARDIAC ENZYMES:No results for input(s): CKTOTAL, CKMB, CKMBINDEX in the last 72 hours. Invalid input(s):  TROPONIN, HIGH SENSITIVITY  FASTING LIPID PANEL:  Lab Results   Component Value Date/Time    HDL 53 12/19/2022 08:55 AM    TRIG 141 12/19/2022 08:55 AM     LIVER PROFILE:  Recent Labs     02/19/23  0736 02/20/23  0846   AST 15 18   ALT 11 16   LABALBU 3.4* 4.0     T spine MRI 2/20/2023  Compression fracture at T8 with fracture line seen in the posterior aspect of   the T8 vertebral body extending towards the anterior aspect of the pedicles. No significant retropulsion. Multilevel degenerative change without canal stenosis or foraminal narrowing. ASSESSMENT:  1. Requested preop cardiac clearance for ERCP due to common bile duct dilatation  2. Paroxysmal atrial fibrillation, on Cardizem, Cordarone, and Coumadin prior to admission  3. Back pain due to T8 compression fracture  4. Sinus bradycardia secondary to medications. She is maintaining normal sinus rhythm and she is not symptomatic  5. Systemic hypertension, on Cardizem and Maxide  6. Chronic kidney disease  7. Chronic anemia      PLAN:    Continue current medications  Ambulate in the hallway as tolerated.    Recommend to bridge her with Lovenox while she is off Coumadin waiting for further evaluation if she needs any further procedure      Discussed with patient,  her daughter,and her nurse    Renetta Herndon MD,

## 2023-02-20 NOTE — PROGRESS NOTES
Physical Therapy  Facility/Department: [facility] SURG  Physical Therapy Daily Note  Name: Jassi Aquino  : 1940  MRN: [id number]  Date of Service: 2023    Discharge Recommendations:  Home with assist PRN          Patient Diagnosis(es): The primary encounter diagnosis was Pain of upper abdomen. A diagnosis of Acute midline low back pain without sciatica was also pertinent to this visit. Past Medical History:  has a past medical history of Allergic rhinitis, Arthritis, Asthma, Atrial fibrillation ([facility] GERD (gastroesophageal reflux disease), Hypertension, Melanoma ([facility] Myocardial infarction [facility]), Wears glasses, and Wears partial dentures. Past Surgical History:  has a past surgical history that includes Appendectomy; other surgical history; Hysterectomy; Neck surgery; Carpal tunnel release (Right); Nose surgery; hernia repair; Tonsillectomy; joint replacement; Total hip arthroplasty (Right, 2019); Total hip arthroplasty (Right, 2019); Upper gastrointestinal endoscopy (N/A, 2019); Colonoscopy (N/A, 2019); eye surgery; Cardiac surgery; and Breast surgery. Assessment   Body Structures, Functions, Activity Limitations Requiring Skilled Therapeutic Intervention: Decreased functional mobility ; Decreased ADL status; Decreased strength;Decreased safe awareness;Decreased endurance;Decreased posture  Assessment: Patient Independent with bed mobility, able to move around room without device SBA, near independent with short distance mobility, patient only agreeable to limited activity this date.   Specific Instructions for Next Treatment: D/C after next treatment if independent with ambulation  Activity Tolerance  Activity Tolerance: Patient tolerated treatment well  Activity Tolerance Comments: only agreeable to limited activity due to feeling like she does not need PT     Plan   Physical Therapy Plan  General Plan: 5-7 times per week  Specific Instructions for Next Treatment: D/C after next treatment if independent with ambulation  Current Treatment Recommendations: Strengthening, Balance training, Functional mobility training, Transfer training, Endurance training, Gait training, Home exercise program, Safety education & training, Patient/Caregiver education & training, Neuromuscular re-education  Safety Devices  Type of Devices: Call light within reach, Nurse notified, Gait belt, Left in chair, Chair alarm in place     Restrictions  Restrictions/Precautions  Restrictions/Precautions: General Precautions, Fall Risk  Position Activity Restriction  Other position/activity restrictions:  Up with assist, telemetry, RUE IV, NPO except sips water with meds     Subjective   General Comment  Comments: RN states patient appropriate for therapy  Subjective  Subjective: patient resting in bed, states she is moving around independently and does need PT, encouraged her to continue to work with PT and patient agreeable         Social/Functional History  Social/Functional History  Lives With: Daughter  Type of Home: House  Home Layout: One level  Home Access: Stairs to enter with rails  Entrance Stairs - Number of Steps: 4(front entrance)  Entrance Stairs - Rails: Both  Bathroom Shower/Tub: Tub/Shower unit, Walk-in shower  Bathroom Toilet: Handicap height (pt has support near toilet she can hold)  Bathroom Equipment: Grab bars in [address] [place], [place] [address]d: Walker, rolling, Reacher, Sock aid, Lift chair  Has the patient had two or more falls in the past year or any fall with injury in the past year?: No (pt denies)  Receives Help From: Family  ADL Assistance: [address]: Independent  Homemaking Responsibilities: Yes  Ambulation Assistance: Independent  Transfer Assistance: Independent  Active : Yes  Mode of Transportation: Car  Occupation: Retired  Type of Occupation: [employer]  Leisure & Hobbies: goes to [employer], helps out at [employer], [facility] [employer]   Heart Rate: 55  Heart Rate Source: Monitor  BP: (!) 152/74  BP Location: Left Arm  MAP (Calculated): 100  Resp: 16  SpO2: 95 %  O2 Device: None (Room air)                          Gait  Distance (ft): 40 Feet  Bed mobility  Supine to Sit: Independent  Transfers  Sit to Stand: Stand by assistance  Stand to Sit: Stand by assistance  Comment: patient remained up in chair following walking  Ambulation  Surface: Level tile  Device: No Device  Assistance: Stand by assistance  Gait Deviations: Slow Judi  Distance: 40 feet  Comments: patient ambulated around room without device, able to raise shade, near independent with ambulation     Balance  Sitting - Static: Good  Sitting - Dynamic: Good  Standing - Static: Good  Standing - Dynamic: Fair;+           AM-PAC Score  AM-PAC Inpatient Mobility Raw Score : 20 (02/20/23 1016)  AM-PAC Inpatient T-Scale Score : 47.67 (02/20/23 1016)  Mobility Inpatient CMS 0-100% Score: 35.83 (02/20/23 1016)  Mobility Inpatient CMS G-Code Modifier : CJ (02/20/23 1016)                 Goals  Short Term Goals  Time Frame for Short Term Goals: 8 visits  Short Term Goal 1: Inc bed-mobility & transfers to independent to enable pt to safely get in/OOB & chair to return to PLOF & decrease risk for falls; Short Term Goal 2: Inc gait to amb 350ft or > indep to enable pt to return to previous level of independence & able to demonstrate indep/ safe functional activities including approaching surfaces and turning to sit. Short Term Goal 3: Pt able to go up/down 4 steps with Jass rails indep  Short Term Goal 4:  Inc standing balance to good with device to facilitate pt independence for performance of ADL's & functional mobility, & reduce fall risk  Short Term Goal 5: Pt able to tolerate 30-40 min of activity to include 15-20 reps of ex, NMR & functional mobility with device to facilitate activity tolerance to [facility]       Education  Patient Education  Education Given To: Patient  Education Provided: Role of Therapy  Education Provided Comments: importance of OOB activity  Education Method: Verbal  Education Outcome: Continued education needed      Therapy Time   Individual Concurrent Group Co-treatment   Time In 2001 Rodrigue Cole         Time Out 0827         Minutes 10                 Stephanie Vitale PT

## 2023-02-20 NOTE — PROGRESS NOTES
Physician Progress Note      Zion Quick  CSN #:                  [id number]  :                       1940  ADMIT DATE:       2/15/2023 2:17 PM  [address] [place] DATE:  RESPONDING  PROVIDER #:        Esteban Robledo MD          QUERY TEXT:    Patient admitted with calculus of gallbladder/choledocholithiasis. Noted to   have moderate malnutrition in RD PN dated 23. If possible, please   document in progress notes and discharge summary if you are evaluating and /or   treating any of the following: The medical record reflects the following:  Risk Factors: acute illness, advanced age  Clinical Indicators: RD notes meets 2 of 6 ASPEN criteria - 50% or less of   estimated energy requirements for 5 days or more and 1-2% of weight loss over   1 week, 3.3% of total body weight loss in last 3 weeks d/t abd pain and   resultant poor oral intake. Treatment: RD c/s, advanced diet when medically appropriate, monitor weight,   labs, GI function    Thank you  Juanito Arteaga RN, CRCR, CCDS  [email or web address]    ASPEN Criteria:    https://aspenjournals. onlinelibrary. styles. com/doi/full/10.1177/351459765317141  5  Options provided:  -- Protein calorie malnutrition moderate  -- Other - I will add my own diagnosis  -- Disagree - Not applicable / Not valid  -- Disagree - Clinically unable to determine / Unknown  -- Refer to Clinical Documentation Reviewer    PROVIDER RESPONSE TEXT:    This patient has moderate protein calorie malnutrition.     Query created by: Kalpesh Riggs on 2023 8:29 AM      Electronically signed by:  Esteban Robledo MD 2023 5:50 PM

## 2023-02-20 NOTE — PLAN OF CARE
Problem: Respiratory - Adult  Goal: Able to breathe comfortably  2/20/2023 0737 by Lilian Walsh RCP  Outcome: Progressing  2/19/2023 2108 by Don Jennings RCP  Outcome: Progressing  Goal: Patient's breath sounds will be clear and equal  2/20/2023 0737 by Lilian Walsh RCP  Outcome: Progressing  2/19/2023 2108 by Don Jennings RCP  Outcome: Progressing

## 2023-02-20 NOTE — PROGRESS NOTES
GI Progress notes    2/20/2023   11:05 AM    Name:  Husam Grant  MRN:    [id number]     Acct:     [de-identified]   Room:  2010/2010-01  IP Day: 5     Admit Date: 2/15/2023  2:17 PM  PCP: Precious Sagastume PA-C    Subjective:     C/C:   Chief Complaint   Patient presents with    Back Pain     Bilateral upper back. Pt reports \"squeezing\" going around to right side under breast area       Interval History: Status: not changed. Patient seen and examined. No acute events overnight. Abdominal imaging discussed with radiology  No clear-cut evidence of any CBD stone  Clinically patient is not having any symptoms of CBD obstruction  LFTs have been normal    ROS:  Constitutional: negative for chills, fevers and sweats  Gastrointestinal: negative for abdominal pain, constipation, diarrhea, nausea and vomiting  Neurological: negative for dizziness and headaches    Medications:      Allergies: No Known Allergies    Current Meds: warfarin placeholder: dosing by pharmacy, RX Placeholder  dilTIAZem (CARDIZEM CD) extended release capsule 180 mg, Daily  oxyCODONE-acetaminophen (PERCOCET) 5-325 MG per tablet 1 tablet, Q6H PRN  bisacodyl (DULCOLAX) suppository 10 mg, Daily PRN  [Held by provider] aspirin EC tablet 81 mg, Daily  azelastine (ASTELIN) 0.1 % nasal spray 2 spray, BID  ferrous sulfate (FE TABS 325) EC tablet 325 mg, BID AC  montelukast (SINGULAIR) tablet 10 mg, Nightly  pantoprazole (PROTONIX) tablet 40 mg, Daily  hydrALAZINE (APRESOLINE) injection 10 mg, Q6H PRN  cetirizine (ZYRTEC) tablet 5 mg, Daily  calcium carb-cholecalciferol 600-10 MG-MCG per tab 1 tablet, Daily  calcium carbonate (TUMS) chewable tablet 500 mg, TID PRN  piperacillin-tazobactam (ZOSYN) 3,375 mg in sodium chloride 0.9 % 50 mL IVPB (mini-bag), Q8H  amiodarone (CORDARONE) tablet 100 mg, Daily  morphine (PF) injection 2 mg, Q3H PRN  fluticasone (FLONASE) 50 MCG/ACT nasal spray 1 spray, Daily  sodium chloride flush 0.9 % injection 5-40 mL, 2 times per day  sodium chloride flush 0.9 % injection 10 mL, PRN  0.9 % sodium chloride infusion, PRN  potassium chloride (KLOR-CON M) extended release tablet 40 mEq, PRN   Or  potassium bicarb-citric acid (EFFER-K) effervescent tablet 40 mEq, PRN   Or  potassium chloride 10 mEq/100 mL IVPB (Peripheral Line), PRN  magnesium sulfate 1000 mg in dextrose 5% 100 mL IVPB, PRN  ondansetron (ZOFRAN-ODT) disintegrating tablet 4 mg, Q8H PRN   Or  ondansetron (ZOFRAN) injection 4 mg, Q6H PRN  polyethylene glycol (GLYCOLAX) packet 17 g, Daily PRN  acetaminophen (TYLENOL) tablet 650 mg, Q6H PRN   Or  acetaminophen (TYLENOL) suppository 650 mg, Q6H PRN  budesonide-formoterol (SYMBICORT) 160-4.5 MCG/ACT inhaler 2 puff, BID  tiotropium (SPIRIVA RESPIMAT) 2.5 MCG/ACT inhaler 2 puff, Daily        Data:     Code Status:  Full Code    Family History   Problem Relation Age of Onset    COPD Mother     Asthma Mother     Cancer Mother     Cancer Father     Stroke Sister     COPD Sister     Emphysema Sister        Social History     Socioeconomic History    Marital status:      Spouse name: Not on file    Number of children: Not on file    Years of education: Not on file    Highest education level: Not on file   Occupational History    Not on file   Tobacco Use    Smoking status: Former     Packs/day: 1.00     Years: 20.00     Pack years: 20.00     Types: Cigarettes     Quit date: 1981     Years since quittin.1    Smokeless tobacco: Never   Vaping Use    Vaping Use: Never used   Substance and Sexual Activity    Alcohol use: No    Drug use: No    Sexual activity: Not on file   Other Topics Concern    Not on file   Social History Narrative    Not on file     Social Determinants of Health     Financial Resource Strain: Low Risk     Difficulty of Paying Living Expenses: Not hard at all   Food Insecurity: Food Insecurity Present    Worried About Running Out of Food in the Last Year: Often true    Ran Out of Food in the Last Year: Often true   Transportation Needs: Unknown    Lack of Transportation (Medical): Not on file    Lack of Transportation (Non-Medical): No   Physical Activity: Sufficiently Active    Days of Exercise per Week: 3 days    Minutes of Exercise per Session: 60 min   Stress: Not on file   Social Connections: Not on file   Intimate Partner Violence: Not on file   Housing Stability: Unknown    Unable to Pay for Housing in the Last Year: Not on file    Number of Places Lived in the Last Year: Not on file    Unstable Housing in the Last Year: No       Vitals:  BP (!) 152/74   Pulse 55   Temp 97.9 °F (36.6 °C) (Oral)   Resp 16   Ht 5' 5\" (1.651 m)   Wt 191 lb 12.8 oz (87 kg)   SpO2 95%   BMI 31.92 kg/m²   Temp (24hrs), Av.9 °F (36.6 °C), Min:97.5 °F (36.4 °C), Max:98.2 °F (36.8 °C)    No results for input(s): POCGLU in the last 72 hours. I/O (24Hr):   No intake or output data in the 24 hours ending 23 1105    Labs:      CBC:   Lab Results   Component Value Date/Time    WBC 3.7 2023 08:49 AM    RBC 3.82 2023 08:49 AM    HGB 11.5 2023 08:49 AM    HCT 36.3 2023 08:49 AM    MCV 95.0 2023 08:49 AM    MCH 30.1 2023 08:49 AM    MCHC 31.7 2023 08:49 AM    RDW 14.6 2023 08:49 AM     2023 08:49 AM    MPV 9.7 2023 08:49 AM     CBC with Differential:    Lab Results   Component Value Date/Time    WBC 3.7 2023 08:49 AM    RBC 3.82 2023 08:49 AM    HGB 11.5 2023 08:49 AM    HCT 36.3 2023 08:49 AM     2023 08:49 AM    MCV 95.0 2023 08:49 AM    MCH 30.1 2023 08:49 AM    MCHC 31.7 2023 08:49 AM    RDW 14.6 2023 08:49 AM    LYMPHOPCT 13 2023 08:49 AM    MONOPCT 1 2023 08:49 AM    BASOPCT 0 2023 08:49 AM    MONOSABS 0.04 2023 08:49 AM    LYMPHSABS 0.48 2023 08:49 AM    EOSABS 0.00 2023 08:49 AM    BASOSABS 0.00 2023 08:49 AM    DIFFTYPE NOT REPORTED 11/10/2020 08:36 AM Hemoglobin/Hematocrit:    Lab Results   Component Value Date/Time    HGB 11.5 02/18/2023 08:49 AM    HCT 36.3 02/18/2023 08:49 AM     CMP:    Lab Results   Component Value Date/Time     02/19/2023 07:36 AM    K 4.0 02/19/2023 07:36 AM     02/19/2023 07:36 AM    CO2 24 02/19/2023 07:36 AM    BUN 37 02/19/2023 07:36 AM    CREATININE 1.52 02/19/2023 07:36 AM    GFRAA 45 12/09/2021 08:40 AM    LABGLOM 34 02/19/2023 07:36 AM    GLUCOSE 107 02/19/2023 07:36 AM    PROT 7.0 02/20/2023 08:46 AM    LABALBU 4.0 02/20/2023 08:46 AM    CALCIUM 8.9 02/19/2023 07:36 AM    BILITOT 0.9 02/20/2023 08:46 AM    ALKPHOS 79 02/20/2023 08:46 AM    AST 18 02/20/2023 08:46 AM    ALT 16 02/20/2023 08:46 AM     BMP:    Lab Results   Component Value Date/Time     02/19/2023 07:36 AM    K 4.0 02/19/2023 07:36 AM     02/19/2023 07:36 AM    CO2 24 02/19/2023 07:36 AM    BUN 37 02/19/2023 07:36 AM    LABALBU 4.0 02/20/2023 08:46 AM    CREATININE 1.52 02/19/2023 07:36 AM    CALCIUM 8.9 02/19/2023 07:36 AM    GFRAA 45 12/09/2021 08:40 AM    LABGLOM 34 02/19/2023 07:36 AM    GLUCOSE 107 02/19/2023 07:36 AM     PT/INR:    Lab Results   Component Value Date/Time    PROTIME 17.2 02/20/2023 05:34 AM    PROTIME 37.9 01/09/2023 10:35 AM    INR 1.4 02/20/2023 05:34 AM     PTT:    Lab Results   Component Value Date/Time    APTT 32.8 05/05/2019 02:47 PM   [APTT}    Physical Examination:        General appearance: alert, cooperative and no distress  Mental Status: oriented to person, place and time and normal affect  Lungs: clear to auscultation bilaterally, normal effort  Heart: regular rate and rhythm, no murmur,  Abdomen: soft, nontender, nondistended, bowel sounds present all four quadrants, no masses, hepatomegaly or splenomegaly  Extremities: no edema, redness or tenderness in the calves  Skin: no gross lesions, rashes, or induration    Assessment:        Primary Problem  Common bile duct dilatation     Active Hospital Problems    Diagnosis Date Noted    Closed wedge compression fracture of T8 vertebra ([facility] [S22.060A] 02/19/2023     Priority: Medium    Choledocholithiasis-suspected [K80.50] 02/16/2023     Priority: Medium    INR therapeutic [R79.1] 02/16/2023     Priority: Medium    Pain of upper abdomen [R10.10] 02/16/2023     Priority: Medium    Acute midline low back pain without sciatica [M54.50] 02/16/2023     Priority: Medium    Common bile duct dilatation [K83.8] 02/15/2023     Priority: Medium    Calculus of gallbladder without cholecystitis without obstruction [K80.20] 02/15/2023     Priority: Medium    Hypertension [I10] 06/03/2020    Gastroesophageal reflux disease [K21.9] 06/03/2020    Atrial fibrillation ([facility] [I48.91] 04/09/2019     Past Medical History:   Diagnosis Date    Allergic rhinitis     Arthritis     Asthma     Atrial fibrillation ([facility]     GERD (gastroesophageal reflux disease)     Hypertension     Melanoma ([facility]     on nose    Myocardial infarction ([facility] 2008    Wears glasses     Wears partial dentures     upper        Plan:        Abdominal pain, ? Sludge/choledocholithiasis on MRCP, s/p attempted ERCP aborted d/t inability to cannulate  LFTs normal  Clinically improved  Abdominal imaging reviewed with radiology. No clear-cut evidence of any CBD stone/obstruction  LFTs have remained normal  Had lengthy discussion with patient and her sister  Came to the consensus that we will hold on ERCP at present time given that clinically her symptoms are not consistent with CBD stone. Patient has normal LFTs, clinical improvement. We explained risks of ERCP including post-ERCP pancreatitis  Will plan for MRI t-spine today  Will follow up imaging tomorrow and plan further management  Consider EUS inpatient vs outpatient if question still remains  Soft diet today  NPO after midnight    Time spent reviewing the chart, seeing the patient, and discussing with the attending MD around 30 minutes.     Explained to the patient and d/W Nursing Staff  Will F/U with you  Please call or Page for any issues or change in status  Thanks    Electronically signed by DUDLEY Yeh NP on 2/20/2023 at 11:05 AM       GI attending physician note. Patient seen with DUDLEY     I independently performed an evaluation on [employer]. I have reviewed the above documentation completed by the Nurse Practitioner and agree with the history, examination, and management plan. Recommendations discussed. This morning around 8 AM, discussed with the patient and patient's daughter regarding patient's status, work-up. Also today I did review imaging studies with the radiologist.    Basing on the review, it is not clear whether patient has biliary ductal abnormality. Also patient symptoms not suggestive of biliary ductal stone. Her LFTs were persistently normal, lipase normal.  Her pain location not consistent with choledocholithiasis symptoms. After discussion, given that patient is doing well, it is decided to cancel the ERCP for today. If at all, she may need EUS as an outpatient. After discussion both she and his daughter agreed for conservative management at this time. If she is discharged advised to see in the office in the next 7 to 10 days. If he develops any pain etc. to contact us immediately. At present patient is going to have MRI of the back as her predominant symptom is back pain.     Discussed with Dr. Ephraim Allen regarding management plan

## 2023-02-20 NOTE — CONSULTS
Orthopedic Consult    Requesting Physician: [facility]    CHIEF COMPLAINT: Spine pain    HISTORY OF PRESENT ILLNESS:      The patient is a 79 y.o. female  who presents with 3-week history of spine symptoms. Spontaneous onset no trauma. Reports both mechanical and radicular pain into right and left abdomen. Self managed with OTC meds, changing activity. Presented to [facility] ER with admission, gastroenterology, general surgery, internal medicine, hospitalist consultations. 2-15 CT thoracolumbar spine, abdomen, pelvis identified T8 fracture. This was followed with thoracic MRI. No bowel or bladder sphincter incontinence. No acute symptoms right left upper extremity    Past orthopedic history includes polyostotic osteoarthritis. Has undergone satisfactory right RENETTA and left hip injection care. Also acknowledges right greater than left shoulder symptoms with stiffness, pain. No rheumatoid arthritis, lupus, gout. No reported oncology.     Past Medical History:    Past Medical History:   Diagnosis Date    Allergic rhinitis     Arthritis     Asthma     Atrial fibrillation ([facility] [place] [address]     GERD (gastroesophageal reflux disease)     Hypertension     Melanoma ([facility] [place] [address]     on nose    Myocardial infarction [facility]) 2008    Wears glasses     Wears partial dentures     upper       Past Surgical History:    Past Surgical History:   Procedure Laterality Date    APPENDECTOMY      BREAST SURGERY      CARDIAC SURGERY      CARPAL TUNNEL RELEASE Right     COLONOSCOPY N/A 8/8/2019    COLONOSCOPY POLYPECTOMY REMOVAL HOT BIOPSY AND RESOLUTION CLIP performed by Sujey Kearney MD at [facility]    ERCP N/A 2/17/2023    ERCP ENDOSCOPIC RETROGRADE CHOLANGIOPANCREATOGRAPHY performed by Giorgi Sifuentes MD at [address]      umbilical    HYSTERECTOMY (CERVIX STATUS UNKNOWN)      JOINT REPLACEMENT      NECK SURGERY      C5-C6    NOSE SURGERY      cancer removed    OTHER SURGICAL HISTORY      heart surg/growth in heart sac    TONSILLECTOMY      TOTAL HIP ARTHROPLASTY Right 04/09/2019    TOTAL HIP ARTHROPLASTY Right 4/9/2019    HIP TOTAL ARTHROPLASTY - MEDACTA, C-ARM, MEDACTA TABLE, NSA= GENERAL VS SPINAL, FASCIA ILLIACA BLOCK, *STANDARD performed by Juan Manuel Jhaveri DO at [facility] [address] N/A 5/6/2019    EGD DIAGNOSTIC ONLY performed by Sury Lala MD at [facility]       Medications Prior to Admission:   Current Facility-Administered Medications   Medication Dose Route Frequency Provider Last Rate Last Admin    warfarin placeholder: dosing by pharmacy   Other [facility] [address], MD        dilTIAZem (CARDIZEM CD) extended release capsule 180 mg  180 mg Oral Daily Milly Ansari MD   180 mg at 02/20/23 0914    oxyCODONE-acetaminophen (PERCOCET) 5-325 MG per tablet 1 tablet  1 tablet Oral Q6H PRN Lisa Vasquez MD   1 tablet at 02/20/23 4292    bisacodyl (DULCOLAX) suppository 10 mg  10 mg Rectal Daily PRN DUDLEY Melgar - CNP   10 mg at 02/18/23 2010    [Held by provider] aspirin EC tablet 81 mg  81 mg Oral Daily DUDLEY Mcconnell - NP   81 mg at 02/18/23 0917    azelastine (ASTELIN) 0.1 % nasal spray 2 spray  2 spray Each Nostril BID Raven Parkinson MD   2 spray at 02/20/23 0915    ferrous sulfate (FE TABS 325) EC tablet 325 mg  325 mg Oral BID AC Raven Parkinson MD   325 mg at 02/20/23 1535    montelukast (SINGULAIR) tablet 10 mg  10 mg Oral Nightly Raven Parkinson MD   10 mg at 02/19/23 1957    pantoprazole (PROTONIX) tablet 40 mg  40 mg Oral Daily Raven Parkinson MD   40 mg at 02/20/23 0511    hydrALAZINE (APRESOLINE) injection 10 mg  10 mg IntraVENous Q6H PRN Raven Parkinson MD   10 mg at 02/20/23 0532    cetirizine (ZYRTEC) tablet 5 mg  5 mg Oral Daily Raven Parkinson MD   5 mg at 02/20/23 0915    calcium carb-cholecalciferol 600-10 MG-MCG per tab 1 tablet  1 tablet Oral Daily Raven Parkinson MD   1 tablet at 02/20/23 0989    calcium carbonate (TUMS) chewable tablet 500 mg  500 mg Oral TID PRN Yancey Osler, MD   500 mg at 02/19/23 1003    piperacillin-tazobactam (ZOSYN) 3,375 mg in sodium chloride 0.9 % 50 mL IVPB (mini-bag)  3,375 mg IntraVENous Gorod Keane MD   Stopped at 02/20/23 1318    amiodarone (CORDARONE) tablet 100 mg  100 mg Oral Daily Yancey Osler, MD   100 mg at 02/19/23 1002    morphine (PF) injection 2 mg  2 mg IntraVENous Q3H PRN Yancey Osler, MD   2 mg at 02/19/23 1958    fluticasone (FLONASE) 50 MCG/ACT nasal spray 1 spray  1 spray Each Nostril Daily Yancey Osler, MD   1 spray at 02/20/23 0915    sodium chloride flush 0.9 % injection 5-40 mL  5-40 mL IntraVENous 2 times per day Yancey Osler, MD   10 mL at 02/19/23 1148    sodium chloride flush 0.9 % injection 10 mL  10 mL IntraVENous PRN Yancey Osler, MD   10 mL at 02/17/23 1538    0.9 % sodium chloride infusion   IntraVENous PRN Yancey Osler, MD        potassium chloride (KLOR-CON M) extended release tablet 40 mEq  40 mEq Oral PRN Yancey Osler, MD        Or    potassium bicarb-citric acid (EFFER-K) effervescent tablet 40 mEq  40 mEq Oral PRN Yancey Osler, MD        Or    potassium chloride 10 mEq/100 mL IVPB (Peripheral Line)  10 mEq IntraVENous PRN Yancey Osler, MD        magnesium sulfate 1000 mg in dextrose 5% 100 mL IVPB  1,000 mg IntraVENous PRN Yancey Osler, MD        ondansetron (ZOFRAN-ODT) disintegrating tablet 4 mg  4 mg Oral Q8H PRN Yancey Osler, MD        Or    ondansetron [facility]) injection 4 mg  4 mg IntraVENous Q6H PRN Yancey Osler, MD        polyethylene glycol (GLYCOLAX) packet 17 g  17 g Oral Daily PRN Yancey Osler, MD   17 g at 02/20/23 1535    acetaminophen (TYLENOL) tablet 650 mg  650 mg Oral Q6H PRN Yancey Osler, MD   650 mg at 02/15/23 2310    Or    acetaminophen (TYLENOL) suppository 650 mg  650 mg Rectal Q6H PRN Yancey Osler, MD        budesonide-formoterol (SYMBICORT) 160-4.5 MCG/ACT inhaler 2 puff  2 puff Inhalation BID Yancey Osler, MD   2 puff at 23 0728    tiotropium (SPIRIVA RESPIMAT) 2.5 MCG/ACT inhaler 2 puff  2 puff Inhalation Daily Vinicio Napoles MD   2 puff at 23 6726         Allergies:  Patient has no known allergies.     Social History:   Social History     Tobacco Use   Smoking Status Former    Packs/day: 1.00    Years: 20.00    Pack years: 20.00    Types: Cigarettes    Quit date: 1981    Years since quittin.1   Smokeless Tobacco Never     Social History     Substance and Sexual Activity   Alcohol Use No     Social History     Substance and Sexual Activity   Drug Use No       Family History:  Family History   Problem Relation Age of Onset    COPD Mother     Asthma Mother     Cancer Mother     Cancer Father     Stroke Sister     COPD Sister     Emphysema Sister          REVIEW OF SYSTEMS:  Gen: Negative for nausea, vomiting, diarrhea, fever, chills, night sweats  Heart: Negative for HTN, palpitations, chest pain  Lungs: Negative for wheezes, asthma or SOB  GI: Negative for nausea, vomiting  Endo: Negative for diabetes  Heme: Negative for DVT       PHYSICAL EXAM:  Patient Vitals for the past 24 hrs:   BP Temp Temp src Pulse Resp SpO2 Weight   23 1526 (!) 140/49 97.3 °F (36.3 °C) Oral 53 16 93 % --   23 0953 -- -- -- -- 18 -- --   23 0923 -- -- -- -- 16 -- --   23 0848 (!) 152/74 97.9 °F (36.6 °C) Oral 55 16 95 % --   23 0729 -- -- -- -- -- 94 % --   23 0547 -- -- -- -- -- -- 191 lb 12.8 oz (87 kg)   23 0530 (!) 169/85 -- -- 50 -- -- --   23 0415 (!) 186/63 97.7 °F (36.5 °C) -- 56 18 94 % --   23 0020 (!) 132/58 98.2 °F (36.8 °C) Oral 50 16 94 % --   23 137/60 98.1 °F (36.7 °C) Oral 50 16 94 % --     Gen: alert and oriented  Head: normorcephalic, atraumatic  Neck: supple  Heart: RRR  Lungs: No audible wheezes  Abdomen: soft  Pelvis: stable  Extremity no evidence of lateralizing radiculopathy or myopathy motor or sensory and reflex C5-S1 range of motion of spine deferred. right and left upper extremity shows painful arc syndrome right shoulder. Elevation 130 degrees. Negative drop arm test.  Unremarkable ipsilateral elbow, wrist, hand examination without edema, ecchymosis, crepitus, pain    Right and left lower extremity well-healed surgical incisions post ASI RENETTA. Arthritic knees with restricted range, palpable osteophyte, crepitus. Normal DP PT pulses peroneal and tibial motor or sensory function. DATA:  CBC:   Lab Results   Component Value Date/Time    WBC 3.7 02/18/2023 08:49 AM    HGB 11.5 02/18/2023 08:49 AM     02/18/2023 08:49 AM     BMP:    Lab Results   Component Value Date/Time     02/19/2023 07:36 AM    K 4.0 02/19/2023 07:36 AM     02/19/2023 07:36 AM    CO2 24 02/19/2023 07:36 AM    BUN 37 02/19/2023 07:36 AM    CREATININE 1.52 02/19/2023 07:36 AM    CALCIUM 8.9 02/19/2023 07:36 AM    GLUCOSE 107 02/19/2023 07:36 AM     PT/INR:    Lab Results   Component Value Date/Time    PROTIME 17.2 02/20/2023 05:34 AM    PROTIME 37.9 01/09/2023 10:35 AM    INR 1.4 02/20/2023 05:34 AM     Troponin:  No results found for: [address] [place] [address],[address]    Radiology:     Imaging reviewed. 2-15 CT thoracic, lumbar spine, abdomen, pelvis reviewed for bone windows. Shows degenerative spondylolisthesis L3-4 grade 1. Lumbar facet arthritis, DDD with Modic grade 3 changes L2-3, L4-5. Gordy phenomenon. Superior endplate compression fracture acute T8 50% loss of anterior column height. MRI thoracic spine reconfirms acute fragility fracture. Low signal T1, high signal STIR changes. Compression fracture. No retropulsion.   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 *       PLAN:  1) treatment options for her T8 fracture reviewed. Nonoperative care via TLSO, pain control versus surgery. Kyphoplasty vertebral augmentation, biopsy would be beneficial to decrease pain, decrease kyphotic deformity, eliminate the need for spine orthosis, obtain tissue diagnosis. Risk and benefits outlined. She wishes to proceed. I will consequently schedule for 2-21 late afternoon. 2.  Vitamin D 25-hydroxy level    3. Pain control    4. Hold anticoagulants    5. Thank you very much for the consultation. We will follow her closely with you.         Ish Ch MD

## 2023-02-20 NOTE — PLAN OF CARE
Problem: Discharge Planning  Goal: Discharge to home or other facility with appropriate resources  2/20/2023 1611 by Duane Go RN  Outcome: Progressing     Problem: Pain  Goal: Verbalizes/displays adequate comfort level or baseline comfort level  2/20/2023 1611 by Duane Go RN  Outcome: Progressing     Problem: Gastrointestinal - Adult  Goal: Maintains or returns to baseline bowel function  Outcome: Progressing

## 2023-02-20 NOTE — PROGRESS NOTES
Occupational [address]  Occupational Therapy Not Seen Note    Patient not available for Occupational Therapy due to:    [] Testing:    [] Hemodialysis    [] Cancelled by RN:    [] Refusal by Patient:    [] Surgery:     [] Intubation:     [] Pain Medication:    [] Sedation:     [] Spine Precautions :    [] Medical Instability:    [x] Other:2/20: Discussed with pt and RN, pt is independent with no skilled OT needs at this time. Pt independently took a shower yesterday with no difficulties. Will discharge from OT, please reorder if there is a functional status change.     Nazario Bowers, OT

## 2023-02-21 ENCOUNTER — ANESTHESIA (OUTPATIENT)
Dept: OPERATING ROOM | Age: 83
DRG: 982 | End: 2023-02-21
Payer: MEDICARE

## 2023-02-21 ENCOUNTER — ANESTHESIA EVENT (OUTPATIENT)
Dept: OPERATING ROOM | Age: 83
DRG: 982 | End: 2023-02-21
Payer: MEDICARE

## 2023-02-21 ENCOUNTER — APPOINTMENT (OUTPATIENT)
Dept: GENERAL RADIOLOGY | Age: 83
DRG: 982 | End: 2023-02-21
Payer: MEDICARE

## 2023-02-21 LAB
INR PPP: 1.4
PROTHROMBIN TIME: 16.7 SEC (ref 11.5–14.2)

## 2023-02-21 PROCEDURE — 2720000010 HC SURG SUPPLY STERILE: Performed by: ORTHOPAEDIC SURGERY

## 2023-02-21 PROCEDURE — 6360000002 HC RX W HCPCS: Performed by: INTERNAL MEDICINE

## 2023-02-21 PROCEDURE — 6370000000 HC RX 637 (ALT 250 FOR IP): Performed by: INTERNAL MEDICINE

## 2023-02-21 PROCEDURE — 3600000002 HC SURGERY LEVEL 2 BASE: Performed by: ORTHOPAEDIC SURGERY

## 2023-02-21 PROCEDURE — 85610 PROTHROMBIN TIME: CPT

## 2023-02-21 PROCEDURE — 94761 N-INVAS EAR/PLS OXIMETRY MLT: CPT

## 2023-02-21 PROCEDURE — APPSS30 APP SPLIT SHARED TIME 16-30 MINUTES: Performed by: NURSE PRACTITIONER

## 2023-02-21 PROCEDURE — 3600000012 HC SURGERY LEVEL 2 ADDTL 15MIN: Performed by: ORTHOPAEDIC SURGERY

## 2023-02-21 PROCEDURE — 2580000003 HC RX 258: Performed by: INTERNAL MEDICINE

## 2023-02-21 PROCEDURE — C1713 ANCHOR/SCREW BN/BN,TIS/BN: HCPCS | Performed by: ORTHOPAEDIC SURGERY

## 2023-02-21 PROCEDURE — 0PU43JZ SUPPLEMENT THORACIC VERTEBRA WITH SYNTHETIC SUBSTITUTE, PERCUTANEOUS APPROACH: ICD-10-PCS | Performed by: ORTHOPAEDIC SURGERY

## 2023-02-21 PROCEDURE — 99232 SBSQ HOSP IP/OBS MODERATE 35: CPT | Performed by: INTERNAL MEDICINE

## 2023-02-21 PROCEDURE — 0PB43ZX EXCISION OF THORACIC VERTEBRA, PERCUTANEOUS APPROACH, DIAGNOSTIC: ICD-10-PCS | Performed by: ORTHOPAEDIC SURGERY

## 2023-02-21 PROCEDURE — 6370000000 HC RX 637 (ALT 250 FOR IP): Performed by: ORTHOPAEDIC SURGERY

## 2023-02-21 PROCEDURE — 1200000000 HC SEMI PRIVATE

## 2023-02-21 PROCEDURE — 36415 COLL VENOUS BLD VENIPUNCTURE: CPT

## 2023-02-21 PROCEDURE — 3700000001 HC ADD 15 MINUTES (ANESTHESIA): Performed by: ORTHOPAEDIC SURGERY

## 2023-02-21 PROCEDURE — 88342 IMHCHEM/IMCYTCHM 1ST ANTB: CPT

## 2023-02-21 PROCEDURE — 3209999900 FLUORO FOR SURGICAL PROCEDURES

## 2023-02-21 PROCEDURE — 2709999900 HC NON-CHARGEABLE SUPPLY: Performed by: ORTHOPAEDIC SURGERY

## 2023-02-21 PROCEDURE — 94640 AIRWAY INHALATION TREATMENT: CPT

## 2023-02-21 PROCEDURE — 88360 TUMOR IMMUNOHISTOCHEM/MANUAL: CPT

## 2023-02-21 PROCEDURE — 2580000003 HC RX 258: Performed by: ORTHOPAEDIC SURGERY

## 2023-02-21 PROCEDURE — 99232 SBSQ HOSP IP/OBS MODERATE 35: CPT | Performed by: NURSE PRACTITIONER

## 2023-02-21 PROCEDURE — 88311 DECALCIFY TISSUE: CPT

## 2023-02-21 PROCEDURE — 7100000000 HC PACU RECOVERY - FIRST 15 MIN: Performed by: ORTHOPAEDIC SURGERY

## 2023-02-21 PROCEDURE — 88307 TISSUE EXAM BY PATHOLOGIST: CPT

## 2023-02-21 PROCEDURE — 7100000001 HC PACU RECOVERY - ADDTL 15 MIN: Performed by: ORTHOPAEDIC SURGERY

## 2023-02-21 PROCEDURE — 6360000004 HC RX CONTRAST MEDICATION: Performed by: ORTHOPAEDIC SURGERY

## 2023-02-21 PROCEDURE — 0PS43ZZ REPOSITION THORACIC VERTEBRA, PERCUTANEOUS APPROACH: ICD-10-PCS | Performed by: ORTHOPAEDIC SURGERY

## 2023-02-21 PROCEDURE — 6360000002 HC RX W HCPCS: Performed by: NURSE ANESTHETIST, CERTIFIED REGISTERED

## 2023-02-21 PROCEDURE — C1894 INTRO/SHEATH, NON-LASER: HCPCS | Performed by: ORTHOPAEDIC SURGERY

## 2023-02-21 PROCEDURE — 2500000003 HC RX 250 WO HCPCS: Performed by: NURSE ANESTHETIST, CERTIFIED REGISTERED

## 2023-02-21 PROCEDURE — 2580000003 HC RX 258: Performed by: NURSE ANESTHETIST, CERTIFIED REGISTERED

## 2023-02-21 PROCEDURE — 2500000003 HC RX 250 WO HCPCS: Performed by: ORTHOPAEDIC SURGERY

## 2023-02-21 PROCEDURE — 3700000000 HC ANESTHESIA ATTENDED CARE: Performed by: ORTHOPAEDIC SURGERY

## 2023-02-21 DEVICE — CEMENT C01A KYPHX HV-R BONE CEMENT EN
Type: IMPLANTABLE DEVICE | Site: BACK | Status: FUNCTIONAL
Brand: KYPHON® HV-R® BONE CEMENT

## 2023-02-21 RX ORDER — ONDANSETRON 2 MG/ML
4 INJECTION INTRAMUSCULAR; INTRAVENOUS
Status: DISCONTINUED | OUTPATIENT
Start: 2023-02-21 | End: 2023-02-21 | Stop reason: HOSPADM

## 2023-02-21 RX ORDER — PROPOFOL 10 MG/ML
INJECTION, EMULSION INTRAVENOUS PRN
Status: DISCONTINUED | OUTPATIENT
Start: 2023-02-21 | End: 2023-02-21 | Stop reason: SDUPTHER

## 2023-02-21 RX ORDER — SODIUM CHLORIDE, SODIUM LACTATE, POTASSIUM CHLORIDE, CALCIUM CHLORIDE 600; 310; 30; 20 MG/100ML; MG/100ML; MG/100ML; MG/100ML
INJECTION, SOLUTION INTRAVENOUS CONTINUOUS
Status: DISCONTINUED | OUTPATIENT
Start: 2023-02-21 | End: 2023-02-22 | Stop reason: HOSPADM

## 2023-02-21 RX ORDER — SODIUM CHLORIDE 0.9 % (FLUSH) 0.9 %
5-40 SYRINGE (ML) INJECTION EVERY 12 HOURS SCHEDULED
Status: DISCONTINUED | OUTPATIENT
Start: 2023-02-21 | End: 2023-02-21 | Stop reason: HOSPADM

## 2023-02-21 RX ORDER — M-VIT,TX,IRON,MINS/CALC/FOLIC 27MG-0.4MG
1 TABLET ORAL DAILY
Status: DISCONTINUED | OUTPATIENT
Start: 2023-02-21 | End: 2023-02-22 | Stop reason: HOSPADM

## 2023-02-21 RX ORDER — IBUPROFEN 200 MG
400 TABLET ORAL EVERY 12 HOURS
Status: DISCONTINUED | OUTPATIENT
Start: 2023-02-21 | End: 2023-02-22 | Stop reason: HOSPADM

## 2023-02-21 RX ORDER — ENOXAPARIN SODIUM 100 MG/ML
80 INJECTION SUBCUTANEOUS 2 TIMES DAILY
Status: DISCONTINUED | OUTPATIENT
Start: 2023-02-22 | End: 2023-02-22 | Stop reason: HOSPADM

## 2023-02-21 RX ORDER — BUPIVACAINE HYDROCHLORIDE AND EPINEPHRINE 5; 5 MG/ML; UG/ML
INJECTION, SOLUTION EPIDURAL; INTRACAUDAL; PERINEURAL PRN
Status: DISCONTINUED | OUTPATIENT
Start: 2023-02-21 | End: 2023-02-21 | Stop reason: ALTCHOICE

## 2023-02-21 RX ORDER — CEFAZOLIN SODIUM 1 G/3ML
INJECTION, POWDER, FOR SOLUTION INTRAMUSCULAR; INTRAVENOUS PRN
Status: DISCONTINUED | OUTPATIENT
Start: 2023-02-21 | End: 2023-02-21 | Stop reason: SDUPTHER

## 2023-02-21 RX ORDER — FENTANYL CITRATE 50 UG/ML
INJECTION, SOLUTION INTRAMUSCULAR; INTRAVENOUS PRN
Status: DISCONTINUED | OUTPATIENT
Start: 2023-02-21 | End: 2023-02-21 | Stop reason: SDUPTHER

## 2023-02-21 RX ORDER — SODIUM CHLORIDE, SODIUM LACTATE, POTASSIUM CHLORIDE, CALCIUM CHLORIDE 600; 310; 30; 20 MG/100ML; MG/100ML; MG/100ML; MG/100ML
INJECTION, SOLUTION INTRAVENOUS CONTINUOUS PRN
Status: DISCONTINUED | OUTPATIENT
Start: 2023-02-21 | End: 2023-02-21 | Stop reason: SDUPTHER

## 2023-02-21 RX ORDER — SODIUM CHLORIDE 0.9 % (FLUSH) 0.9 %
5-40 SYRINGE (ML) INJECTION EVERY 12 HOURS SCHEDULED
Status: DISCONTINUED | OUTPATIENT
Start: 2023-02-21 | End: 2023-02-22 | Stop reason: HOSPADM

## 2023-02-21 RX ORDER — ACETAMINOPHEN AND CODEINE PHOSPHATE 300; 30 MG/1; MG/1
1 TABLET ORAL EVERY 4 HOURS PRN
Status: DISCONTINUED | OUTPATIENT
Start: 2023-02-21 | End: 2023-02-22 | Stop reason: HOSPADM

## 2023-02-21 RX ORDER — CYCLOBENZAPRINE HCL 10 MG
10 TABLET ORAL 3 TIMES DAILY PRN
Status: DISCONTINUED | OUTPATIENT
Start: 2023-02-21 | End: 2023-02-22 | Stop reason: HOSPADM

## 2023-02-21 RX ORDER — SODIUM CHLORIDE 0.9 % (FLUSH) 0.9 %
5-40 SYRINGE (ML) INJECTION PRN
Status: DISCONTINUED | OUTPATIENT
Start: 2023-02-21 | End: 2023-02-22 | Stop reason: HOSPADM

## 2023-02-21 RX ORDER — FAMOTIDINE 20 MG/1
20 TABLET, FILM COATED ORAL NIGHTLY
Status: DISCONTINUED | OUTPATIENT
Start: 2023-02-21 | End: 2023-02-21

## 2023-02-21 RX ORDER — SODIUM CHLORIDE 9 MG/ML
INJECTION, SOLUTION INTRAVENOUS PRN
Status: DISCONTINUED | OUTPATIENT
Start: 2023-02-21 | End: 2023-02-22 | Stop reason: HOSPADM

## 2023-02-21 RX ORDER — FENTANYL CITRATE 50 UG/ML
25 INJECTION, SOLUTION INTRAMUSCULAR; INTRAVENOUS EVERY 5 MIN PRN
Status: DISCONTINUED | OUTPATIENT
Start: 2023-02-21 | End: 2023-02-21 | Stop reason: HOSPADM

## 2023-02-21 RX ORDER — SODIUM CHLORIDE 9 MG/ML
INJECTION, SOLUTION INTRAVENOUS PRN
Status: DISCONTINUED | OUTPATIENT
Start: 2023-02-21 | End: 2023-02-21 | Stop reason: HOSPADM

## 2023-02-21 RX ORDER — SODIUM CHLORIDE 0.9 % (FLUSH) 0.9 %
5-40 SYRINGE (ML) INJECTION PRN
Status: DISCONTINUED | OUTPATIENT
Start: 2023-02-21 | End: 2023-02-21 | Stop reason: HOSPADM

## 2023-02-21 RX ORDER — HYDROMORPHONE HYDROCHLORIDE 1 MG/ML
0.5 INJECTION, SOLUTION INTRAMUSCULAR; INTRAVENOUS; SUBCUTANEOUS EVERY 5 MIN PRN
Status: DISCONTINUED | OUTPATIENT
Start: 2023-02-21 | End: 2023-02-21 | Stop reason: HOSPADM

## 2023-02-21 RX ORDER — LIDOCAINE HYDROCHLORIDE 20 MG/ML
INJECTION, SOLUTION EPIDURAL; INFILTRATION; INTRACAUDAL; PERINEURAL PRN
Status: DISCONTINUED | OUTPATIENT
Start: 2023-02-21 | End: 2023-02-21 | Stop reason: SDUPTHER

## 2023-02-21 RX ORDER — ACETAMINOPHEN 325 MG/1
650 TABLET ORAL EVERY 6 HOURS
Status: DISCONTINUED | OUTPATIENT
Start: 2023-02-21 | End: 2023-02-22 | Stop reason: HOSPADM

## 2023-02-21 RX ORDER — LANOLIN ALCOHOL/MO/W.PET/CERES
325 CREAM (GRAM) TOPICAL
Status: DISCONTINUED | OUTPATIENT
Start: 2023-02-22 | End: 2023-02-22 | Stop reason: HOSPADM

## 2023-02-21 RX ADMIN — OXYCODONE AND ACETAMINOPHEN 1 TABLET: 5; 325 TABLET ORAL at 05:51

## 2023-02-21 RX ADMIN — PIPERACILLIN AND TAZOBACTAM 3375 MG: 3; .375 INJECTION, POWDER, FOR SOLUTION INTRAVENOUS at 10:52

## 2023-02-21 RX ADMIN — SODIUM CHLORIDE, POTASSIUM CHLORIDE, SODIUM LACTATE AND CALCIUM CHLORIDE: 600; 310; 30; 20 INJECTION, SOLUTION INTRAVENOUS at 16:41

## 2023-02-21 RX ADMIN — SODIUM CHLORIDE, POTASSIUM CHLORIDE, SODIUM LACTATE AND CALCIUM CHLORIDE: 600; 310; 30; 20 INJECTION, SOLUTION INTRAVENOUS at 21:00

## 2023-02-21 RX ADMIN — FENTANYL CITRATE 25 MCG: 50 INJECTION INTRAMUSCULAR; INTRAVENOUS at 17:12

## 2023-02-21 RX ADMIN — HYDRALAZINE HYDROCHLORIDE 10 MG: 20 INJECTION INTRAMUSCULAR; INTRAVENOUS at 04:30

## 2023-02-21 RX ADMIN — FENTANYL CITRATE 25 MCG: 50 INJECTION INTRAMUSCULAR; INTRAVENOUS at 17:05

## 2023-02-21 RX ADMIN — BUDESONIDE AND FORMOTEROL FUMARATE DIHYDRATE 2 PUFF: 160; 4.5 AEROSOL RESPIRATORY (INHALATION) at 08:25

## 2023-02-21 RX ADMIN — ACETAMINOPHEN AND CODEINE PHOSPHATE 1 TABLET: 300; 30 TABLET ORAL at 21:01

## 2023-02-21 RX ADMIN — AZELASTINE HYDROCHLORIDE 2 SPRAY: 137 SPRAY, METERED NASAL at 10:53

## 2023-02-21 RX ADMIN — BUDESONIDE AND FORMOTEROL FUMARATE DIHYDRATE 2 PUFF: 160; 4.5 AEROSOL RESPIRATORY (INHALATION) at 19:54

## 2023-02-21 RX ADMIN — FENTANYL CITRATE 25 MCG: 50 INJECTION INTRAMUSCULAR; INTRAVENOUS at 16:44

## 2023-02-21 RX ADMIN — PROPOFOL 25 MCG/KG/MIN: 10 INJECTION, EMULSION INTRAVENOUS at 16:50

## 2023-02-21 RX ADMIN — FENTANYL CITRATE 25 MCG: 50 INJECTION INTRAMUSCULAR; INTRAVENOUS at 17:03

## 2023-02-21 RX ADMIN — PANTOPRAZOLE SODIUM 40 MG: 40 TABLET, DELAYED RELEASE ORAL at 05:52

## 2023-02-21 RX ADMIN — FERROUS SULFATE TAB EC 325 MG (65 MG FE EQUIVALENT) 325 MG: 325 (65 FE) TABLET DELAYED RESPONSE at 05:51

## 2023-02-21 RX ADMIN — CEFAZOLIN 2 G: 1 INJECTION, POWDER, FOR SOLUTION INTRAMUSCULAR; INTRAVENOUS at 16:59

## 2023-02-21 RX ADMIN — FLUTICASONE PROPIONATE 1 SPRAY: 50 SPRAY, METERED NASAL at 10:53

## 2023-02-21 RX ADMIN — HYDRALAZINE HYDROCHLORIDE 10 MG: 20 INJECTION INTRAMUSCULAR; INTRAVENOUS at 12:59

## 2023-02-21 RX ADMIN — AZELASTINE HYDROCHLORIDE 2 SPRAY: 137 SPRAY, METERED NASAL at 21:01

## 2023-02-21 RX ADMIN — LIDOCAINE HYDROCHLORIDE 50 MG: 20 INJECTION, SOLUTION EPIDURAL; INFILTRATION; INTRACAUDAL at 16:44

## 2023-02-21 RX ADMIN — IBUPROFEN 400 MG: 200 TABLET, FILM COATED ORAL at 21:00

## 2023-02-21 RX ADMIN — MONTELUKAST 10 MG: 10 TABLET, FILM COATED ORAL at 21:01

## 2023-02-21 RX ADMIN — MORPHINE SULFATE 2 MG: 2 INJECTION, SOLUTION INTRAMUSCULAR; INTRAVENOUS at 12:59

## 2023-02-21 RX ADMIN — PROPOFOL 50 MG: 10 INJECTION, EMULSION INTRAVENOUS at 16:44

## 2023-02-21 RX ADMIN — PIPERACILLIN AND TAZOBACTAM 3375 MG: 3; .375 INJECTION, POWDER, FOR SOLUTION INTRAVENOUS at 01:47

## 2023-02-21 RX ADMIN — TIOTROPIUM BROMIDE INHALATION SPRAY 2 PUFF: 3.12 SPRAY, METERED RESPIRATORY (INHALATION) at 08:25

## 2023-02-21 ASSESSMENT — PAIN DESCRIPTION - PAIN TYPE
TYPE: ACUTE PAIN
TYPE: SURGICAL PAIN

## 2023-02-21 ASSESSMENT — PAIN DESCRIPTION - LOCATION
LOCATION: BACK

## 2023-02-21 ASSESSMENT — PAIN DESCRIPTION - ONSET: ONSET: ON-GOING

## 2023-02-21 ASSESSMENT — PAIN DESCRIPTION - FREQUENCY
FREQUENCY: CONTINUOUS
FREQUENCY: CONTINUOUS

## 2023-02-21 ASSESSMENT — PAIN - FUNCTIONAL ASSESSMENT: PAIN_FUNCTIONAL_ASSESSMENT: PREVENTS OR INTERFERES SOME ACTIVE ACTIVITIES AND ADLS

## 2023-02-21 ASSESSMENT — PAIN DESCRIPTION - DESCRIPTORS
DESCRIPTORS: ACHING
DESCRIPTORS: ACHING;DISCOMFORT

## 2023-02-21 ASSESSMENT — PAIN SCALES - GENERAL
PAINLEVEL_OUTOF10: 9
PAINLEVEL_OUTOF10: 8
PAINLEVEL_OUTOF10: 4
PAINLEVEL_OUTOF10: 5

## 2023-02-21 ASSESSMENT — PAIN DESCRIPTION - ORIENTATION
ORIENTATION: MID
ORIENTATION: MID

## 2023-02-21 NOTE — BRIEF OP NOTE
Brief Postoperative Note      Patient: Tamra Clock  YOB: 1940  MRN: [id number]    Date of Procedure: 2/21/2023    Pre-Op Diagnosis: Compression fracture of T8 vertebra with delayed healing [S22.060G]    Post-Op Diagnosis:  Pathological osteoporotic T8 compression fracture       Procedure(s):  T8 KYPHOPLASTY WITH BIOPSY; regional Marcaine thoracic block; AP lateral fluoroscopy thoracic spine    Surgeon(s): Wolf Craven MD    Assistant:  * No surgical staff found *    Anesthesia: General    Estimated Blood Loss (mL): Minimal    Complications: None    Specimens:   ID Type Source Tests Collected by Time Destination   A : BONE BIOPSY T8 Bone Back SURGICAL PATHOLOGY Wolf Craven MD 2/21/2023 1704        Implants:  Implant Name Type Inv.  Item Serial No.  Lot No. LRB No. Used Action   CEMENT BNE HI VISC RADIOPAQUE KYPHON HV-R - [id number]  CEMENT BNE HI VISC RADIOPAQUE KYPHON HV-R  [id number] N/A 1 Implanted         Drains: * No LDAs found *    Findings: See dictation    Electronically signed by Wolf Craven MD on 2/21/2023 at 5:18 PM

## 2023-02-21 NOTE — PLAN OF CARE
Problem: Discharge Planning  Goal: Discharge to home or other facility with appropriate resources  Outcome: Progressing     Problem: Respiratory - Adult  Goal: Able to breathe comfortably  2/21/2023 0826 by Melody Kunz RCP  Outcome: Progressing  Goal: Patient's breath sounds will be clear and equal  2/21/2023 0826 by Melody Kunz RCP  Outcome: Progressing

## 2023-02-21 NOTE — ANESTHESIA POSTPROCEDURE EVALUATION
Department of Anesthesiology  Postprocedure Note    Patient: Alireza Frank  MRN: [id number]  YOB: 1940  Date of evaluation: 2/21/2023      Procedure Summary     Date: 02/21/23 Room / Location: [address]    Anesthesia Start: 3340 Anesthesia Stop: 1729    Procedure: T8 KYPHOPLASTY WITH BIOPSY (Back) Diagnosis:       Compression fracture of T8 vertebra with delayed healing      (Compression fracture of T8 vertebra with delayed healing [S22.060G])    Surgeons: Pam Nunn MD Responsible Provider: Loly Chinchilla MD    Anesthesia Type: general, TIVA ASA Status: 3          Anesthesia Type: No value filed.     Zeke Phase I: Zeke Score: 8    Zeke Phase II:        Anesthesia Post Evaluation    Patient location during evaluation: PACU  Patient participation: complete - patient participated  Level of consciousness: awake  Airway patency: patent  Nausea & Vomiting: no nausea  Complications: no  Cardiovascular status: blood pressure returned to baseline  Respiratory status: acceptable  Hydration status: euvolemic  Comments: Multimodal analgesia pain management as indicated by procedure  Multimodal analgesia pain management approach

## 2023-02-21 NOTE — PROGRESS NOTES
Nutrition Assessment     Type and Reason for Visit: Reassess    Nutrition Recommendations/Plan:   Diet NPO Exceptions are: Sips of Water with Meds  Resume diet when medically appropriate  Monitor p.o intakes and labs     Malnutrition Assessment:  Malnutrition Status: Moderate malnutrition    Nutrition Assessment:  Patient is status post aborted ERCP. Patient states that she did feel better after ERCP and her appetite started to improve. Patient reports she ate % of pot roast and mac and cheese for dinner last night. Patient is NPO today for Kyphoplasty to repair compression fracture. Advance diet when medically appropriate. Monitor p.o intakes and labs. Estimated Daily Nutrient Needs:  Energy (kcal):  8608-4191 kcal (20-22 kcal/kg) Weight Used for Energy Requirements: Admission     Protein (g):  74-86 gm of protein (1.3-1.5 gm/kg) Weight Used for Protein Requirements: Ideal            Nutrition Related Findings:   Edema: +1 non-pitting BLE. Active bowel sounds. Constipation. S/P ERCP (2/17/23) Wound Type: None    Current Nutrition Therapies:    Diet NPO Exceptions are: Sips of Water with Meds    Anthropometric Measures:  Height: 5' 5\" (165.1 cm)  Current Body Wt: 187 lb (84.8 kg) (2/17/23)   BMI: 31.1    Nutrition Diagnosis: In context of acute, non-illness related, Moderate malnutrition related to inadequate protein-energy intake as evidenced by weight loss, intake 0-25%, poor intake prior to admission, weight loss 1%-2% in 1 week    Nutrition Interventions:   Food and/or Nutrient Delivery: Continue NPO  Nutrition Education/Counseling: Education not indicated  Coordination of Nutrition Care: Continue to monitor while inpatient       Goals:     Goals: Initiate PO diet, within 2 days       Nutrition Monitoring and Evaluation:      Food/Nutrient Intake Outcomes: Diet Advancement/Tolerance  Physical Signs/Symptoms Outcomes: Biochemical Data, Fluid Status or Edema, Skin, Weight    Discharge Planning:     Too soon to determine           Maggie Jacinto  MFN, RDN, LDN  Lead Clinical Dietitian  RD Office Phone [phone]

## 2023-02-21 NOTE — PLAN OF CARE
Problem: Respiratory - Adult  Goal: Able to breathe comfortably  2/21/2023 0826 by Angelica Haddad RCP  Outcome: Progressing  2/20/2023 2204 by Lurdes Short RCP  Outcome: Progressing  Goal: Patient's breath sounds will be clear and equal  2/21/2023 0826 by Angelica Haddad RCP  Outcome: Progressing  2/20/2023 2204 by Lurdes Short RCP  Outcome: Progressing

## 2023-02-21 NOTE — PROGRESS NOTES
Section of Cardiology  Progress Note      Date:  2/21/2023  Patient: Rachel Watt  Admission:  2/15/2023  2:17 PM  Admit DX: Common bile duct dilatation [K83.8]  Pain of upper abdomen [R10.10]  Acute midline low back pain without sciatica [M54.50]  Age:  79 y.o., 1940     LOS: 6 days     Reason for evaluation:   atrial fibrillation and hypertension      SUBJECTIVE:     The patient was seen and examined. Notes and labs reviewed. Patient still has significant back pain radiating to the front. She is going for kyphoplasty later this afternoon. She denies any chest pain or shortness of breath or dizziness or palpitations or legs edema. Monitor showed sinus rhythm    OBJECTIVE:    Telemetry: Sinus  BP (!) 159/71   Pulse 52   Temp 98.2 °F (36.8 °C) (Oral)   Resp 16   Ht 5' 5\" (1.651 m)   Wt 191 lb 12.8 oz (87 kg)   SpO2 95%   BMI 31.92 kg/m²     Intake/Output Summary (Last 24 hours) at 2/21/2023 1515  Last data filed at 2/20/2023 1814  Gross per 24 hour   Intake 300 ml   Output --   Net 300 ml         EXAM:   CONSTITUTIONAL:  awake, alert, cooperative, no apparent distress, and appears stated age. HEENT: Normal jugular venous pulsations, no carotid bruits. Head is atraumatic, normocephalic. Eyes and oral mucosa are normal.  LUNGS: Nonlabored, clear to auscultation without rales or rhonchi or wheezes  CARDIOVASCULAR:  Normal apical impulse, regular rate and rhythm, normal S1 and S2,   ABDOMEN: Soft, nontender, nondistended. Bowel sounds present. SKIN: Warm and dry. EXTREMITIES: Trace edema. No cyanosis or clubbing.   MUSCULOSKELETAL:  There is tenderness of the thoracic spine over T8    Current Inpatient Medications:   enoxaparin  80 mg SubCUTAneous BID    warfarin placeholder: dosing by pharmacy   Other RX Placeholder    dilTIAZem  180 mg Oral Daily    [Held by provider] aspirin  81 mg Oral Daily    azelastine  2 spray Each Nostril BID    ferrous sulfate  325 mg Oral BID AC    montelukast  10 mg Oral Nightly    pantoprazole  40 mg Oral Daily    cetirizine  5 mg Oral Daily    calcium carb-cholecalciferol  1 tablet Oral Daily    piperacillin-tazobactam  3,375 mg IntraVENous Q8H    amiodarone  100 mg Oral Daily    fluticasone  1 spray Each Nostril Daily    sodium chloride flush  5-40 mL IntraVENous 2 times per day    budesonide-formoterol  2 puff Inhalation BID    tiotropium  2 puff Inhalation Daily       IV Infusions (if any):   sodium chloride         Diagnostics:   EKG: .  2/15/2023 showed sinus bradycardia with first-degree AV block and PACs, no T or ST change      Labs:   CBC:   No results for input(s): WBC, HGB, HCT, PLT in the last 72 hours. BMP:   Recent Labs     02/19/23  0736      K 4.0   CO2 24   BUN 37*   CREATININE 1.52*   LABGLOM 34*   GLUCOSE 107*     Lab Results   Component Value Date/Time    BNP NOT REPORTED 06/12/2014 01:20 PM     PT/INR:   Recent Labs     02/20/23  0534 02/21/23  0627   PROTIME 17.2* 16.7*   INR 1.4 1.4     APTT:No results for input(s): APTT in the last 72 hours. CARDIAC ENZYMES:No results for input(s): CKTOTAL, CKMB, CKMBINDEX in the last 72 hours. Invalid input(s):  TROPONIN, HIGH SENSITIVITY  FASTING LIPID PANEL:  Lab Results   Component Value Date/Time    HDL 53 12/19/2022 08:55 AM    TRIG 141 12/19/2022 08:55 AM     LIVER PROFILE:  Recent Labs     02/19/23  0736 02/20/23  0846   AST 15 18   ALT 11 16   LABALBU 3.4* 4.0     T spine MRI 2/20/2023  Compression fracture at T8 with fracture line seen in the posterior aspect of   the T8 vertebral body extending towards the anterior aspect of the pedicles. No significant retropulsion. Multilevel degenerative change without canal stenosis or foraminal narrowing. ASSESSMENT:  1. Requested preop cardiac clearance for ERCP due to common bile duct dilatation  2. Paroxysmal atrial fibrillation, on Cardizem, Cordarone, and Coumadin prior to admission  3.   Back pain due to T8 compression fracture, going later today for kyphoplasty  4. Sinus bradycardia secondary to medications. She is maintaining normal sinus rhythm and she is not symptomatic  5. Systemic hypertension, on Cardizem and Maxide  6. Chronic kidney disease  7. Chronic anemia      PLAN:    Continue current medications  Ambulate in the hallway as tolerated. Recommend to bridge her with Lovenox while she is off Coumadin. Lovenox was held last night and this morning for kyphoplasty today.   I asked the nurse when patient comes back from kyphoplasty to check with the orthopedic surgeon when she can be back on    Discussed with patient,  her daughter,and her nurse    Sofia George MD,

## 2023-02-21 NOTE — PLAN OF CARE
Problem: Discharge Planning  Goal: Discharge to home or other facility with appropriate resources  2/21/2023 1522 by Maddie Aj, RN  Outcome: Progressing  2/21/2023 1335 by Maddie Aj, RN  Outcome: Progressing     Problem: Pain  Goal: Verbalizes/displays adequate comfort level or baseline comfort level  Outcome: Progressing

## 2023-02-21 NOTE — PLAN OF CARE
Problem: Discharge Planning  Goal: Discharge to home or other facility with appropriate resources  2/20/2023 2134 by Nadine Moreno RN  Outcome: Progressing  2/20/2023 1611 by Kevin Balderrama RN  Outcome: Progressing     Problem: Pain  Goal: Verbalizes/displays adequate comfort level or baseline comfort level  2/20/2023 2134 by Nadine Moreno RN  Outcome: Progressing  2/20/2023 1611 by Kevin Balderrama RN  Outcome: Progressing     Problem: ABCDS Injury Assessment  Goal: Absence of physical injury  Outcome: Progressing     Problem: Safety - Adult  Goal: Free from fall injury  Outcome: Progressing     Problem: Respiratory - Adult  Goal: Able to breathe comfortably  2/20/2023 0737 by Lilian Walsh RCP  Outcome: Progressing  Goal: Patient's breath sounds will be clear and equal  2/20/2023 0737 by Lilian Walsh RCP  Outcome: Progressing     Problem: Gastrointestinal - Adult  Goal: Minimal or absence of nausea and vomiting  Outcome: Progressing  Goal: Maintains or returns to baseline bowel function  2/20/2023 2134 by Nadine Moreno RN  Outcome: Progressing  2/20/2023 1611 by Kevin Balderrama RN  Outcome: Progressing  Goal: Maintains adequate nutritional intake  Outcome: Progressing     Problem: Nutrition Deficit:  Goal: Optimize nutritional status  Outcome: Progressing

## 2023-02-21 NOTE — PROGRESS NOTES
GI Progress notes    2/21/2023   10:12 AM    Name:  Kateryna Doherty  MRN:    [id number]     Acct:     [de-identified]   Room:  2010/2010-01  IP Day: 6     Admit Date: 2/15/2023  2:17 PM  PCP: Juan Luis MOHAN PA-C    Subjective:     C/C:   Chief Complaint   Patient presents with    Back Pain     Bilateral upper back. Pt reports \"squeezing\" going around to right side under breast area       Interval History: Status: not changed. Patient seen and examined. No acute events overnight. LFTs have been normal.  Was seen by orthopedic surgeon yesterday. Plans for kypho today. ROS:  Constitutional: negative for chills, fevers and sweats  Gastrointestinal: negative for abdominal pain, constipation, diarrhea, nausea and vomiting  Neurological: negative for dizziness and headaches    Medications:      Allergies: No Known Allergies    Current Meds: enoxaparin (LOVENOX) injection 80 mg, BID  warfarin placeholder: dosing by pharmacy, RX Placeholder  dilTIAZem (CARDIZEM CD) extended release capsule 180 mg, Daily  oxyCODONE-acetaminophen (PERCOCET) 5-325 MG per tablet 1 tablet, Q6H PRN  bisacodyl (DULCOLAX) suppository 10 mg, Daily PRN  [Held by provider] aspirin EC tablet 81 mg, Daily  azelastine (ASTELIN) 0.1 % nasal spray 2 spray, BID  ferrous sulfate (FE TABS 325) EC tablet 325 mg, BID AC  montelukast (SINGULAIR) tablet 10 mg, Nightly  pantoprazole (PROTONIX) tablet 40 mg, Daily  hydrALAZINE (APRESOLINE) injection 10 mg, Q6H PRN  cetirizine (ZYRTEC) tablet 5 mg, Daily  calcium carb-cholecalciferol 600-10 MG-MCG per tab 1 tablet, Daily  calcium carbonate (TUMS) chewable tablet 500 mg, TID PRN  piperacillin-tazobactam (ZOSYN) 3,375 mg in sodium chloride 0.9 % 50 mL IVPB (mini-bag), Q8H  amiodarone (CORDARONE) tablet 100 mg, Daily  morphine (PF) injection 2 mg, Q3H PRN  fluticasone (FLONASE) 50 MCG/ACT nasal spray 1 spray, Daily  sodium chloride flush 0.9 % injection 5-40 mL, 2 times per day  sodium chloride flush 0.9 % injection 10 mL, PRN  0.9 % sodium chloride infusion, PRN  potassium chloride (KLOR-CON M) extended release tablet 40 mEq, PRN   Or  potassium bicarb-citric acid (EFFER-K) effervescent tablet 40 mEq, PRN   Or  potassium chloride 10 mEq/100 mL IVPB (Peripheral Line), PRN  magnesium sulfate 1000 mg in dextrose 5% 100 mL IVPB, PRN  ondansetron (ZOFRAN-ODT) disintegrating tablet 4 mg, Q8H PRN   Or  ondansetron (ZOFRAN) injection 4 mg, Q6H PRN  polyethylene glycol (GLYCOLAX) packet 17 g, Daily PRN  acetaminophen (TYLENOL) tablet 650 mg, Q6H PRN   Or  acetaminophen (TYLENOL) suppository 650 mg, Q6H PRN  budesonide-formoterol (SYMBICORT) 160-4.5 MCG/ACT inhaler 2 puff, BID  tiotropium (SPIRIVA RESPIMAT) 2.5 MCG/ACT inhaler 2 puff, Daily        Data:     Code Status:  Full Code    Family History   Problem Relation Age of Onset    COPD Mother     Asthma Mother     Cancer Mother     Cancer Father     Stroke Sister     COPD Sister     Emphysema Sister        Social History     Socioeconomic History    Marital status:      Spouse name: Not on file    Number of children: Not on file    Years of education: Not on file    Highest education level: Not on file   Occupational History    Not on file   Tobacco Use    Smoking status: Former     Packs/day: 1.00     Years: 20.00     Pack years: 20.00     Types: Cigarettes     Quit date: 1981     Years since quittin.1    Smokeless tobacco: Never   Vaping Use    Vaping Use: Never used   Substance and Sexual Activity    Alcohol use: No    Drug use: No    Sexual activity: Not on file   Other Topics Concern    Not on file   Social History Narrative    Not on file     Social Determinants of Health     Financial Resource Strain: Low Risk     Difficulty of Paying Living Expenses: Not hard at all   Food Insecurity: Food Insecurity Present    Worried About Running Out of Food in the Last Year: Often true    Ran Out of Food in the Last Year: Often true   Transportation Needs:  Unknown    Lack of Transportation (Medical): Not on file    Lack of Transportation (Non-Medical): No   Physical Activity: Sufficiently Active    Days of Exercise per Week: 3 days    Minutes of Exercise per Session: 60 min   Stress: Not on file   Social Connections: Not on file   Intimate Partner Violence: Not on file   Housing Stability: Unknown    Unable to Pay for Housing in the Last Year: Not on file    Number of Places Lived in the Last Year: Not on file    Unstable Housing in the Last Year: No       Vitals:  BP (!) 150/53   Pulse 54   Temp 97.9 °F (36.6 °C) (Oral)   Resp 16   Ht 5' 5\" (1.651 m)   Wt 191 lb 12.8 oz (87 kg)   SpO2 94%   BMI 31.92 kg/m²   Temp (24hrs), Av.8 °F (36.6 °C), Min:97.3 °F (36.3 °C), Max:98 °F (36.7 °C)    No results for input(s): POCGLU in the last 72 hours. I/O (24Hr):     Intake/Output Summary (Last 24 hours) at 2023 1012  Last data filed at 2023 1814  Gross per 24 hour   Intake 300 ml   Output --   Net 300 ml       Labs:      CBC:   Lab Results   Component Value Date/Time    WBC 3.7 2023 08:49 AM    RBC 3.82 2023 08:49 AM    HGB 11.5 2023 08:49 AM    HCT 36.3 2023 08:49 AM    MCV 95.0 2023 08:49 AM    MCH 30.1 2023 08:49 AM    MCHC 31.7 2023 08:49 AM    RDW 14.6 2023 08:49 AM     2023 08:49 AM    MPV 9.7 2023 08:49 AM     CBC with Differential:    Lab Results   Component Value Date/Time    WBC 3.7 2023 08:49 AM    RBC 3.82 2023 08:49 AM    HGB 11.5 2023 08:49 AM    HCT 36.3 2023 08:49 AM     2023 08:49 AM    MCV 95.0 2023 08:49 AM    MCH 30.1 2023 08:49 AM    MCHC 31.7 2023 08:49 AM    RDW 14.6 2023 08:49 AM    LYMPHOPCT 13 2023 08:49 AM    MONOPCT 1 2023 08:49 AM    BASOPCT 0 2023 08:49 AM    MONOSABS 0.04 2023 08:49 AM    LYMPHSABS 0.48 2023 08:49 AM    EOSABS 0.00 2023 08:49 AM    BASOSABS 0.00 02/18/2023 08:49 AM    DIFFTYPE NOT REPORTED 11/10/2020 08:36 AM     Hemoglobin/Hematocrit:    Lab Results   Component Value Date/Time    HGB 11.5 02/18/2023 08:49 AM    HCT 36.3 02/18/2023 08:49 AM     CMP:    Lab Results   Component Value Date/Time     02/19/2023 07:36 AM    K 4.0 02/19/2023 07:36 AM     02/19/2023 07:36 AM    CO2 24 02/19/2023 07:36 AM    BUN 37 02/19/2023 07:36 AM    CREATININE 1.52 02/19/2023 07:36 AM    GFRAA 45 12/09/2021 08:40 AM    LABGLOM 34 02/19/2023 07:36 AM    GLUCOSE 107 02/19/2023 07:36 AM    PROT 7.0 02/20/2023 08:46 AM    LABALBU 4.0 02/20/2023 08:46 AM    CALCIUM 8.9 02/19/2023 07:36 AM    BILITOT 0.9 02/20/2023 08:46 AM    ALKPHOS 79 02/20/2023 08:46 AM    AST 18 02/20/2023 08:46 AM    ALT 16 02/20/2023 08:46 AM     BMP:    Lab Results   Component Value Date/Time     02/19/2023 07:36 AM    K 4.0 02/19/2023 07:36 AM     02/19/2023 07:36 AM    CO2 24 02/19/2023 07:36 AM    BUN 37 02/19/2023 07:36 AM    LABALBU 4.0 02/20/2023 08:46 AM    CREATININE 1.52 02/19/2023 07:36 AM    CALCIUM 8.9 02/19/2023 07:36 AM    GFRAA 45 12/09/2021 08:40 AM    LABGLOM 34 02/19/2023 07:36 AM    GLUCOSE 107 02/19/2023 07:36 AM     PT/INR:    Lab Results   Component Value Date/Time    PROTIME 16.7 02/21/2023 06:27 AM    PROTIME 37.9 01/09/2023 10:35 AM    INR 1.4 02/21/2023 06:27 AM     PTT:    Lab Results   Component Value Date/Time    APTT 32.8 05/05/2019 02:47 PM   [APTT}    Physical Examination:        General appearance: alert, cooperative and no distress  Mental Status: oriented to person, place and time and normal affect  Abdomen: soft, nontender, nondistended, bowel sounds present   Extremities: no edema, redness or tenderness in the calves  Skin: no gross lesions, rashes, or induration    Assessment:        Primary Problem  Common bile duct dilatation     Active Hospital Problems    Diagnosis Date Noted    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 ([facility] [address] Anjelica Nath 02/20/2023     Priority: Medium    Age-related osteoporosis with current pathological fracture [M80.00XA] 02/20/2023     Priority: Medium    History of total right hip replacement [Z96.641] 02/20/2023     Priority: Medium    Intractable back pain [M54.9] 02/20/2023     Priority: Medium    Choledocholithiasis-suspected [K80.50] 02/16/2023     Priority: Medium    INR therapeutic [R79.1] 02/16/2023     Priority: Medium    Pain of upper abdomen [R10.10] 02/16/2023     Priority: Medium    Common bile duct dilatation [K83.8] 02/15/2023     Priority: Medium    Calculus of gallbladder without cholecystitis without obstruction [K80.20] 02/15/2023     Priority: Medium    Hypertension [I10] 06/03/2020    Gastroesophageal reflux disease [K21.9] 06/03/2020    Atrial fibrillation ([facility] [address] [I48.91] 04/09/2019     Past Medical History:   Diagnosis Date    Allergic rhinitis     Arthritis     Asthma     Atrial fibrillation ([address]     GERD (gastroesophageal reflux disease)     Hypertension     Melanoma ([facility] [address]     on nose    Myocardial infarction ([facility] [address] 2008    Wears glasses     Wears partial dentures     upper        Plan:        Abdominal pain, ? Sludge/choledocholithiasis on MRCP, s/p attempted ERCP aborted d/t inability to cannulate  LFTs normal  Clinically improved  Abdominal imaging reviewed with radiology. No clear-cut evidence of any CBD stone/obstruction  LFTs have remained normal  Had lengthy discussion with patient and her sister  Came to the consensus that we will hold on ERCP at present time given that clinically her symptoms are not consistent with CBD stone. Patient has normal LFTs, clinical improvement.  We explained risks of ERCP including post-ERCP pancreatitis  Plans for kypho today with Dr. Camille Trevino for compression fracture  Consider EUS outpatient if question still remains  Follow-up in office in the next 7-10 days  GI signing off, consult as needed    Time spent reviewing the chart, seeing the patient, and discussing with the attending MD around 30 minutes. Explained to the patient and d/W Nursing Staff  Will F/U with you  Please call or Page for any issues or change in status  Thanks    Electronically signed by DUDLEY Waite NP on 2/21/2023 at 10:12 AM   GI attending physician note. Patient seen with DUDLEY     I independently performed an evaluation on [facility]. I have reviewed the above documentation completed by the Nurse Practitioner and agree with the history, examination, and management plan. Recommendations discussed. Patient continues to be asymptomatic. LFTs normal.  Patient is going to have further evaluation regarding kyphoplasty/back pain. If she is discharged advised to see in the office in the next couple of weeks or sooner if she develops symptoms.

## 2023-02-21 NOTE — ANESTHESIA PRE PROCEDURE
Department of Anesthesiology  Preprocedure Note       Name:  Deann Vick   Age:  79 y.o.  :  1940                                          MRN:  [id number]         Date:  2023      Surgeon: Jamar Batista): Misha Ortega MD    Procedure: T8 KYPHOPLASTY    Medications prior to admission:   Prior to Admission medications    Medication Sig Start Date End Date Taking? Authorizing Provider   acetaminophen-codeine (TYLENOL #3) 300-30 MG per tablet Take 1 tablet by mouth every 4 hours as needed for Pain.     Historical Provider, MD   famotidine (PEPCID) 40 MG tablet Take 40 mg by mouth nightly    Historical Provider, MD   Multiple Vitamins-Minerals (HAIR SKIN AND NAILS FORMULA) TABS Take 2 tablets by mouth daily    Historical Provider, MD   ferrous sulfate (FE TABS 325) 325 (65 Fe) MG EC tablet Take 325 mg by mouth daily (with breakfast)    Historical Provider, MD   FIBER COMPLETE PO Take by mouth daily    Historical Provider, MD   cetirizine (ZYRTEC) 10 MG tablet Take 10 mg by mouth daily    Historical Provider, MD   RESTASIS MULTIDOSE 0.05 % ophthalmic emulsion Place 1 drop into both eyes nightly 22   Historical Provider, MD   pantoprazole (PROTONIX) 40 MG tablet Take 40 mg by mouth daily    Historical Provider, MD   calcium carbonate-vitamin D 600-200 MG-UNIT TABS Take 1 tablet by mouth daily    Historical Provider, MD   Multiple Vitamins-Minerals (CENTRUM SILVER) TABS Take 1 tablet by mouth daily    Historical Provider, MD   fluticasone (FLONASE) 50 MCG/ACT nasal spray 1 spray by Each Nostril route nightly    Historical Provider, MD   amiodarone (CORDARONE) 200 MG tablet Take 200 mg by mouth daily    Historical Provider, MD   fluticasone-umeclidin-vilant (TRELEGY ELLIPTA) 100-62.5-25 MCG/INH AEPB Inhale 1 puff into the lungs daily    Historical Provider, MD   montelukast (SINGULAIR) 10 MG tablet Take 10 mg by mouth nightly    Historical Provider, MD   triamterene-hydrochlorothiazide (MAXZIDE-25) 37.5-25 MG per tablet Take 1 tablet by mouth daily 12/29/15   Historical Provider, MD   diltiazem (CARDIZEM CD) 240 MG ER capsule Take 240 mg by mouth daily 8/8/14   DUDLEY Aleman CNP   aspirin 81 MG tablet Take 81 mg by mouth daily Will stop 7 days before surgery    Historical Provider, MD   azelastine (ASTELIN) 137 MCG/SPRAY nasal spray 1 spray by Nasal route 2 times daily. Use in each nostril as directed    Historical Provider, MD   nitroGLYCERIN (NITRODUR) 0.1 MG/HR Place 1 patch onto the skin daily. Historical Provider, MD   warfarin (COUMADIN) 5 MG tablet Take 2.5-5 mg by mouth See Admin Instructions Indications: 5mg Sun/Tues/Thurs, 2.5mg all other days    Historical Provider, MD       Current medications:    No current facility-administered medications for this visit. No current outpatient medications on file.      Facility-Administered Medications Ordered in Other Visits   Medication Dose Route Frequency Provider Last Rate Last Admin    enoxaparin (LOVENOX) injection 80 mg  80 mg SubCUTAneous BID Sasha Mills MD        warfarin placeholder: dosing by pharmacy   Other [address] [place] [address] Street, MD        dilTIAZem [facility] CD) extended release capsule 180 mg  180 mg Oral Daily Sasha Mills MD   180 mg at 02/20/23 0914    oxyCODONE-acetaminophen (PERCOCET) 5-325 MG per tablet 1 tablet  1 tablet Oral Q6H PRN Deandra Anderson MD   1 tablet at 02/21/23 0551    bisacodyl (DULCOLAX) suppository 10 mg  10 mg Rectal Daily PRN DUDLEY Brooks CNP   10 mg at 02/18/23 2010    [Held by provider] aspirin EC tablet 81 mg  81 mg Oral Daily DUDLEY Carlton NP   81 mg at 02/18/23 0917    azelastine (ASTELIN) 0.1 % nasal spray 2 spray  2 spray Each Nostril BID Nette Montoya MD   2 spray at 02/21/23 1053    ferrous sulfate (FE TABS 325) EC tablet 325 mg  325 mg Oral BID AC Nette Montoya MD   325 mg at 02/21/23 0551    montelukast (SINGULAIR) tablet 10 mg  10 mg Oral Nightly Farid U Oumar Robledo MD   10 mg at 02/20/23 2032    pantoprazole (PROTONIX) tablet 40 mg  40 mg Oral Daily Bernardo Gotti MD   40 mg at 02/21/23 7957    hydrALAZINE (APRESOLINE) injection 10 mg  10 mg IntraVENous Q6H PRN Bernardo Gotti MD   10 mg at 02/21/23 1259    cetirizine (ZYRTEC) tablet 5 mg  5 mg Oral Daily Bernardo Gotti MD   5 mg at 02/20/23 0915    calcium carb-cholecalciferol 600-10 MG-MCG per tab 1 tablet  1 tablet Oral Daily Bernardo Gotti MD   1 tablet at 02/20/23 0914    calcium carbonate (TUMS) chewable tablet 500 mg  500 mg Oral TID PRN Bernardo Gotti MD   500 mg at 02/19/23 1003    piperacillin-tazobactam (ZOSYN) 3,375 mg in sodium chloride 0.9 % 50 mL IVPB (mini-bag)  3,375 mg IntraVENous Q8H Bernardo Gotti MD   Stopped at 02/21/23 1452    amiodarone (CORDARONE) tablet 100 mg  100 mg Oral Daily Bernardo Gotti MD   100 mg at 02/19/23 1002    morphine (PF) injection 2 mg  2 mg IntraVENous Q3H PRN Bernardo Gotti MD   2 mg at 02/21/23 1259    fluticasone (FLONASE) 50 MCG/ACT nasal spray 1 spray  1 spray Each Nostril Daily Bernardo Gotti MD   1 spray at 02/21/23 1053    sodium chloride flush 0.9 % injection 5-40 mL  5-40 mL IntraVENous 2 times per day Bernardo Gotti MD   10 mL at 02/19/23 1148    sodium chloride flush 0.9 % injection 10 mL  10 mL IntraVENous PRN Bernardo Gotti MD   10 mL at 02/17/23 1538    0.9 % sodium chloride infusion   IntraVENous PRN Bernardo Gotti MD        potassium chloride (KLOR-CON M) extended release tablet 40 mEq  40 mEq Oral PRN Bernardo Gotti MD        Or    potassium bicarb-citric acid (EFFER-K) effervescent tablet 40 mEq  40 mEq Oral PRN Bernardo Gotti MD        Or    potassium chloride 10 mEq/100 mL IVPB (Peripheral Line)  10 mEq IntraVENous PRN Bernardo Gotti MD        magnesium sulfate 1000 mg in dextrose 5% 100 mL IVPB  1,000 mg IntraVENous PRN Bernardo Gotti MD        ondansetron (ZOFRAN-ODT) disintegrating tablet 4 mg  4 mg Oral Q8H PRN Bernardo Gotti MD Or    ondansetron (ZOFRAN) injection 4 mg  4 mg IntraVENous Q6H PRN Lang Carney MD        polyethylene glycol [facility]) packet 17 g  17 g Oral Daily PRN Lang Carney MD   17 g at 02/20/23 1535    acetaminophen (TYLENOL) tablet 650 mg  650 mg Oral Q6H PRN Lang Carney MD   650 mg at 02/15/23 2310    Or    acetaminophen (TYLENOL) suppository 650 mg  650 mg Rectal Q6H PRN Lang Carney MD        budesonide-formoterol [facility]) 160-4.5 MCG/ACT inhaler 2 puff  2 puff Inhalation BID Lang Carney MD   2 puff at 02/21/23 0825    tiotropium (SPIRIVA RESPIMAT) 2.5 MCG/ACT inhaler 2 puff  2 puff Inhalation Daily Lang Carney MD   2 puff at 02/21/23 0825       Allergies:  No Known Allergies    Problem List:    Patient Active Problem List   Diagnosis Code    Hypertension I10    Allergic rhinitis J30.9    Atrial fibrillation (HCC) I48.91    Malignant melanoma ([facility] [address] C43.9    History of MI (myocardial infarction) I25.2    Wears glasses Z97.3    Sinus bradycardia R00.1    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    GI bleed K92.2    Serologic abnormality R89.4    Chronic cough R05.3    Chronic fatigue syndrome G93.32    Chronic sinusitis J32.9    Gastroesophageal reflux disease K21.9    Moderate persistent asthma without complication S06.27    Degenerative joint disease involving multiple joints on both sides of body M15.9    Primary localized osteoarthritis of pelvic region and thigh M16.10    Primary osteoarthritis of both knees M17.0    Other hemoglobinopathies ([facility] [address] D58.2    Hypersomnia G47.10    Sleep apnea G47.30    History of smoking Z87.891    Arthritis of left hip M16.12    Calculus of gallbladder without cholecystitis without obstruction K80.20    Flank pain R10.9    Common bile duct dilatation K83.8    Choledocholithiasis-suspected K80.50    INR therapeutic R79.1    Pain of upper abdomen R10.10    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) 2008    Wears glasses     Wears partial dentures     upper       Past Surgical History:        Procedure Laterality Date    APPENDECTOMY      BREAST SURGERY      CARDIAC SURGERY      CARPAL TUNNEL RELEASE Right     COLONOSCOPY N/A 2019    COLONOSCOPY POLYPECTOMY REMOVAL HOT BIOPSY AND RESOLUTION CLIP performed by Cristela Simon MD at [facility] ERCP N/A 2023    ERCP ENDOSCOPIC RETROGRADE CHOLANGIOPANCREATOGRAPHY performed by Darion Patiño MD at [address]      umbilical    HYSTERECTOMY (CERVIX STATUS UNKNOWN)     [address]      C5-C6    NOSE SURGERY      cancer removed    OTHER SURGICAL HISTORY      heart surg/growth in heart sac    TONSILLECTOMY      TOTAL HIP ARTHROPLASTY Right 2019    TOTAL HIP ARTHROPLASTY Right 2019    HIP TOTAL ARTHROPLASTY - MEDACTA, C-ARM, MEDACTA TABLE, NSA= GENERAL VS SPINAL, FASCIA ILLIACA BLOCK, *STANDARD performed by Yadi Banerjee DO at [address] N/A 2019    EGD DIAGNOSTIC ONLY performed by Cristela Simon MD at [facility]       Social History:    Social History     Tobacco Use    Smoking status: Former     Packs/day: 1.00     Years: 20.00     Pack years: 20.00     Types: Cigarettes     Quit date: 1981     Years since quittin.1    Smokeless tobacco: Never   Substance Use Topics    Alcohol use: No                                Counseling given: Not Answered      Vital Signs (Current):    There were no vitals filed for this visit. BP Readings from Last 3 Encounters:   02/21/23 (!) 159/71   02/15/23 (!) 140/90   02/01/23 130/80       NPO Status:                                                                                 BMI:   Wt Readings from Last 3 Encounters:   02/20/23 191 lb 12.8 oz (87 kg)   02/15/23 178 lb (80.7 kg)   02/01/23 176 lb (79.8 kg)     There is no height or weight on file to calculate BMI.    CBC:   Lab Results   Component Value Date/Time    WBC 3.7 02/18/2023 08:49 AM    RBC 3.82 02/18/2023 08:49 AM    HGB 11.5 02/18/2023 08:49 AM    HCT 36.3 02/18/2023 08:49 AM    MCV 95.0 02/18/2023 08:49 AM    RDW 14.6 02/18/2023 08:49 AM     02/18/2023 08:49 AM       CMP:   Lab Results   Component Value Date/Time     02/19/2023 07:36 AM    K 4.0 02/19/2023 07:36 AM     02/19/2023 07:36 AM    CO2 24 02/19/2023 07:36 AM    BUN 37 02/19/2023 07:36 AM    CREATININE 1.52 02/19/2023 07:36 AM    GFRAA 45 12/09/2021 08:40 AM    LABGLOM 34 02/19/2023 07:36 AM    GLUCOSE 107 02/19/2023 07:36 AM    PROT 7.0 02/20/2023 08:46 AM    CALCIUM 8.9 02/19/2023 07:36 AM    BILITOT 0.9 02/20/2023 08:46 AM    ALKPHOS 79 02/20/2023 08:46 AM    AST 18 02/20/2023 08:46 AM    ALT 16 02/20/2023 08:46 AM       POC Tests: No results for input(s): POCGLU, POCNA, POCK, POCCL, POCBUN, POCHEMO, POCHCT in the last 72 hours.     Coags:   Lab Results   Component Value Date/Time    PROTIME 16.7 02/21/2023 06:27 AM    PROTIME 37.9 01/09/2023 10:35 AM    INR 1.4 02/21/2023 06:27 AM    APTT 32.8 05/05/2019 02:47 PM       HCG (If Applicable): No results found for: PREGTESTUR, PREGSERUM, HCG, HCGQUANT     ABGs: No results found for: PHART, PO2ART, KXN5UXP, UKH4UNL, BEART, J7WWTUDI     Type & Screen (If Applicable):  No results found for: LABABO, LABRH    Anesthesia Evaluation    Airway: Mallampati: III     Neck ROM: limited     Dental:    (+) partials      Pulmonary:normal exam    (+) sleep apnea:  asthma:                            Cardiovascular:    (+) hypertension:, past MI:, CAD:, dysrhythmias: atrial fibrillation,     (-)  angina    ECG reviewed                        Neuro/Psych:               GI/Hepatic/Renal:   (+) GERD:, renal disease: CRI,           Endo/Other:    (+) : arthritis: OA., malignancy/cancer (melanoma). Abdominal:             Vascular: Other Findings:             Anesthesia Plan      general and TIVA     ASA 3     (  Pt and family aware of and accept risks  )  Induction: intravenous. MIPS: Postoperative opioids intended and Prophylactic antiemetics administered. Anesthetic plan and risks discussed with patient. Attending anesthesiologist reviewed and agrees with Preprocedure content          Department of Anesthesiology  Preprocedure Note       Name:  Ibis Lipscomb   Age:  79 y.o.  :  1940                                          MRN:  [id number]         Date:  2023      Surgeon: Mary Katz): Ovi Gonzalez MD    Procedure: T8 KYPHOPLASTY    Medications prior to admission:   Prior to Admission medications    Medication Sig Start Date End Date Taking? Authorizing Provider   acetaminophen-codeine (TYLENOL #3) 300-30 MG per tablet Take 1 tablet by mouth every 4 hours as needed for Pain.     Historical Provider, MD   famotidine (PEPCID) 40 MG tablet Take 40 mg by mouth nightly    Historical Provider, MD   Multiple Vitamins-Minerals (HAIR SKIN AND NAILS FORMULA) TABS Take 2 tablets by mouth daily    Historical Provider, MD   ferrous sulfate (FE TABS 325) 325 (65 Fe) MG EC tablet Take 325 mg by mouth daily (with breakfast)    Historical Provider, MD   FIBER COMPLETE PO Take by mouth daily    Historical Provider, MD   cetirizine (ZYRTEC) 10 MG tablet Take 10 mg by mouth daily    Historical Provider, MD   RESTASIS MULTIDOSE 0.05 % ophthalmic emulsion Place 1 drop into both eyes nightly 22   Historical Provider, MD   pantoprazole (PROTONIX) 40 MG tablet Take 40 mg by mouth daily    Historical Provider, MD   calcium carbonate-vitamin D 600-200 MG-UNIT TABS Take 1 tablet by mouth daily    Historical Provider, MD   Multiple Vitamins-Minerals (CENTRUM SILVER) TABS Take 1 tablet by mouth daily    Historical Provider, MD   fluticasone (FLONASE) 50 MCG/ACT nasal spray 1 spray by Each Nostril route nightly    Historical Provider, MD   amiodarone (CORDARONE) 200 MG tablet Take 200 mg by mouth daily    Historical Provider, MD   fluticasone-umeclidin-vilant (TRELEGY ELLIPTA) 100-62.5-25 MCG/INH AEPB Inhale 1 puff into the lungs daily    Historical Provider, MD   montelukast (SINGULAIR) 10 MG tablet Take 10 mg by mouth nightly    Historical Provider, MD   triamterene-hydrochlorothiazide (MAXZIDE-25) 37.5-25 MG per tablet Take 1 tablet by mouth daily 12/29/15   Historical Provider, MD   diltiazem (CARDIZEM CD) 240 MG ER capsule Take 240 mg by mouth daily 8/8/14   DUDLEY Bailon - CNP   aspirin 81 MG tablet Take 81 mg by mouth daily Will stop 7 days before surgery    Historical Provider, MD   azelastine (ASTELIN) 137 MCG/SPRAY nasal spray 1 spray by Nasal route 2 times daily. Use in each nostril as directed    Historical Provider, MD   nitroGLYCERIN (NITRODUR) 0.1 MG/HR Place 1 patch onto the skin daily. Historical Provider, MD   warfarin (COUMADIN) 5 MG tablet Take 2.5-5 mg by mouth See Admin Instructions Indications: 5mg Sun/Tues/Thurs, 2.5mg all other days    Historical Provider, MD       Current medications:    No current facility-administered medications for this visit. No current outpatient medications on file.      Facility-Administered Medications Ordered in Other Visits   Medication Dose Route Frequency Provider Last Rate Last Admin    enoxaparin (LOVENOX) injection 80 mg  80 mg SubCUTAneous BID Brad Robertson MD        warfarin placeholder: dosing by pharmacy   Other [address], MD        dilTIAZem [facility] CD) extended release capsule 180 mg  180 mg Oral Daily Sasha Mills MD   180 mg at 02/20/23 0914    oxyCODONE-acetaminophen (PERCOCET) 5-325 MG per tablet 1 tablet  1 tablet Oral Q6H PRN Esteban Robledo MD   1 tablet at 02/21/23 0551    bisacodyl (DULCOLAX) suppository 10 mg  10 mg Rectal Daily PRN DUDLEY Brooks - CNP   10 mg at 02/18/23 2010    [Held by provider] aspirin EC tablet 81 mg  81 mg Oral Daily DUDLEY Carlton - NP   81 mg at 02/18/23 0917    azelastine (ASTELIN) 0.1 % nasal spray 2 spray  2 spray Each Nostril BID Nette Montoya MD   2 spray at 02/21/23 1053    ferrous sulfate (FE TABS 325) EC tablet 325 mg  325 mg Oral BID AC Nette Montoya MD   325 mg at 02/21/23 0551    montelukast (SINGULAIR) tablet 10 mg  10 mg Oral Nightly Nette Montoya MD   10 mg at 02/20/23 2032    pantoprazole (PROTONIX) tablet 40 mg  40 mg Oral Daily Nette Montoya MD   40 mg at 02/21/23 0981    hydrALAZINE (APRESOLINE) injection 10 mg  10 mg IntraVENous Q6H PRN Nette Montoya MD   10 mg at 02/21/23 1259    cetirizine (ZYRTEC) tablet 5 mg  5 mg Oral Daily Nette Montoya MD   5 mg at 02/20/23 0915    calcium carb-cholecalciferol 600-10 MG-MCG per tab 1 tablet  1 tablet Oral Daily Nette Montoya MD   1 tablet at 02/20/23 0914    calcium carbonate (TUMS) chewable tablet 500 mg  500 mg Oral TID PRN Nette Montoya MD   500 mg at 02/19/23 1003    piperacillin-tazobactam (ZOSYN) 3,375 mg in sodium chloride 0.9 % 50 mL IVPB (mini-bag)  3,375 mg IntraVENous Everardo Cortés MD   Stopped at 02/21/23 1452    amiodarone (CORDARONE) tablet 100 mg  100 mg Oral Daily Nette Montoya MD   100 mg at 02/19/23 1002    morphine (PF) injection 2 mg  2 mg IntraVENous Q3H PRN Nette Montoya MD   2 mg at 02/21/23 1259    fluticasone (FLONASE) 50 MCG/ACT nasal spray 1 spray  1 spray Each Nostril Daily Nette Montoya MD   1 spray at 02/21/23 1053    sodium chloride flush 0.9 % injection 5-40 mL  5-40 mL IntraVENous 2 times per day Saud Giles MD   10 mL at 02/19/23 1148    sodium chloride flush 0.9 % injection 10 mL  10 mL IntraVENous PRN Saud Giles MD   10 mL at 02/17/23 1538    0.9 % sodium chloride infusion   IntraVENous PRN Saud Giles MD        potassium chloride (KLOR-CON M) extended release tablet 40 mEq  40 mEq Oral PRN Saud Giles MD        Or    potassium bicarb-citric acid (EFFER-K) effervescent tablet 40 mEq  40 mEq Oral PRN Saud Giles MD        Or    potassium chloride 10 mEq/100 mL IVPB (Peripheral Line)  10 mEq IntraVENous PRN Saud Giles MD        magnesium sulfate 1000 mg in dextrose 5% 100 mL IVPB  1,000 mg IntraVENous PRN Saud Giles MD        ondansetron (ZOFRAN-ODT) disintegrating tablet 4 mg  4 mg Oral Q8H PRN Saud Giles MD        Or    [facility]) injection 4 mg  4 mg IntraVENous Q6H PRN Saud Giles MD        polyethylene glycol (GLYCOLAX) packet 17 g  17 g Oral Daily PRN Saud Giles MD   17 g at 02/20/23 1535    acetaminophen (TYLENOL) tablet 650 mg  650 mg Oral Q6H PRN Saud Giles MD   650 mg at 02/15/23 2310    Or    acetaminophen (TYLENOL) suppository 650 mg  650 mg Rectal Q6H PRN Saud Giles MD        budesonide-formoterol [facility]) 160-4.5 MCG/ACT inhaler 2 puff  2 puff Inhalation BID Saud Giles MD   2 puff at 02/21/23 0825    tiotropium (SPIRIVA RESPIMAT) 2.5 MCG/ACT inhaler 2 puff  2 puff Inhalation Daily Saud Giles MD   2 puff at 02/21/23 0825       Allergies:  No Known Allergies    Problem List:    Patient Active Problem List   Diagnosis Code    Hypertension I10    Allergic rhinitis J30.9    Atrial fibrillation (HCC) I48.91    Malignant melanoma ([facility] [address] C43.9    History of MI (myocardial infarction) I25.2    Wears glasses Z97.3    Sinus bradycardia R00.1    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    GI bleed K92.2    Serologic abnormality R89.4    Chronic cough R05.3    Chronic fatigue syndrome G93.32    Chronic sinusitis J32.9    Gastroesophageal reflux disease K21.9    Moderate persistent asthma without complication G94.24    Degenerative joint disease involving multiple joints on both sides of body M15.9    Primary localized osteoarthritis of pelvic region and thigh M16.10    Primary osteoarthritis of both knees M17.0    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    Hypersomnia G47.10    Sleep apnea G47.30    History of smoking Z87.891    Arthritis of left hip M16.12    Calculus of gallbladder without cholecystitis without obstruction K80.20    Flank pain R10.9    Common bile duct dilatation K83.8    Choledocholithiasis-suspected K80.50    INR therapeutic R79.1    Pain of upper abdomen R10.10    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 (HCC) M80.88XA    Age-related osteoporosis with current pathological fracture M80.00XA    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 [address]     on nose    Myocardial infarction ([facility] [address] 2008    Wears glasses     Wears partial dentures     upper       Past Surgical History:        Procedure Laterality Date    APPENDECTOMY      BREAST SURGERY      CARDIAC SURGERY      CARPAL TUNNEL RELEASE Right     COLONOSCOPY N/A 8/8/2019    COLONOSCOPY POLYPECTOMY REMOVAL HOT BIOPSY AND RESOLUTION CLIP performed by Bridger Bruner MD at [facility] ERCP N/A 2/17/2023    ERCP ENDOSCOPIC RETROGRADE CHOLANGIOPANCREATOGRAPHY performed by Ivy Torres MD at [address]      umbilical    HYSTERECTOMY (CERVIX STATUS UNKNOWN)      JOINT REPLACEMENT      NECK SURGERY      C5-C6    NOSE SURGERY      cancer removed    OTHER SURGICAL HISTORY      heart surg/growth in heart sac    TONSILLECTOMY      TOTAL HIP ARTHROPLASTY Right 2019    TOTAL HIP ARTHROPLASTY Right 2019    HIP TOTAL ARTHROPLASTY - MEDACTA, C-ARM, MEDACTA TABLE, NSA= GENERAL VS SPINAL, FASCIA ILLIACA BLOCK, *STANDARD performed by Yordy Workman DO at [address] [facility] [facility] N/A 2019    EGD DIAGNOSTIC ONLY performed by Sharan Adams MD at [facility]       Social History:    Social History     Tobacco Use    Smoking status: Former     Packs/day: 1.00     Years: 20.00     Pack years: 20.00     Types: Cigarettes     Quit date: 1981     Years since quittin.1    Smokeless tobacco: Never   Substance Use Topics    Alcohol use: No                                Counseling given: Not Answered      Vital Signs (Current): There were no vitals filed for this visit.                                            BP Readings from Last 3 Encounters:   23 (!) 159/71   02/15/23 (!) 140/90   23 130/80       NPO Status:                                                                                 BMI:   Wt Readings from Last 3 Encounters:   23 191 lb 12.8 oz (87 kg)   02/15/23 178 lb (80.7 kg)   23 176 lb (79.8 kg)     There is no height or weight on file to calculate BMI.    CBC:   Lab Results   Component Value Date/Time    WBC 3.7 2023 08:49 AM    RBC 3.82 2023 08:49 AM    HGB 11.5 2023 08:49 AM    HCT 36.3 2023 08:49 AM    MCV 95.0 2023 08:49 AM    RDW 14.6 2023 08:49 AM     2023 08:49 AM       CMP:   Lab Results   Component Value Date/Time     2023 07:36 AM    K 4.0 2023 07:36 AM     2023 07:36 AM    CO2 24 2023 07:36 AM    BUN 37 2023 07:36 AM    CREATININE 1.52 2023 07:36 AM    GFRAA 45 2021 08:40 AM    LABGLOM 34 2023 07:36 AM    GLUCOSE 107 2023 07:36 AM    PROT 7.0 2023 08:46 AM    CALCIUM 8.9 2023 07:36 AM    BILITOT 0.9 02/20/2023 08:46 AM    ALKPHOS 79 02/20/2023 08:46 AM    AST 18 02/20/2023 08:46 AM    ALT 16 02/20/2023 08:46 AM       POC Tests: No results for input(s): POCGLU, POCNA, POCK, POCCL, POCBUN, POCHEMO, POCHCT in the last 72 hours. Coags:   Lab Results   Component Value Date/Time    PROTIME 16.7 02/21/2023 06:27 AM    PROTIME 37.9 01/09/2023 10:35 AM    INR 1.4 02/21/2023 06:27 AM    APTT 32.8 05/05/2019 02:47 PM       HCG (If Applicable): No results found for: PREGTESTUR, PREGSERUM, HCG, HCGQUANT     ABGs: No results found for: PHART, PO2ART, QMQ2MKQ, TGV9YHJ, BEART, Q0BFDNMN     Type & Screen (If Applicable):  No results found for: LABABO, [address]    Anesthesia Evaluation  Patient summary reviewed and Nursing notes reviewed no history of anesthetic complications:   Airway: Mallampati: I  TM distance: >3 FB   Neck ROM: full  Mouth opening: > = 3 FB Dental:    (+) lower dentures, upper dentures and partials      Pulmonary:Negative Pulmonary ROS breath sounds clear to auscultation                             Cardiovascular:  Exercise tolerance: good (>4 METS),   (+) hypertension:, past MI:, dysrhythmias: atrial fibrillation,       ECG reviewed  Rhythm: regular  Rate: normal           Beta Blocker:  Not on Beta Blocker         Neuro/Psych:   Negative Neuro/Psych ROS  (+) depression/anxiety             GI/Hepatic/Renal: Neg GI/Hepatic/Renal ROS            Endo/Other: Negative Endo/Other ROS                    Abdominal:       Abdomen: soft. Vascular: negative vascular ROS. Anesthesia Plan      MAC     ASA 3       Induction: intravenous. Anesthetic plan and risks discussed with patient. Plan discussed with CRNA.                   Brittany Man MD   2/21/2023                Brittany Man MD   2/21/2023

## 2023-02-21 NOTE — PLAN OF CARE
Problem: Respiratory - Adult  Goal: Able to breathe comfortably  Outcome: Progressing     Problem: Respiratory - Adult  Goal: Patient's breath sounds will be clear and equal  Outcome: Progressing

## 2023-02-21 NOTE — PROGRESS NOTES
Physical Therapy    DATE: 2023    NAME: Shay Herzog  MRN: [id number]   : 1940    Patient not seen this date for Physical Therapy due to:      [] Cancel by RN or physician due to:    [] Hemodialysis    [] Critical Lab Value Level     [] Blood transfusion in progress    [] Acute or unstable cardiovascular status   _MAP < 55 or more than >115  _HR < 40 or > 130    [] Acute or unstable pulmonary status   -FiO2 > 60%   _RR < 5 or >40    _O2 sats < 85%    [x] Kyphoplasty this date    [] Off Unit for surgery or procedure    [] Off Unit for testing       [] Pending imaging to R/O fracture    [] Refusal by Patient      [] Other      [] PT being discontinued at this time. Patient independent. No further needs. [] PT being discontinued at this time as the patient has been transferred to hospice care. No further needs.       [facility], PT

## 2023-02-21 NOTE — PROGRESS NOTES
PATIENT NAME: Nuria Prather     TODAY'S DATE: 2/21/2023, 4:02 PM    SUBJECTIVE:    78 y/o female following for abdominal pain r/o symptomatic cholelithiasis with no new complaints today. Patient still with upper abdominal pressure. Denies nausea or vomiting. OBJECTIVE:   VITALS:  BP (!) 159/71   Pulse 52   Temp 98.2 °F (36.8 °C) (Oral)   Resp 16   Ht 5' 5\" (1.651 m)   Wt 191 lb 12.8 oz (87 kg)   SpO2 95%   BMI 31.92 kg/m²      INTAKE/OUTPUT:      Intake/Output Summary (Last 24 hours) at 2/21/2023 1602  Last data filed at 2/20/2023 1814  Gross per 24 hour   Intake 300 ml   Output --   Net 300 ml                 CONSTITUTIONAL:  Awake and alert. No acute distress  ABDOMEN:   Abdomen soft, non-tender, non-distended      ASSESSMENT   Abdominal pain    Plan  Patient is scheduled for kyphoplasty today. GI plans on outpatient evaluation for potential ERCP. Patient does not have an acute surgical abdomen. There is no indication for surgery this admission. Patient symptoms are not completely consistent with biliary colic. Patient's pain is not associated with food. I explained that cholecystectomy will not guarantee resolution of symptoms. Patient can follow up with me as an outpatient. If patient wants to pursue cholecystectomy, we can schedule as elective outpatient. General surgery to sign off. Please call if there are any additional questions or concerns.       Electronically signed by Rivera Cisse IV, DO  on 2/21/2023 at 4:02 PM

## 2023-02-22 VITALS
TEMPERATURE: 97.7 F | WEIGHT: 188.5 LBS | HEIGHT: 65 IN | RESPIRATION RATE: 18 BRPM | OXYGEN SATURATION: 95 % | HEART RATE: 51 BPM | DIASTOLIC BLOOD PRESSURE: 62 MMHG | BODY MASS INDEX: 31.4 KG/M2 | SYSTOLIC BLOOD PRESSURE: 193 MMHG

## 2023-02-22 LAB
ABSOLUTE EOS #: 0.3 K/UL (ref 0–0.44)
ABSOLUTE IMMATURE GRANULOCYTE: 0.06 K/UL (ref 0–0.3)
ABSOLUTE LYMPH #: 1.46 K/UL (ref 1.1–3.7)
ABSOLUTE MONO #: 0.71 K/UL (ref 0.1–1.2)
ANION GAP SERPL CALCULATED.3IONS-SCNC: 9 MMOL/L (ref 9–17)
BASOPHILS # BLD: 1 % (ref 0–2)
BASOPHILS ABSOLUTE: 0.06 K/UL (ref 0–0.2)
BUN SERPL-MCNC: 25 MG/DL (ref 8–23)
BUN/CREAT BLD: 18 (ref 9–20)
CALCIUM SERPL-MCNC: 8.5 MG/DL (ref 8.6–10.4)
CHLORIDE SERPL-SCNC: 104 MMOL/L (ref 98–107)
CO2 SERPL-SCNC: 26 MMOL/L (ref 20–31)
CREAT SERPL-MCNC: 1.41 MG/DL (ref 0.5–0.9)
EOSINOPHILS RELATIVE PERCENT: 6 % (ref 1–4)
GFR SERPL CREATININE-BSD FRML MDRD: 37 ML/MIN/1.73M2
GLUCOSE SERPL-MCNC: 80 MG/DL (ref 70–99)
HCT VFR BLD AUTO: 33.4 % (ref 36.3–47.1)
HCT VFR BLD AUTO: 33.4 % (ref 36.3–47.1)
HGB BLD-MCNC: 10.3 G/DL (ref 11.9–15.1)
HGB BLD-MCNC: 10.3 G/DL (ref 11.9–15.1)
IMMATURE GRANULOCYTES: 1 %
INR PPP: 1.2
LYMPHOCYTES # BLD: 27 % (ref 24–43)
MCH RBC QN AUTO: 29.4 PG (ref 25.2–33.5)
MCHC RBC AUTO-ENTMCNC: 30.6 G/DL (ref 28.4–34.8)
MCV RBC AUTO: 96.1 FL (ref 82.6–102.9)
MONOCYTES # BLD: 13 % (ref 3–12)
NRBC AUTOMATED: 0 PER 100 WBC
PDW BLD-RTO: 15.2 % (ref 11.8–14.4)
PLATELET # BLD AUTO: 206 K/UL (ref 138–453)
PMV BLD AUTO: 10.5 FL (ref 8.1–13.5)
POTASSIUM SERPL-SCNC: 4.2 MMOL/L (ref 3.7–5.3)
PROTHROMBIN TIME: 15.7 SEC (ref 11.5–14.2)
RBC # BLD: 3.57 M/UL (ref 3.95–5.11)
RBC # BLD: ABNORMAL 10*6/UL
SEG NEUTROPHILS: 52 % (ref 36–65)
SEGMENTED NEUTROPHILS ABSOLUTE COUNT: 2.83 K/UL (ref 1.5–8.1)
SODIUM SERPL-SCNC: 139 MMOL/L (ref 135–144)
WBC # BLD AUTO: 5.4 K/UL (ref 3.5–11.3)

## 2023-02-22 PROCEDURE — 6370000000 HC RX 637 (ALT 250 FOR IP): Performed by: ORTHOPAEDIC SURGERY

## 2023-02-22 PROCEDURE — 97116 GAIT TRAINING THERAPY: CPT

## 2023-02-22 PROCEDURE — 80048 BASIC METABOLIC PNL TOTAL CA: CPT

## 2023-02-22 PROCEDURE — 6360000002 HC RX W HCPCS: Performed by: ORTHOPAEDIC SURGERY

## 2023-02-22 PROCEDURE — 99239 HOSP IP/OBS DSCHRG MGMT >30: CPT | Performed by: NURSE PRACTITIONER

## 2023-02-22 PROCEDURE — 97530 THERAPEUTIC ACTIVITIES: CPT

## 2023-02-22 PROCEDURE — 85025 COMPLETE CBC W/AUTO DIFF WBC: CPT

## 2023-02-22 PROCEDURE — 85610 PROTHROMBIN TIME: CPT

## 2023-02-22 PROCEDURE — 2580000003 HC RX 258: Performed by: ORTHOPAEDIC SURGERY

## 2023-02-22 PROCEDURE — 36415 COLL VENOUS BLD VENIPUNCTURE: CPT

## 2023-02-22 PROCEDURE — 6370000000 HC RX 637 (ALT 250 FOR IP): Performed by: NURSE PRACTITIONER

## 2023-02-22 PROCEDURE — 94640 AIRWAY INHALATION TREATMENT: CPT

## 2023-02-22 PROCEDURE — 2500000003 HC RX 250 WO HCPCS: Performed by: NURSE PRACTITIONER

## 2023-02-22 RX ORDER — HYDRALAZINE HYDROCHLORIDE 50 MG/1
50 TABLET, FILM COATED ORAL EVERY 8 HOURS SCHEDULED
Status: DISCONTINUED | OUTPATIENT
Start: 2023-02-22 | End: 2023-02-22 | Stop reason: HOSPADM

## 2023-02-22 RX ORDER — HYDRALAZINE HYDROCHLORIDE 20 MG/ML
10 INJECTION INTRAMUSCULAR; INTRAVENOUS ONCE
Status: DISCONTINUED | OUTPATIENT
Start: 2023-02-22 | End: 2023-02-22 | Stop reason: HOSPADM

## 2023-02-22 RX ORDER — OXYCODONE HYDROCHLORIDE AND ACETAMINOPHEN 5; 325 MG/1; MG/1
1 TABLET ORAL EVERY 6 HOURS PRN
Qty: 12 TABLET | Refills: 0 | Status: SHIPPED | OUTPATIENT
Start: 2023-02-22 | End: 2023-02-25

## 2023-02-22 RX ORDER — OXYCODONE HYDROCHLORIDE AND ACETAMINOPHEN 5; 325 MG/1; MG/1
1 TABLET ORAL EVERY 6 HOURS PRN
Qty: 12 TABLET | Refills: 0 | Status: SHIPPED | OUTPATIENT
Start: 2023-02-22 | End: 2023-02-22 | Stop reason: SDUPTHER

## 2023-02-22 RX ORDER — HYDRALAZINE HYDROCHLORIDE 50 MG/1
50 TABLET, FILM COATED ORAL EVERY 8 HOURS SCHEDULED
Qty: 90 TABLET | Refills: 3 | Status: SHIPPED | OUTPATIENT
Start: 2023-02-22

## 2023-02-22 RX ORDER — METOPROLOL TARTRATE 5 MG/5ML
5 INJECTION INTRAVENOUS EVERY 4 HOURS PRN
Status: DISCONTINUED | OUTPATIENT
Start: 2023-02-22 | End: 2023-02-22 | Stop reason: HOSPADM

## 2023-02-22 RX ORDER — OXYCODONE HYDROCHLORIDE AND ACETAMINOPHEN 5; 325 MG/1; MG/1
1 TABLET ORAL EVERY 6 HOURS PRN
Status: DISCONTINUED | OUTPATIENT
Start: 2023-02-22 | End: 2023-02-22 | Stop reason: HOSPADM

## 2023-02-22 RX ORDER — WARFARIN SODIUM 5 MG/1
5 TABLET ORAL
Status: DISCONTINUED | OUTPATIENT
Start: 2023-02-22 | End: 2023-02-22 | Stop reason: HOSPADM

## 2023-02-22 RX ADMIN — ACETAMINOPHEN 650 MG: 325 TABLET ORAL at 06:37

## 2023-02-22 RX ADMIN — METOPROLOL TARTRATE 5 MG: 5 INJECTION, SOLUTION INTRAVENOUS at 09:05

## 2023-02-22 RX ADMIN — PIPERACILLIN AND TAZOBACTAM 3375 MG: 3; .375 INJECTION, POWDER, FOR SOLUTION INTRAVENOUS at 10:51

## 2023-02-22 RX ADMIN — AZELASTINE HYDROCHLORIDE 2 SPRAY: 137 SPRAY, METERED NASAL at 09:06

## 2023-02-22 RX ADMIN — ACETAMINOPHEN 650 MG: 325 TABLET ORAL at 14:20

## 2023-02-22 RX ADMIN — AMIODARONE HYDROCHLORIDE 100 MG: 200 TABLET ORAL at 09:04

## 2023-02-22 RX ADMIN — BUDESONIDE AND FORMOTEROL FUMARATE DIHYDRATE 2 PUFF: 160; 4.5 AEROSOL RESPIRATORY (INHALATION) at 07:44

## 2023-02-22 RX ADMIN — TIOTROPIUM BROMIDE INHALATION SPRAY 2 PUFF: 3.12 SPRAY, METERED RESPIRATORY (INHALATION) at 07:44

## 2023-02-22 RX ADMIN — ACETAMINOPHEN 650 MG: 325 TABLET ORAL at 01:48

## 2023-02-22 RX ADMIN — HYDRALAZINE HYDROCHLORIDE 10 MG: 20 INJECTION INTRAMUSCULAR; INTRAVENOUS at 06:37

## 2023-02-22 RX ADMIN — ENOXAPARIN SODIUM 80 MG: 100 INJECTION SUBCUTANEOUS at 09:05

## 2023-02-22 RX ADMIN — HYDRALAZINE HYDROCHLORIDE 10 MG: 20 INJECTION INTRAMUSCULAR; INTRAVENOUS at 00:17

## 2023-02-22 RX ADMIN — FLUTICASONE PROPIONATE 1 SPRAY: 50 SPRAY, METERED NASAL at 09:06

## 2023-02-22 RX ADMIN — DILTIAZEM HYDROCHLORIDE 180 MG: 180 CAPSULE, COATED, EXTENDED RELEASE ORAL at 09:04

## 2023-02-22 RX ADMIN — MULTIPLE VITAMINS W/ MINERALS TAB 1 TABLET: TAB at 09:05

## 2023-02-22 RX ADMIN — PANTOPRAZOLE SODIUM 40 MG: 40 TABLET, DELAYED RELEASE ORAL at 06:37

## 2023-02-22 RX ADMIN — HYDRALAZINE HYDROCHLORIDE 50 MG: 50 TABLET, FILM COATED ORAL at 09:05

## 2023-02-22 RX ADMIN — PIPERACILLIN AND TAZOBACTAM 3375 MG: 3; .375 INJECTION, POWDER, FOR SOLUTION INTRAVENOUS at 01:50

## 2023-02-22 RX ADMIN — FERROUS SULFATE TAB EC 325 MG (65 MG FE EQUIVALENT) 325 MG: 325 (65 FE) TABLET DELAYED RESPONSE at 09:05

## 2023-02-22 RX ADMIN — IBUPROFEN 400 MG: 200 TABLET, FILM COATED ORAL at 06:37

## 2023-02-22 RX ADMIN — CETIRIZINE HYDROCHLORIDE 5 MG: 5 TABLET ORAL at 09:05

## 2023-02-22 RX ADMIN — Medication 1 TABLET: at 09:04

## 2023-02-22 RX ADMIN — SODIUM CHLORIDE, POTASSIUM CHLORIDE, SODIUM LACTATE AND CALCIUM CHLORIDE: 600; 310; 30; 20 INJECTION, SOLUTION INTRAVENOUS at 06:41

## 2023-02-22 ASSESSMENT — PAIN SCALES - GENERAL
PAINLEVEL_OUTOF10: 4
PAINLEVEL_OUTOF10: 4
PAINLEVEL_OUTOF10: 3

## 2023-02-22 NOTE — CONSULTS
Warfarin Dosing - Pharmacy Consult Note  Consulting Provider: DUDLEY Gray-CNP  Indication:  Atrial Fibrillation  Warfarin Dose prior to admission: 5 mg Sun/Tue/Thu and 2.5 mg all other days   Concurrent anticoagulants/antiplatelets: Enoxaparin - needs to be dc'd when INR is therapeutic  Significant Drug Interactions: amiodarone (incr INR) (home med)  Recent Labs     02/20/23  0534 02/20/23  0846 02/21/23  0627 02/22/23  0543   INR 1.4  --  1.4 1.2   HGB  --   --   --  10.3*  10.3*   PLT  --   --   --  206   LABALBU  --  4.0  --   --      Recent warfarin administrations        No warfarin orders with administrations found. Orders not given:            warfarin placeholder: dosing by pharmacy                   Date   INR    Dose  2/22       1.2            Assessment/Plan  (Goal INR: 2 - 3)  Patient also on Lovenox 1 mg/kg bid. Also on ibuprofen until 2/24. Will restart with 5 mg tonight and recheck INR in am.    Active problem list reviewed. INR orders are placed. Chart reviewed for pertinent labs, drug/diet interactions, and past doses. Documentation of patient's clinical condition was reviewed. Pharmacy Dosing:  Pharmacy will continue to follow.

## 2023-02-22 NOTE — PROGRESS NOTES
Post Operative Progress Note    2/22/2023 7:08 AM  Subjective:   Admit Date: 2/15/2023  PCP: Saray Dickerson PA-C  Date of Discharge: Okay to discharge orthopedic    Medications:   Scheduled Meds:   therapeutic multivitamin-minerals  1 tablet Oral Daily    ferrous sulfate  325 mg Oral Daily with breakfast    sodium chloride flush  5-40 mL IntraVENous 2 times per day    acetaminophen  650 mg Oral Q6H    ibuprofen  400 mg Oral Q12H    enoxaparin  80 mg SubCUTAneous BID    warfarin placeholder: dosing by pharmacy   Other RX Placeholder    dilTIAZem  180 mg Oral Daily    azelastine  2 spray Each Nostril BID    montelukast  10 mg Oral Nightly    pantoprazole  40 mg Oral Daily    cetirizine  5 mg Oral Daily    calcium carb-cholecalciferol  1 tablet Oral Daily    piperacillin-tazobactam  3,375 mg IntraVENous Q8H    amiodarone  100 mg Oral Daily    fluticasone  1 spray Each Nostril Daily    sodium chloride flush  5-40 mL IntraVENous 2 times per day    budesonide-formoterol  2 puff Inhalation BID    tiotropium  2 puff Inhalation Daily     Continuous Infusions:   lactated ringers IV soln 50 mL/hr at 02/22/23 0641    sodium chloride       PRN Meds:acetaminophen-codeine, sodium chloride flush, sodium chloride, cyclobenzaprine, oxyCODONE-acetaminophen, bisacodyl, hydrALAZINE, calcium carbonate, morphine, sodium chloride flush, potassium chloride **OR** potassium alternative oral replacement **OR** potassium chloride, magnesium sulfate, ondansetron **OR** ondansetron, polyethylene glycol, acetaminophen **OR** acetaminophen    Diet:   Diet: ADULT DIET;  Regular    Subjective:   Systemic or Specific Complaints:No Complaints    Objective:     Patient Vitals for the past 24 hrs:   BP Temp Temp src Pulse Resp SpO2 Height Weight   02/22/23 0445 (!) 177/59 98.4 °F (36.9 °C) Oral 56 18 93 % -- --   02/22/23 0156 -- -- -- -- -- -- -- 188 lb 8 oz (85.5 kg)   02/22/23 0011 (!) 184/81 99.1 °F (37.3 °C) Oral 63 18 94 % -- --   02/22/23 0003 -- -- -- -- -- -- -- 187 lb 1.6 oz (84.9 kg)   02/21/23 1958 -- -- -- 55 18 95 % -- --   02/21/23 1920 (!) 173/76 97.3 °F (36.3 °C) Oral 59 18 98 % -- --   02/21/23 1821 (!) 194/52 97.5 °F (36.4 °C) Axillary 55 18 95 % -- --   02/21/23 1800 (!) 166/56 97.7 °F (36.5 °C) -- 52 17 94 % -- --   02/21/23 1745 (!) 174/56 -- -- 53 19 96 % -- --   02/21/23 1730 (!) 163/56 -- -- 58 18 -- -- --   02/21/23 1725 (!) 163/56 98.2 °F (36.8 °C) Temporal 56 17 96 % -- --   02/21/23 1259 -- -- -- -- -- -- 5' 5\" (1.651 m) --   02/21/23 1103 (!) 159/71 98.2 °F (36.8 °C) Oral 52 -- 95 % -- --   02/21/23 0843 (!) 150/53 97.9 °F (36.6 °C) Oral 54 16 94 % -- --   02/21/23 0825 -- -- -- -- -- 94 % -- --     I/O last 3 completed shifts: In: 76 [I.V.:75]  Out: -   No intake/output data recorded. General: alert, appears stated age, and cooperative   Wound: Wound Intact   Motion: Painless Range of Motion in affected extremity   DVT Exam: No evidence of DVT seen on physical exam.  Negative Hernando's sign. No cords or calf tenderness. Additional exam:         Data Review  CBC:   Recent Labs     02/22/23  0543   HGB 10.3*     BMP:    Recent Labs     02/19/23  0736      K 4.0      CO2 24   BUN 37*   CREATININE 1.52*   GLUCOSE 107*     Hepatic:   Recent Labs     02/19/23  0736 02/20/23  0846   AST 15 18   ALT 11 16   BILITOT 0.5 0.9   ALKPHOS 64 79     Troponin: No results for input(s): TROPONINI in the last 72 hours. BNP: No results for input(s): BNP in the last 72 hours. Lipids: No results for input(s): CHOL, HDL in the last 72 hours. Invalid input(s): LDLCALCU  INR:   Recent Labs     02/20/23  0534 02/21/23  0627 02/22/23  0543   INR 1.4 1.4 1.2         Assessment:   Krupa Watkins has improved from yesterday. Pain is well controlled. She has no nausea and no vomiting.     Current activity is ad rosario  Incision: intact      Plan:      1: Spine suture removal, Steri-Strip  2:  Continue Deep venous thrombosis prophylaxis  3: Continue Pain Control  4. Okay to discharge orthopedically. Office follow-up 1 month.   Pre-clinic imaging       Electronically signed by Fernando Flores MD on 2/22/2023 at 7:08 AM

## 2023-02-22 NOTE — PROGRESS NOTES
Physical Therapy  Facility/Department: [facility] SURG  Physical Therapy Initial Assessment    Name: Armond May  : 1940  MRN: [id number]  Date of Service: 2023    Discharge Recommendations:  Home with assist PRN      Kyphoplasty 23    Patient Diagnosis(es): The primary encounter diagnosis was Pain of upper abdomen. Diagnoses of Acute midline low back pain without sciatica and Compression fracture of T8 vertebra with delayed healing were also pertinent to this visit. Past Medical History:  has a past medical history of Allergic rhinitis, Arthritis, Asthma, Atrial fibrillation ([facility] GERD (gastroesophageal reflux disease), Hypertension, Melanoma ([facility] [address] Myocardial infarction [facility]), Wears glasses, and Wears partial dentures. Past Surgical History:  has a past surgical history that includes Appendectomy; other surgical history; Hysterectomy; Neck surgery; Carpal tunnel release (Right); Nose surgery; hernia repair; Tonsillectomy; joint replacement; Total hip arthroplasty (Right, 2019); Total hip arthroplasty (Right, 2019); Upper gastrointestinal endoscopy (N/A, 2019); Colonoscopy (N/A, 2019); eye surgery; Cardiac surgery; Breast surgery; ERCP (N/A, 2023); and Spine surgery (N/A, 2023). Assessment   Body Structures, Functions, Activity Limitations Requiring Skilled Therapeutic Intervention: Decreased functional mobility ; Decreased strength;Decreased endurance  Assessment: Patient demo good improvement in pain and mobility s/p kyphoplasty on 23. Patient demo steady gait and safe transfers.   Specific Instructions for Next Treatment: Stairs if still here   Therapy Prognosis: Excellent  Decision Making: Medium Complexity  Requires PT Follow-Up: Yes  Activity Tolerance  Activity Tolerance: Patient tolerated treatment well     Plan   Physcial Therapy Plan  General Plan: 5-7 times per week  Specific Instructions for Next Treatment: Stairs if still here   Current Treatment Recommendations: Strengthening, Balance training, Functional mobility training, Transfer training, Endurance training, Gait training, Home exercise program, Safety education & training, Patient/Caregiver education & training  Safety Devices  Type of Devices: Call light within reach, Nurse notified, Gait belt, Left in chair, Chair alarm in place, All fall risk precautions in place     Restrictions  Restrictions/Precautions  Restrictions/Precautions: General Precautions, Fall Risk  Position Activity Restriction  Other position/activity restrictions: Up with assist, RUE IV,     Subjective   General  Chart Reviewed: Yes  Patient assessed for rehabilitation services?: Yes  Additional Pertinent Hx: appendectomy, arthritis, asthma, atrial fib on warfarin, GERD, HTN, MI, melanoma, and MI  Response To Previous Treatment: Patient with no complaints from previous session. Family / Caregiver Present: No  Follows Commands: Within Functional Limits  General Comment  Comments: Rachell RN reports pateint is medically appropriate for PT, but BP has been running high. Subjective  Subjective: Pateint pleasant and agreeable to PT, reports significant improvement in pain since kyphoplasty.          Social/Functional History  Social/Functional History  Lives With: Daughter  Type of Home: House  Home Layout: One level  Home Access: Stairs to enter with rails  Entrance Stairs - Number of Steps: 4(front entrance)  Entrance Stairs - Rails: Both  Bathroom Shower/Tub: Tub/Shower unit, Walk-in shower  Bathroom Toilet: Handicap height (pt has support near toilet she can hold)  Bathroom Equipment: Grab bars in shower  Home Equipment: Sherl Miguelito, rolling, Reacher, Sock aid, Lift chair  Has the patient had two or more falls in the past year or any fall with injury in the past year?: No (pt denies)  Receives Help From: Family  ADL Assistance: [address] [place] [address] Avenue: Independent  Homemaking Responsibilities: Yes  Ambulation Assistance: Independent  Transfer Assistance: Independent  Active : Yes  Mode of Transportation: Car  Occupation: Retired  Type of Occupation: JEEP  Leisure & Hobbies: goes to [employer], helps out at [employer], [facility]: Impaired  Vision Exceptions: Wears glasses at all times  Hearing  H[facility]  Hearing Exceptions: Hard of hearing/hearing concerns; No hearing aid    Cognition   Orientation  Overall Orientation Status: Within Functional Limits  Cognition  Overall Cognitive Status: WFL     Objective   Heart Rate: 51  Heart Rate Source: Monitor  BP: (!) 193/62  BP Location: Right upper arm  Patient Position: Sitting  MAP (Calculated): 106  Resp: 18  SpO2: 95 %  O2 Device: None (Room air)     Observation/Palpation  Observation: RUE IV, dressing over kyphoplasty site        AROM RLE (degrees)  RLE AROM: WFL  AROM LLE (degrees)  LLE AROM : WFL  Strength RLE  Comment: 4+/5  Strength LLE  Comment: 4+/5           Bed mobility  Rolling to Left: Modified independent  Supine to Sit: Modified independent  Scooting: Independent  Bed Mobility Comments: Verbal cues for log rolling, indep with use of bed rail for rolling and supine to sit. Transfers  Sit to Stand: Independent  Stand to Sit: Independent  Bed to Chair: Independent  Stand Pivot Transfers: Independent  Comment: Patient demo good safety with transfers, steady. Ambulation  Surface: Level tile  Device: No Device  Assistance: Supervision  Quality of Gait: Patient amb with steady gait, mild SOB after amb 90 feet. Patient supervision due to recent surgery, inceased BP, and decreased HR (see vitals).   Gait Deviations: Slow Judi  Distance: 90 feet, 40 feet     Balance  Sitting - Static: Good  Sitting - Dynamic: Good  Standing - Static: Good  Standing - Dynamic: Good;-           OutComes Score    AM-PAC Score  AM-PAC Inpatient Mobility Raw Score : 21 (02/22/23 1509)  AM-PAC Inpatient T-Scale Score : 50.25 (02/22/23 1509)  Mobility Inpatient CMS 0-100% Score: 28.97 (02/22/23 1509)  Mobility Inpatient CMS G-Code Modifier : CJ (02/22/23 1509)         Goals  Short Term Goals  Time Frame for Short Term Goals: 8 visits  Short Term Goal 1: Inc bed-mobility & transfers to independent to enable pt to safely get in/OOB & chair to return to PLOF & decrease risk for falls; Short Term Goal 2: Inc gait to amb 350ft or > indep to enable pt to return to previous level of independence & able to demonstrate indep/ safe functional activities including approaching surfaces and turning to sit. Short Term Goal 3: Pt able to go up/down 4 steps with Jass rails indep  Short Term Goal 4:  Inc standing balance to good with device to facilitate pt independence for performance of ADL's & functional mobility, & reduce fall risk  Short Term Goal 5: Pt able to tolerate 30-40 min of activity to include 15-20 reps of ex, NMR & functional mobility with device to facilitate activity tolerance to [facility]  Patient Goals   Patient Goals : Return home       Education  Patient Education  Education Given To: Patient  Education Provided: Transfer Training;Home Exercise Program;Precautions  Education Provided Comments: Importance of log rolling, frequent ambulation, avoid bending and lifting  Education Method: Verbal  Barriers to Learning: None  Education Outcome: Verbalized understanding      Therapy Time   Individual Concurrent Group Co-treatment   Time In 1025         Time Out 1111         Minutes 46         Timed Code Treatment Minutes: [facility], PT

## 2023-02-22 NOTE — OP NOTE
[address] [facility] [address]                [address] [facility], [address] [place] [address]                                OPERATIVE REPORT    PATIENT NAME: Venice Goldmann                     :        1940  MED REC NO:   [id number]                             ROOM:         ACCOUNT NO:   [de-identified]                           ADMIT DATE: 02/15/2023  PROVIDER:     Tanner Bravo MD    DATE OF PROCEDURE:  2023    PREOPERATIVE DIAGNOSIS:  Pathological osteoporotic T8 compression  fracture. POSTOPERATIVE DIAGNOSIS:  Pathological osteoporotic T8 compression  fracture. PROCEDURE:  1. Kyphoplasty with vertebral augmentation of pathological T8  compression fracture. 2.  Anesthesia by surgeon - 64 modifier. 3.  Regional Marcaine thoracic block (42457). 4.  Biopsy of pathological T8 compression fracture. 5.  AP and lateral fluoroscopy of thoracic spine. SURGEON:  Tanner Bravo MD    INDICATION:  The patient is an 76-year-old, who states she had a sharp  onset of mechanical at times radicular pain three weeks ago. She denies  any trauma. The pain radiated to the sternal, but also into the  abdomen. She presented to [facility]'s ER with admission, she has  had several services involved with her evaluation and treatment. This  included General Surgery, Cardiology, Hospitalist, and Gastroenterology. I was asked to see her orthopedically when an acute fragility fracture  was identified on imaging. She had a CT as well as an MRI. The MRI  noted low signal T1, high-signal STIR changes present at T8 compression  fracture site. Treatment options were reviewed. Nonoperative care via  TLSO pain control versus surgery. Kyphoplasty with vertebral  augmentation and biopsy was recommended to decrease pain, decrease  kyphotic deformity, eliminate the need for spine orthosis and obtain  tissue diagnosis.   Risks of procedure including infection, phlebitis,  neurovascular or tendon injury, aesthetic complications, failure of the  procedure, new fractures amongst others were discussed and accepted. DESCRIPTION OF PROCEDURE:  The patient was brought to the operative room  and given MAC sedation. She was given prophylactic antibiotics per SCIP  protocol with Ancef. She was kept in the prone position on a  well-padded Abner frame. Two C-arms were brought in. True AP and  true lateral images were obtained of the T8 fracture. The spine was  therefore scrubbed, prepped and draped in routine sterile fashion with  ChloraPrep. The regional block was performed at this point. It involved an 18-gauge  spinal needle. Skin, subcutaneous tissue, paraspinal musculature was  infiltrated with a total of 30 mL. An 11-blade was used for the skin  incision and 15.2 instrumentation. The compression was approximately  50%. A bevel tip trocar was docked on the 9 o'clock and 3 o'clock  position for the left and right pedicle respectively. The coronal  dimensions of the T8 pedicle were wide consistent with osteoporosis. A transpedicular route was successful to the body. Once it was  encountered, a diagnostic trabecular biopsy was obtained through a  cannula trocar. This was followed by a hand drill creating a track for  the IBTs. The balloons were inflated _____ with clear correction of  anterior column height loss. AP and lateral fluoroscopy was used throughout the operative procedure. The images were interpreted by myself regarding fracture reduction and  implant position. On the back table, methylmethacrylate was prepared. It was warmed to  proper consistency. It was then carefully back filled into the void  created by the balloons. The fill was excellent throughout the anterior  column superior to inferior endplate without canal extrusions. Satisfied with result, the skin portals were closed with 3-0 nylon. A 4  x 4 ABD dressing was secured.   Having tolerated the procedure well, the  patient was transferred to the recovery room in stable condition.         Lyn Washington MD    D: 02/21/2023 17:33:46       T: 02/21/2023 17:37:11     [email or web address]  Job#: [id number]     Doc#: [id number]    CC:   _____       Dr. Lorenzo Mei Dr. _____       MD Kia Granados MD

## 2023-02-22 NOTE — PLAN OF CARE
Problem: Respiratory - Adult  Goal: Able to breathe comfortably  2/21/2023 1959 by Thania Kwan RCP  Outcome: Progressing  2/21/2023 0826 by Antony Kwong RCP  Outcome: Progressing     Problem: Respiratory - Adult  Goal: Patient's breath sounds will be clear and equal  2/21/2023 1959 by Thania Kwan RCP  Outcome: Progressing  2/21/2023 0826 by Antony Kwong RCP  Outcome: Progressing

## 2023-02-22 NOTE — PLAN OF CARE
Problem: Respiratory - Adult  Goal: Able to breathe comfortably  2/22/2023 0751 by Moose Hunter RCP  Outcome: Progressing  2/21/2023 1959 by Maame Kelsey RCP  Outcome: Progressing  Goal: Patient's breath sounds will be clear and equal  2/22/2023 0751 by Moose Hunter RCP  Outcome: Progressing  2/21/2023 1959 by Maame Kelsey RCP  Outcome: Progressing

## 2023-02-22 NOTE — PLAN OF CARE
Problem: Discharge Planning  Goal: Discharge to home or other facility with appropriate resources  2/21/2023 2220 by Maria Elena Fernando RN  Outcome: Progressing  2/21/2023 1522 by Robb Pillai RN  Outcome: Progressing  2/21/2023 1335 by Robb Pillai RN  Outcome: Progressing     Problem: Pain  Goal: Verbalizes/displays adequate comfort level or baseline comfort level  2/21/2023 2220 by Maria Elena Fernando RN  Outcome: Progressing  2/21/2023 1522 by Robb Pillai RN  Outcome: Progressing     Problem: ABCDS Injury Assessment  Goal: Absence of physical injury  Outcome: Progressing     Problem: Safety - Adult  Goal: Free from fall injury  2/21/2023 2220 by Maria Elena Fernando RN  Outcome: Progressing  2/21/2023 1335 by Robb Pillai RN  Outcome: Progressing     Problem: Respiratory - Adult  Goal: Able to breathe comfortably  2/21/2023 1959 by Matt Rey RCP  Outcome: Progressing  2/21/2023 0826 by Antonette Rizo RCP  Outcome: Progressing  Goal: Patient's breath sounds will be clear and equal  2/21/2023 1959 by Matt Rey RCP  Outcome: Progressing  2/21/2023 0826 by Antonette Rizo RCP  Outcome: Progressing     Problem: Gastrointestinal - Adult  Goal: Minimal or absence of nausea and vomiting  Outcome: Progressing  Goal: Maintains or returns to baseline bowel function  Outcome: Progressing  Goal: Maintains adequate nutritional intake  Outcome: Progressing     Problem: Nutrition Deficit:  Goal: Optimize nutritional status  Outcome: Progressing  Flowsheets (Taken 2/21/2023 1259 by Basim Dunne, RD, LD)  Nutrient intake appropriate for improving, restoring, or maintaining nutritional needs:   Monitor oral intake, labs, and treatment plans   Assess nutritional status and recommend course of action

## 2023-02-22 NOTE — DISCHARGE SUMMARY
[facility]  Office: [address], DO, Joceline Auburn, DO, Tessie Degroots, DO, Tish Wilson Blood, DO, Maria Elena Cross MD, Karolyn Mtz MD, Johnnie Claude, MD, Giuliana Reyna MD,  Kris Hamilton MD, Truett Burkitt, MD, Abhijit Barksdale, DO, Amanda Tillman MD,  Barry Morin MD, Jordon Mercer MD, Sujey Herman, DO, Kianna Perrin MD, Quynh Quintero MD, Julianna Davis, DO, Cody Hyman MD, Yisel Villalobos MD, Rupa Jean MD, Loly Watson MD, Dottie Ramirez, DO, Joselyn Tran MD, Shira Tom MD, Navdeep Frank, CNP,  Maribeth Sandoval, CNP, Cindy Balderas, CNP, Lan Khalil, CNP,  Gilma Tolentino, [facility], Marylen Net, CNP, Ventura Khoury, CNP, Krupa White, CNP, Erma Ragland, CNP, Hong Turner, CNP, Amanda John PA-C, Carmen Gaxiola, CNS, Rinku Mattson, CNP, Lien Matias, [facility]    Discharge Summary     Patient ID: Flory Tomas  :  1940   MRN: [id number]     ACCOUNT:  [de-identified]   Patient's PCP: Fransico Moore PA-C  Admit Date: 2/15/2023   Discharge Date: 2023     Length of Stay: 7  Code Status:  Full Code  Admitting Physician: Scar Olivia MD  Discharge Physician: Dorcas Tran APRN - NP     Active Discharge Diagnoses:     Hospital Problem Lists:  Principal Problem:    Common bile duct dilatation  Active Problems:    Calculus of gallbladder without cholecystitis without obstruction    Choledocholithiasis-suspected    INR therapeutic    Pain of upper abdomen    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  *      Admission Condition:  fair     Discharged Condition: good    Hospital Stay:     Hospital Course:  Eddie Lynn is a 79 y.o. female who was admitted for the management of  Common bile duct dilatation , presented to ER with Back Pain (Bilateral upper back. Pt reports \"squeezing\" going around to right side under breast area)    2/16 - Patient says she started having mid back pain that started 3 weeks ago that radiated to the left side, and then started radiating around to the right side. She has been taking Tylenol #3 for the discomfort, but it has not been effective for the pain, and she has had constipation since taking it. Her last BM was Sunday after taking an OTC laxative and prune juice. she went to see Dr Martinez Medina general surgeon yesterday- no cholecystectomy recommended at this time. Prior imaging has revealed renal cysts and mild cholelithiasis, mild diverticulosis, no kidney stones. She has a PMH of appendectomy, arthritis, asthma, atrial fib on warfarin, GERD, HTN, MI, melanoma, and MI. She went to the ED as the pain did not subside. 2/17-2/18 -significant improvement. ERCP completed and patient was found to have significant biliary dilatation. Plan to slowly advance diet, continue hydration, continue pain control, possibly repeat ERCP on Monday. 2/19-2/21 -patient condition has improved throughout hospital course. Diet advancement is gone well. Epigastric bandlike pain has resolved. Patient complaining of back pain. Plan for kyphoplasty on 2/21. Probable discharge on 2/22 if procedure goes well and patient is able to work with therapy. Will require outpatient follow-up with GI for possible EUS.      2/22 - Patient went for kyphoplasty yesterday. This has gone well. Patient reports postoperative pain is under control and she has been working with therapy. Patient is highly motivated for discharge. Patient is found to be hypertension without symptoms this morning.   If blood pressure can be stabilized throughout the day on an oral regiment she will be discharged to home later today. Significant therapeutic interventions: As above    Significant Diagnostic Studies:   Labs / Micro:  CBC:   Lab Results   Component Value Date/Time    WBC 5.4 02/22/2023 05:43 AM    RBC 3.57 02/22/2023 05:43 AM    HGB 10.3 02/22/2023 05:43 AM    HGB 10.3 02/22/2023 05:43 AM    HCT 33.4 02/22/2023 05:43 AM    HCT 33.4 02/22/2023 05:43 AM    MCV 96.1 02/22/2023 05:43 AM    MCH 29.4 02/22/2023 05:43 AM    MCHC 30.6 02/22/2023 05:43 AM    RDW 15.2 02/22/2023 05:43 AM     02/22/2023 05:43 AM     BMP:    Lab Results   Component Value Date/Time    GLUCOSE 80 02/22/2023 05:43 AM     02/22/2023 05:43 AM    K 4.2 02/22/2023 05:43 AM     02/22/2023 05:43 AM    CO2 26 02/22/2023 05:43 AM    ANIONGAP 9 02/22/2023 05:43 AM    BUN 25 02/22/2023 05:43 AM    CREATININE 1.41 02/22/2023 05:43 AM    BUNCRER 18 02/22/2023 05:43 AM    CALCIUM 8.5 02/22/2023 05:43 AM    LABGLOM 37 02/22/2023 05:43 AM    GFRAA 45 12/09/2021 08:40 AM    GFR      12/09/2021 08:40 AM    GFR NOT REPORTED 12/09/2021 08:40 AM        Radiology:  MRI THORACIC SPINE WO CONTRAST    Result Date: 2/20/2023  Compression fracture at T8 with fracture line seen in the posterior aspect of the T8 vertebral body extending towards the anterior aspect of the pedicles. No significant retropulsion. Multilevel degenerative change without canal stenosis or foraminal narrowing. US GALLBLADDER RUQ    Result Date: 2/15/2023  1. Dilated gallbladder containing sludge and small stones. 2.  Dilated common bile duct. Please note that the bile duct was not dilated on prior CT scan of 4 February 2023. No definite stones are noted in the bile duct but it is incompletely visualized. 3.  Liver is of coarse echotexture suggesting fatty infiltration. RECOMMENDATIONS: Consideration of MRCP. MRI ABDOMEN WO CONTRAST MRCP    Result Date: 2/15/2023  1. Cholelithiasis without other findings to suggest acute cholecystitis.  2. Mild extrahepatic biliary ductal dilatation with suspected sludge or small stones in the distal common bile duct. Consider ERCP for further evaluation. The findings were sent to the Radiology Results [address] at 7:57 pm on 2/15/2023 to be communicated to a licensed caregiver. Consultations:    Consults:     Final Specialist Recommendations/Findings:   IP CONSULT TO GI  IP CONSULT TO INTERNAL MEDICINE  IP CONSULT TO CARDIOLOGY  IP CONSULT TO GENERAL SURGERY  PHARMACY TO DOSE WARFARIN  IP CONSULT TO ORTHOPEDIC SURGERY      The patient was seen and examined on day of discharge and this discharge summary is in conjunction with any daily progress note from day of discharge. Discharge plan:     Disposition: Home    Physician Follow Up:     Gerard Faith MD  [address] [facility]  [phone]    Follow up in 1 month(s)  Pre-clinic AP lateral x-rays thoracic, lumbar spine       Requiring Further Evaluation/Follow Up POST HOSPITALIZATION/Incidental Findings: Hypertension    Diet: regular diet    Activity: As tolerated, no driving until cleared by orthopedics at follow-up appointment    Instructions to Patient: Take the pain medication only if you needed. Take a stool softener with the pain medicines. Track your blood pressure and take the new blood pressure medication as prescribed. Follow-up with your primary care provider within 1 week to ensure the appropriateness of the current regimen we have started. Follow-up with gastroenterology to discuss having an additional scope procedure completed. Follow-up with general surgery if your gallbladder issues remain persistent.     Discharge Medications:      Medication List        START taking these medications      hydrALAZINE 50 MG tablet  Commonly known as: APRESOLINE  Take 1 tablet by mouth every 8 hours     oxyCODONE-acetaminophen 5-325 MG per tablet  Commonly known as: PERCOCET  Take 1 tablet by mouth every 6 hours as needed for Pain for up to 3 days. Max Daily Amount: 4 tablets            CHANGE how you take these medications      ferrous sulfate 325 (65 Fe) MG EC tablet  Commonly known as: FE TABS 325  What changed: Another medication with the same name was removed. Continue taking this medication, and follow the directions you see here. CONTINUE taking these medications      acetaminophen-codeine 300-30 MG per tablet  Commonly known as: TYLENOL #3     amiodarone 200 MG tablet  Commonly known as: CORDARONE     aspirin 81 MG tablet     azelastine 0.1 % nasal spray  Commonly known as: ASTELIN     calcium carbonate-vitamin D 600-200 MG-UNIT Tabs     * Centrum Silver Tabs     * Hair Skin and Nails Formula Tabs     cetirizine 10 MG tablet  Commonly known as: ZYRTEC     dilTIAZem 240 MG extended release capsule  Commonly known as: CARDIZEM CD     famotidine 40 MG tablet  Commonly known as: PEPCID     FIBER COMPLETE PO     fluticasone 50 MCG/ACT nasal spray  Commonly known as: FLONASE     montelukast 10 MG tablet  Commonly known as: SINGULAIR     nitroGLYCERIN 0.1 MG/HR  Commonly known as: NITRODUR     pantoprazole 40 MG tablet  Commonly known as: PROTONIX     Restasis MultiDose 0.05 % ophthalmic emulsion  Generic drug: cycloSPORINE     Trelegy Ellipta 100-62.5-25 MCG/ACT Aepb inhaler  Generic drug: fluticasone-umeclidin-vilant     triamterene-hydroCHLOROthiazide 37.5-25 MG per tablet  Commonly known as: MAXZIDE-25     warfarin 5 MG tablet  Commonly known as: COUMADIN           * This list has 2 medication(s) that are the same as other medications prescribed for you. Read the directions carefully, and ask your doctor or other care provider to review them with you.                    Where to Get Your Medications        These medications were sent to [address] [facility], [facility] [place] [phone] - F [phone]  [address] [place] [address]      Phone: [phone]   hydrALAZINE 50 MG tablet  oxyCODONE-acetaminophen 5-325 MG per tablet         No discharge procedures on file. Time Spent on discharge is  39 mins in patient examination, evaluation, counseling as well as medication reconciliation, prescriptions for required medications, discharge plan and follow up. Electronically signed by   DUDLEY Shah NP  2/22/2023  3:05 PM      Thank you Dr. Maxx Peña PA-C for the opportunity to be involved in this patient's care.

## 2023-02-22 NOTE — PROGRESS NOTES
[facility]  Office: [address], DO, Ahmet Barbosa, DO, Sabrina Jasmine, DO, Ari Badillo Bayron, DO, Jane Payne MD, Carolyne James MD, Danis Sanford MD, Allen Mercado MD,  Julian Arriaga MD, Hakeem Anderson MD, Joselyn Perez DO, Poonam Waterman MD,  Mishel Moya MD, Pablo Simon MD, Sunshine Esquivel DO, Deborah Khan MD, Colleen Haywood MD, Roberta Burdick DO, Laura Vargas MD, Stacy Peterson MD, Nir Priest MD, Jesus Lawler MD, Roslyn Miller DO, Crispin Nguyne MD, Melanie Burgos MD, Jose Alfredo Wood, CNP,  Patricia Toscano, CNP, Kelli Gomez, CNP, Estelita Beavers, CNP,  Mccoy Baumgarten, [facility], Yessenia Noland, CNP, Leyla Palacio, CNP, Ron Barrera, CNP, Morena Segal, CNP, Rosa Morris, CNP, Hitesh Frederick PA-C, [facility], CNS, Juli Pierre, CNP, [facility]    Progress Note    2/22/2023    12:41 PM    Name:   Leonor Garcia  MRN:     [id number]     [place]:      [de-identified]   Room:   2010/2010-01   Day:  7  Admit Date:  2/15/2023  2:17 PM    PCP:   Nicol Jean-Baptiste PA-C  Code Status:  Full Code    Subjective:     C/C:   Chief Complaint   Patient presents with    Back Pain     Bilateral upper back. Pt reports \"squeezing\" going around to right side under breast area     Interval History Status: improved. Patient went kyphoplasty yesterday. This is going well. Patient reports postoperative pain is under control and she has been working with therapy. Patient is highly motivated for discharge. Patient is found to be hypertension without symptoms this morning. If blood pressure can be stabilized throughout the day on an oral regiment she will be discharged to home later today. Brief History:     2/16 - Patient says she started having mid back pain that started 3 weeks ago that radiated to the left side, and then started radiating around to the right side.  She has been taking Tylenol #3 for the discomfort, but it has not been effective for the pain, and she has had constipation since taking it. Her last BM was Sunday after taking an OTC laxative and prune juice. she went to see Dr Rachael Mckee general surgeon yesterday- no cholecystectomy recommended at this time. Prior imaging has revealed renal cysts and mild cholelithiasis, mild diverticulosis, no kidney stones. She has a PMH of appendectomy, arthritis, asthma, atrial fib on warfarin, GERD, HTN, MI, melanoma, and MI. She went to the ED as the pain did not subside. 2/17-2/18 -significant improvement. ERCP completed and patient was found to have significant biliary dilatation. Plan to slowly advance diet, continue hydration, continue pain control, possibly repeat ERCP on Monday. 2/19-2/21 -patient condition has improved throughout hospital course. Diet advancement is gone well. Epigastric bandlike pain has resolved. Patient complaining of back pain. Plan for kyphoplasty on 2/21. Probable discharge on 2/22 if procedure goes well and patient is able to work with therapy. Will require outpatient follow-up with GI for possible EUS.     2/22 - Patient went for kyphoplasty yesterday. This has gone well. Patient reports postoperative pain is under control and she has been working with therapy. Patient is highly motivated for discharge. Patient is found to be hypertension without symptoms this morning. If blood pressure can be stabilized throughout the day on an oral regiment she will be discharged to home later today.     Review of Systems:     Constitutional:  negative for chills, fevers, sweats  Respiratory:  negative for cough, dyspnea on exertion, shortness of breath, wheezing  Cardiovascular:  negative for chest pain, chest pressure/discomfort, lower extremity edema, palpitations  Gastrointestinal:  negative for abdominal pain, constipation, diarrhea, nausea, vomiting  Neurological:  negative for dizziness, headache    Medications: Allergies:  No Known Allergies    Current Meds:   Scheduled Meds:    hydrALAZINE  50 mg Oral 3 times per day    therapeutic multivitamin-minerals  1 tablet Oral Daily    ferrous sulfate  325 mg Oral Daily with breakfast    sodium chloride flush  5-40 mL IntraVENous 2 times per day    acetaminophen  650 mg Oral Q6H    ibuprofen  400 mg Oral Q12H    enoxaparin  80 mg SubCUTAneous BID    warfarin placeholder: dosing by pharmacy   Other RX Placeholder    dilTIAZem  180 mg Oral Daily    azelastine  2 spray Each Nostril BID    montelukast  10 mg Oral Nightly    pantoprazole  40 mg Oral Daily    cetirizine  5 mg Oral Daily    calcium carb-cholecalciferol  1 tablet Oral Daily    piperacillin-tazobactam  3,375 mg IntraVENous Q8H    amiodarone  100 mg Oral Daily    fluticasone  1 spray Each Nostril Daily    sodium chloride flush  5-40 mL IntraVENous 2 times per day    budesonide-formoterol  2 puff Inhalation BID    tiotropium  2 puff Inhalation Daily     Continuous Infusions:    lactated ringers IV soln Stopped (02/22/23 1052)    sodium chloride       PRN Meds: metoprolol, acetaminophen-codeine, sodium chloride flush, sodium chloride, cyclobenzaprine, oxyCODONE-acetaminophen, bisacodyl, hydrALAZINE, calcium carbonate, morphine, sodium chloride flush, potassium chloride **OR** potassium alternative oral replacement **OR** potassium chloride, magnesium sulfate, ondansetron **OR** ondansetron, polyethylene glycol, acetaminophen **OR** acetaminophen    Data:     Past Medical History:   has a past medical history of Allergic rhinitis, Arthritis, Asthma, Atrial fibrillation ([facility] [address] GERD (gastroesophageal reflux disease), Hypertension, Melanoma ([facility] [address] Myocardial infarction ([facility] [address] Wears glasses, and Wears partial dentures. Social History:   reports that she quit smoking about 42 years ago. Her smoking use included cigarettes. She has a 20.00 pack-year smoking history.  She has never used smokeless tobacco. She reports that she does not drink alcohol and does not use drugs.     Family History:   Family History   Problem Relation Age of Onset    COPD Mother     Asthma Mother     Cancer Mother     Cancer Father     Stroke Sister     COPD Sister     Emphysema Sister        Vitals:  BP (!) 193/62   Pulse 51   Temp 97.7 °F (36.5 °C) (Oral)   Resp 18   Ht 5' 5\" (1.651 m)   Wt 188 lb 8 oz (85.5 kg)   SpO2 95%   BMI 31.37 kg/m²   Temp (24hrs), Av °F (36.7 °C), Min:97.3 °F (36.3 °C), Max:99.1 °F (37.3 °C)    No results for input(s): POCGLU in the last 72 hours.    I/O (24Hr):    Intake/Output Summary (Last 24 hours) at 2023 1241  Last data filed at 2023 1152  Gross per 24 hour   Intake 1255.63 ml   Output --   Net 1255.63 ml       Labs:  Hematology:  Recent Labs     23  0534 23  0627 23  0543   HGB  --   --  10.3*   HCT  --   --  33.4*   INR 1.4 1.4 1.2     Chemistry:  No results for input(s): NA, K, CL, CO2, GLUCOSE, BUN, CREATININE, MG, ANIONGAP, LABGLOM, GFRAA, CALCIUM, CAION, PHOS, PSA, PROBNP, TROPHS, CKTOTAL, CKMB, CKMBINDEX, MYOGLOBIN, DIGOXIN, LACTACIDWB in the last 72 hours.    Recent Labs     23  0846   PROT 7.0   LABALBU 4.0   AST 18   ALT 16   ALKPHOS 79   BILITOT 0.9   BILIDIR 0.2     ABG:No results found for: POCPH, PHART, PH, POCPCO2, OSF2QBT, PCO2, POCPO2, PO2ART, PO2, POCHCO3, RBZ6HWS, HCO3, NBEA, PBEA, BEART, BE, THGBART, THB, OFP9OPS, FUQL3BQI, Y3FOHLWS, O2SAT, FIO2  No results found for: SPECIAL  Lab Results   Component Value Date/Time    CULTURE NO SIGNIFICANT GROWTH 2023 04:53 PM       Radiology:  CT ABDOMEN PELVIS WO CONTRAST Additional Contrast? None    Result Date: 2/15/2023  1.  Interval development of bladder distension and central biliary ductal dilatation since 2023.  Sludge and small stones are present in the gallbladder.  Findings of concern for stones in the common bile duct or edema in the distal common bowel duct due to a  recently passed calculus. Right upper quadrant ultrasound is advised. 2.  Moderate colonic stool burden. 3.  No urinary obstruction, small bowel obstruction or appendicitis. RECOMMENDATION: Right upper quadrant ultrasound. CT THORACIC SPINE WO CONTRAST    Result Date: 2/15/2023  Thoracic spine CT: Age indeterminate superior endplate compression fracture of T8 with 40% height loss. No retropulsion. If there is need for further evaluation thoracic spine MRI can be obtained Lumbar spine CT: No acute fracture Multilevel disc and facet degenerative disease most pronounced at L3-L4 and L4-L5 at L5-S1. .  Grade 1 anterolisthesis at L3-L4. Moderate bilateral neural foraminal narrowing at L4-L5 and L5-S1. CT LUMBAR SPINE WO CONTRAST    Result Date: 2/15/2023  Thoracic spine CT: Age indeterminate superior endplate compression fracture of T8 with 40% height loss. No retropulsion. If there is need for further evaluation thoracic spine MRI can be obtained Lumbar spine CT: No acute fracture Multilevel disc and facet degenerative disease most pronounced at L3-L4 and L4-L5 at L5-S1. .  Grade 1 anterolisthesis at L3-L4. Moderate bilateral neural foraminal narrowing at L4-L5 and L5-S1. US GALLBLADDER RUQ    Result Date: 2/15/2023  1. Dilated gallbladder containing sludge and small stones. 2.  Dilated common bile duct. Please note that the bile duct was not dilated on prior CT scan of 4 February 2023. No definite stones are noted in the bile duct but it is incompletely visualized. 3.  Liver is of coarse echotexture suggesting fatty infiltration. RECOMMENDATIONS: Consideration of MRCP. MRI ABDOMEN WO CONTRAST MRCP    Result Date: 2/15/2023  1. Cholelithiasis without other findings to suggest acute cholecystitis. 2. Mild extrahepatic biliary ductal dilatation with suspected sludge or small stones in the distal common bile duct. Consider ERCP for further evaluation.  The findings were sent to the Radiology Results Communication Center at 7:57 pm on 2/15/2023 to be communicated to a licensed caregiver.        Physical Examination:        General appearance:  alert, cooperative and no distress  Mental Status:  oriented to person, place and time and normal affect  Lungs:  clear to auscultation bilaterally, normal effort  Heart:  regular rate and rhythm, no murmur  Abdomen:  soft, nontender, nondistended, normal bowel sounds, no masses, hepatomegaly, splenomegaly  Extremities:  no edema, redness, tenderness in the calves  Skin:  no gross lesions, rashes, induration    Assessment:        Hospital Problems             Last Modified POA    * (Principal) Common bile duct dilatation 2/16/2023 Yes    Calculus of gallbladder without cholecystitis without obstruction 2/16/2023 Yes    Choledocholithiasis-suspected 2/16/2023 Yes    INR therapeutic 2/16/2023 Yes    Pain of upper abdomen 2/17/2023 Yes    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 (HCC) 2/20/2023 Yes    Age-related osteoporosis with current pathological fracture 2/20/2023 Yes    History of total right hip replacement 2/20/2023 Yes    Intractable back pain 2/20/2023 Yes    Hypertension 2/16/2023 Yes    Atrial fibrillation ([address] 2/16/2023 Yes    Gastroesophageal reflux disease 2/16/2023 Yes         Plan:        Cholelithiasis with common bile duct dilatation  Diet advancement has gone well  De-escalate pain control  Follow-up outpatient with GI for possible EUS  Intractable back pain with pathological spinal fracture  Plan for kyphoplasty later this afternoon  PT/OT tomorrow and probable discharge if all goes well on 2/22  Chronic A-fib with hypertension on long-term anticoagulation  Continue aspirin and Coumadin at discharge  Continue Cardizem 180 mg daily  Hypertension without symptoms  Amiodarone, Cardizem at home dosages  Add hydralazine  As needed metoprolol      DUDLEY Westbrook NP  2/22/2023  12:41 PM

## 2023-02-22 NOTE — PLAN OF CARE
Problem: Discharge Planning  Goal: Discharge to home or other facility with appropriate resources  2/22/2023 1153 by Oly Huerta RN  Outcome: Progressing  Flowsheets (Taken 2/22/2023 0900)  Discharge to home or other facility with appropriate resources:   Identify barriers to discharge with patient and caregiver   Arrange for needed discharge resources and transportation as appropriate   Identify discharge learning needs (meds, wound care, etc)   Arrange for interpreters to assist at discharge as needed   Refer to discharge planning if patient needs post-hospital services based on physician order or complex needs related to functional status, cognitive ability or social support system  2/21/2023 2220 by Alana Mcgrath RN  Outcome: Progressing     Problem: Pain  Goal: Verbalizes/displays adequate comfort level or baseline comfort level  2/22/2023 1153 by Oly Huerta RN  Outcome: Progressing  Flowsheets (Taken 2/18/2023 1442 by Mary Austin RN)  Verbalizes/displays adequate comfort level or baseline comfort level:   Assess pain using appropriate pain scale   Encourage patient to monitor pain and request assistance  2/21/2023 2220 by Alana Mcgrath RN  Outcome: Progressing     Problem: ABCDS Injury Assessment  Goal: Absence of physical injury  2/22/2023 1153 by Oly Huerta RN  Outcome: Progressing  Flowsheets (Taken 2/18/2023 0010 by Allison Jensen RN)  Absence of Physical Injury: Implement safety measures based on patient assessment  2/21/2023 2220 by Alana Mcgrath RN  Outcome: Progressing     Problem: Safety - Adult  Goal: Free from fall injury  2/22/2023 1153 by Oly Huerta RN  Outcome: Progressing  Flowsheets (Taken 2/22/2023 1153)  Free From Fall Injury: Instruct family/caregiver on patient safety  2/21/2023 2220 by Alana Mcgrath RN  Outcome: Progressing     Problem: Respiratory - Adult  Goal: Able to breathe comfortably  2/22/2023 0751 by Diana Dias RCP  Outcome: Progressing  Goal: Patient's breath sounds will be clear and equal  2/22/2023 0751 by Mumtaz Castro RCP  Outcome: Progressing     Problem: Gastrointestinal - Adult  Goal: Minimal or absence of nausea and vomiting  2/22/2023 1153 by Loly Lui RN  Outcome: Progressing  Flowsheets (Taken 2/22/2023 0900)  Minimal or absence of nausea and vomiting: Provide nonpharmacologic comfort measures as appropriate  2/21/2023 2220 by Seven Franz RN  Outcome: Progressing  Goal: Maintains or returns to baseline bowel function  2/22/2023 1153 by Loly Lui RN  Outcome: Progressing  Flowsheets (Taken 2/22/2023 0900)  Maintains or returns to baseline bowel function: Assess bowel function  2/21/2023 2220 by Seven Franz RN  Outcome: Progressing  Goal: Maintains adequate nutritional intake  2/22/2023 1153 by Loly Lui RN  Outcome: Progressing  Flowsheets (Taken 2/22/2023 0900)  Maintains adequate nutritional intake: Identify factors contributing to decreased intake, treat as appropriate  2/21/2023 2220 by Seven Franz RN  Outcome: Progressing     Problem: Nutrition Deficit:  Goal: Optimize nutritional status  2/22/2023 1153 by Loly Lui RN  Outcome: Progressing  Flowsheets (Taken 2/21/2023 1259 by Tay Nj RD, RAJAN)  Nutrient intake appropriate for improving, restoring, or maintaining nutritional needs:   Monitor oral intake, labs, and treatment plans   Assess nutritional status and recommend course of action  2/21/2023 2220 by Seven Franz RN  Outcome: Progressing  Flowsheets (Taken 2/21/2023 1259 by Tay Nj RD, RAJAN)  Nutrient intake appropriate for improving, restoring, or maintaining nutritional needs:   Monitor oral intake, labs, and treatment plans   Assess nutritional status and recommend course of action

## 2023-02-23 LAB — SURGICAL PATHOLOGY REPORT: NORMAL

## 2023-03-09 RX ORDER — ACETAMINOPHEN AND CODEINE PHOSPHATE 300; 30 MG/1; MG/1
1 TABLET ORAL EVERY 8 HOURS PRN
Qty: 12 TABLET | Refills: 0 | OUTPATIENT
Start: 2023-03-09 | End: 2023-03-12

## 2023-03-09 NOTE — TELEPHONE ENCOUNTER
Patient states that she does not need Tylenol #3 prescription. Patient states that she is doing very well.

## 2023-03-22 ENCOUNTER — HOSPITAL ENCOUNTER (OUTPATIENT)
Age: 83
Setting detail: SPECIMEN
Discharge: HOME | End: 2023-03-22

## 2023-03-22 LAB
POC INR: 1.6
PROTHROMBIN TIME, POC: 18.9 SEC (ref 9.6–14.4)

## 2023-03-28 ENCOUNTER — HOSPITAL ENCOUNTER (OUTPATIENT)
Dept: MAMMOGRAPHY | Facility: CLINIC | Age: 83
Discharge: HOME | End: 2023-03-30
Payer: MEDICARE

## 2023-03-28 DIAGNOSIS — M81.0 POSTMENOPAUSAL OSTEOPOROSIS: ICD-10-CM

## 2023-03-28 PROCEDURE — 77080 DXA BONE DENSITY AXIAL: CPT

## 2023-05-15 ENCOUNTER — TELEPHONE (OUTPATIENT)
Dept: FAMILY MEDICINE CLINIC | Age: 83
End: 2023-05-15

## 2023-05-15 DIAGNOSIS — R92.8 ABNORMAL MAMMOGRAM: Primary | ICD-10-CM

## 2023-05-15 NOTE — TELEPHONE ENCOUNTER
[facility] contacted the office and states they need a new diagnostic mammogram order. They state it doesn't appear to be open anymore. They also need an order for the breast ultrasound. Please advise.

## 2023-05-23 ENCOUNTER — HOSPITAL ENCOUNTER (OUTPATIENT)
Age: 83
Setting detail: SPECIMEN
Discharge: HOME | End: 2023-05-23

## 2023-05-23 LAB
25(OH)D3 SERPL-MCNC: 41.3 NG/ML
BASOPHILS # BLD: 0.09 K/UL (ref 0–0.2)
BASOPHILS NFR BLD: 1 % (ref 0–2)
EOSINOPHIL # BLD: 0.23 K/UL (ref 0–0.44)
EOSINOPHILS RELATIVE PERCENT: 3 % (ref 1–4)
ERYTHROCYTE [DISTWIDTH] IN BLOOD BY AUTOMATED COUNT: 15.3 % (ref 11.8–14.4)
FOLATE SERPL-MCNC: >20 NG/ML
HCT VFR BLD AUTO: 39.7 % (ref 36.3–47.1)
HGB BLD-MCNC: 12.7 G/DL (ref 11.9–15.1)
IMM GRANULOCYTES # BLD AUTO: 0.05 K/UL (ref 0–0.3)
IMM GRANULOCYTES NFR BLD: 1 %
IRON SATN MFR SERPL: 32 % (ref 20–55)
IRON SERPL-MCNC: 78 UG/DL (ref 37–145)
LYMPHOCYTES # BLD: 28 % (ref 24–43)
LYMPHOCYTES NFR BLD: 2.03 K/UL (ref 1.1–3.7)
MAGNESIUM SERPL-MCNC: 2.4 MG/DL (ref 1.6–2.6)
MCH RBC QN AUTO: 30.2 PG (ref 25.2–33.5)
MCHC RBC AUTO-ENTMCNC: 32 G/DL (ref 28.4–34.8)
MCV RBC AUTO: 94.5 FL (ref 82.6–102.9)
MONOCYTES NFR BLD: 0.85 K/UL (ref 0.1–1.2)
MONOCYTES NFR BLD: 12 % (ref 3–12)
NEUTROPHILS NFR BLD: 55 % (ref 36–65)
NEUTS SEG NFR BLD: 3.91 K/UL (ref 1.5–8.1)
NRBC AUTOMATED: 0 PER 100 WBC
PLATELET # BLD AUTO: 268 K/UL (ref 138–453)
PMV BLD AUTO: 10.8 FL (ref 8.1–13.5)
RBC # BLD AUTO: 4.2 M/UL (ref 3.95–5.11)
RBC # BLD: ABNORMAL 10*6/UL
TIBC SERPL-MCNC: 247 UG/DL (ref 250–450)
UNSATURATED IRON BINDING CAPACITY: 169 UG/DL (ref 112–347)
VIT B12 SERPL-MCNC: 790 PG/ML (ref 232–1245)
WBC OTHER # BLD: 7.2 K/UL (ref 3.5–11.3)

## 2023-05-31 ENCOUNTER — OFFICE VISIT (OUTPATIENT)
Dept: ORTHOPEDIC SURGERY | Age: 83
End: 2023-05-31

## 2023-05-31 VITALS — HEIGHT: 65 IN | BODY MASS INDEX: 28.66 KG/M2 | RESPIRATION RATE: 16 BRPM | WEIGHT: 172 LBS

## 2023-05-31 DIAGNOSIS — M75.101 TEAR OF RIGHT ROTATOR CUFF, UNSPECIFIED TEAR EXTENT, UNSPECIFIED WHETHER TRAUMATIC: ICD-10-CM

## 2023-05-31 DIAGNOSIS — M25.511 RIGHT SHOULDER PAIN, UNSPECIFIED CHRONICITY: Primary | ICD-10-CM

## 2023-05-31 RX ORDER — METHYLPREDNISOLONE ACETATE 80 MG/ML
80 INJECTION, SUSPENSION INTRA-ARTICULAR; INTRALESIONAL; INTRAMUSCULAR; SOFT TISSUE ONCE
Status: COMPLETED | OUTPATIENT
Start: 2023-05-31 | End: 2023-05-31

## 2023-05-31 RX ORDER — BUPIVACAINE HYDROCHLORIDE 2.5 MG/ML
2 INJECTION, SOLUTION INFILTRATION; PERINEURAL ONCE
Status: COMPLETED | OUTPATIENT
Start: 2023-05-31 | End: 2023-05-31

## 2023-05-31 RX ADMIN — METHYLPREDNISOLONE ACETATE 80 MG: 80 INJECTION, SUSPENSION INTRA-ARTICULAR; INTRALESIONAL; INTRAMUSCULAR; SOFT TISSUE at 15:34

## 2023-05-31 RX ADMIN — BUPIVACAINE HYDROCHLORIDE 5 MG: 2.5 INJECTION, SOLUTION INFILTRATION; PERINEURAL at 15:33

## 2023-06-01 ENCOUNTER — HOSPITAL ENCOUNTER (OUTPATIENT)
Age: 83
Setting detail: SPECIMEN
Discharge: HOME | End: 2023-06-01

## 2023-06-01 LAB
POC INR: 1.4
PROTHROMBIN TIME, POC: 17.2 SEC (ref 9.6–14.4)

## 2023-06-01 ASSESSMENT — ENCOUNTER SYMPTOMS
ROS SKIN COMMENTS: NEGATIVE FOR RASH
ABDOMINAL PAIN: 0
SHORTNESS OF BREATH: 0
EYE DISCHARGE: 0

## 2023-06-01 NOTE — PROGRESS NOTES
[address] [place] [address] AND SPORTS MEDICINE  [facility] Bebeto [place]ps[place]  [address] [address]  Dept: [phone]    Ambulatory Orthopedic Consult      CHIEF COMPLAINT:    Chief Complaint   Patient presents with    Shoulder Pain     Right       HISTORY OF PRESENT ILLNESS:      The patient is a 79 y.o. female who is being seen at the request of  Melecio Rodas PA-C for consultation and evaluation of right shoulder pain. The patient notes shoulder pain progressively worsening especially over the last 2 months. The patient denies any trauma to the right shoulder. She is right-hand dominant. He has been doing therapy for back surgery that she had in February 2023. She has difficulty with overhead activities.       Past Medical History:    Past Medical History:   Diagnosis Date    Allergic rhinitis     Arthritis     Asthma     Atrial fibrillation ([address]     GERD (gastroesophageal reflux disease)     Hypertension     Melanoma ([address]     on nose    Myocardial infarction [facility]) 2008    Wears glasses     Wears partial dentures     upper       Past Surgical History:    Past Surgical History:   Procedure Laterality Date    APPENDECTOMY      BREAST SURGERY      CARDIAC SURGERY      CARPAL TUNNEL RELEASE Right     COLONOSCOPY N/A 8/8/2019    COLONOSCOPY POLYPECTOMY REMOVAL HOT BIOPSY AND RESOLUTION CLIP performed by Yu Be MD at [facility]    ERCP N/A 2/17/2023    ERCP ENDOSCOPIC RETROGRADE CHOLANGIOPANCREATOGRAPHY performed by Magdalena Caro MD at [address]      umbilical    HYSTERECTOMY (CERVIX STATUS UNKNOWN)      JOINT REPLACEMENT      NECK SURGERY      C5-C6    NOSE SURGERY      cancer removed    OTHER SURGICAL HISTORY      heart surg/growth in heart sac    SPINE SURGERY N/A 2/21/2023    T8 KYPHOPLASTY WITH BIOPSY performed by Braydon Crawfodr MD at [address] 04/09/2019

## 2023-06-29 ENCOUNTER — HOSPITAL ENCOUNTER (OUTPATIENT)
Age: 83
Setting detail: SPECIMEN
Discharge: HOME | End: 2023-06-29

## 2023-06-29 LAB
POC INR: 2.9
PROTHROMBIN TIME, POC: 34.8 SEC (ref 9.6–14.4)

## 2023-07-10 ENCOUNTER — OFFICE VISIT (OUTPATIENT)
Dept: FAMILY MEDICINE CLINIC | Age: 83
End: 2023-07-10
Payer: MEDICARE

## 2023-07-10 VITALS
BODY MASS INDEX: 27.89 KG/M2 | SYSTOLIC BLOOD PRESSURE: 124 MMHG | HEART RATE: 51 BPM | DIASTOLIC BLOOD PRESSURE: 70 MMHG | OXYGEN SATURATION: 96 % | HEIGHT: 65 IN | TEMPERATURE: 97.3 F | WEIGHT: 167.4 LBS

## 2023-07-10 DIAGNOSIS — R51.9 PAIN OF SCALP: Primary | ICD-10-CM

## 2023-07-10 DIAGNOSIS — N18.30 STAGE 3 CHRONIC KIDNEY DISEASE, UNSPECIFIED WHETHER STAGE 3A OR 3B CKD (HCC): ICD-10-CM

## 2023-07-10 PROBLEM — C43.9 MALIGNANT MELANOMA (HCC): Status: RESOLVED | Noted: 2020-06-03 | Resolved: 2023-07-10

## 2023-07-10 PROBLEM — D58.2 OTHER HEMOGLOBINOPATHIES (HCC): Status: RESOLVED | Noted: 2020-06-03 | Resolved: 2023-07-10

## 2023-07-10 PROCEDURE — G8399 PT W/DXA RESULTS DOCUMENT: HCPCS | Performed by: PHYSICIAN ASSISTANT

## 2023-07-10 PROCEDURE — 1036F TOBACCO NON-USER: CPT | Performed by: PHYSICIAN ASSISTANT

## 2023-07-10 PROCEDURE — 1123F ACP DISCUSS/DSCN MKR DOCD: CPT | Performed by: PHYSICIAN ASSISTANT

## 2023-07-10 PROCEDURE — G8427 DOCREV CUR MEDS BY ELIG CLIN: HCPCS | Performed by: PHYSICIAN ASSISTANT

## 2023-07-10 PROCEDURE — 3078F DIAST BP <80 MM HG: CPT | Performed by: PHYSICIAN ASSISTANT

## 2023-07-10 PROCEDURE — 1090F PRES/ABSN URINE INCON ASSESS: CPT | Performed by: PHYSICIAN ASSISTANT

## 2023-07-10 PROCEDURE — G8417 CALC BMI ABV UP PARAM F/U: HCPCS | Performed by: PHYSICIAN ASSISTANT

## 2023-07-10 PROCEDURE — 99213 OFFICE O/P EST LOW 20 MIN: CPT | Performed by: PHYSICIAN ASSISTANT

## 2023-07-10 PROCEDURE — 3074F SYST BP LT 130 MM HG: CPT | Performed by: PHYSICIAN ASSISTANT

## 2023-07-10 ASSESSMENT — ENCOUNTER SYMPTOMS: COLOR CHANGE: 0

## 2023-07-10 NOTE — PROGRESS NOTES
Visit Information    Have you changed or started any medications since your last visit including any over-the-counter medicines, vitamins, or herbal medicines? no   Are you having any side effects from any of your medications? -  no  Have you stopped taking any of your medications? Is so, why? -  no    Have you seen any other physician or provider since your last visit? No  Have you had any other diagnostic tests since your last visit? No  Have you been seen in the emergency room and/or had an admission to a hospital since we last saw you? No  Have you had your routine dental cleaning in the past 6 months? no    Have you activated your [employer] account? If not, what are your barriers?  Yes     Patient Care Team:  Timothy Shen PA-C as PCP - General  Timothy Shen PA-C as PCP - Empaneled Provider  Rian Cortés MD as Consulting Physician (Cardiology)  Racquel Couch MD as Consulting Physician (Oral Maxillofacial Surgery)    Medical History Review  Past Medical, Family, and Social History reviewed and  contribute to the patient presenting condition    Health Maintenance   Topic Date Due    COVID-19 Vaccine (5 - Booster for Moderna series) 06/23/2022    Flu vaccine (1) 08/01/2023    Annual Wellness Visit (AWV)  12/20/2023    Depression Screen  02/01/2024    DTaP/Tdap/Td vaccine (2 - Td or Tdap) 06/21/2032    DEXA (modify frequency per FRAX score)  Completed    Shingles vaccine  Completed    Pneumococcal 65+ years Vaccine  Completed    Hepatitis A vaccine  Aged Out    Hib vaccine  Aged Out    Meningococcal (ACWY) vaccine  Aged Out

## 2023-07-10 NOTE — PROGRESS NOTES
[facility] FAMILY MEDICINE  [address] [address]  Dept: [phone]  Dept Fax: [phone]    Everardo Monte is a 79 y.o. female who presents today for her medical conditions/complaintsas noted below. Everardo Monte is c/o of   Chief Complaint   Patient presents with    Skin Lesion     Spot on back of head, sore to the touch, noticed it on Saturday          HPI:     HPI    Patient is here reporting she noticed a small bump on the her posterior scalp over the weekend. She states it is slightly tender to the touch and she can notice it a little bit when laying on her pillow. She denies any drainage.   No known trauma    No results found for: LABA1C          ( goal A1Cis < 7)   No results found for: LABMICR  LDL Cholesterol (mg/dL)   Date Value   12/19/2022 121   12/09/2021 108   11/10/2020 121       (goal LDL is <100)   AST (U/L)   Date Value   02/20/2023 18     ALT (U/L)   Date Value   02/20/2023 16     BUN (mg/dL)   Date Value   02/22/2023 25 (H)     BP Readings from Last 3 Encounters:   07/10/23 124/70   02/22/23 (!) 193/62   02/15/23 (!) 140/90          (goal 120/80)    Past Medical History:   Diagnosis Date    Allergic rhinitis     Arthritis     Asthma     Atrial fibrillation (HCC)     GERD (gastroesophageal reflux disease)     Hypertension     Melanoma ([address])     on nose    Myocardial infarction ([address]) 2008    Wears glasses     Wears partial dentures     upper      Past Surgical History:   Procedure Laterality Date    APPENDECTOMY      BREAST SURGERY      CARDIAC SURGERY      CARPAL TUNNEL RELEASE Right     COLONOSCOPY N/A 8/8/2019    COLONOSCOPY POLYPECTOMY REMOVAL HOT BIOPSY AND RESOLUTION CLIP performed by Miguel Bermeo MD at [address] [place] [address]    ERCP N/A 2/17/2023    ERCP ENDOSCOPIC RETROGRADE CHOLANGIOPANCREATOGRAPHY performed by Moy Joseph MD at [facility]      umbilical    HYSTERECTOMY ([facility])

## 2023-07-20 ENCOUNTER — HOSPITAL ENCOUNTER (OUTPATIENT)
Age: 83
Setting detail: SPECIMEN
Discharge: HOME | End: 2023-07-20

## 2023-07-20 LAB
POC INR: 2.8
PROTHROMBIN TIME, POC: 33.4 SEC (ref 9.6–14.4)

## 2023-08-02 ENCOUNTER — TELEPHONE (OUTPATIENT)
Dept: FAMILY MEDICINE CLINIC | Age: 83
End: 2023-08-02

## 2023-08-02 NOTE — TELEPHONE ENCOUNTER
Patient contacted the office and states she has a rash/hives on her chest abdomin and face. Patient states she hasn't done anything different except take a medication for her bowels. Patient believes the rash is from that. Patient is asking if she can use the cream given to her for her head on the rash. That medication she wants to use is called triamcinolone.     Please advise

## 2023-08-03 ENCOUNTER — OFFICE VISIT (OUTPATIENT)
Dept: FAMILY MEDICINE CLINIC | Age: 83
End: 2023-08-03
Payer: MEDICARE

## 2023-08-03 VITALS
HEIGHT: 65 IN | TEMPERATURE: 96.9 F | HEART RATE: 53 BPM | BODY MASS INDEX: 28.36 KG/M2 | OXYGEN SATURATION: 95 % | WEIGHT: 170.2 LBS

## 2023-08-03 DIAGNOSIS — L30.9 DERMATITIS: Primary | ICD-10-CM

## 2023-08-03 PROBLEM — Z87.891 HISTORY OF SMOKING: Status: RESOLVED | Noted: 2021-03-02 | Resolved: 2023-08-03

## 2023-08-03 PROCEDURE — G8427 DOCREV CUR MEDS BY ELIG CLIN: HCPCS | Performed by: NURSE PRACTITIONER

## 2023-08-03 PROCEDURE — G8399 PT W/DXA RESULTS DOCUMENT: HCPCS | Performed by: NURSE PRACTITIONER

## 2023-08-03 PROCEDURE — 1090F PRES/ABSN URINE INCON ASSESS: CPT | Performed by: NURSE PRACTITIONER

## 2023-08-03 PROCEDURE — 1123F ACP DISCUSS/DSCN MKR DOCD: CPT | Performed by: NURSE PRACTITIONER

## 2023-08-03 PROCEDURE — 99213 OFFICE O/P EST LOW 20 MIN: CPT | Performed by: NURSE PRACTITIONER

## 2023-08-03 PROCEDURE — G8417 CALC BMI ABV UP PARAM F/U: HCPCS | Performed by: NURSE PRACTITIONER

## 2023-08-03 PROCEDURE — 1036F TOBACCO NON-USER: CPT | Performed by: NURSE PRACTITIONER

## 2023-08-03 RX ORDER — LINACLOTIDE 72 UG/1
CAPSULE, GELATIN COATED ORAL
COMMUNITY
Start: 2023-07-19 | End: 2023-08-03

## 2023-08-03 RX ORDER — AMIODARONE HYDROCHLORIDE 100 MG/1
TABLET ORAL
COMMUNITY
Start: 2023-08-02

## 2023-08-03 RX ORDER — DENOSUMAB 60 MG/ML
INJECTION SUBCUTANEOUS
COMMUNITY
Start: 2023-05-03

## 2023-08-03 ASSESSMENT — ENCOUNTER SYMPTOMS
CHEST TIGHTNESS: 0
NAUSEA: 0
VOMITING: 0
ABDOMINAL PAIN: 0
COUGH: 0
SHORTNESS OF BREATH: 0

## 2023-08-03 NOTE — PROGRESS NOTES
Visit Information    Have you changed or started any medications since your last visit including any over-the-counter medicines, vitamins, or herbal medicines? no   Have you stopped taking any of your medications? Is so, why? -  no  Are you having any side effects from any of your medications? - no    Have you seen any other physician or provider since your last visit?  no   Have you had any other diagnostic tests since your last visit?  no   Have you been seen in the emergency room and/or had an admission in a hospital since we last saw you?  no   Have you had your routine dental cleaning in the past 6 months?  no     Do you have an active MyChart account? If no, what is the barrier?   Yes    Patient Care Team:  Manish Powers PA-C as PCP - General  Manish Powers PA-C as PCP - Empaneled Provider  Amy Lopez MD as Consulting Physician (Cardiology)  Eloise Adams MD as Consulting Physician (Oral Maxillofacial Surgery)    Medical History Review  Past Medical, Family, and Social History reviewed and  contribute to the patient presenting condition    Health Maintenance   Topic Date Due    COVID-19 Vaccine (5 - Booster for Moderna series) 06/23/2022    Flu vaccine (1) 08/01/2023    Annual Wellness Visit (AWV)  12/20/2023    Depression Screen  02/01/2024    DTaP/Tdap/Td vaccine (2 - Td or Tdap) 06/21/2032    DEXA (modify frequency per FRAX score)  Completed    Shingles vaccine  Completed    Pneumococcal 65+ years Vaccine  Completed    Hepatitis A vaccine  Aged Out    Hib vaccine  Aged Out    Meningococcal (ACWY) vaccine  Aged Out

## 2023-08-29 ENCOUNTER — HOSPITAL ENCOUNTER (OUTPATIENT)
Age: 83
Setting detail: SPECIMEN
Discharge: HOME | End: 2023-08-29

## 2023-08-29 LAB
POC INR: 3
PROTHROMBIN TIME, POC: 36 SEC (ref 9.6–14.4)

## 2023-09-07 NOTE — PATIENT INSTRUCTIONS
Personalized Preventive Plan for Kristen Moulton - 12/6/2021  Medicare offers a range of preventive health benefits. Some of the tests and screenings are paid in full while other may be subject to a deductible, co-insurance, and/or copay. Some of these benefits include a comprehensive review of your medical history including lifestyle, illnesses that may run in your family, and various assessments and screenings as appropriate. After reviewing your medical record and screening and assessments performed today your provider may have ordered immunizations, labs, imaging, and/or referrals for you. A list of these orders (if applicable) as well as your Preventive Care list are included within your After Visit Summary for your review. Other Preventive Recommendations:    · A preventive eye exam performed by an eye specialist is recommended every 1-2 years to screen for glaucoma; cataracts, macular degeneration, and other eye disorders. · A preventive dental visit is recommended every 6 months. · Try to get at least 150 minutes of exercise per week or 10,000 steps per day on a pedometer . · Order or download the FREE \"Exercise & Physical Activity: Your Everyday Guide\" from [employer]. Call 1-462.905.7948 or search [employer] online. · You need 2420-4781 mg of calcium and 9360-2896 IU of vitamin D per day. It is possible to meet your calcium requirement with diet alone, but a vitamin D supplement is usually necessary to meet this goal.  · When exposed to the sun, use a sunscreen that protects against both UVA and UVB radiation with an SPF of 30 or greater. Reapply every 2 to 3 hours or after sweating, drying off with a towel, or swimming. · Always wear a seat belt when traveling in a car. Always wear a helmet when riding a bicycle or motorcycle.
Statement Selected

## 2023-09-18 ENCOUNTER — OFFICE VISIT (OUTPATIENT)
Dept: ORTHOPEDIC SURGERY | Age: 83
End: 2023-09-18
Payer: MEDICARE

## 2023-09-18 VITALS — BODY MASS INDEX: 28.32 KG/M2 | HEIGHT: 65 IN | WEIGHT: 170 LBS | RESPIRATION RATE: 14 BRPM

## 2023-09-18 DIAGNOSIS — M79.18 LEFT BUTTOCK PAIN: ICD-10-CM

## 2023-09-18 DIAGNOSIS — M25.511 RIGHT SHOULDER PAIN, UNSPECIFIED CHRONICITY: Primary | ICD-10-CM

## 2023-09-18 DIAGNOSIS — M47.817 ARTHRITIS OF LUMBOSACRAL SPINE: ICD-10-CM

## 2023-09-18 PROCEDURE — 1123F ACP DISCUSS/DSCN MKR DOCD: CPT | Performed by: ORTHOPAEDIC SURGERY

## 2023-09-18 PROCEDURE — G8399 PT W/DXA RESULTS DOCUMENT: HCPCS | Performed by: ORTHOPAEDIC SURGERY

## 2023-09-18 PROCEDURE — G8427 DOCREV CUR MEDS BY ELIG CLIN: HCPCS | Performed by: ORTHOPAEDIC SURGERY

## 2023-09-18 PROCEDURE — 99214 OFFICE O/P EST MOD 30 MIN: CPT | Performed by: ORTHOPAEDIC SURGERY

## 2023-09-18 PROCEDURE — G8417 CALC BMI ABV UP PARAM F/U: HCPCS | Performed by: ORTHOPAEDIC SURGERY

## 2023-09-18 PROCEDURE — 1090F PRES/ABSN URINE INCON ASSESS: CPT | Performed by: ORTHOPAEDIC SURGERY

## 2023-09-18 PROCEDURE — 1036F TOBACCO NON-USER: CPT | Performed by: ORTHOPAEDIC SURGERY

## 2023-09-18 ASSESSMENT — ENCOUNTER SYMPTOMS
ABDOMINAL PAIN: 0
EYE DISCHARGE: 0
ROS SKIN COMMENTS: NEGATIVE FOR RASH
SHORTNESS OF BREATH: 0

## 2023-09-18 NOTE — PROGRESS NOTES
[address] [facility] AND SPORTS MEDICINE  [address] [address]  Dept: [phone]  Dept Fax: [phone]        Ambulatory Follow Up      Subjective:   Galindo Castro is a 79y.o. year old female who presents to our office today for routine followup regarding her   1. Right shoulder pain, unspecified chronicity    2. Left buttock pain    3. Arthritis of lumbosacral spine    . Chief Complaint   Patient presents with    Shoulder Pain     R Shoulder Pain       HPI Karlee Larson is an 79-year-old female who presents the office today for recheck of her right shoulder and also her left buttock pain. She was last seen on 5/31/2023 for her right shoulder. She underwent right shoulder corticosteroid injection and [place] physical [place] She notes nearly 100% improvement with her shoulder and she is able to do most everything. She notes left hip or buttock pain which is progressively worsened over the last few months. She notes pain mostly to the buttock area. She has not had specific treatment for this other than back surgery in the past.  She denies any radiation of the buttock pain down her left leg. Review of Systems   Constitutional:  Positive for activity change. Negative for fever. HENT:  Negative for dental problem. Eyes:  Negative for discharge. Respiratory:  Negative for shortness of breath. Cardiovascular:  Negative for chest pain. Gastrointestinal:  Negative for abdominal pain. Genitourinary: Negative. Musculoskeletal:  Positive for arthralgias. Skin:         Negative for rash   Neurological:  Positive for weakness. Psychiatric/Behavioral:  Negative for confusion. I have reviewed the CC, HPI, ROS, PMH, FHX, Social History, and if not present in this note, I have reviewed in the patient's chart.    I agree with the documentation provided by

## 2023-09-21 ENCOUNTER — TELEPHONE (OUTPATIENT)
Dept: FAMILY MEDICINE CLINIC | Age: 83
End: 2023-09-21

## 2023-09-21 NOTE — TELEPHONE ENCOUNTER
Patient called would like to know if she can get a script for a handicap placard. Pt states she is having trouble walking far distances. Pt wanted to know if you can do this or should her back doctor. Please advise patient.

## 2023-10-09 ENCOUNTER — HOSPITAL ENCOUNTER (OUTPATIENT)
Age: 83
Setting detail: SPECIMEN
Discharge: HOME | End: 2023-10-09

## 2023-10-09 LAB
POC INR: 3.7
PROTHROMBIN TIME, POC: 44.1 SEC (ref 9.6–14.4)

## 2023-11-10 ENCOUNTER — HOSPITAL ENCOUNTER (OUTPATIENT)
Age: 83
Setting detail: SPECIMEN
Discharge: HOME | End: 2023-11-10

## 2023-11-10 LAB
POC INR: 2.7
PROTHROMBIN TIME, POC: 32.2 SEC (ref 9.6–14.4)

## 2023-12-08 ENCOUNTER — HOSPITAL ENCOUNTER (OUTPATIENT)
Age: 83
Setting detail: SPECIMEN
Discharge: HOME | End: 2023-12-08

## 2023-12-08 LAB
POC INR: 3.5
PROTHROMBIN TIME, POC: 41.5 SEC (ref 9.6–14.4)

## 2023-12-28 ENCOUNTER — HOSPITAL ENCOUNTER (OUTPATIENT)
Age: 83
Setting detail: SPECIMEN
Discharge: HOME | End: 2023-12-28

## 2023-12-28 LAB
POC INR: 1.3
PROTHROMBIN TIME, POC: 16.1 SEC (ref 9.6–14.4)

## 2024-01-04 ENCOUNTER — TELEPHONE (OUTPATIENT)
Dept: FAMILY MEDICINE CLINIC | Age: 84
End: 2024-01-04

## 2024-01-04 DIAGNOSIS — N18.30 STAGE 3 CHRONIC KIDNEY DISEASE, UNSPECIFIED WHETHER STAGE 3A OR 3B CKD (HCC): Primary | ICD-10-CM

## 2024-01-04 NOTE — TELEPHONE ENCOUNTER
----- Message from Benny Santiago sent at 1/4/2024 10:28 AM EST -----  Subject: Referral Request    Reason for referral request? Patient was told by kidney doctor she needs a   referral to make an appt. Any kidney doctor at [facility] fax   [phone]  Provider patient wants to be referred to(if known):     Provider Phone Number(if known):    Additional Information for Provider?   ---------------------------------------------------------------------------  --------------  CALL BACK INFO    [phone]; OK to leave message on voicemail  ---------------------------------------------------------------------------  --------------

## 2024-01-25 ENCOUNTER — HOSPITAL ENCOUNTER (OUTPATIENT)
Age: 84
Setting detail: SPECIMEN
Discharge: HOME | End: 2024-01-25

## 2024-01-25 LAB
POC INR: 1.8
PROTHROMBIN TIME, POC: 22.2 SEC (ref 9.6–14.4)

## 2024-02-19 ENCOUNTER — HOSPITAL ENCOUNTER (OUTPATIENT)
Age: 84
Setting detail: SPECIMEN
Discharge: HOME | End: 2024-02-19

## 2024-02-19 LAB
POC INR: 1.8
PROTHROMBIN TIME, POC: 21 SEC (ref 9.6–14.4)

## 2024-02-26 ENCOUNTER — OFFICE VISIT (OUTPATIENT)
Dept: PRIMARY CARE CLINIC | Age: 84
End: 2024-02-26
Payer: MEDICARE

## 2024-02-26 VITALS
DIASTOLIC BLOOD PRESSURE: 75 MMHG | SYSTOLIC BLOOD PRESSURE: 127 MMHG | OXYGEN SATURATION: 97 % | HEART RATE: 56 BPM | TEMPERATURE: 98.3 F

## 2024-02-26 DIAGNOSIS — S43.401A SPRAIN OF RIGHT SHOULDER, UNSPECIFIED SHOULDER SPRAIN TYPE, INITIAL ENCOUNTER: ICD-10-CM

## 2024-02-26 DIAGNOSIS — M25.511 ACUTE PAIN OF RIGHT SHOULDER: Primary | ICD-10-CM

## 2024-02-26 PROCEDURE — G8417 CALC BMI ABV UP PARAM F/U: HCPCS | Performed by: FAMILY MEDICINE

## 2024-02-26 PROCEDURE — 99213 OFFICE O/P EST LOW 20 MIN: CPT | Performed by: FAMILY MEDICINE

## 2024-02-26 PROCEDURE — G8399 PT W/DXA RESULTS DOCUMENT: HCPCS | Performed by: FAMILY MEDICINE

## 2024-02-26 PROCEDURE — 3074F SYST BP LT 130 MM HG: CPT | Performed by: FAMILY MEDICINE

## 2024-02-26 PROCEDURE — G8427 DOCREV CUR MEDS BY ELIG CLIN: HCPCS | Performed by: FAMILY MEDICINE

## 2024-02-26 PROCEDURE — 3078F DIAST BP <80 MM HG: CPT | Performed by: FAMILY MEDICINE

## 2024-02-26 PROCEDURE — 1036F TOBACCO NON-USER: CPT | Performed by: FAMILY MEDICINE

## 2024-02-26 PROCEDURE — G8484 FLU IMMUNIZE NO ADMIN: HCPCS | Performed by: FAMILY MEDICINE

## 2024-02-26 PROCEDURE — 1090F PRES/ABSN URINE INCON ASSESS: CPT | Performed by: FAMILY MEDICINE

## 2024-02-26 PROCEDURE — 1123F ACP DISCUSS/DSCN MKR DOCD: CPT | Performed by: FAMILY MEDICINE

## 2024-02-26 RX ORDER — HYDRALAZINE HYDROCHLORIDE 50 MG/1
TABLET, FILM COATED ORAL
COMMUNITY
Start: 2024-01-22

## 2024-02-26 NOTE — PATIENT INSTRUCTIONS
Rest, ice, compress and take tylenol/motrin as needed for pain  Will call with final xray read when available  If symptoms worsen or do not improve please follow-up with PCP or return to clinic

## 2024-02-26 NOTE — PROGRESS NOTES
[facility] [place] [facility]  [address]  [facility] [address]  Dept: [phone]  Dept Fax: [phone]    Nadine Albarran is a 84 y.o. female who presents today for her medical conditions/complaintsas noted below.  Nadine Albarran is c/o of Clavicle Injury (Rt x yesterday after fall)        HPI:     Shoulder Pain   The pain is present in the right shoulder (clavicle). This is a new problem. The current episode started yesterday. There has been a history of trauma (fall). The problem occurs constantly. The problem has been unchanged. The quality of the pain is described as aching. The pain is moderate. Associated symptoms include an inability to bear weight and a limited range of motion. Pertinent negatives include no joint swelling, numbness or tingling. The symptoms are aggravated by activity and contact. She has tried acetaminophen for the symptoms. The treatment provided mild relief. Her past medical history is significant for osteoarthritis. osteopenia       Past Medical History:   Diagnosis Date    Allergic rhinitis     Arthritis     Asthma     Atrial fibrillation (HCC)     GERD (gastroesophageal reflux disease)     Hypertension     Melanoma (HCC)     on nose    Myocardial infarction (HCC) 2008    Wears glasses     Wears partial dentures     upper    Past medical history reviewed and pertinent positives/negatives in the HPI    Past Surgical History:   Procedure Laterality Date    APPENDECTOMY      BREAST SURGERY      CARDIAC SURGERY      CARPAL TUNNEL RELEASE Right     COLONOSCOPY N/A 8/8/2019    COLONOSCOPY POLYPECTOMY REMOVAL HOT BIOPSY AND RESOLUTION CLIP performed by Carlitos Schultz MD at [facility]    ERCP N/A 2/17/2023    ERCP ENDOSCOPIC RETROGRADE CHOLANGIOPANCREATOGRAPHY performed by Corey Sapp MD at [facility] OR    EYE SURGERY      HERNIA REPAIR      umbilical    HYSTERECTOMY (CERVIX STATUS UNKNOWN)      JOINT REPLACEMENT      NECK

## 2024-03-19 ENCOUNTER — HOSPITAL ENCOUNTER (OUTPATIENT)
Age: 84
Setting detail: SPECIMEN
Discharge: HOME | End: 2024-03-19

## 2024-03-19 LAB
POC INR: 2.6
PROTHROMBIN TIME, POC: 31.8 SEC (ref 9.6–14.4)

## 2024-04-18 ENCOUNTER — HOSPITAL ENCOUNTER (OUTPATIENT)
Age: 84
Setting detail: SPECIMEN
Discharge: HOME | End: 2024-04-18

## 2024-04-18 LAB
POC INR: 1.4
PROTHROMBIN TIME, POC: 16.3 SEC (ref 9.6–14.4)

## 2024-05-14 ENCOUNTER — HOSPITAL ENCOUNTER (OUTPATIENT)
Age: 84
Setting detail: SPECIMEN
Discharge: HOME | End: 2024-05-14

## 2024-05-14 DIAGNOSIS — I10 PRIMARY HYPERTENSION: ICD-10-CM

## 2024-05-14 LAB
POC INR: 1.5
PROTHROMBIN TIME, POC: 17.6 SEC (ref 9.6–14.4)

## 2024-06-14 ENCOUNTER — HOSPITAL ENCOUNTER (OUTPATIENT)
Age: 84
Setting detail: SPECIMEN
Discharge: HOME | End: 2024-06-14

## 2024-06-14 LAB
POC INR: 2.4
PROTHROMBIN TIME, POC: 22.8 SEC (ref 9.6–14.4)

## 2024-06-26 ENCOUNTER — APPOINTMENT (OUTPATIENT)
Dept: GENERAL RADIOLOGY | Age: 84
DRG: 303 | End: 2024-06-26
Payer: MEDICARE

## 2024-06-26 ENCOUNTER — HOSPITAL ENCOUNTER (INPATIENT)
Age: 84
LOS: 1 days | Discharge: HOME | DRG: 303 | End: 2024-06-28
Attending: EMERGENCY MEDICINE | Admitting: STUDENT IN AN ORGANIZED HEALTH CARE EDUCATION/TRAINING PROGRAM
Payer: MEDICARE

## 2024-06-26 DIAGNOSIS — I20.9 ANGINA PECTORIS (HCC): ICD-10-CM

## 2024-06-26 DIAGNOSIS — R07.9 CHEST PAIN, UNSPECIFIED TYPE: Primary | ICD-10-CM

## 2024-06-26 PROBLEM — D68.69 SECONDARY HYPERCOAGULABLE STATE (HCC): Chronic | Status: ACTIVE | Noted: 2024-06-26

## 2024-06-26 PROBLEM — I20.89 STABLE ANGINA (HCC): Status: ACTIVE | Noted: 2024-06-26

## 2024-06-26 LAB
ALBUMIN SERPL-MCNC: 4.1 G/DL (ref 3.5–5.2)
ALP SERPL-CCNC: 64 U/L (ref 35–104)
ALT SERPL-CCNC: 15 U/L (ref 5–33)
ANION GAP SERPL CALCULATED.3IONS-SCNC: 13 MMOL/L (ref 9–17)
AST SERPL-CCNC: 21 U/L
BASOPHILS # BLD: 0.07 K/UL (ref 0–0.2)
BASOPHILS NFR BLD: 1 % (ref 0–2)
BILIRUB DIRECT SERPL-MCNC: 0.1 MG/DL
BILIRUB INDIRECT SERPL-MCNC: 0.4 MG/DL (ref 0–1)
BILIRUB SERPL-MCNC: 0.5 MG/DL (ref 0.3–1.2)
BUN SERPL-MCNC: 32 MG/DL (ref 8–23)
BUN/CREAT SERPL: 19 (ref 9–20)
CALCIUM SERPL-MCNC: 8.6 MG/DL (ref 8.6–10.4)
CHLORIDE SERPL-SCNC: 103 MMOL/L (ref 98–107)
CO2 SERPL-SCNC: 23 MMOL/L (ref 20–31)
CREAT SERPL-MCNC: 1.7 MG/DL (ref 0.5–0.9)
EOSINOPHIL # BLD: 0.16 K/UL (ref 0–0.44)
EOSINOPHILS RELATIVE PERCENT: 2 % (ref 1–4)
ERYTHROCYTE [DISTWIDTH] IN BLOOD BY AUTOMATED COUNT: 14.4 % (ref 11.8–14.4)
GFR, ESTIMATED: 29 ML/MIN/1.73M2
GLUCOSE SERPL-MCNC: 100 MG/DL (ref 70–99)
HCT VFR BLD AUTO: 40 % (ref 36.3–47.1)
HGB BLD-MCNC: 13.1 G/DL (ref 11.9–15.1)
IMM GRANULOCYTES # BLD AUTO: 0.03 K/UL (ref 0–0.3)
IMM GRANULOCYTES NFR BLD: 0 %
INR PPP: 2
LYMPHOCYTES NFR BLD: 1.6 K/UL (ref 1.1–3.7)
LYMPHOCYTES RELATIVE PERCENT: 22 % (ref 24–43)
MCH RBC QN AUTO: 30.8 PG (ref 25.2–33.5)
MCHC RBC AUTO-ENTMCNC: 32.8 G/DL (ref 28.4–34.8)
MCV RBC AUTO: 93.9 FL (ref 82.6–102.9)
MONOCYTES NFR BLD: 0.95 K/UL (ref 0.1–1.2)
MONOCYTES NFR BLD: 13 % (ref 3–12)
MYOGLOBIN SERPL-MCNC: 33 NG/ML (ref 25–58)
NEUTROPHILS NFR BLD: 62 % (ref 36–65)
NEUTS SEG NFR BLD: 4.41 K/UL (ref 1.5–8.1)
NRBC BLD-RTO: 0 PER 100 WBC
PARTIAL THROMBOPLASTIN TIME: 32.5 SEC (ref 23.9–33.8)
PLATELET # BLD AUTO: 217 K/UL (ref 138–453)
PMV BLD AUTO: 10.1 FL (ref 8.1–13.5)
POTASSIUM SERPL-SCNC: 3.8 MMOL/L (ref 3.7–5.3)
PROT SERPL-MCNC: 7.1 G/DL (ref 6.4–8.3)
PROTHROMBIN TIME: 22.4 SEC (ref 11.5–14.2)
RBC # BLD AUTO: 4.26 M/UL (ref 3.95–5.11)
SODIUM SERPL-SCNC: 139 MMOL/L (ref 135–144)
TROPONIN I SERPL HS-MCNC: 17 NG/L (ref 0–14)
TROPONIN I SERPL HS-MCNC: 17 NG/L (ref 0–14)
TROPONIN I SERPL HS-MCNC: 20 NG/L (ref 0–14)
WBC OTHER # BLD: 7.2 K/UL (ref 3.5–11.3)

## 2024-06-26 PROCEDURE — 83874 ASSAY OF MYOGLOBIN: CPT

## 2024-06-26 PROCEDURE — 99222 1ST HOSP IP/OBS MODERATE 55: CPT | Performed by: STUDENT IN AN ORGANIZED HEALTH CARE EDUCATION/TRAINING PROGRAM

## 2024-06-26 PROCEDURE — 85730 THROMBOPLASTIN TIME PARTIAL: CPT

## 2024-06-26 PROCEDURE — 99285 EMERGENCY DEPT VISIT HI MDM: CPT

## 2024-06-26 PROCEDURE — 93005 ELECTROCARDIOGRAM TRACING: CPT | Performed by: NURSE PRACTITIONER

## 2024-06-26 PROCEDURE — G0378 HOSPITAL OBSERVATION PER HR: HCPCS

## 2024-06-26 PROCEDURE — 6370000000 HC RX 637 (ALT 250 FOR IP): Performed by: NURSE PRACTITIONER

## 2024-06-26 PROCEDURE — 84484 ASSAY OF TROPONIN QUANT: CPT

## 2024-06-26 PROCEDURE — 71045 X-RAY EXAM CHEST 1 VIEW: CPT

## 2024-06-26 PROCEDURE — 80048 BASIC METABOLIC PNL TOTAL CA: CPT

## 2024-06-26 PROCEDURE — 36415 COLL VENOUS BLD VENIPUNCTURE: CPT

## 2024-06-26 PROCEDURE — 85610 PROTHROMBIN TIME: CPT

## 2024-06-26 PROCEDURE — 80076 HEPATIC FUNCTION PANEL: CPT

## 2024-06-26 PROCEDURE — 85025 COMPLETE CBC W/AUTO DIFF WBC: CPT

## 2024-06-26 RX ORDER — METOPROLOL TARTRATE 1 MG/ML
5 INJECTION, SOLUTION INTRAVENOUS EVERY 5 MIN PRN
Status: ACTIVE | OUTPATIENT
Start: 2024-06-27 | End: 2024-06-27

## 2024-06-26 RX ORDER — NITROGLYCERIN 0.4 MG/1
0.4 TABLET SUBLINGUAL EVERY 5 MIN PRN
Status: CANCELLED | OUTPATIENT
Start: 2024-06-26 | End: 2024-06-27

## 2024-06-26 RX ORDER — AMINOPHYLLINE 25 MG/ML
50 INJECTION, SOLUTION INTRAVENOUS PRN
Status: ACTIVE | OUTPATIENT
Start: 2024-06-27 | End: 2024-06-27

## 2024-06-26 RX ORDER — ONDANSETRON 2 MG/ML
4 INJECTION INTRAMUSCULAR; INTRAVENOUS EVERY 6 HOURS PRN
Status: DISCONTINUED | OUTPATIENT
Start: 2024-06-26 | End: 2024-06-28 | Stop reason: HOSPADM

## 2024-06-26 RX ORDER — PANTOPRAZOLE SODIUM 40 MG/1
40 TABLET, DELAYED RELEASE ORAL DAILY
Status: DISCONTINUED | OUTPATIENT
Start: 2024-06-26 | End: 2024-06-28 | Stop reason: HOSPADM

## 2024-06-26 RX ORDER — NITROGLYCERIN 0.1MG/HR
1 PATCH, TRANSDERMAL 24 HOURS TRANSDERMAL DAILY
Status: DISCONTINUED | OUTPATIENT
Start: 2024-06-26 | End: 2024-06-28 | Stop reason: HOSPADM

## 2024-06-26 RX ORDER — DILTIAZEM HYDROCHLORIDE 240 MG/1
240 CAPSULE, EXTENDED RELEASE ORAL DAILY
Status: DISCONTINUED | OUTPATIENT
Start: 2024-06-27 | End: 2024-06-28 | Stop reason: HOSPADM

## 2024-06-26 RX ORDER — SODIUM CHLORIDE 0.9 % (FLUSH) 0.9 %
10 SYRINGE (ML) INJECTION PRN
Status: DISCONTINUED | OUTPATIENT
Start: 2024-06-26 | End: 2024-06-28 | Stop reason: HOSPADM

## 2024-06-26 RX ORDER — ATROPINE SULFATE 0.1 MG/ML
0.5 INJECTION INTRAVENOUS EVERY 5 MIN PRN
Status: ACTIVE | OUTPATIENT
Start: 2024-06-27 | End: 2024-06-27

## 2024-06-26 RX ORDER — TRIAMTERENE AND HYDROCHLOROTHIAZIDE 37.5; 25 MG/1; MG/1
1 TABLET ORAL DAILY
Status: CANCELLED | OUTPATIENT
Start: 2024-06-27

## 2024-06-26 RX ORDER — WARFARIN SODIUM 2.5 MG/1
2.5 TABLET ORAL SEE ADMIN INSTRUCTIONS
Status: DISCONTINUED | OUTPATIENT
Start: 2024-06-26 | End: 2024-06-27

## 2024-06-26 RX ORDER — REGADENOSON 0.08 MG/ML
0.4 INJECTION, SOLUTION INTRAVENOUS
Status: COMPLETED | OUTPATIENT
Start: 2024-06-27 | End: 2024-06-27

## 2024-06-26 RX ORDER — ONDANSETRON 4 MG/1
4 TABLET, ORALLY DISINTEGRATING ORAL EVERY 8 HOURS PRN
Status: DISCONTINUED | OUTPATIENT
Start: 2024-06-26 | End: 2024-06-28 | Stop reason: HOSPADM

## 2024-06-26 RX ORDER — SODIUM CHLORIDE 0.9 % (FLUSH) 0.9 %
5-40 SYRINGE (ML) INJECTION PRN
Status: ACTIVE | OUTPATIENT
Start: 2024-06-27 | End: 2024-06-27

## 2024-06-26 RX ORDER — SODIUM CHLORIDE 9 MG/ML
500 INJECTION, SOLUTION INTRAVENOUS CONTINUOUS PRN
Status: ACTIVE | OUTPATIENT
Start: 2024-06-27 | End: 2024-06-27

## 2024-06-26 RX ORDER — ALBUTEROL SULFATE 90 UG/1
2 AEROSOL, METERED RESPIRATORY (INHALATION) PRN
Status: ACTIVE | OUTPATIENT
Start: 2024-06-27 | End: 2024-06-27

## 2024-06-26 RX ORDER — FAMOTIDINE 20 MG/1
20 TABLET, FILM COATED ORAL NIGHTLY
Status: DISCONTINUED | OUTPATIENT
Start: 2024-06-26 | End: 2024-06-28 | Stop reason: HOSPADM

## 2024-06-26 RX ORDER — SODIUM CHLORIDE 0.9 % (FLUSH) 0.9 %
5-40 SYRINGE (ML) INJECTION EVERY 12 HOURS SCHEDULED
Status: DISCONTINUED | OUTPATIENT
Start: 2024-06-26 | End: 2024-06-28 | Stop reason: HOSPADM

## 2024-06-26 RX ORDER — HYDRALAZINE HYDROCHLORIDE 50 MG/1
50 TABLET, FILM COATED ORAL EVERY 8 HOURS SCHEDULED
Status: CANCELLED | OUTPATIENT
Start: 2024-06-26

## 2024-06-26 RX ORDER — ACETAMINOPHEN 650 MG/1
650 SUPPOSITORY RECTAL EVERY 6 HOURS PRN
Status: DISCONTINUED | OUTPATIENT
Start: 2024-06-26 | End: 2024-06-28 | Stop reason: HOSPADM

## 2024-06-26 RX ORDER — ASPIRIN 81 MG/1
324 TABLET, CHEWABLE ORAL ONCE
Status: COMPLETED | OUTPATIENT
Start: 2024-06-26 | End: 2024-06-26

## 2024-06-26 RX ORDER — MONTELUKAST SODIUM 10 MG/1
10 TABLET ORAL NIGHTLY
Status: DISCONTINUED | OUTPATIENT
Start: 2024-06-26 | End: 2024-06-28 | Stop reason: HOSPADM

## 2024-06-26 RX ORDER — ALBUTEROL SULFATE 90 UG/1
1 AEROSOL, METERED RESPIRATORY (INHALATION) EVERY 4 HOURS PRN
Status: DISCONTINUED | OUTPATIENT
Start: 2024-06-26 | End: 2024-06-28 | Stop reason: HOSPADM

## 2024-06-26 RX ORDER — NITROGLYCERIN 0.4 MG/1
0.4 TABLET SUBLINGUAL EVERY 5 MIN PRN
Status: DISCONTINUED | OUTPATIENT
Start: 2024-06-26 | End: 2024-06-26

## 2024-06-26 RX ORDER — ASPIRIN 81 MG/1
81 TABLET, CHEWABLE ORAL DAILY
Status: DISCONTINUED | OUTPATIENT
Start: 2024-06-27 | End: 2024-06-28 | Stop reason: HOSPADM

## 2024-06-26 RX ORDER — FERROUS SULFATE 325(65) MG
325 TABLET, DELAYED RELEASE (ENTERIC COATED) ORAL
Status: DISCONTINUED | OUTPATIENT
Start: 2024-06-27 | End: 2024-06-28 | Stop reason: HOSPADM

## 2024-06-26 RX ORDER — AMIODARONE HYDROCHLORIDE 200 MG/1
100 TABLET ORAL DAILY
Status: CANCELLED | OUTPATIENT
Start: 2024-06-27

## 2024-06-26 RX ORDER — ATORVASTATIN CALCIUM 40 MG/1
40 TABLET, FILM COATED ORAL NIGHTLY
Status: DISCONTINUED | OUTPATIENT
Start: 2024-06-26 | End: 2024-06-28 | Stop reason: HOSPADM

## 2024-06-26 RX ORDER — SODIUM CHLORIDE 9 MG/ML
INJECTION, SOLUTION INTRAVENOUS PRN
Status: DISCONTINUED | OUTPATIENT
Start: 2024-06-26 | End: 2024-06-28 | Stop reason: HOSPADM

## 2024-06-26 RX ORDER — ACETAMINOPHEN 325 MG/1
650 TABLET ORAL EVERY 6 HOURS PRN
Status: DISCONTINUED | OUTPATIENT
Start: 2024-06-26 | End: 2024-06-28 | Stop reason: HOSPADM

## 2024-06-26 RX ORDER — FLUTICASONE PROPIONATE 50 MCG
1 SPRAY, SUSPENSION (ML) NASAL NIGHTLY
Status: DISCONTINUED | OUTPATIENT
Start: 2024-06-26 | End: 2024-06-28 | Stop reason: HOSPADM

## 2024-06-26 RX ORDER — NITROGLYCERIN 0.4 MG/1
0.4 TABLET SUBLINGUAL EVERY 5 MIN PRN
Status: CANCELLED | OUTPATIENT
Start: 2024-06-26

## 2024-06-26 RX ADMIN — Medication 0.4 MG: at 17:54

## 2024-06-26 RX ADMIN — ASPIRIN 81 MG CHEWABLE TABLET 324 MG: 81 TABLET CHEWABLE at 18:21

## 2024-06-26 ASSESSMENT — ENCOUNTER SYMPTOMS
COUGH: 0
SHORTNESS OF BREATH: 0
ABDOMINAL PAIN: 0
COLOR CHANGE: 0
VOMITING: 0
NAUSEA: 0
BACK PAIN: 0

## 2024-06-26 ASSESSMENT — PAIN DESCRIPTION - ORIENTATION
ORIENTATION: LEFT
ORIENTATION: LEFT

## 2024-06-26 ASSESSMENT — PAIN DESCRIPTION - PAIN TYPE: TYPE: ACUTE PAIN

## 2024-06-26 ASSESSMENT — PAIN DESCRIPTION - DESCRIPTORS
DESCRIPTORS: PRESSURE
DESCRIPTORS: PRESSURE

## 2024-06-26 ASSESSMENT — PAIN DESCRIPTION - LOCATION
LOCATION: CHEST
LOCATION: CHEST

## 2024-06-26 ASSESSMENT — PAIN DESCRIPTION - FREQUENCY: FREQUENCY: CONTINUOUS

## 2024-06-26 ASSESSMENT — PAIN SCALES - GENERAL
PAINLEVEL_OUTOF10: 7
PAINLEVEL_OUTOF10: 2

## 2024-06-26 NOTE — ED PROVIDER NOTES
EMERGENCY DEPARTMENT ENCOUNTER   ATTENDING ATTESTATION     Pt Name: Nadine Albarran  MRN: [id number]  Birthdate 1940  Date of evaluation: 6/26/24   Nadine Albarran is a 84 y.o. female with CC: Chest Pain (Px arrives complaining of L sided chest pain w SOB. Px states it was temporarily present yesterday, Px states it started back up around today at 1300. Px has hx of afib)    MDM:   I performed a substantive part of the MDM during the patient's E/M visit. I personally evaluated and examined the patient. I personally made or approved the documented management plan and acknowledge its risk of complication  Independent Interpretation: My (EKG/X-Ray/US/CT) interpretation     Discussion: Management/test interpretation discussed with        CRITICAL CARE:       EKG: All EKG's are interpreted by the Emergency Department Physician who either signs or Co-signs this chart in the absence of a cardiologist.      RADIOLOGY:All plain film, CT, MRI, and formal ultrasound images (except ED bedside ultrasound) are read by the radiologist, see reports below, unless otherwise noted in MDM or here.  XR CHEST PORTABLE   Final Result   No acute process.      Stable mild cardiomegaly           LABS: All lab results were reviewed by myself, and all abnormals are listed below.  Labs Reviewed   BASIC METABOLIC PANEL - Abnormal; Notable for the following components:       Result Value    Glucose 100 (*)     BUN 32 (*)     Creatinine 1.7 (*)     Est, Glom Filt Rate 29 (*)     All other components within normal limits   CBC WITH AUTO DIFFERENTIAL - Abnormal; Notable for the following components:    Lymphocytes % 22 (*)     Monocytes % 13 (*)     All other components within normal limits   TROP/MYOGLOBIN - Abnormal; Notable for the following components:    Troponin, High Sensitivity 20 (*)     All other components within normal limits   PROTIME-INR - Abnormal; Notable for the following components:    Protime 22.4 (*)     All other components

## 2024-06-26 NOTE — ED PROVIDER NOTES
Team [facility] ED  eMERGENCY dEPARTMENT eNCOUnter      Pt Name: Nadine Albarran  MRN: [id number]  Birthdate 1940  Date of evaluation: 6/26/2024  Provider: DUDLEY Vicente CNP    CHIEF COMPLAINT       Chief Complaint   Patient presents with    Chest Pain     Px arrives complaining of L sided chest pain w SOB. Px states it was temporarily present yesterday, Px states it started back up around today at 1300. Px has hx of afib         HISTORY OF PRESENT ILLNESS  (Location/Symptom, Timing/Onset, Context/Setting, Quality, Duration, Modifying Factors, Severity.)   Nadine Albarran is a 84 y.o. female who presents to the emergency department. C/o left chest pain. States she has chest discomfort often; states she describes it as \"gas bubbles.\" Reports today the episode was severe and constant. Denies fever, chills, dizziness, weakness, back pain. Denies SOB, diaphoresis. Rates her pain 7/10. She has hx a-fib and takes coumadin.      Nursing Notes were reviewed.    ALLERGIES     Patient has no known allergies.    CURRENT MEDICATIONS       Previous Medications    ALBUTEROL SULFATE HFA (PROVENTIL;VENTOLIN;PROAIR) 108 (90 BASE) MCG/ACT INHALER        AMIODARONE (PACERONE) 100 MG TABLET        AMMONIUM LACTATE (LAC-HYDRIN) 12 % LOTION    apply to DRY AREAS once daily if needed AVOID FACE,GROIN,AND SKIN FOLDS. AVOID ANY OPEN WOUNDS    AZELASTINE (ASTELIN) 137 MCG/SPRAY NASAL SPRAY    1 spray by Nasal route 2 times daily Use in each nostril as directed    CALCIUM CARBONATE-VITAMIN D 600-200 MG-UNIT TABS    Take 1 tablet by mouth daily    CETIRIZINE (ZYRTEC) 10 MG TABLET    Take 1 tablet by mouth daily    DENOSUMAB (PROLIA) 60 MG/ML SOSY SC INJECTION    as directed Subcutaneous every 180 days for 365 days    DILTIAZEM (CARDIZEM CD) 240 MG ER CAPSULE    Take 1 capsule by mouth daily    FAMOTIDINE (PEPCID) 40 MG TABLET    Take 1 tablet by mouth nightly    FERROUS SULFATE (FE TABS 325) 325 (65 FE) MG EC TABLET     involving multiple joints on both sides of body M15.9    Primary localized osteoarthritis of pelvic region and thigh M16.10    Primary osteoarthritis of both knees M17.0    Hypersomnia G47.10    Sleep apnea G47.30    Arthritis of left hip M16.12    Calculus of gallbladder without cholecystitis without obstruction K80.20    Flank pain R10.9    Common bile duct dilatation K83.8    Choledocholithiasis-suspected K80.50    INR therapeutic R79.1    Pain of upper abdomen R10.10    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 (HCC) M80.88XA    Age-related osteoporosis with current pathological fracture M80.00XA    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    DISCHARGE MEDICATIONS:     New Prescriptions    No medications on file           (Please note that portions of this note were completed with a voice recognition program.  Efforts were made to edit the dictations but occasionally words are mis-transcribed.)    DUDLEY Vicente - Jerri Agarwal APRN - [place]  06/26/24 [id number]

## 2024-06-27 ENCOUNTER — APPOINTMENT (OUTPATIENT)
Dept: NUCLEAR MEDICINE | Age: 84
DRG: 303 | End: 2024-06-27
Payer: MEDICARE

## 2024-06-27 ENCOUNTER — APPOINTMENT (OUTPATIENT)
Age: 84
DRG: 303 | End: 2024-06-27
Payer: MEDICARE

## 2024-06-27 PROBLEM — R07.89 ATYPICAL CHEST PAIN: Status: ACTIVE | Noted: 2024-06-26

## 2024-06-27 PROBLEM — I48.0 PAROXYSMAL ATRIAL FIBRILLATION (HCC): Status: ACTIVE | Noted: 2019-04-09

## 2024-06-27 LAB
ANION GAP SERPL CALCULATED.3IONS-SCNC: 9 MMOL/L (ref 9–17)
BUN SERPL-MCNC: 30 MG/DL (ref 8–23)
BUN/CREAT SERPL: 20 (ref 9–20)
CALCIUM SERPL-MCNC: 8.1 MG/DL (ref 8.6–10.4)
CHLORIDE SERPL-SCNC: 106 MMOL/L (ref 98–107)
CHOLEST SERPL-MCNC: 193 MG/DL (ref 0–199)
CHOLESTEROL/HDL RATIO: 4
CO2 SERPL-SCNC: 25 MMOL/L (ref 20–31)
CREAT SERPL-MCNC: 1.5 MG/DL (ref 0.5–0.9)
EKG ATRIAL RATE: 60 BPM
EKG P-R INTERVAL: 182 MS
EKG Q-T INTERVAL: 482 MS
EKG QRS DURATION: 106 MS
EKG QTC CALCULATION (BAZETT): 482 MS
EKG R AXIS: 19 DEGREES
EKG T AXIS: 47 DEGREES
EKG VENTRICULAR RATE: 60 BPM
ERYTHROCYTE [DISTWIDTH] IN BLOOD BY AUTOMATED COUNT: 14.1 % (ref 11.8–14.4)
EST. AVERAGE GLUCOSE BLD GHB EST-MCNC: 100 MG/DL
GFR, ESTIMATED: 34 ML/MIN/1.73M2
GLUCOSE SERPL-MCNC: 91 MG/DL (ref 70–99)
HBA1C MFR BLD: 5.1 % (ref 4–6)
HCT VFR BLD AUTO: 37.7 % (ref 36.3–47.1)
HDLC SERPL-MCNC: 50 MG/DL
HGB BLD-MCNC: 12.2 G/DL (ref 11.9–15.1)
INR PPP: 1.8
LDLC SERPL CALC-MCNC: 121 MG/DL (ref 0–100)
MAGNESIUM SERPL-MCNC: 2.6 MG/DL (ref 1.6–2.6)
MCH RBC QN AUTO: 30.6 PG (ref 25.2–33.5)
MCHC RBC AUTO-ENTMCNC: 32.4 G/DL (ref 28.4–34.8)
MCV RBC AUTO: 94.5 FL (ref 82.6–102.9)
NRBC BLD-RTO: 0 PER 100 WBC
PLATELET # BLD AUTO: 182 K/UL (ref 138–453)
PMV BLD AUTO: 9.5 FL (ref 8.1–13.5)
POTASSIUM SERPL-SCNC: 3.7 MMOL/L (ref 3.7–5.3)
PROTHROMBIN TIME: 20.9 SEC (ref 11.5–14.2)
RBC # BLD AUTO: 3.99 M/UL (ref 3.95–5.11)
SODIUM SERPL-SCNC: 140 MMOL/L (ref 135–144)
TRIGL SERPL-MCNC: 107 MG/DL
TROPONIN I SERPL HS-MCNC: 16 NG/L (ref 0–14)
VLDLC SERPL CALC-MCNC: 21 MG/DL
WBC OTHER # BLD: 4 K/UL (ref 3.5–11.3)

## 2024-06-27 PROCEDURE — 99232 SBSQ HOSP IP/OBS MODERATE 35: CPT | Performed by: INTERNAL MEDICINE

## 2024-06-27 PROCEDURE — 85027 COMPLETE CBC AUTOMATED: CPT

## 2024-06-27 PROCEDURE — 6360000002 HC RX W HCPCS: Performed by: NURSE PRACTITIONER

## 2024-06-27 PROCEDURE — 6370000000 HC RX 637 (ALT 250 FOR IP): Performed by: NURSE PRACTITIONER

## 2024-06-27 PROCEDURE — 85610 PROTHROMBIN TIME: CPT

## 2024-06-27 PROCEDURE — 84484 ASSAY OF TROPONIN QUANT: CPT

## 2024-06-27 PROCEDURE — 80061 LIPID PANEL: CPT

## 2024-06-27 PROCEDURE — 93017 CV STRESS TEST TRACING ONLY: CPT

## 2024-06-27 PROCEDURE — 83735 ASSAY OF MAGNESIUM: CPT

## 2024-06-27 PROCEDURE — 2580000003 HC RX 258: Performed by: NURSE PRACTITIONER

## 2024-06-27 PROCEDURE — A9500 TC99M SESTAMIBI: HCPCS | Performed by: NURSE PRACTITIONER

## 2024-06-27 PROCEDURE — G0378 HOSPITAL OBSERVATION PER HR: HCPCS | Performed by: INTERNAL MEDICINE

## 2024-06-27 PROCEDURE — 3430000000 HC RX DIAGNOSTIC RADIOPHARMACEUTICAL: Performed by: NURSE PRACTITIONER

## 2024-06-27 PROCEDURE — 83036 HEMOGLOBIN GLYCOSYLATED A1C: CPT

## 2024-06-27 PROCEDURE — 78452 HT MUSCLE IMAGE SPECT MULT: CPT

## 2024-06-27 PROCEDURE — G0378 HOSPITAL OBSERVATION PER HR: HCPCS

## 2024-06-27 PROCEDURE — 93010 ELECTROCARDIOGRAM REPORT: CPT | Performed by: INTERNAL MEDICINE

## 2024-06-27 PROCEDURE — 80048 BASIC METABOLIC PNL TOTAL CA: CPT

## 2024-06-27 PROCEDURE — 36415 COLL VENOUS BLD VENIPUNCTURE: CPT

## 2024-06-27 RX ORDER — TRIAMTERENE AND HYDROCHLOROTHIAZIDE 37.5; 25 MG/1; MG/1
1 TABLET ORAL DAILY
Status: DISCONTINUED | OUTPATIENT
Start: 2024-06-27 | End: 2024-06-28 | Stop reason: HOSPADM

## 2024-06-27 RX ORDER — TETRAKIS(2-METHOXYISOBUTYLISOCYANIDE)COPPER(I) TETRAFLUOROBORATE 1 MG/ML
18.2 INJECTION, POWDER, LYOPHILIZED, FOR SOLUTION INTRAVENOUS
Status: COMPLETED | OUTPATIENT
Start: 2024-06-27 | End: 2024-06-27

## 2024-06-27 RX ORDER — AMIODARONE HYDROCHLORIDE 200 MG/1
200 TABLET ORAL DAILY
Status: DISCONTINUED | OUTPATIENT
Start: 2024-06-27 | End: 2024-06-27

## 2024-06-27 RX ORDER — AMIODARONE HYDROCHLORIDE 100 MG/1
100 TABLET ORAL DAILY
Status: DISCONTINUED | OUTPATIENT
Start: 2024-06-28 | End: 2024-06-28 | Stop reason: HOSPADM

## 2024-06-27 RX ORDER — WARFARIN SODIUM 5 MG/1
7.5 TABLET ORAL
Status: DISCONTINUED | OUTPATIENT
Start: 2024-06-27 | End: 2024-06-27

## 2024-06-27 RX ORDER — TETRAKIS(2-METHOXYISOBUTYLISOCYANIDE)COPPER(I) TETRAFLUOROBORATE 1 MG/ML
44.6 INJECTION, POWDER, LYOPHILIZED, FOR SOLUTION INTRAVENOUS
Status: COMPLETED | OUTPATIENT
Start: 2024-06-27 | End: 2024-06-27

## 2024-06-27 RX ORDER — SENNOSIDES 8.6 MG
650 CAPSULE ORAL EVERY 8 HOURS PRN
COMMUNITY

## 2024-06-27 RX ORDER — AZELASTINE 1 MG/ML
1 SPRAY, METERED NASAL 2 TIMES DAILY
Status: DISCONTINUED | OUTPATIENT
Start: 2024-06-27 | End: 2024-06-28 | Stop reason: HOSPADM

## 2024-06-27 RX ORDER — HYDRALAZINE HYDROCHLORIDE 50 MG/1
50 TABLET, FILM COATED ORAL EVERY 8 HOURS SCHEDULED
Status: DISCONTINUED | OUTPATIENT
Start: 2024-06-27 | End: 2024-06-28 | Stop reason: HOSPADM

## 2024-06-27 RX ADMIN — SODIUM CHLORIDE, PRESERVATIVE FREE 10 ML: 5 INJECTION INTRAVENOUS at 07:46

## 2024-06-27 RX ADMIN — PANTOPRAZOLE SODIUM 40 MG: 40 TABLET, DELAYED RELEASE ORAL at 12:59

## 2024-06-27 RX ADMIN — SODIUM CHLORIDE, PRESERVATIVE FREE 10 ML: 5 INJECTION INTRAVENOUS at 00:33

## 2024-06-27 RX ADMIN — SODIUM CHLORIDE, PRESERVATIVE FREE 10 ML: 5 INJECTION INTRAVENOUS at 20:47

## 2024-06-27 RX ADMIN — TRIAMTERENE AND HYDROCHLOROTHIAZIDE 1 TABLET: 37.5; 25 TABLET ORAL at 18:46

## 2024-06-27 RX ADMIN — HYDRALAZINE HYDROCHLORIDE 50 MG: 50 TABLET, FILM COATED ORAL at 20:46

## 2024-06-27 RX ADMIN — HYDRALAZINE HYDROCHLORIDE 50 MG: 50 TABLET, FILM COATED ORAL at 12:59

## 2024-06-27 RX ADMIN — REGADENOSON 0.4 MG: 0.08 INJECTION, SOLUTION INTRAVENOUS at 08:40

## 2024-06-27 RX ADMIN — ASPIRIN 81 MG CHEWABLE TABLET 81 MG: 81 TABLET CHEWABLE at 13:02

## 2024-06-27 RX ADMIN — Medication 18.2 MILLICURIE: at 08:42

## 2024-06-27 RX ADMIN — SODIUM CHLORIDE, PRESERVATIVE FREE 10 ML: 5 INJECTION INTRAVENOUS at 08:40

## 2024-06-27 RX ADMIN — Medication 325 MG: at 12:59

## 2024-06-27 RX ADMIN — Medication 1 SPRAY: at 20:46

## 2024-06-27 RX ADMIN — Medication 44.6 MILLICURIE: at 11:18

## 2024-06-27 RX ADMIN — AZELASTINE 1 SPRAY: 1 SPRAY, METERED NASAL at 20:45

## 2024-06-27 NOTE — PROGRESS NOTES
Patient admitted to room 1004  VS taken, telemetry placed and call light given/within reach. Patient A&O and given room orientation. Orders released/reviewed, initial assessment completed and navigator started. See chart for more detail.       [x] Medication Reconciliation was completed and the patient's home medication list was verified. The Med List Status is \"Complete\". The following sources were used to assist with Medication Reconciliation:    [] Med Rec Pharmacist already completed  [x] Patient had a list of medications which was transcribed into the EHR.  [] Patient provided bottles of their medications  [x] Home medications reviewed and confirmed with patient  [] Contacted patient's pharmacy to confirm home medications  [] Contacted patient's physician office to confirm home medications  [] Medical Records from another facility and/or Care Everywhere were reviewed  [] MAR from facility requested to be faxed over  [] Unable to complete due to patient condition  [] Unable to validate med reconciliation

## 2024-06-27 NOTE — PLAN OF CARE
Pt oriented x 4. Up with stand by assist  Stress test completed today. No complaints of chest pain.     Problem: Discharge Planning  Goal: Discharge to home or other facility with appropriate resources  6/27/2024 1150 by Elise De Souza, RN  Outcome: Progressing     Problem: Pain  Goal: Verbalizes/displays adequate comfort level or baseline comfort level  6/27/2024 1150 by Elise De Souza, RN  Outcome: Progressing     Problem: Safety - Adult  Goal: Free from fall injury  6/27/2024 1150 by Elise De Souza, RN  Outcome: Progressing     Problem: ABCDS Injury Assessment  Goal: Absence of physical injury  6/27/2024 1150 by Elise De Souza, RN  Outcome: Progressing     Problem: Cardiovascular - Adult  Goal: Maintains optimal cardiac output and hemodynamic stability  6/27/2024 1150 by Elise De Souza, RN  Outcome: Progressing

## 2024-06-27 NOTE — PLAN OF CARE
[facility]  Office: [phone]  Savage Marino DO, Saman Alejandra, DO, Kishor Contreras DO, Radu Verma, DO, Deandra Mina MD, Leona Logan MD, Flip Sapp MD, Abigail Mccord MD,  Steve Nelson MD, Jayme Potter MD, Nadege Hernandez MD,  Kuldeep Adhikari DO, Giovanna Marc MD, Mian Sandhu MD, Chirag Marino DO, Sheila Holder MD,  Giovanny Mosley DO, Janae Alegria MD, Vivian Diggs MD, Zaina Camp MD, Linn Valenzuela MD,  Joshua Middleton MD, Hien Tan MD, Vamsi Bruce MD, Regine Jauregui MD, Zach Stearns MD, Niki Paredes MD, Nico Valdovinos DO, Krishna West DO, Monse Iraheta MD,  Vinnie Jernigan MD, Shirley Waterhouse, CNP,  Tabitha House CNP, Jose M Salinas, CNP,  Kaylene Jeffries, DNP, Arely Hampton, CNP, Milena Hearn, CNP, Loly Tucker, CNP, Selene Holguin CNP, Toña Mims PA-C, Sahara Warren PA-C, Carmen Plata, CNP, Trinity Kowalski, CNP, Selene Sharma, CNP, Rachell Mederos, CNP, Soniya Shukla, CNP, Pamela Augustin, CNS, Maria De Jesus Galeano, CNP, Maya Martin CNP, Tracy Schwab, CNP         [facility]   IN-PATIENT SERVICE   [facility]    Second Visit Note  For more detailed information please refer to the progress note of the day      6/27/2024    6:24 PM    Name:   Nadine Albarran  MRN:     [id number]     Acct:      [id number]   Room:   1004/1004-02  IP Day:  0  Admit Date:  6/26/2024  3:48 PM    PCP:   Sujey Moscoso PA-C  Code Status:  Full Code      Pt vitals were reviewed   New labs were reviewed   Patient was seen    Stress results     IMPRESSION:  Perfusion: [Reversible perfusion defects at stress involve 10% of left ventricular myocardium.]  Function: [Normal]  Risk stratification: [HIGH, BASED ON PERFUSION     Updated plan :     Spoke with cardiology.  Will continue current medication regiment and they will discuss next steps with patient prior to discharge.  Hold Coumadin        DUDLEY Salinas NP  6/27/2024  6:24 PM

## 2024-06-27 NOTE — CARE COORDINATION
Case Management Assessment  Initial Evaluation    Date/Time of Evaluation: 6/27/2024 3:23 PM  Assessment Completed by: SABAS VASQUEZ RN    If patient is discharged prior to next notation, then this note serves as note for discharge by case management.    Patient Name: Nadine Albarran                   YOB: 1940  Diagnosis: Angina pectoris (HCC) [I20.9]  Stable angina (HCC) [I20.89]  Chest pain, unspecified type [R07.9]                   Date / Time: 6/26/2024  3:48 PM    Patient Admission Status: Observation   Readmission Risk (Low < 19, Mod (19-27), High > 27): No data recorded  Current PCP: Sujey Moscoso PA-C  PCP verified by CM? Yes    Chart Reviewed: Yes      History Provided by: Patient  Patient Orientation: Alert and Oriented    Patient Cognition: Alert    Hospitalization in the last 30 days (Readmission):  No    If yes, Readmission Assessment in CM Navigator will be completed.    Advance Directives:      Code Status: Full Code   Patient's Primary Decision Maker is: Legal Next of Kin    Primary Decision Maker: Amaris Bro - Child - [phone]    Discharge Planning:    Patient lives with: Children, Other (Comment) (daughter Amaris) Type of Home: House  Primary Care Giver: Self  Patient Support Systems include: Children, Family Members   Current Financial resources: Medicare  Current community resources: None  Current services prior to admission: Durable Medical Equipment            Current DME: Walker, Other (Comment) (lift chair, reacher)            Type of Home Care services:  None    ADLS  Prior functional level: Independent in ADLs/IADLs  Current functional level: Independent in ADLs/IADLs    PT AM-PAC:   /24  OT AM-PAC:   /24    Family can provide assistance at DC: Yes  Would you like Case Management to discuss the discharge plan with any other family members/significant others, and if so, who? No  Plans to Return to Present Housing: Yes  Other Identified Issues/Barriers to  supports the patient's individualized plan of care/goals and shares the quality data associated with the providers was provided to: Patient   Patient Representative Name:       The Patient and/or Patient Representative Agree with the Discharge Plan? Yes    SABAS VASQUEZ RN  Case Management Department

## 2024-06-27 NOTE — H&P
[facility]  Office: [phone]  Savage Marino DO, Saman Alejandra DO, Kishor Contreras DO, Radu Verma DO, Deandra Mina MD, Leona Logan MD, Flip Sapp MD, Abigail Mccord MD,  Steve Nelson MD, Jayme Potter MD, Nadege Hernandez MD,  Kuldeep Adhikari DO, Giovanna Marc MD, Mian Sandhu MD, Chirag Marino DO, Sheila Holder MD,  Giovanny Mosley DO, Janea Alegria MD, Vivian Diggs MD, Zaina Camp MD, Linn Valenzuela MD,  Joshua Middleton MD, Hien Tan MD, Vamsi Bruce MD, Regine Jauregui MD, Zach Stearns MD, Niki Paredes MD, Nico Valdovinos DO, Krishna West DO, Monse Iraheta MD,  Vinnie Jernigan MD, Shirley Waterhouse, CNP,  Tabitha House CNP, Jose M Salinas, CNP,  Kaylene Jeffries, DNP, Arely Hampton, CNP, Milena Hearn, CNP, Loly Tucker, CNP, Selene Holguin, CNP, Toña Mims PADreaC, Sahara Warren PADreaC, Carmen Plata, CNP, Trinity Kowalski, CNP, Selene Sharma, CNP, Rachell Mederos, CNP, Soniya Shukla, CNP, Pamela Augustin, CNS, Maria De Jesus Galeano, CNP, Maya Martin, CNP, Tracy Schwab, CNP         [facility]   IN-PATIENT SERVICE   [facility]    HISTORY AND PHYSICAL EXAMINATION            Date:   6/26/2024  Patient name:  Nadine Albarran  Date of admission:  6/26/2024  3:48 PM  MRN:   [id number]  Account:  [id number]  YOB: 1940  PCP:    Sujey Moscoso PA-C  Room:   [address] [place] [address]  Code Status:    Full Code    Chief Complaint:     Chief Complaint   Patient presents with    Chest Pain     Px arrives complaining of L sided chest pain w SOB. Px states it was temporarily present yesterday, Px states it started back up around today at 1300. Px has hx of afib       History Obtained From:     patient    History of Present Illness:     Nadine Albarran is a 84 y.o. Non- / non  female who presents with Chest Pain (Px arrives complaining of L sided chest pain w SOB. Px states it was temporarily present yesterday, Px states it started  back up around today at 1300. Px has hx of afib)   and is admitted to the hospital for the management of Stable angina (HCC).    84-year-old female to past medical history of A-fib on Coumadin, hypertension, and CKD presents emergency department for chest pain since 1 PM today.  Patient states that it was constant midsternal nonradiating chest pain.  However, patient states her chest pain has resolved since being here in the hospital.  She states that she had a stress as many years ago that was negative.  She denies any heart catheterizations, stents, or valve replacements.  Patient denies nausea, vomiting, diarrhea, shortness of breath, cough, ecchymosis, or tingling    Past Medical History:     Past Medical History:   Diagnosis Date    Allergic rhinitis     Arthritis     Asthma     Atrial fibrillation (HCC)     GERD (gastroesophageal reflux disease)     Hypertension     Melanoma (HCC)     on nose    Myocardial infarction (HCC) 2008    Wears glasses     Wears partial dentures     upper        Past Surgical History:     Past Surgical History:   Procedure Laterality Date    APPENDECTOMY      BREAST SURGERY      CARDIAC SURGERY      CARPAL TUNNEL RELEASE Right     COLONOSCOPY N/A 8/8/2019    COLONOSCOPY POLYPECTOMY REMOVAL HOT BIOPSY AND RESOLUTION CLIP performed by Carlitos Schultz MD at [facility] OR    ERCP N/A 2/17/2023    ERCP ENDOSCOPIC RETROGRADE CHOLANGIOPANCREATOGRAPHY performed by Corey Sapp MD at [facility] OR    EYE SURGERY      HERNIA REPAIR      umbilical    HYSTERECTOMY (CERVIX STATUS UNKNOWN)      JOINT REPLACEMENT      NECK SURGERY      C5-C6    NOSE SURGERY      cancer removed    OTHER SURGICAL HISTORY      heart surg/growth in heart sac    SPINE SURGERY N/A 2/21/2023    T8 KYPHOPLASTY WITH BIOPSY performed by Marek Ellis MD at [facility] OR    TONSILLECTOMY      TOTAL HIP ARTHROPLASTY Right 04/09/2019    TOTAL HIP ARTHROPLASTY Right 4/9/2019    HIP TOTAL ARTHROPLASTY - MEDACTA, C-ARM, MEDACTA TABLE, NSA= GENERAL  PM    Copy sent to Dr. Moscoso, Sujey PINEDA PA-C

## 2024-06-27 NOTE — PLAN OF CARE
Patient resting in bed on room air, alert and oriented x4, up to bathroom standby assist.   Patient was sinus stacy on monitor.  Patient NPO at midnight for potential procedures this morning.  Patient was admitted for chest pain, but denied pain once she made it to the floor.  Patient safety maintained.     Problem: Discharge Planning  Goal: Discharge to home or other facility with appropriate resources  Outcome: Progressing     Problem: Pain  Goal: Verbalizes/displays adequate comfort level or baseline comfort level  Outcome: Progressing  Patient denied having pain. The need for pain and symptom management will be considered in the discharge planning process to ensure patients comfort.       Problem: Safety - Adult  Goal: Free from fall injury  Outcome: Progressing     Problem: ABCDS Injury Assessment  Goal: Absence of physical injury  Outcome: Progressing     Problem: Cardiovascular - Adult  Goal: Maintains optimal cardiac output and hemodynamic stability  Outcome: Progressing

## 2024-06-27 NOTE — FLOWSHEET NOTE
Pt tolerated lexiscan without experiencing side effect, recovered on cart and taken to [facility] for further testing in nad

## 2024-06-27 NOTE — PLAN OF CARE
Patient admitted with chest pain with a hx of Afib and MI in 2008. Pt takes warfarin. Cardiology consulted. Stress test completed, high risk. Dr. Herrera wants to review personally in AM. A/C held for potential cardiac cath. Pt hypertensive; cardiology aware. Cardizem held today d/t bradycardia. Labs reviewed. Pt A&O and denies any chest pain or other cardiac symptoms at this time. Pt up at [facility]ario and calls out appropriately. D/C plan home once cleared by cardiology. Will continue to monitor.    Problem: Discharge Planning  Goal: Discharge to home or other facility with appropriate resources  6/27/2024 1817 by Sandra Flores RN  Outcome: Progressing  Flowsheets (Taken 6/27/2024 1341)  Discharge to home or other facility with appropriate resources:   Identify barriers to discharge with patient and caregiver   Arrange for needed discharge resources and transportation as appropriate   Identify discharge learning needs (meds, wound care, etc)     Problem: Pain  Goal: Verbalizes/displays adequate comfort level or baseline comfort level  6/27/2024 1817 by Sandra Flores, RN  Outcome: Progressing  Patient denies pain at this time. Will continue to monitor with vitals/reassessment and PRN.     Problem: Safety - Adult  Goal: Free from fall injury  6/27/2024 1817 by Sandra Flores RN  Outcome: Progressing     Problem: ABCDS Injury Assessment  Goal: Absence of physical injury  6/27/2024 1817 by Sandra Flores, RN  Outcome: Progressing     Problem: Cardiovascular - Adult  Goal: Maintains optimal cardiac output and hemodynamic stability  6/27/2024 1817 by Sandra Flores, RN  Outcome: Progressing  Flowsheets (Taken 6/27/2024 1341)  Maintains optimal cardiac output and hemodynamic stability:   Monitor blood pressure and heart rate   Monitor urine output and notify Licensed Independent Practitioner for values outside of normal range   Assess for signs of decreased cardiac output   Administer fluid and/or volume

## 2024-06-27 NOTE — PROGRESS NOTES
Pt in observation.   Due to insurance, medications would be an out of pocket cost according to pharmacy, unsure of cost/which medications would be what price.   Pt states to want to wait to take any medications until daughter brings hers in from home, states to be bringing them after lunch time.

## 2024-06-27 NOTE — CONSULTS
Section of Cardiology   Consult Note      Reason for Consult: Chest pain  Requesting Physician: Kuldeep Adhikari DO    CHIEF COMPLAINT: Chest pain    History Obtained From:  patient, electronic medical record, patient's nurse    HISTORY OF PRESENT ILLNESS:      The patient is a 84 y.o. female with significant past medical history of paroxysmal atrial fibrillation who presents with chest pain.  The patient is well-known to our practice.  She is on chronic anticoagulation.  Yesterday she did not place her Nitropatch on and she started to have chest pain.  She did not have any diaphoresis.  No radiation of the pain.  It was mild to moderate in severity.  No shortness of breath.  Denies any palpitation.  No dizziness or falls.  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  She does not report bleeding.  No individual weakness or numbness in any arm or leg.  The patient goes to the [facility] 3 times a week and does aerobic in the water.  That does not induce any chest pain.  In the emergency room the EKG did not show acute abnormality.  Her cardiac enzymes remain normal.  Previous diagnostic testing for coronary artery disease includes: echocardiogram, stress thallium.   Previous history of cardiac disease includes: atrial fibrillation.    Coronary artery disease risk factors include: advanced age (older than 55 for men, 65 for women) and hypertension.    Past Medical History:    Past Medical History:   Diagnosis Date    Allergic rhinitis     Arthritis     Asthma     Atrial fibrillation (HCC)     GERD (gastroesophageal reflux disease)     Hypertension     Melanoma (HCC)     on nose    Myocardial infarction (HCC) 2008    Wears glasses     Wears partial dentures     upper     Past Surgical History:    Past Surgical History:   Procedure Laterality Date    APPENDECTOMY      BREAST SURGERY      CARDIAC SURGERY      CARPAL TUNNEL RELEASE Right     COLONOSCOPY N/A 8/8/2019    COLONOSCOPY POLYPECTOMY REMOVAL HOT  Sign     Worried About Running Out of Food in the Last Year: Never true     Ran Out of Food in the Last Year: Never true   Transportation Needs: No Transportation Needs (6/26/2024)    PRAPARE - Transportation     Lack of Transportation (Medical): No     Lack of Transportation (Non-Medical): No   Physical Activity: Inactive (12/21/2023)    Exercise Vital Sign     Days of Exercise per Week: 0 days     Minutes of Exercise per Session: 0 min   Stress: Not on file   Social Connections: Moderately Integrated (6/27/2024)    Social Connections ([facility] HRSN)     If for any reason you need help with day-to-day activities such as bathing, preparing meals, shopping, managing finances, etc., do you get the help you need?: Not on file   Intimate Partner Violence: Not on file   Housing Stability: Low Risk  (6/26/2024)    Housing Stability Vital Sign     Unable to Pay for Housing in the Last Year: No     Number of Places Lived in the Last Year: 1     Unstable Housing in the Last Year: No     Family History:   Family History   Problem Relation Age of Onset    COPD Mother     Asthma Mother     Cancer Mother     Cancer Father     Stroke Sister     COPD Sister     Emphysema Sister        REVIEW OF SYSTEMS   As above    PHYSICAL EXAM:    Vitals:    VITALS:  BP (!) 168/63   Pulse 50   Temp 98.1 °F (36.7 °C) (Oral)   Resp 19   Ht 1.651 m (5' 5\")   Wt 77.2 kg (170 lb 3.1 oz)   SpO2 97%   BMI 28.32 kg/m²   24HR INTAKE/OUTPUT:    Intake/Output Summary (Last 24 hours) at 6/27/2024 0812  Last data filed at 6/27/2024 0751  Gross per 24 hour   Intake --   Output 300 ml   Net -300 ml       CONSTITUTIONAL:  awake, alert, cooperative, no apparent distress, and appears stated age  EYES: Pupils equal, round and reactive to light, extra ocular muscles intact, sclera clear, conjunctiva normal  ENT:  normocepalic, without obvious abnormality  NECK:  supple, symmetrical, trachea midline, no carotid bruit ,   No  JVD  BACK:  symmetric  LUNGS:  Non-labored, good air exchange, clear to auscultation bilaterally, no crackles or wheezing  CARDIOVASCULAR:  Normal apical impulse, regular rate and rhythm, normal S1 and S2, no S3 or S4, and no murmur noted, no rub.  radial and bilateralpresent 1+  ABDOMEN:  No scars, normal bowel sounds, soft, non-distended, non-tender, no masses palpated, no hepatosplenomegally, no bruit.  MUSCULOSKELETAL:  there is no redness, warmth, or swelling of the joints   No legs edema.  NEUROLOGIC:  Awake, alert, oriented to name, place and time.  SKIN:  no bruising or bleeding, normal skin color, texture, turgor and no jaundice    DATA:   ECG: Admission EKG showed sinus bradycardia without acute ST-T changes  ECHO: Date:   Not performed to date  Stress Test:  Not performed to date  Angiography:  Not performed to date    Cardiology Labs:  Recent Labs     06/26/24  1605   MYOGLOBIN 33     Warfarin PT/INR:  Lab Results   Component Value Date/Time    PROTIME 22.4 06/26/2024 04:05 PM    PROTIME 22.8 06/14/2024 08:50 AM    INR 2.0 06/26/2024 04:05 PM     CBC:  Lab Results   Component Value Date/Time    WBC 4.0 06/27/2024 05:46 AM    RBC 3.99 06/27/2024 05:46 AM    RBC 4.29 11/27/2023 01:19 PM    HGB 12.2 06/27/2024 05:46 AM    HCT 37.7 06/27/2024 05:46 AM    MCV 94.5 06/27/2024 05:46 AM    MCH 30.6 06/27/2024 05:46 AM    MCHC 32.4 06/27/2024 05:46 AM    RDW 14.1 06/27/2024 05:46 AM     06/27/2024 05:46 AM    MPV 9.5 06/27/2024 05:46 AM     CMP:  Lab Results   Component Value Date/Time     06/27/2024 05:46 AM    K 3.7 06/27/2024 05:46 AM     06/27/2024 05:46 AM    CO2 25 06/27/2024 05:46 AM    BUN 30 06/27/2024 05:46 AM    CREATININE 1.5 06/27/2024 05:46 AM    GFRAA 30.8 05/23/2024 03:35 PM    AGRATIO 1.8 02/01/2024 12:04 PM    LABGLOM 34 06/27/2024 05:46 AM    LABGLOM 37 02/22/2023 05:43 AM    GLUCOSE 91 06/27/2024 05:46 AM    GLUCOSE 106 05/23/2024 03:35 PM    CALCIUM 8.1 06/27/2024 05:46 AM     Magnesium:    Lab Results

## 2024-06-27 NOTE — CONSULTS
Warfarin Dosing - Pharmacy Consult Note  Consulting Provider: Dr. Adhikari  Indication:  Atrial Fibrillation  Warfarin Dose prior to admission: 5 mg daily    Concurrent anticoagulants/antiplatelets: ASA 81 mg daily   Significant Drug Interactions: amiodarone (incr INR) - home med   Recent Labs     06/26/24  1605 06/27/24  0546 06/27/24  1539   INR 2.0  --  1.8   HGB 13.1 12.2  --     182  --      Recent warfarin administrations        No warfarin orders with administrations found.            Orders not given:            warfarin placeholder: dosing by provider                   Date   INR    Dose  6/26       2.0        none   6/27      1.8    Assessment/Plan  (Goal INR: 2 - 3)  No warfarin given yesterday and INR sub therapeutic today will give boost dose of 7.5 mg tonight. Follow INR in AM.   Please use patient own supply of warfarin - 5 mg tabs (administer 1.5 tabs tonight)     Active problem list reviewed.  INR orders are placed.  Chart reviewed for pertinent labs, drug/diet interactions, and past doses.  Documentation of patient's clinical condition was reviewed.    Pharmacy Dosing:  Pharmacy will continue to follow.

## 2024-06-27 NOTE — PROGRESS NOTES
Transitions of Care Pharmacy Service   Medication Review    The patient's list of current home medications has been reviewed.     Source(s) of information: Patient, Sure Scripts, Home medication bottles, Care Everywhere, [facility]    Based on information provided by the above source(s), I have updated the patient's home med list as described below.     Please review the ACTION REQUESTED section of this note below for any discrepancies on current hospital orders.      I changed or updated the following medications on the patient's home medication list:  Removed Albuterol Sulfate Inhaler     Added Acetaminophen 650mg ER Tab  Refresh Eye Drops     Adjusted   Amiodarone sig code added  Hydralazine sig code added  Warfarin regimen changed to 5mg on all days     Other Notes Patient due for next denosumab injection in November  Patient states that her gastroenterologist changed her ferrous sulfate sig from 1 tab daily to 1 po every other day  Patient states she only takes two tablets of hydralazine per day because she often forgets the third dose in the evening  [facility] manages patient warfarin regimen and current regimen is one 5mg tablet by mouth daily         PROVIDER ACTION REQUESTED  Medications that need to be addressed by a physician/nurse practitioner:    Medication Action Requested     Amiodarone         Warfarin      Trelegy    Patient is receiving amiodarone 200mg tablets inpatient but is taking 100mg tablets at home, consider decreasing strength if appropriate    Patient's anticoagulation clinic has her taking 5mg po QD, consider changing inpatient order if appropriate    Ready for physician reconciliation            Please feel free to call me with any questions about this encounter. Thank you.    BARRERA HAMMONDS   Transitions of Care Pharmacy Service  Phone:  [phone]  Fax: [phone]      Electronically signed by BARRERA HAMMONDS on 6/27/2024 at 2:02 PM     Medications Prior to  Admission:   carboxymethylcellulose 1 % ophthalmic solution, Place 1 drop into both eyes 3 times daily as needed for Dry Eyes  acetaminophen (TYLENOL) 650 MG extended release tablet, Take 1 tablet by mouth every 8 hours as needed for Pain  hydrALAZINE (APRESOLINE) 50 MG tablet, Take 1 tablet by mouth 3 times daily  ammonium lactate (LAC-HYDRIN) 12 % lotion, apply to DRY AREAS once daily if needed AVOID FACE,GROIN,AND SKIN FOLDS. AVOID ANY OPEN WOUNDS  amiodarone (PACERONE) 100 MG tablet, Take 1 tablet by mouth daily  denosumab (PROLIA) 60 MG/ML SOSY SC injection, as directed Subcutaneous every 180 days for 365 days  famotidine (PEPCID) 40 MG tablet, Take 1 tablet by mouth nightly  Multiple Vitamins-Minerals (HAIR SKIN AND NAILS FORMULA) TABS, Take 2 tablets by mouth daily  ferrous sulfate (FE TABS 325) 325 (65 Fe) MG EC tablet, Take 1 tablet by mouth every other day  FIBER COMPLETE PO, Take by mouth daily  cetirizine (ZYRTEC) 10 MG tablet, Take 1 tablet by mouth daily  RESTASIS MULTIDOSE 0.05 % ophthalmic emulsion, Place 1 drop into both eyes nightly  pantoprazole (PROTONIX) 40 MG tablet, Take 1 tablet by mouth daily  calcium carbonate-vitamin D 600-200 MG-UNIT TABS, Take 1 tablet by mouth daily  Multiple Vitamins-Minerals (CENTRUM SILVER) TABS, Take 1 tablet by mouth daily  fluticasone (FLONASE) 50 MCG/ACT nasal spray, 1 spray by Each Nostril route nightly  fluticasone-umeclidin-vilant (TRELEGY ELLIPTA) 100-62.5-25 MCG/INH AEPB, Inhale 1 puff into the lungs daily  montelukast (SINGULAIR) 10 MG tablet, Take 1 tablet by mouth nightly  triamterene-hydrochlorothiazide (MAXZIDE-25) 37.5-25 MG per tablet, Take 1 tablet by mouth daily  diltiazem (CARDIZEM CD) 240 MG ER capsule, Take 1 capsule by mouth daily  azelastine (ASTELIN) 137 MCG/SPRAY nasal spray, 1 spray by Nasal route 2 times daily Use in each nostril as directed  nitroGLYCERIN (NITRODUR) 0.1 MG/HR, Place 1 patch onto the skin daily  warfarin (COUMADIN) 5 MG  tablet, Take 1 tablet by mouth daily Indications: 5mg all days

## 2024-06-27 NOTE — PROGRESS NOTES
[facility]  Office: [phone]  Savage Marino DO, Saman Alejandra, DO, Kishor Contreras DO, Radu Verma, DO, Deandra Mina MD, Leona Logan MD, Flip Sapp MD, Abigail Mccord MD,  Steve Nelson MD, Jayme Potter MD, Nadege Hernandez MD,  Kuldeep Adhikari DO, Giovanna Marc MD, Mian Sandhu MD, Chirag Marino DO, Sheila Holder MD,  Giovanny Mosley DO, Janae Alegria MD, Vivian Diggs MD, Zaina Camp MD, Linn Valenzuela MD,  Joshua Middleton MD, Hien Tan MD, Vamsi Bruce MD, Regine Jauregui MD, Zach Stearns MD, Niki Paredes MD, Nico Valdovinos DO, Krishna West DO, Monse Iraheta MD,  Vinnie Jernigan MD, Shirley Waterhouse, CNP,  Tabitha House CNP, Jose M Salinas, CNP,  Kaylene Jeffries, DNP, Arely Hampton, CNP, Milena Hearn, CNP, Loly Tucker, CNP, Selene Holguin CNP, Toña Mims PADreaC, Sahara Warren PA-C, Carmen Plata, CNP, Trinity Kowalski, CNP, Selene Sharma, CNP, Rachell Mederos, CNP, Soniya Shukla, CNP, Pamela Augustin, CNS, Maria De Jesus Galeano, CNP, Maya Martin CNP, Tracy Schwab, CNP         [facility]   IN-PATIENT SERVICE   [facility]    Progress Note    6/27/2024    7:40 AM    Name:   Nadine Albarran  MRN:     [id number]     Acct:      [id number]   Room:   1004/1004-02   Day:  0  Admit Date:  6/26/2024  3:48 PM    PCP:   Sujey Moscoso PA-C  Code Status:  Full Code    Subjective:     She has no chest pain. Her blood pressure is elevated but she does not notice an associated between the two. She says she can typically tell when she's in afib and doesn't feel like her symptoms are related to that. No fevers, chills, or cough. She says her chest pain started while playing cards. She's had similar symptoms before and improved with treatment for indigestion. This is different than afib pain      Brief History:     84 year old female coming in with chest pain that started while playing cared    Medications:     Allergies:  No Known  cardizem, amiodarone,  and Coumadin   Restart home Hydralazine , hold triamterene  Continue ferrous sulfate   Flonase, singulair   Protonix     Medical Decision Making: Shashi Adhikari DO  6/27/2024  7:40 AM

## 2024-06-27 NOTE — PROGRESS NOTES
Stress test resulted. Dr. Herrera notified of results and wants to review himself tomorrow. Dr. Adhikari notified.     Coumadin dose held.

## 2024-06-28 VITALS
HEART RATE: 50 BPM | WEIGHT: 176 LBS | HEIGHT: 65 IN | SYSTOLIC BLOOD PRESSURE: 134 MMHG | RESPIRATION RATE: 14 BRPM | BODY MASS INDEX: 29.32 KG/M2 | TEMPERATURE: 98.2 F | DIASTOLIC BLOOD PRESSURE: 46 MMHG | OXYGEN SATURATION: 97 %

## 2024-06-28 PROBLEM — R07.89 ATYPICAL CHEST PAIN: Status: RESOLVED | Noted: 2024-06-26 | Resolved: 2024-06-28

## 2024-06-28 LAB
ANION GAP SERPL CALCULATED.3IONS-SCNC: 12 MMOL/L (ref 9–17)
CHLORIDE SERPL-SCNC: 102 MMOL/L (ref 98–107)
CO2 SERPL-SCNC: 21 MMOL/L (ref 20–31)
ECHO BSA: 1.88 M2
INR PPP: 1.6
NUC STRESS EJECTION FRACTION: 78 %
NUC STRESS LV EDV: 69 ML (ref 56–104)
POTASSIUM SERPL-SCNC: 4.2 MMOL/L (ref 3.7–5.3)
PROTHROMBIN TIME: 19 SEC (ref 11.5–14.2)
SODIUM SERPL-SCNC: 135 MMOL/L (ref 135–144)
STRESS BASELINE DIAS BP: 75 MMHG
STRESS BASELINE HR: 51 BPM
STRESS BASELINE ST DEPRESSION: 0 MM
STRESS BASELINE SYS BP: 156 MMHG
STRESS ESTIMATED WORKLOAD: 1 METS
STRESS EXERCISE DUR MIN: 1 MIN
STRESS EXERCISE DUR SEC: 0 SEC
STRESS PEAK DIAS BP: 75 MMHG
STRESS PEAK SYS BP: 156 MMHG
STRESS PERCENT HR ACHIEVED: 45 %
STRESS POST PEAK HR: 61 BPM
STRESS RATE PRESSURE PRODUCT: 9516 BPM*MMHG
STRESS ST DEPRESSION: 0 MM
STRESS STAGE 1 BP: NORMAL MMHG
STRESS STAGE 1 DURATION: 1 MIN:SEC
STRESS STAGE 1 HR: 53 BPM
STRESS STAGE RECOVERY 1 DURATION: 1 MIN:SEC
STRESS STAGE RECOVERY 1 HR: 61 BPM
STRESS STAGE RECOVERY 2 BP: NORMAL MMHG
STRESS STAGE RECOVERY 2 DURATION: 1 MIN:SEC
STRESS STAGE RECOVERY 2 HR: 61 BPM
STRESS STAGE RECOVERY 3 DURATION: 1 MIN:SEC
STRESS STAGE RECOVERY 3 HR: 58 BPM
STRESS STAGE RECOVERY 4 BP: NORMAL MMHG
STRESS STAGE RECOVERY 4 DURATION: 1 MIN:SEC
STRESS STAGE RECOVERY 4 HR: 57 BPM
STRESS TARGET HR: 135 BPM
TID: 0.94

## 2024-06-28 PROCEDURE — 36415 COLL VENOUS BLD VENIPUNCTURE: CPT

## 2024-06-28 PROCEDURE — 6370000000 HC RX 637 (ALT 250 FOR IP): Performed by: NURSE PRACTITIONER

## 2024-06-28 PROCEDURE — G0378 HOSPITAL OBSERVATION PER HR: HCPCS

## 2024-06-28 PROCEDURE — 85610 PROTHROMBIN TIME: CPT

## 2024-06-28 PROCEDURE — 1200000000 HC SEMI PRIVATE

## 2024-06-28 PROCEDURE — 99232 SBSQ HOSP IP/OBS MODERATE 35: CPT | Performed by: STUDENT IN AN ORGANIZED HEALTH CARE EDUCATION/TRAINING PROGRAM

## 2024-06-28 PROCEDURE — 80051 ELECTROLYTE PANEL: CPT

## 2024-06-28 RX ORDER — ATORVASTATIN CALCIUM 40 MG/1
40 TABLET, FILM COATED ORAL NIGHTLY
Qty: 30 TABLET | Refills: 3 | Status: SHIPPED | OUTPATIENT
Start: 2024-06-28

## 2024-06-28 RX ORDER — ASPIRIN 81 MG/1
81 TABLET, CHEWABLE ORAL DAILY
Qty: 30 TABLET | Refills: 3 | Status: SHIPPED | OUTPATIENT
Start: 2024-06-29

## 2024-06-28 RX ORDER — WARFARIN SODIUM 7.5 MG/1
7.5 TABLET ORAL
Status: COMPLETED | OUTPATIENT
Start: 2024-06-28 | End: 2024-06-28

## 2024-06-28 RX ADMIN — Medication 325 MG: at 08:30

## 2024-06-28 RX ADMIN — HYDRALAZINE HYDROCHLORIDE 50 MG: 50 TABLET, FILM COATED ORAL at 05:51

## 2024-06-28 RX ADMIN — WARFARIN SODIUM 7.5 MG: 7.5 TABLET ORAL at 16:15

## 2024-06-28 RX ADMIN — AZELASTINE 1 SPRAY: 1 SPRAY, METERED NASAL at 08:29

## 2024-06-28 RX ADMIN — PANTOPRAZOLE SODIUM 40 MG: 40 TABLET, DELAYED RELEASE ORAL at 08:30

## 2024-06-28 RX ADMIN — AMIODARONE HYDROCHLORIDE 100 MG: 100 TABLET ORAL at 08:31

## 2024-06-28 RX ADMIN — TRIAMTERENE AND HYDROCHLOROTHIAZIDE 1 TABLET: 37.5; 25 TABLET ORAL at 08:29

## 2024-06-28 RX ADMIN — DILTIAZEM HYDROCHLORIDE 240 MG: 240 CAPSULE, EXTENDED RELEASE ORAL at 08:28

## 2024-06-28 RX ADMIN — ASPIRIN 81 MG CHEWABLE TABLET 81 MG: 81 TABLET CHEWABLE at 08:41

## 2024-06-28 RX ADMIN — HYDRALAZINE HYDROCHLORIDE 50 MG: 50 TABLET, FILM COATED ORAL at 15:00

## 2024-06-28 NOTE — PROGRESS NOTES
IV and telemetry removed, pt placed on clothing without assist, discharge instructions given to patient using AVS. Prescription pick-up reiterated, patient voiced understanding regarding prescriptions, medications, follow up appointments, and care of self at home. Patient taken to front entrance via wheelchair with personal belongings including medications, pt discharged with dgtr

## 2024-06-28 NOTE — DISCHARGE INSTRUCTIONS
Follow-up outpatient with PCP and cardiology.  Continue medications as prescribed.     Angina: Care Instructions  Overview     Angina happens when there is not enough blood flow to your heart muscle. This low blood flow is often a result of coronary artery disease. Coronary artery disease happens when fatty deposits called plaque (say \"plak\") build up inside your coronary arteries. This plaque may limit the amount of blood to your heart muscle. Having coronary artery disease also increases your risk of a heart attack.  Chest pain or pressure is the most common symptom of angina. But some people have other symptoms, like:  Pain, pressure, or a strange feeling in the back, neck, jaw, or upper belly, or in one or both shoulders or arms.  Shortness of breath.  Nausea or vomiting.  Lightheadedness or sudden weakness.  Fast or irregular heartbeat.  Angina can be dangerous. That's why it is important to pay attention to your symptoms. Know what is typical for you, learn how to control your symptoms, and understand when you need to get treatment.  A change in your usual pattern of symptoms is an emergency. It may mean that you are having a heart attack.  If you notice any problems or new symptoms, get medical treatment right away.  Follow-up care is a key part of your treatment and safety. Be sure to make and go to all appointments, and call your doctor if you are having problems. It's also a good idea to know your test results and keep a list of the medicines you take.  How can you care for yourself at home?  Medicines    If your doctor has given you nitroglycerin for angina symptoms, keep it with you at all times. If you have symptoms, sit down and rest, and take the first dose of nitroglycerin as directed. If your symptoms get worse or are not getting better within 5 minutes, call 911 right away. Stay on the phone. The emergency  will give you further instructions.     Be safe with medicines. Take your medicines  chest.  Shortness of breath.  Sweating.  Nausea or vomiting.  Pain, pressure, or a strange feeling in the back, neck, jaw, or upper belly or in one or both shoulders or arms.  Lightheadedness or sudden weakness.  A fast or irregular heartbeat.  After you call 911, the  may tell you to chew 1 adult-strength or 2 to 4 low-dose aspirin. Wait for an ambulance. Do not try to drive yourself.     You have angina symptoms that do not go away with rest or are not getting better within 5 minutes after you take a dose of nitroglycerin.   Call your doctor now if:    Your angina symptoms seem worse but still follow your typical pattern. You can predict when symptoms will happen, but they may come on sooner, feel worse, or last longer.   Watch closely for changes in your health, and be sure to contact your doctor if you have any problems.  Where can you learn more?  Go to https://www.[employer].net/patientEd and enter H129 to learn more about \"Angina: Care Instructions.\"  Current as of: June 24, 2023  Content Version: 14.1  © 9325-1861 [employer].   Care instructions adapted under license by [employer]. If you have questions about a medical condition or this instruction, always ask your healthcare professional. [employer] disclaims any warranty or liability for your use of this information.

## 2024-06-28 NOTE — DISCHARGE SUMMARY
\"POCPH\", \"PHART\", \"PH\", \"POCPCO2\", \"MNC1DYJ\", \"PCO2\", \"POCPO2\", \"PO2ART\", \"PO2\", \"POCHCO3\", \"HPB8NQJ\", \"HCO3\", \"NBEA\", \"PBEA\", \"BEART\", \"BE\", \"THGBART\", \"THB\", \"FCU2SHW\", \"HVNH7AYS\", \"U7LCYEDH\", \"O2SAT\", \"FIO2\"  No results found for: \"SPECIAL\"  Lab Results   Component Value Date/Time    CULTURE NO SIGNIFICANT GROWTH 02/01/2023 04:53 PM       Radiology:  Nuclear stress test with myocardial perfusion    Addendum Date: 6/28/2024      Stress Combined Conclusion: The study is most consistent with myocardial ischemia. Findings suggest a high risk of cardiac events.   Stress Function: Left ventricular function post-stress is normal. Post-stress ejection fraction is 78%. The stress end diastolic cavity size is normal. Stress end diastolic volume: 69 mL. The stress end systolic cavity size is normal.   Perfusion Comments: Prone images were obtained. Prone imaging was helpful in correcting soft tissue attenuation. LV perfusion is abnormal. There is evidence of inducible ischemia.   Perfusion Defect: There is a moderate severity left ventricular stress perfusion defect that is medium in size present in the mid to distal inferior segment(s) that is reversible. Viability in the area is good. There is normal wall motion in the defect area. Perfusion defect was visually and quantitatively present. This defect was visualized during the stress phase of imaging. There is a moderate severity left ventricular stress perfusion defect that is medium in size present in the mid to distal inferolateral segment(s) that is partially reversible. Viability in the area is good. There is normal wall motion in the defect area. Perfusion defect was visual and quantitative. This defect was visualized during the stress and rest phases of imaging.   ECG: Resting ECG demonstrates sinus bradycardia.   ECG: The stress ECG was negative for ischemia.   Stress Test: A pharmacological stress test was performed using regadenoson (Lexiscan). The patient  annual death or MI) 1. Mild/moderate resting LV dysfunction (LVEF 35% to 49%) not readily explained by non coronary causes. 2. Resting perfusion abnormalities in 5%-9.9% of the myocardium in patients without a history or prior evidence of MI 3. Stress-induced perfusion abnormality encumbering 5%-9.9% of the myocardium or stress segmental scores indicating 1 vascular territory with abnormalities but without LV dilation 4. Small wall motion abnormality involving 1-2 segments and only 1 coronary bed. Low Risk (Less than 1% annual death or MI) 1. Normal or small myocardial perfusion defect at rest or with stress encumbering less than 5% of the myocardium.     XR CHEST PORTABLE    Result Date: 6/26/2024  No acute process. Stable mild cardiomegaly       Consultations:    Consults:     Final Specialist Recommendations/Findings:   IP CONSULT TO INTERNAL MEDICINE  IP CONSULT TO CARDIOLOGY  PHARMACY TO DOSE MEDICATION      The patient was seen and examined on day of discharge and this discharge summary is in conjunction with any daily progress note from day of discharge.    Discharge plan:     Disposition: Home    Physician Follow Up:   [facility] PA-C  [address] [facility] [place] [address] [phone]    Follow up      Julio Herrera MD  [address]  [facility] [address] [phone]    Follow up         Requiring Further Evaluation/Follow Up POST HOSPITALIZATION/Incidental Findings: Follow-up outpatient with PCP and cardiology for lab results and imaging results as mentioned above.    Diet: cardiac diet    Activity: As tolerated    Instructions to Patient:   Follow-up outpatient with PCP and cardiology.  Continue medications as prescribed.    Discharge Medications:      Medication List        START taking these medications      aspirin 81 MG chewable tablet  Take 1 tablet by mouth daily  Start taking on: June 29, 2024     atorvastatin 40 MG tablet  Commonly known as: LIPITOR  Take 1 tablet by mouth nightly    - F [phone] [address] [facility] [address]      Phone: [phone]   aspirin 81 MG chewable tablet  atorvastatin 40 MG tablet         Time spent on discharge is 31 mins in patient examination, evaluation, counseling as well as medication reconciliation, prescriptions for required medications, discharge plan and follow up.    Electronically signed by   Zach Stearns MD  6/28/2024  3:10 PM      Thank you Sujey Watts PA-C for the opportunity to be involved in this patient's care.

## 2024-06-28 NOTE — PLAN OF CARE
Problem: Discharge Planning  Goal: Discharge to home or other facility with appropriate resources  Outcome: Progressing   Discharge anticipated for this afternoon, home with no needs    Problem: Pain  Goal: Verbalizes/displays adequate comfort level or baseline comfort level  Outcome: Progressing  Pain scale reviewed with pt, verbalized understanding, denies pain/discomfort, will continue to monitor, educate and encourage patient to report discomfort at the earliest onset    Problem: Safety - Adult  Goal: Free from fall injury  Outcome: Progressing   Alert and oriented, remains ambulatory, free from falls and injuries, fall risk assessment completed per shift and prn. Bed in lowest position with wheels locked, call light at reach and utilized appropriately, frequently used items within reach, Falling star, light system and hourly rounding implemented. Will continue to monitor and educate on safety as needed.

## 2024-06-28 NOTE — PROGRESS NOTES
[facility]  Office: [phone]  Savage Marino, DO, Saman Alejandra, DO, Kishor Contreras DO, Radu Verma, DO, Deandra Mina MD, Leona Logan MD, Flip Sapp MD, Abigail Mccord MD,  Steve Nelson MD, Jayme Potter MD, Nadege Hernandez MD,  Kuldeep Adhikari DO, Giovanna aMrc MD, Mian Sandhu MD, Chirag Marino DO, Sheila Holder MD,  Giovanny Mosley DO, Janae Alegria MD, Vivian Diggs MD, Zaina Camp MD, Linn Valenzuela MD,  Joshua Middleton MD, Hien Tan MD, Vamsi Bruce MD, Regine Jauregui MD, Zach Stearns MD, Niki Paredes MD, Nico Valdovinos DO, Krishna West DO, Monse Iraheta MD,  Vinnie Jernigan MD, Shirley Waterhouse, CNP,  Tabitha House CNP, Jose M Salinas, CNP,  Kaylene Jeffries, DNP, Arely Hampton, CNP, Milena Hearn, CNP, Loly Tucker, CNP, Selene Holguin, CNP, Toña Mims PA-C, Sahara Warren PA-C, Carmen Plata, CNP, Trinity Kowalski, CNP, Selene Sharma, CNP, Rachell Mederos, CNP, Soniya Shukla, CNP, Pamela Augustin, CNS, Maria De Jesus Galeano, CNP, Maya Martin CNP, Tracy Schwab, CNP         [facility]   IN-PATIENT SERVICE   [facility]    Progress Note    6/28/2024    2:24 PM    Name:   Nadine Albarran  MRN:     [id number]     Acct:      [id number]   Room:   1004/1004-02   Day:  0  Admit Date:  6/26/2024  3:48 PM    PCP:   Sujey Moscoso PA-C  Code Status:  Full Code    Subjective:     Patient seen this morning, resting comfortably in her bed.  Denied any chest pain as of now although did mention that she gets mild pain intermittently.  Remained afebrile, heart rate in 50s, did drop to 40s.  Blood pressure on the higher side.  Saturating well on room air.  Previous lab work reviewed.  S/p stress test, as per the interpretation, the study is most consistent with myocardial ischemia and findings suggest a high risk of cardiac event.  Although RN informed that cardiologist is not concerned about any risk for cardiac event and recommending  Sinus bradycardia 6/26/2024 Yes    Sleep apnea 6/26/2024 Yes    Chronic renal disease, stage III (HCC) [841001] 6/26/2024 Yes    Secondary hypercoagulable state (HCC) (Chronic) 6/26/2024 Yes     Plan:     Atypical chest pain  Paroxysmal atrial fibrillation with secondary hypercoagulable state  Chronic kidney disease stage IIIb baseline Scr ~1.6  Primary hypertension   JATIN  Allergic rhinitis  GERD    S/p stress test concerning for ischemia.  Cardiology on board awaiting their recommendations.  Creatinine improving.  Continue cardizem, amiodarone,  and Coumadin   Continue hydralazine and Maxide.  Continue ferrous sulfate   Flonase, singulair   Protonix   Anticipate discharge today if okay with cardiology.    Medical Decision Making: Medium      Zach Stearns MD  6/28/2024  2:24 PM

## 2024-06-28 NOTE — PLAN OF CARE
Patient resting in bed on room air, alert and oriented X4. She is up independently to the bathroom.  Patient brought the majority of her home medication, however I was not able to administer her Pepcid or her Singulair as those were not brought in from home.  Patient denied having pain.  Patient safety maintained.    Problem: Discharge Planning  Goal: Discharge to home or other facility with appropriate resources  6/27/2024 2124 by Margarette Mederos RN  Outcome: Progressing     Problem: Pain  Goal: Verbalizes/displays adequate comfort level or baseline comfort level  6/27/2024 2124 by Margarette Mederos RN  Outcome: Progressing  Patient denied having pain. The need for pain and symptom management will be considered in the discharge planning process to ensure patients comfort.       Problem: Safety - Adult  Goal: Free from fall injury  6/27/2024 2124 by Margarette Mederos RN  Outcome: Progressing     Problem: ABCDS Injury Assessment  Goal: Absence of physical injury  6/27/2024 2124 by Margarette Mederos RN  Outcome: Progressing     Problem: Cardiovascular - Adult  Goal: Maintains optimal cardiac output and hemodynamic stability  6/27/2024 2124 by Margarette Mederos RN  Outcome: Progressing

## 2024-06-28 NOTE — PROGRESS NOTES
Section of Cardiology  Progress Note      Date:  6/28/2024  Patient: Nadine Albarran  Admission:  6/26/2024  3:48 PM  Admit DX: Angina pectoris (HCC) [I20.9]  Stable angina (HCC) [I20.89]  Chest pain, unspecified type [R07.9]  Atypical chest pain [R07.89]  Age:  84 y.o., 1940     LOS: 0 days     Reason for evaluation:   angina      SUBJECTIVE:     The patient was seen and examined. Notes and labs reviewed.  There were not complications over night.    Patient seen and examined in her room with her nurse at bedside.  It was reported by the radiologist that the nuclear stress test was positive for ischemia.  And mention that there is no prone position pictures, however that was not completely true and the patient did have prone imaging which was basically normal.  Patient's cardiac review of systems: negative for chest pain, dyspnea, irregular heart beat, near-syncope, and palpitations.  The patient is generally feeling gradually improving.    OBJECTIVE:    Telemetry: Sinus  BP (!) 132/51   Pulse 57   Temp 97.9 °F (36.6 °C) (Oral)   Resp 14   Ht 1.651 m (5' 5\")   Wt 79.8 kg (176 lb)   SpO2 94%   BMI 29.29 kg/m²     Intake/Output Summary (Last 24 hours) at 6/28/2024 1359  Last data filed at 6/27/2024 1511  Gross per 24 hour   Intake 480 ml   Output --   Net 480 ml       EXAM:   CONSTITUTIONAL:  awake, alert, cooperative, no apparent distress, and appears stated age.  HEENT: Normal jugular venous pulsations, no carotid bruits. Head is atraumatic, normocephalic. Eyes and oral mucosa are normal.  LUNGS: Good respiratory effort. No for increased work of breathing. On auscultation: clear to auscultation bilaterally  CARDIOVASCULAR:  Normal apical impulse, regular rate and rhythm, normal S1 and S2,   ABDOMEN: Soft, nontender, nondistended. Bowel sounds present.    SKIN: Warm and dry.  EXTREMITIES: No lower extremities edema. No cyanosis or clubbing.    Current Inpatient Medications:   hydrALAZINE  50 mg Oral 3  times per day    amiodarone  100 mg Oral Daily    fluticasone-umeclidin-vilant  1 puff Inhalation Daily    triamterene-hydroCHLOROthiazide  1 tablet Oral Daily    azelastine  1 spray Each Nostril BID    warfarin placeholder: dosing by provider   Other RX Placeholder    dilTIAZem  240 mg Oral Daily    ferrous sulfate  325 mg Oral Daily with breakfast    famotidine  40 mg Oral Nightly    fluticasone  1 spray Each Nostril Nightly    montelukast  10 mg Oral Nightly    pantoprazole  40 mg Oral Daily    sodium chloride flush  5-40 mL IntraVENous 2 times per day    atorvastatin  40 mg Oral Nightly    aspirin  81 mg Oral Daily    nitroGLYCERIN  1 patch TransDERmal Daily       IV Infusions (if any):   sodium chloride         Diagnostics:   EKG: .  ECHO: .   Stress Test: reviewed.  Cardiac Angiography: not obtained.    Labs:   CBC:   Recent Labs     06/26/24  1605 06/27/24  0546   WBC 7.2 4.0   HGB 13.1 12.2   HCT 40.0 37.7    182     BMP:   Recent Labs     06/26/24  1605 06/27/24  0546    140   K 3.8 3.7   CO2 23 25   BUN 32* 30*   CREATININE 1.7* 1.5*   LABGLOM 29* 34*   GLUCOSE 100* 91     Lab Results   Component Value Date/Time    BNP NOT REPORTED 06/12/2014 01:20 PM     PT/INR:   Recent Labs     06/27/24  1539 06/28/24  0620   PROTIME 20.9* 19.0*   INR 1.8 1.6     APTT:  Recent Labs     06/26/24  1605   APTT 32.5     CARDIAC ENZYMES:No results for input(s): \"CKTOTAL\", \"CKMB\", \"CKMBINDEX\" in the last 72 hours.    Invalid input(s): \"TROPONIN\", \"HIGH SENSITIVITY\"  FASTING LIPID PANEL:  Lab Results   Component Value Date/Time    HDL 50 06/27/2024 05:46 AM    TRIG 107 06/27/2024 05:46 AM     LIVER PROFILE:  Recent Labs     06/26/24  1605   AST 21   ALT 15       ASSESSMENT:  Atypical chest pain without evidence of acute coronary syndrome  Paroxysmal atrial fibrillation  Chronic anticoagulation  Systemic hypertension  Sinus bradycardia  Patient Active Problem List   Diagnosis    Hypertension    Allergic rhinitis

## 2024-06-28 NOTE — CONSULTS
Warfarin Dosing - Pharmacy Consult Note  Consulting Provider: Dr. Adhikari,  Indication:  Atrial Fibrillation  Warfarin Dose prior to admission: 5 mg daily   Concurrent anticoagulants/antiplatelets: ASA 81 mg daily   Significant Drug Interactions:  amiodarone (incr INR) - home med   Recent Labs     06/26/24  1605 06/27/24  0546 06/27/24  1539 06/28/24  0620   INR 2.0  --  1.8 1.6   HGB 13.1 12.2  --   --     182  --   --      Recent warfarin administrations        No warfarin orders with administrations found.            Orders not given:            warfarin placeholder: dosing by provider                   Date   INR    Dose  6/26      2.0        none   6/27      1.8        none- held for stress test  6/28      1.6    Assessment/Plan  (Goal INR: 2 - 3)  Noted plans for patient to discharge today. Will place order for boost dose of warfarin 7.5 mg x1 for today in case patient is still admitted at 18:00.     Active problem list reviewed.  INR orders are placed.  Chart reviewed for pertinent labs, drug/diet interactions, and past doses.  Documentation of patient's clinical condition was reviewed.    Pharmacy Dosing:  Pharmacy will continue to follow.

## 2024-07-01 ENCOUNTER — TELEPHONE (OUTPATIENT)
Dept: FAMILY MEDICINE CLINIC | Age: 84
End: 2024-07-01

## 2024-07-01 NOTE — TELEPHONE ENCOUNTER
Care Transitions Initial Follow Up Call    Outreach made within 2 business days of discharge: Yes    Patient: Nadine Albarran Patient : 1940   MRN: [id number]  Reason for Admission: There are no discharge diagnoses documented for the most recent discharge.  Discharge Date: 24       Spoke with: Nadine    Discharge department/facility: [facility] Interactive Patient Contact:  Was patient able to fill all prescriptions: Yes  Was patient instructed to bring all medications to the follow-up visit: Yes  Is patient taking all medications as directed in the discharge summary? Yes  Does patient understand their discharge instructions: Yes  Does patient have questions or concerns that need addressed prior to 7-14 day follow up office visit: yes - 24    Scheduled appointment with PCP within 7-14 days    Follow Up  Future Appointments   Date Time Provider Department Center   2024  9:30 AM [facility] RM 2 [facility]   2024 12:00 PM Sujey Moscoso PA-C [facility]   2025 10:00 AM Sujey Moscoso PA-C [facility]       Debbie Anderson MA

## 2024-07-02 ENCOUNTER — HOSPITAL ENCOUNTER (OUTPATIENT)
Dept: MAMMOGRAPHY | Age: 84
Discharge: HOME | End: 2024-07-04
Payer: MEDICARE

## 2024-07-02 VITALS — BODY MASS INDEX: 29.32 KG/M2 | HEIGHT: 65 IN | WEIGHT: 176 LBS

## 2024-07-02 DIAGNOSIS — Z12.31 VISIT FOR SCREENING MAMMOGRAM: ICD-10-CM

## 2024-07-02 PROCEDURE — 77063 BREAST TOMOSYNTHESIS BI: CPT

## 2024-07-08 ENCOUNTER — OFFICE VISIT (OUTPATIENT)
Dept: FAMILY MEDICINE CLINIC | Age: 84
End: 2024-07-08

## 2024-07-08 VITALS
TEMPERATURE: 97.4 F | SYSTOLIC BLOOD PRESSURE: 126 MMHG | HEIGHT: 65 IN | HEART RATE: 60 BPM | OXYGEN SATURATION: 98 % | BODY MASS INDEX: 28.99 KG/M2 | WEIGHT: 174 LBS | DIASTOLIC BLOOD PRESSURE: 68 MMHG

## 2024-07-08 DIAGNOSIS — I48.0 PAROXYSMAL ATRIAL FIBRILLATION (HCC): ICD-10-CM

## 2024-07-08 DIAGNOSIS — Z09 HOSPITAL DISCHARGE FOLLOW-UP: ICD-10-CM

## 2024-07-08 DIAGNOSIS — R07.9 CHEST PAIN, UNSPECIFIED TYPE: Primary | ICD-10-CM

## 2024-07-08 SDOH — ECONOMIC STABILITY: FOOD INSECURITY: WITHIN THE PAST 12 MONTHS, YOU WORRIED THAT YOUR FOOD WOULD RUN OUT BEFORE YOU GOT MONEY TO BUY MORE.: NEVER TRUE

## 2024-07-08 SDOH — ECONOMIC STABILITY: FOOD INSECURITY: WITHIN THE PAST 12 MONTHS, THE FOOD YOU BOUGHT JUST DIDN'T LAST AND YOU DIDN'T HAVE MONEY TO GET MORE.: NEVER TRUE

## 2024-07-08 SDOH — ECONOMIC STABILITY: INCOME INSECURITY: HOW HARD IS IT FOR YOU TO PAY FOR THE VERY BASICS LIKE FOOD, HOUSING, MEDICAL CARE, AND HEATING?: NOT HARD AT ALL

## 2024-07-08 ASSESSMENT — PATIENT HEALTH QUESTIONNAIRE - PHQ9
SUM OF ALL RESPONSES TO PHQ QUESTIONS 1-9: 0
1. LITTLE INTEREST OR PLEASURE IN DOING THINGS: NOT AT ALL
SUM OF ALL RESPONSES TO PHQ QUESTIONS 1-9: 0
SUM OF ALL RESPONSES TO PHQ9 QUESTIONS 1 & 2: 0
SUM OF ALL RESPONSES TO PHQ QUESTIONS 1-9: 0
2. FEELING DOWN, DEPRESSED OR HOPELESS: NOT AT ALL
SUM OF ALL RESPONSES TO PHQ QUESTIONS 1-9: 0

## 2024-07-08 NOTE — PROGRESS NOTES
Visit Information    Have you changed or started any medications since your last visit including any over-the-counter medicines, vitamins, or herbal medicines? no   Are you having any side effects from any of your medications? -  no  Have you stopped taking any of your medications? Is so, why? -  no    Have you seen any other physician or provider since your last visit? No  Have you had any other diagnostic tests since your last visit? No  Have you been seen in the emergency room and/or had an admission to a hospital since we last saw you? No  Have you had your routine dental cleaning in the past 6 months? no    Have you activated your [employer] account? If not, what are your barriers? Yes     Patient Care Team:  Sujey Moscoso PA-C as PCP - General  Sujey Moscoso PA-C as PCP - Empaneled Provider  Alysa Steward MD as Consulting Physician (Cardiology)  Carson Woo MD as Consulting Physician (Oral Maxillofacial Surgery)    Medical History Review  Past Medical, Family, and Social History reviewed and  contribute to the patient presenting condition    Health Maintenance   Topic Date Due    Respiratory Syncytial Virus (RSV) Pregnant or age 60 yrs+ (1 - 1-dose 60+ series) Never done    Flu vaccine (1) 08/01/2024    Depression Screen  12/21/2024    Annual Wellness Visit (Medicare)  12/21/2024    Lipids  06/27/2025    DTaP/Tdap/Td vaccine (2 - Td or Tdap) 06/21/2032    DEXA (modify frequency per FRAX score)  Completed    Shingles vaccine  Completed    Pneumococcal 65+ years Vaccine  Completed    COVID-19 Vaccine  Completed    Hepatitis A vaccine  Aged Out    Hepatitis B vaccine  Aged Out    Hib vaccine  Aged Out    Polio vaccine  Aged Out    Meningococcal (ACWY) vaccine  Aged Out

## 2024-07-08 NOTE — PROGRESS NOTES
Post-Discharge Transitional Care  Follow Up      Nadine Albarran   YOB: 1940    Date of Office Visit:  7/8/2024  Date of Hospital Admission: 6/26/24  Date of Hospital Discharge: 6/28/24  Risk of hospital readmission (high >=14%. Medium >=10%) :Readmission Risk Score: 11.1      Care management risk score Rising risk (score 2-5) and Complex Care (Scores >=6): No Risk Score On File     Non face to face  following discharge, date last encounter closed (first attempt may have been earlier): 07/01/2024    Call initiated 2 business days of discharge: Yes    ASSESSMENT/PLAN:   Chest pain, unspecified type  Hospital discharge follow-up  -     MT DISCHARGE MEDS RECONCILED W/ CURRENT OUTPATIENT MED LIST  Paroxysmal atrial fibrillation (HCC)  Hospital workup reviewed  Patient is feeling well at this time, no further symptoms since discharge.  She will be seeing her cardiologist next week.  She is doing well on present medications, no refills needed at this time.  Will plan to recheck in the office in 6 months, sooner if any questions or concerns arise  Patient agreed with treatment plan  Medical Decision Making: moderate complexity  Return in about 6 months (around 1/8/2025) for awv, sooner prn.           Subjective:   HPI:  Follow up of Hospital problems/diagnosis(es): patient is here for follow up after recent hospitalization after presenting to the ER with chest pain. Patient reported pain was on the left side of the chest with SOB. Patient has a history of Atrial fibrillation and follows with [facility] cardiology.   ER EKG unremarkable. No rise in enzymes/troponin  Cardiology consulted, patient underwent stress test during admission. Results concerning for ischemia.   patient discharged home for out patient follow up.       Addendum Date: 6/28/2024      Stress Combined Conclusion: The study is most consistent with myocardial ischemia. Findings suggest a high risk of cardiac events.   Stress Function: Left

## 2024-07-09 NOTE — PROGRESS NOTES
Physician Progress Note      PATIENT:               GABRIELLA KRUGER  [facility] #:                  [id number]  :                       1940  ADMIT DATE:       2024 3:48 PM  DISCH DATE:        2024 5:38 PM  RESPONDING  PROVIDER #:        Zach Hays MD          QUERY TEXT:    Pt admitted with chest pain.  Pt noted to have conflicting documentation.  If   possible, please document in progress notes and  if you are evaluating and/or treating any of the following:    The medical record reflects the following:  Risk Factors: A fib, HTN, CKD, advanced age 84  Clinical Indicators: H&P: Atypical chest pain-troponin 17 from 20.  No ST   elevation on EKG.  did not place her Nitropatch on and she started to have   chest pain. CD: Atypical chest pain without evidence of acute coronary   syndrome. reported by the radiologist that the nuclear stress test was   positive for ischemia.  And mention that there is no prone position pictures,   however that was not completely true and the patient did have prone imaging   which was basically normal.  Treatment: Cardiology consulted , stress testing    Thank you, kaye CDS  Options provided:  -- Chest pain due to ischemia heart disease  -- Chest pain is not due to ischemic heart disease  -- Chest pain due to, please specify etiology  -- Other - I will add my own diagnosis  -- Disagree - Not applicable / Not valid  -- Disagree - Clinically unable to determine / Unknown  -- Refer to Clinical Documentation Reviewer    PROVIDER RESPONSE TEXT:    This patient had chest pain due to ischemic heart disease.    Query created by: Sherry Aguilar on 2024 9:03 AM      Electronically signed by:  Zach Hays MD 2024 7:31 PM

## 2024-07-10 ENCOUNTER — HOSPITAL ENCOUNTER (OUTPATIENT)
Age: 84
Setting detail: SPECIMEN
Discharge: HOME | End: 2024-07-10

## 2024-07-10 LAB
POC INR: 1.6
PROTHROMBIN TIME, POC: 19.2 SEC (ref 9.6–14.4)

## 2024-07-22 ENCOUNTER — HOSPITAL ENCOUNTER (OUTPATIENT)
Age: 84
Setting detail: SPECIMEN
Discharge: HOME | End: 2024-07-22

## 2024-07-22 LAB
POC INR: 1.7
PROTHROMBIN TIME, POC: 20.4 SEC (ref 9.6–14.4)

## 2024-08-13 ENCOUNTER — HOSPITAL ENCOUNTER (OUTPATIENT)
Age: 84
Setting detail: SPECIMEN
Discharge: HOME | End: 2024-08-13

## 2024-08-13 LAB
POC INR: 2.2
PROTHROMBIN TIME, POC: 26.9 SEC (ref 9.6–14.4)

## 2024-09-12 ENCOUNTER — OFFICE VISIT (OUTPATIENT)
Dept: FAMILY MEDICINE CLINIC | Age: 84
End: 2024-09-12
Payer: MEDICARE

## 2024-09-12 VITALS
WEIGHT: 172 LBS | OXYGEN SATURATION: 96 % | TEMPERATURE: 97.9 F | SYSTOLIC BLOOD PRESSURE: 130 MMHG | HEART RATE: 55 BPM | DIASTOLIC BLOOD PRESSURE: 78 MMHG | BODY MASS INDEX: 28.66 KG/M2 | HEIGHT: 65 IN

## 2024-09-12 DIAGNOSIS — J45.40 MODERATE PERSISTENT ASTHMA WITHOUT COMPLICATION: Primary | ICD-10-CM

## 2024-09-12 DIAGNOSIS — I10 PRIMARY HYPERTENSION: ICD-10-CM

## 2024-09-12 PROCEDURE — 1123F ACP DISCUSS/DSCN MKR DOCD: CPT | Performed by: PHYSICIAN ASSISTANT

## 2024-09-12 PROCEDURE — G8417 CALC BMI ABV UP PARAM F/U: HCPCS | Performed by: PHYSICIAN ASSISTANT

## 2024-09-12 PROCEDURE — 3078F DIAST BP <80 MM HG: CPT | Performed by: PHYSICIAN ASSISTANT

## 2024-09-12 PROCEDURE — 1036F TOBACCO NON-USER: CPT | Performed by: PHYSICIAN ASSISTANT

## 2024-09-12 PROCEDURE — 1090F PRES/ABSN URINE INCON ASSESS: CPT | Performed by: PHYSICIAN ASSISTANT

## 2024-09-12 PROCEDURE — G8399 PT W/DXA RESULTS DOCUMENT: HCPCS | Performed by: PHYSICIAN ASSISTANT

## 2024-09-12 PROCEDURE — G8427 DOCREV CUR MEDS BY ELIG CLIN: HCPCS | Performed by: PHYSICIAN ASSISTANT

## 2024-09-12 PROCEDURE — 99213 OFFICE O/P EST LOW 20 MIN: CPT | Performed by: PHYSICIAN ASSISTANT

## 2024-09-12 PROCEDURE — 3075F SYST BP GE 130 - 139MM HG: CPT | Performed by: PHYSICIAN ASSISTANT

## 2024-09-12 RX ORDER — TOBRAMYCIN AND DEXAMETHASONE 3; 1 MG/ML; MG/ML
1 SUSPENSION/ DROPS OPHTHALMIC ONCE
COMMUNITY
Start: 2024-08-20 | End: 2024-09-12

## 2024-09-12 ASSESSMENT — ENCOUNTER SYMPTOMS
COUGH: 0
SHORTNESS OF BREATH: 0
WHEEZING: 0
ABDOMINAL PAIN: 0
COLOR CHANGE: 0

## 2024-09-20 ENCOUNTER — HOSPITAL ENCOUNTER (OUTPATIENT)
Age: 84
Setting detail: SPECIMEN
Discharge: HOME | End: 2024-09-20

## 2024-09-20 LAB
POC INR: 1.3
PROTHROMBIN TIME, POC: 16.1 SEC (ref 9.6–14.4)

## 2024-09-27 ENCOUNTER — HOSPITAL ENCOUNTER (OUTPATIENT)
Age: 84
Setting detail: SPECIMEN
Discharge: HOME | End: 2024-09-27

## 2024-09-27 ENCOUNTER — OFFICE VISIT (OUTPATIENT)
Dept: PRIMARY CARE CLINIC | Age: 84
End: 2024-09-27
Payer: MEDICARE

## 2024-09-27 VITALS
HEART RATE: 54 BPM | DIASTOLIC BLOOD PRESSURE: 81 MMHG | SYSTOLIC BLOOD PRESSURE: 182 MMHG | OXYGEN SATURATION: 97 % | TEMPERATURE: 97.8 F

## 2024-09-27 DIAGNOSIS — R21 RASH IN ADULT: ICD-10-CM

## 2024-09-27 DIAGNOSIS — S76.019A: Primary | ICD-10-CM

## 2024-09-27 PROCEDURE — 1123F ACP DISCUSS/DSCN MKR DOCD: CPT

## 2024-09-27 PROCEDURE — 99213 OFFICE O/P EST LOW 20 MIN: CPT

## 2024-09-27 PROCEDURE — 1036F TOBACCO NON-USER: CPT

## 2024-09-27 PROCEDURE — G8427 DOCREV CUR MEDS BY ELIG CLIN: HCPCS

## 2024-09-27 PROCEDURE — G8417 CALC BMI ABV UP PARAM F/U: HCPCS

## 2024-09-27 PROCEDURE — 3079F DIAST BP 80-89 MM HG: CPT

## 2024-09-27 PROCEDURE — 1090F PRES/ABSN URINE INCON ASSESS: CPT

## 2024-09-27 PROCEDURE — G8399 PT W/DXA RESULTS DOCUMENT: HCPCS

## 2024-09-27 PROCEDURE — 3077F SYST BP >= 140 MM HG: CPT

## 2024-09-27 RX ORDER — BACLOFEN 10 MG/1
5 TABLET ORAL 3 TIMES DAILY
Qty: 5 TABLET | Refills: 0 | Status: SHIPPED | OUTPATIENT
Start: 2024-09-27 | End: 2024-09-30

## 2024-09-27 ASSESSMENT — ENCOUNTER SYMPTOMS
COUGH: 0
SINUS PRESSURE: 0
ABDOMINAL DISTENTION: 0
COLOR CHANGE: 0
PHOTOPHOBIA: 0
STRIDOR: 0
SINUS PAIN: 0
CHOKING: 0
EYE REDNESS: 0
CONSTIPATION: 0
APNEA: 0
CHEST TIGHTNESS: 0
GASTROINTESTINAL NEGATIVE: 1
ANAL BLEEDING: 0
WHEEZING: 0
EYE DISCHARGE: 0
VOMITING: 0
TROUBLE SWALLOWING: 0
EYE ITCHING: 0
BLOOD IN STOOL: 0
FACIAL SWELLING: 0
BOWEL INCONTINENCE: 0
EYES NEGATIVE: 1
RHINORRHEA: 0
EYE PAIN: 0
SORE THROAT: 0
RESPIRATORY NEGATIVE: 1
RECTAL PAIN: 0
ABDOMINAL PAIN: 0
BACK PAIN: 1
SHORTNESS OF BREATH: 0
VOICE CHANGE: 0
DIARRHEA: 0
NAUSEA: 0

## 2024-09-28 LAB
HSV1 DNA SPEC QL NAA+PROBE: NEGATIVE
HSV2 DNA SPEC QL NAA+PROBE: NEGATIVE
SOURCE: NORMAL
SPECIMEN DESCRIPTION: NORMAL
VZV DNA SPEC QL NAA+PROBE: NEGATIVE
VZV DNA SPEC QL NAA+PROBE: NORMAL

## 2024-10-02 ENCOUNTER — LAB (OUTPATIENT)
Dept: PRIMARY CARE CLINIC | Age: 84
End: 2024-10-02

## 2024-10-02 ENCOUNTER — OFFICE VISIT (OUTPATIENT)
Dept: FAMILY MEDICINE CLINIC | Age: 84
End: 2024-10-02
Payer: MEDICARE

## 2024-10-02 VITALS
WEIGHT: 168 LBS | TEMPERATURE: 97 F | HEIGHT: 65 IN | BODY MASS INDEX: 27.99 KG/M2 | HEART RATE: 71 BPM | OXYGEN SATURATION: 96 % | DIASTOLIC BLOOD PRESSURE: 66 MMHG | SYSTOLIC BLOOD PRESSURE: 118 MMHG

## 2024-10-02 DIAGNOSIS — Z09 FOLLOW-UP EXAMINATION: Primary | ICD-10-CM

## 2024-10-02 DIAGNOSIS — M54.50 MIDLINE LOW BACK PAIN WITHOUT SCIATICA, UNSPECIFIED CHRONICITY: ICD-10-CM

## 2024-10-02 DIAGNOSIS — M48.061 LUMBAR FORAMINAL STENOSIS: ICD-10-CM

## 2024-10-02 DIAGNOSIS — M43.16 ANTEROLISTHESIS OF LUMBAR SPINE: ICD-10-CM

## 2024-10-02 DIAGNOSIS — M62.830 SPASM OF LUMBAR PARASPINOUS MUSCLE: ICD-10-CM

## 2024-10-02 DIAGNOSIS — M51.369 DEGENERATION OF INTERVERTEBRAL DISC OF LUMBAR REGION, UNSPECIFIED WHETHER PAIN PRESENT: ICD-10-CM

## 2024-10-02 DIAGNOSIS — M25.552 LEFT HIP PAIN: ICD-10-CM

## 2024-10-02 DIAGNOSIS — M54.50 MIDLINE LOW BACK PAIN WITHOUT SCIATICA, UNSPECIFIED CHRONICITY: Primary | ICD-10-CM

## 2024-10-02 PROBLEM — K80.20 CALCULUS OF GALLBLADDER WITHOUT CHOLECYSTITIS WITHOUT OBSTRUCTION: Status: RESOLVED | Noted: 2023-02-15 | Resolved: 2024-10-02

## 2024-10-02 PROBLEM — K83.8 COMMON BILE DUCT DILATATION: Status: RESOLVED | Noted: 2023-02-15 | Resolved: 2024-10-02

## 2024-10-02 PROBLEM — K80.50 CHOLEDOCHOLITHIASIS: Status: RESOLVED | Noted: 2023-02-16 | Resolved: 2024-10-02

## 2024-10-02 PROBLEM — G93.32 CHRONIC FATIGUE SYNDROME: Status: RESOLVED | Noted: 2020-06-03 | Resolved: 2024-10-02

## 2024-10-02 PROBLEM — K92.2 GI BLEED: Status: RESOLVED | Noted: 2019-05-05 | Resolved: 2024-10-02

## 2024-10-02 PROCEDURE — 3074F SYST BP LT 130 MM HG: CPT | Performed by: FAMILY MEDICINE

## 2024-10-02 PROCEDURE — 3078F DIAST BP <80 MM HG: CPT | Performed by: FAMILY MEDICINE

## 2024-10-02 PROCEDURE — 96372 THER/PROPH/DIAG INJ SC/IM: CPT | Performed by: FAMILY MEDICINE

## 2024-10-02 PROCEDURE — 99213 OFFICE O/P EST LOW 20 MIN: CPT | Performed by: FAMILY MEDICINE

## 2024-10-02 PROCEDURE — 1123F ACP DISCUSS/DSCN MKR DOCD: CPT | Performed by: FAMILY MEDICINE

## 2024-10-02 RX ORDER — METHYLPREDNISOLONE 4 MG
TABLET, DOSE PACK ORAL
Qty: 1 KIT | Refills: 0 | Status: SHIPPED | OUTPATIENT
Start: 2024-10-02 | End: 2024-10-08

## 2024-10-02 RX ORDER — TIZANIDINE 2 MG/1
2 TABLET ORAL 3 TIMES DAILY PRN
Qty: 30 TABLET | Refills: 0 | Status: SHIPPED | OUTPATIENT
Start: 2024-10-02

## 2024-10-02 RX ORDER — KETOROLAC TROMETHAMINE 30 MG/ML
30 INJECTION, SOLUTION INTRAMUSCULAR; INTRAVENOUS ONCE
Status: COMPLETED | OUTPATIENT
Start: 2024-10-02 | End: 2024-10-02

## 2024-10-02 RX ADMIN — KETOROLAC TROMETHAMINE 30 MG: 30 INJECTION, SOLUTION INTRAMUSCULAR; INTRAVENOUS at 14:02

## 2024-10-02 ASSESSMENT — ENCOUNTER SYMPTOMS
BACK PAIN: 1
EYES NEGATIVE: 1
GASTROINTESTINAL NEGATIVE: 1
ALLERGIC/IMMUNOLOGIC NEGATIVE: 1
RESPIRATORY NEGATIVE: 1

## 2024-10-02 NOTE — PROGRESS NOTES
Visit Information    Have you changed or started any medications since your last visit including any over-the-counter medicines, vitamins, or herbal medicines? no   Are you having any side effects from any of your medications? -  no  Have you stopped taking any of your medications? Is so, why? -  no    Have you seen any other physician or provider since your last visit? No  Have you had any other diagnostic tests since your last visit? No  Have you been seen in the emergency room and/or had an admission to a hospital since we last saw you? No  Have you had your routine dental cleaning in the past 6 months? no    Have you activated your [employer] account? If not, what are your barriers? No:      Patient Care Team:  Sujey Moscoso PA-C as PCP - General  Sujey Moscoso PA-C as PCP - Empaneled Provider  Alysa Steward MD as Consulting Physician (Cardiology)  Carson Woo MD as Consulting Physician (Oral Maxillofacial Surgery)    Medical History Review  Past Medical, Family, and Social History reviewed and does not contribute to the patient presenting condition    Health Maintenance   Topic Date Due    Respiratory Syncytial Virus (RSV) Pregnant or age 60 yrs+ (1 - 1-dose 60+ series) Never done    Flu vaccine (1) 08/01/2024    COVID-19 Vaccine (6 - 2023-24 season) 09/01/2024    Annual Wellness Visit (Medicare)  12/21/2024    Lipids  06/27/2025    Depression Screen  07/08/2025    DTaP/Tdap/Td vaccine (2 - Td or Tdap) 06/21/2032    DEXA (modify frequency per FRAX score)  Completed    Shingles vaccine  Completed    Pneumococcal 65+ years Vaccine  Completed    Hepatitis A vaccine  Aged Out    Hepatitis B vaccine  Aged Out    Hib vaccine  Aged Out    Polio vaccine  Aged Out    Meningococcal (ACWY) vaccine  Aged Out     
narrowing at L4-L5 and L5-S1.              Exam Ended: 02/15/23 15:04 EST Last Resulted: 02/15/23 16:05 EST          PHYSICAL EXAM:       /66 (Site: Right Upper Arm, Position: Sitting, Cuff Size: Medium Adult)   Pulse 71   Temp 97 °F (36.1 °C) (Tympanic)   Ht 1.651 m (5' 5\")   Wt 76.2 kg (168 lb)   SpO2 96%   BMI 27.96 kg/m²        Physical Exam  Vitals and nursing note reviewed.   Constitutional:       General: She is not in acute distress.     Appearance: Normal appearance. She is normal weight. She is not ill-appearing, toxic-appearing or diaphoretic.   HENT:      Head: Normocephalic and atraumatic.      Right Ear: Tympanic membrane, ear canal and external ear normal.      Left Ear: Tympanic membrane, ear canal and external ear normal.      Nose: Nose normal. No congestion or rhinorrhea.      Mouth/Throat:      Mouth: Mucous membranes are moist.      Pharynx: Oropharynx is clear. No oropharyngeal exudate or posterior oropharyngeal erythema.   Eyes:      General: No scleral icterus.     Extraocular Movements: Extraocular movements intact.      Conjunctiva/sclera: Conjunctivae normal.      Pupils: Pupils are equal, round, and reactive to light.   Neck:      Vascular: No carotid bruit.   Cardiovascular:      Rate and Rhythm: Normal rate and regular rhythm.      Pulses: Normal pulses.      Heart sounds: Normal heart sounds. No murmur heard.  Pulmonary:      Effort: Pulmonary effort is normal.      Breath sounds: Normal breath sounds. No wheezing, rhonchi or rales.   Abdominal:      General: Bowel sounds are normal. There is no distension.      Palpations: Abdomen is soft.      Tenderness: There is no abdominal tenderness. There is no guarding or rebound.   Musculoskeletal:         General: Tenderness present. No signs of injury. Normal range of motion.      Cervical back: Normal range of motion and neck supple. No tenderness.      Comments: There is muscular tenderness reproducible with palpation of the

## 2024-10-14 ENCOUNTER — HOSPITAL ENCOUNTER (OUTPATIENT)
Age: 84
Setting detail: SPECIMEN
Discharge: HOME | End: 2024-10-14

## 2024-10-14 LAB
POC INR: 4.5
PROTHROMBIN TIME, POC: 54 SEC (ref 9.6–14.4)

## 2024-10-29 ENCOUNTER — HOSPITAL ENCOUNTER (OUTPATIENT)
Age: 84
Setting detail: SPECIMEN
Discharge: HOME | End: 2024-10-29

## 2024-10-29 LAB
POC INR: 2.5
PROTHROMBIN TIME, POC: 29.9 SEC (ref 9.6–14.4)

## 2024-11-27 ENCOUNTER — HOSPITAL ENCOUNTER (OUTPATIENT)
Age: 84
Setting detail: SPECIMEN
Discharge: HOME | End: 2024-11-27

## 2024-11-27 LAB
POC INR: 2.1
PROTHROMBIN TIME, POC: 25.2 SEC (ref 9.6–14.4)

## 2024-12-27 ENCOUNTER — HOSPITAL ENCOUNTER (OUTPATIENT)
Age: 84
Setting detail: SPECIMEN
Discharge: HOME | End: 2024-12-27

## 2024-12-27 LAB
POC INR: 2.6
PROTHROMBIN TIME, POC: 30.6 SEC (ref 9.6–14.4)

## 2025-01-27 SDOH — ECONOMIC STABILITY: INCOME INSECURITY: IN THE LAST 12 MONTHS, WAS THERE A TIME WHEN YOU WERE NOT ABLE TO PAY THE MORTGAGE OR RENT ON TIME?: NO

## 2025-01-27 SDOH — ECONOMIC STABILITY: TRANSPORTATION INSECURITY
IN THE PAST 12 MONTHS, HAS THE LACK OF TRANSPORTATION KEPT YOU FROM MEDICAL APPOINTMENTS OR FROM GETTING MEDICATIONS?: NO

## 2025-01-27 SDOH — HEALTH STABILITY: PHYSICAL HEALTH: ON AVERAGE, HOW MANY DAYS PER WEEK DO YOU ENGAGE IN MODERATE TO STRENUOUS EXERCISE (LIKE A BRISK WALK)?: 3 DAYS

## 2025-01-27 SDOH — ECONOMIC STABILITY: FOOD INSECURITY: WITHIN THE PAST 12 MONTHS, YOU WORRIED THAT YOUR FOOD WOULD RUN OUT BEFORE YOU GOT MONEY TO BUY MORE.: NEVER TRUE

## 2025-01-27 SDOH — ECONOMIC STABILITY: FOOD INSECURITY: WITHIN THE PAST 12 MONTHS, THE FOOD YOU BOUGHT JUST DIDN'T LAST AND YOU DIDN'T HAVE MONEY TO GET MORE.: NEVER TRUE

## 2025-01-27 SDOH — ECONOMIC STABILITY: TRANSPORTATION INSECURITY
IN THE PAST 12 MONTHS, HAS LACK OF TRANSPORTATION KEPT YOU FROM MEETINGS, WORK, OR FROM GETTING THINGS NEEDED FOR DAILY LIVING?: NO

## 2025-01-27 SDOH — HEALTH STABILITY: PHYSICAL HEALTH: ON AVERAGE, HOW MANY MINUTES DO YOU ENGAGE IN EXERCISE AT THIS LEVEL?: 60 MIN

## 2025-01-27 ASSESSMENT — LIFESTYLE VARIABLES
HOW MANY STANDARD DRINKS CONTAINING ALCOHOL DO YOU HAVE ON A TYPICAL DAY: 0
HOW MANY STANDARD DRINKS CONTAINING ALCOHOL DO YOU HAVE ON A TYPICAL DAY: PATIENT DOES NOT DRINK
HOW OFTEN DO YOU HAVE SIX OR MORE DRINKS ON ONE OCCASION: 1
HOW OFTEN DO YOU HAVE A DRINK CONTAINING ALCOHOL: 1
HOW OFTEN DO YOU HAVE A DRINK CONTAINING ALCOHOL: NEVER

## 2025-01-27 ASSESSMENT — PATIENT HEALTH QUESTIONNAIRE - PHQ9
1. LITTLE INTEREST OR PLEASURE IN DOING THINGS: NOT AT ALL
SUM OF ALL RESPONSES TO PHQ QUESTIONS 1-9: 0
SUM OF ALL RESPONSES TO PHQ QUESTIONS 1-9: 0
2. FEELING DOWN, DEPRESSED OR HOPELESS: NOT AT ALL
SUM OF ALL RESPONSES TO PHQ QUESTIONS 1-9: 0
SUM OF ALL RESPONSES TO PHQ9 QUESTIONS 1 & 2: 0
SUM OF ALL RESPONSES TO PHQ QUESTIONS 1-9: 0

## 2025-01-28 ENCOUNTER — HOSPITAL ENCOUNTER (OUTPATIENT)
Age: 85
Setting detail: SPECIMEN
Discharge: HOME | End: 2025-01-28

## 2025-01-28 ENCOUNTER — OFFICE VISIT (OUTPATIENT)
Dept: FAMILY MEDICINE CLINIC | Age: 85
End: 2025-01-28
Payer: MEDICARE

## 2025-01-28 VITALS
SYSTOLIC BLOOD PRESSURE: 116 MMHG | TEMPERATURE: 97.9 F | WEIGHT: 171 LBS | HEIGHT: 65 IN | DIASTOLIC BLOOD PRESSURE: 60 MMHG | BODY MASS INDEX: 28.49 KG/M2 | HEART RATE: 62 BPM | OXYGEN SATURATION: 95 %

## 2025-01-28 DIAGNOSIS — Z00.00 MEDICARE ANNUAL WELLNESS VISIT, SUBSEQUENT: Primary | ICD-10-CM

## 2025-01-28 LAB
POC INR: 2.5
PROTHROMBIN TIME, POC: 29.8 SEC (ref 9.6–14.4)

## 2025-01-28 PROCEDURE — G0439 PPPS, SUBSEQ VISIT: HCPCS | Performed by: PHYSICIAN ASSISTANT

## 2025-01-28 PROCEDURE — 1159F MED LIST DOCD IN RCRD: CPT | Performed by: PHYSICIAN ASSISTANT

## 2025-01-28 PROCEDURE — 1160F RVW MEDS BY RX/DR IN RCRD: CPT | Performed by: PHYSICIAN ASSISTANT

## 2025-01-28 PROCEDURE — 1123F ACP DISCUSS/DSCN MKR DOCD: CPT | Performed by: PHYSICIAN ASSISTANT

## 2025-01-28 RX ORDER — BACLOFEN 5 MG/1
1 TABLET ORAL DAILY PRN
COMMUNITY
End: 2025-01-28

## 2025-01-28 RX ORDER — CELECOXIB 200 MG/1
200 CAPSULE ORAL DAILY
COMMUNITY
Start: 2024-11-11 | End: 2025-01-28

## 2025-01-28 NOTE — PROGRESS NOTES
Visit Information    Have you changed or started any medications since your last visit including any over-the-counter medicines, vitamins, or herbal medicines? no   Are you having any side effects from any of your medications? -  no  Have you stopped taking any of your medications? Is so, why? -  no    Have you seen any other physician or provider since your last visit? No  Have you had any other diagnostic tests since your last visit? No  Have you been seen in the emergency room and/or had an admission to a hospital since we last saw you? No  Have you had your routine dental cleaning in the past 6 months? no    Have you activated your [employer] account? If not, what are your barriers? Yes     Patient Care Team:  Sujey Moscoso PA-C as PCP - General  Sujey Moscoso PA-C as PCP - Empaneled Provider  Alysa Steward MD as Consulting Physician (Cardiology)  Carson Woo MD as Consulting Physician (Oral Maxillofacial Surgery)    Medical History Review  Past Medical, Family, and Social History reviewed and  contribute to the patient presenting condition    Health Maintenance   Topic Date Due    Respiratory Syncytial Virus (RSV) Pregnant or age 60 yrs+ (1 - 1-dose 75+ series) Never done    Flu vaccine (1) 08/01/2024    COVID-19 Vaccine (6 - 2023-24 season) 09/01/2024    Annual Wellness Visit (Medicare)  12/21/2024    Lipids  06/27/2025    Depression Screen  01/27/2026    DTaP/Tdap/Td vaccine (2 - Td or Tdap) 06/21/2032    Shingles vaccine  Completed    Pneumococcal 65+ years Vaccine  Completed    Hepatitis A vaccine  Aged Out    Hepatitis B vaccine  Aged Out    Hib vaccine  Aged Out    Polio vaccine  Aged Out    Meningococcal (ACWY) vaccine  Aged Out    DEXA (modify frequency per FRAX score)  Discontinued

## 2025-01-28 NOTE — PROGRESS NOTES
Medicare Annual Wellness Visit    Nadine Albarran is here for Medicare AWV    Assessment & Plan   Medicare annual wellness visit, subsequent     Recommend healthy diet-low carb/calorie diet, healthy whole foods.    Regular exercise encouraged. Recommendation for 150min of exercise a week. Can be divided however convenient.  Recommend healthy sleep habit. Try and go to bed at the same time and wake up at the same time for the most restfull pattern.   Also recommend regular healthy fluid intake. Water is the best at hydrating us. Encourage minimal caffeine and pop/soda use  Patient reports she had covid/flu and RSV vaccine at pharmacy this year  Cont with specialists, following with pulm, cardio, derm, GI regularly.   Doing well on present medications. Living with her daughter. Still managing her own ADLs.   Recheck in 1 year, sooner prn    Return in 1 year (on 1/28/2026) for Medicare AW.     Subjective   The following acute and/or chronic problems were also addressed today:  AWV  Patient reports feeling well. No present concerns.    Patient's complete Health Risk Assessment and screening values have been reviewed and are found in Flowsheets. The following problems were reviewed today and where indicated follow up appointments were made and/or referrals ordered.    No Positive Risk Factors identified today.                                Objective   Vitals:    01/28/25 0831   BP: 116/60   Site: Left Upper Arm   Position: Sitting   Cuff Size: Large Adult   Pulse: 62   Temp: 97.9 °F (36.6 °C)   TempSrc: Oral   SpO2: 95%   Weight: 77.6 kg (171 lb)   Height: 1.651 m (5' 5\")      Body mass index is 28.46 kg/m².        General Appearance: alert and oriented to person, place and time, well developed and well- nourished, in no acute distress  Skin: warm and dry, no rash or erythema  Head: normocephalic and atraumatic  Eyes: pupils equal, round, and reactive to light, extraocular eye movements intact, conjunctivae normal  ENT:

## 2025-03-04 ENCOUNTER — HOSPITAL ENCOUNTER (OUTPATIENT)
Age: 85
Setting detail: SPECIMEN
Discharge: HOME | End: 2025-03-04

## 2025-03-04 LAB
POC INR: 1.8
PROTHROMBIN TIME, POC: 21.6 SEC (ref 9.6–14.4)

## 2025-04-07 ENCOUNTER — HOSPITAL ENCOUNTER (OUTPATIENT)
Age: 85
Setting detail: SPECIMEN
Discharge: HOME | End: 2025-04-07

## 2025-04-07 LAB
POC INR: 2.3
PROTHROMBIN TIME, POC: 28 SEC (ref 9.6–14.4)

## 2025-05-09 ENCOUNTER — HOSPITAL ENCOUNTER (OUTPATIENT)
Age: 85
Setting detail: SPECIMEN
Discharge: HOME | End: 2025-05-09

## 2025-05-09 LAB
POC INR: 1.9
PROTHROMBIN TIME, POC: 22.3 SEC (ref 9.6–14.4)

## 2025-05-28 DIAGNOSIS — M25.511 RIGHT SHOULDER PAIN, UNSPECIFIED CHRONICITY: ICD-10-CM

## 2025-05-28 DIAGNOSIS — M25.512 LEFT SHOULDER PAIN, UNSPECIFIED CHRONICITY: ICD-10-CM

## 2025-05-28 DIAGNOSIS — M75.101 TEAR OF RIGHT ROTATOR CUFF, UNSPECIFIED TEAR EXTENT, UNSPECIFIED WHETHER TRAUMATIC: Primary | ICD-10-CM

## 2025-05-28 NOTE — PROGRESS NOTES
[facility]  Office: [address], DO, Andrés Gautam, DO, Bryon Frazier, DO, Da Whiting Blood, DO, Velvet Crespo MD, Tosha Oconnell MD, Shital Hernandez MD, Gerard Walsh MD,  Linda Delatorre MD, Cleo Kang MD, Viviana Rivero, DO, Zoe Agudelo MD,  Steff Sosa MD, Kelly Cooper MD, Dottie Foster, DO, Fatuma Weaver MD, Jose Díaz MD, Nikole Rahman, DO, Aggie Marvailla MD, Hood Lin MD, Alex Dunne MD, Nelly Lemus MD, Kristen Padilla, DO, Mary Howard MD, Rosa Bishop MD, Rebecca Yates, [facility],  Jer Rausch, CNP, Jessica Gamino, CNP, Renetta Swartz, CNP,  Sherwin Gilliam, [facility], Latesha Mackey, CNP, Zac Virk, CNP, Asha Ascencio, CNP, Bunny Feliz, CNP, Claudio Bryant, CNP, Joseph Mcneill PA-C, Ernie Castaneda, CNS, Carolyn Madison, CNP, Poncho Zelaya, [facility]    Progress Note    2023    10:23 AM    Name:   Park Mccray  MRN:     [id number]     Acct:      [de-identified]   Room:     IP Day:  6  Admit Date:  2/15/2023  2:17 PM    PCP:   Mily Hernandez PA-C  Code Status:  Full Code    Subjective:     C/C:   Chief Complaint   Patient presents with    Back Pain     Bilateral upper back. Pt reports \"squeezing\" going around to right side under breast area     Interval History Status: improved. Patient condition has improved throughout hospital course. Diet advancement is gone well. Epigastric bandlike pain has resolved. Patient complaining of back pain. Plan for kyphoplasty on . Probable discharge on  if procedure goes well and patient is able to work with therapy. Will require outpatient follow-up with GI for possible EUS. Brief History:      - Patient says she started having mid back pain that started 3 weeks ago that radiated to the left side, and then started radiating around to the right side.  She has been taking Tylenol #3 for the Writer sent [employer] message of recommendation below encouraging appointment    discomfort, but it has not been effective for the pain, and she has had constipation since taking it. Her last BM was Sunday after taking an OTC laxative and prune juice. she went to see Dr Gosia Man general surgeon yesterday- no cholecystectomy recommended at this time. Prior imaging has revealed renal cysts and mild cholelithiasis, mild diverticulosis, no kidney stones. She has a PMH of appendectomy, arthritis, asthma, atrial fib on warfarin, GERD, HTN, MI, melanoma, and MI. She went to the ED as the pain did not subside. 2/17-2/18 -significant improvement. ERCP completed and patient was found to have significant biliary dilatation. Plan to slowly advance diet, continue hydration, continue pain control, possibly repeat ERCP on Monday. 2/19-2/21 -patient condition has improved throughout hospital course. Diet advancement is gone well. Epigastric bandlike pain has resolved. Patient complaining of back pain. Plan for kyphoplasty on 2/21. Probable discharge on 2/22 if procedure goes well and patient is able to work with therapy. Will require outpatient follow-up with GI for possible EUS. Review of Systems:     Constitutional:  negative for chills, fevers, sweats  Respiratory:  negative for cough, dyspnea on exertion, shortness of breath, wheezing  Cardiovascular:  negative for chest pain, chest pressure/discomfort, lower extremity edema, palpitations  Gastrointestinal:  negative for abdominal pain, constipation, diarrhea, nausea, vomiting  Neurological:  negative for dizziness, headache    Medications:      Allergies:  No Known Allergies    Current Meds:   Scheduled Meds:    enoxaparin  80 mg SubCUTAneous BID    warfarin placeholder: dosing by pharmacy   Other RX Placeholder    dilTIAZem  180 mg Oral Daily    [Held by provider] aspirin  81 mg Oral Daily    azelastine  2 spray Each Nostril BID    ferrous sulfate  325 mg Oral BID AC    montelukast  10 mg Oral Nightly    pantoprazole  40 mg Oral Daily cetirizine  5 mg Oral Daily    calcium carb-cholecalciferol  1 tablet Oral Daily    piperacillin-tazobactam  3,375 mg IntraVENous Q8H    amiodarone  100 mg Oral Daily    fluticasone  1 spray Each Nostril Daily    sodium chloride flush  5-40 mL IntraVENous 2 times per day    budesonide-formoterol  2 puff Inhalation BID    tiotropium  2 puff Inhalation Daily     Continuous Infusions:    sodium chloride       PRN Meds: oxyCODONE-acetaminophen, bisacodyl, hydrALAZINE, calcium carbonate, morphine, sodium chloride flush, sodium chloride, potassium chloride **OR** potassium alternative oral replacement **OR** potassium chloride, magnesium sulfate, ondansetron **OR** ondansetron, polyethylene glycol, acetaminophen **OR** acetaminophen    Data:     Past Medical History:   has a past medical history of Allergic rhinitis, Arthritis, Asthma, Atrial fibrillation ([facility] [address] GERD (gastroesophageal reflux disease), Hypertension, Melanoma ([facility] [address] Myocardial infarction ([facility] [address] Wears glasses, and Wears partial dentures. Social History:   reports that she quit smoking about 42 years ago. Her smoking use included cigarettes. She has a 20.00 pack-year smoking history. She has never used smokeless tobacco. She reports that she does not drink alcohol and does not use drugs. Family History:   Family History   Problem Relation Age of Onset    COPD Mother     Asthma Mother     Cancer Mother     Cancer Father     Stroke Sister     COPD Sister     Emphysema Sister        Vitals:  BP (!) 150/53   Pulse 54   Temp 97.9 °F (36.6 °C) (Oral)   Resp 16   Ht 5' 5\" (1.651 m)   Wt 191 lb 12.8 oz (87 kg)   SpO2 94%   BMI 31.92 kg/m²   Temp (24hrs), Av.8 °F (36.6 °C), Min:97.3 °F (36.3 °C), Max:98 °F (36.7 °C)    No results for input(s): POCGLU in the last 72 hours. I/O (24Hr):     Intake/Output Summary (Last 24 hours) at 2023 1023  Last data filed at 2023 1814  Gross per 24 hour   Intake 300 ml   Output --   Net 300 ml Labs:  Hematology:  Recent Labs     02/19/23  0603 02/20/23  0534 02/21/23  0627   INR 1.2 1.4 1.4     Chemistry:  Recent Labs     02/19/23  0736      K 4.0      CO2 24   GLUCOSE 107*   BUN 37*   CREATININE 1.52*   ANIONGAP 10   LABGLOM 34*   CALCIUM 8.9     Recent Labs     02/19/23  0736 02/20/23  0846   PROT 6.0* 7.0   LABALBU 3.4* 4.0   AST 15 18   ALT 11 16   ALKPHOS 64 79   BILITOT 0.5 0.9   BILIDIR  --  0.2   LIPASE 21  --      ABG:No results found for: POCPH, PHART, PH, POCPCO2, DFK2CPS, PCO2, POCPO2, PO2ART, PO2, POCHCO3, KBR1GAK, HCO3, NBEA, PBEA, BEART, BE, THGBART, THB, NYX6YAK, AIHK9FDL, D0SUVMTK, O2SAT, FIO2  No results found for: SPECIAL  Lab Results   Component Value Date/Time    CULTURE NO SIGNIFICANT GROWTH 02/01/2023 04:53 PM       Radiology:  CT ABDOMEN PELVIS WO CONTRAST Additional Contrast? None    Result Date: 2/15/2023  1. Interval development of bladder distension and central biliary ductal dilatation since 4 February 2023. Sludge and small stones are present in the gallbladder. Findings of concern for stones in the common bile duct or edema in the distal common bowel duct due to a recently passed calculus. Right upper quadrant ultrasound is advised. 2.  Moderate colonic stool burden. 3.  No urinary obstruction, small bowel obstruction or appendicitis. RECOMMENDATION: Right upper quadrant ultrasound. CT THORACIC SPINE WO CONTRAST    Result Date: 2/15/2023  Thoracic spine CT: Age indeterminate superior endplate compression fracture of T8 with 40% height loss. No retropulsion. If there is need for further evaluation thoracic spine MRI can be obtained Lumbar spine CT: No acute fracture Multilevel disc and facet degenerative disease most pronounced at L3-L4 and L4-L5 at L5-S1. .  Grade 1 anterolisthesis at L3-L4. Moderate bilateral neural foraminal narrowing at L4-L5 and L5-S1.      CT LUMBAR SPINE WO CONTRAST    Result Date: 2/15/2023  Thoracic spine CT: Age indeterminate superior endplate compression fracture of T8 with 40% height loss. No retropulsion. If there is need for further evaluation thoracic spine MRI can be obtained Lumbar spine CT: No acute fracture Multilevel disc and facet degenerative disease most pronounced at L3-L4 and L4-L5 at L5-S1. .  Grade 1 anterolisthesis at L3-L4. Moderate bilateral neural foraminal narrowing at L4-L5 and L5-S1. US GALLBLADDER RUQ    Result Date: 2/15/2023  1. Dilated gallbladder containing sludge and small stones. 2.  Dilated common bile duct. Please note that the bile duct was not dilated on prior CT scan of 4 February 2023. No definite stones are noted in the bile duct but it is incompletely visualized. 3.  Liver is of coarse echotexture suggesting fatty infiltration. RECOMMENDATIONS: Consideration of MRCP. MRI ABDOMEN WO CONTRAST MRCP    Result Date: 2/15/2023  1. Cholelithiasis without other findings to suggest acute cholecystitis. 2. Mild extrahepatic biliary ductal dilatation with suspected sludge or small stones in the distal common bile duct. Consider ERCP for further evaluation. The findings were sent to the Radiology Results [address] at 7:57 pm on 2/15/2023 to be communicated to a licensed caregiver.        Physical Examination:        General appearance:  alert, cooperative and no distress  Mental Status:  oriented to person, place and time and normal affect  Lungs:  clear to auscultation bilaterally, normal effort  Heart:  regular rate and rhythm, no murmur  Abdomen:  soft, nontender, nondistended, normal bowel sounds, no masses, hepatomegaly, splenomegaly  Extremities:  no edema, redness, tenderness in the calves  Skin:  no gross lesions, rashes, induration    Assessment:        Hospital Problems             Last Modified POA    * (Principal) Common bile duct dilatation 2/16/2023 Yes    Calculus of gallbladder without cholecystitis without obstruction 2/16/2023 Yes Choledocholithiasis-suspected 2/16/2023 Yes    INR therapeutic 2/16/2023 Yes    Pain of upper abdomen 2/17/2023 Yes    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 ([address] 2/20/2023 Yes    Age-related osteoporosis with current pathological fracture 2/20/2023 Yes    History of total right hip replacement 2/20/2023 Yes    Intractable back pain 2/20/2023 Yes    Hypertension 2/16/2023 Yes    Atrial fibrillation ([address] 2/16/2023 Yes    Gastroesophageal reflux disease 2/16/2023 Yes         Plan:        Cholelithiasis with common bile duct dilatation  Diet advancement has gone well  De-escalate pain control  Follow-up outpatient with GI for possible EUS  Intractable back pain with pathological spinal fracture  Plan for kyphoplasty later this afternoon  PT/OT tomorrow and probable discharge if all goes well on 2/22  Chronic A-fib with hypertension on long-term anticoagulation  Continue aspirin and Coumadin at discharge  Continue Cardizem 180 mg daily      DUDLEY Patel NP  2/21/2023  10:23 AM

## 2025-05-28 NOTE — PATIENT INSTRUCTIONS
CORTISONE INJECTION CARE    The injection site should never get red, hot, or swollen and if it does the patient will contact our office right away. The patient may experience a increase in soreness the first 24-48 hours due to a cortisone flair and can take anti-inflammatories for a short period of time to reduce that soreness. The patient should not submerge the injection site in water for a minimum of 24 hours to avoid infection. This means no lakes, pools, ponds, or hot tubs for 24 hours. If the patient is diabetic the injection may increase their blood sugar for up to one week. The patient can do this cortisone injection once every 4 months as needed.                                                                                                                                                                                                                                                                                                                   PATIENTIQ:  PatientIQ helps [facility] stay in touch with you to know how you're feeling, and provides education and care instructions to you at various time points.   Your answers help your care team track your progress to provide the best care possible. PatientIQ will contact you pre-op and post-op via email or text with:  Educational Videos and Care Instructions  Questionnaires About How You're Feeling    Your participation provides you valuable education and helps [facility] continue to provide quality care to all patients. Thank you

## 2025-05-29 ENCOUNTER — OFFICE VISIT (OUTPATIENT)
Dept: ORTHOPEDIC SURGERY | Age: 85
End: 2025-05-29

## 2025-05-29 VITALS — BODY MASS INDEX: 29.22 KG/M2 | OXYGEN SATURATION: 98 % | HEIGHT: 65 IN | WEIGHT: 175.4 LBS | RESPIRATION RATE: 16 BRPM

## 2025-05-29 DIAGNOSIS — M19.012 ARTHRITIS OF BOTH GLENOHUMERAL JOINTS: Primary | ICD-10-CM

## 2025-05-29 DIAGNOSIS — M19.011 ARTHRITIS OF BOTH GLENOHUMERAL JOINTS: Primary | ICD-10-CM

## 2025-05-29 RX ORDER — METHYLPREDNISOLONE ACETATE 80 MG/ML
80 INJECTION, SUSPENSION INTRA-ARTICULAR; INTRALESIONAL; INTRAMUSCULAR; SOFT TISSUE ONCE
Status: COMPLETED | OUTPATIENT
Start: 2025-05-29 | End: 2025-05-30

## 2025-05-29 RX ORDER — LIDOCAINE HYDROCHLORIDE 10 MG/ML
4 INJECTION, SOLUTION INFILTRATION; PERINEURAL ONCE
Status: CANCELLED | OUTPATIENT
Start: 2025-05-29 | End: 2025-05-29

## 2025-05-29 RX ORDER — METHYLPREDNISOLONE ACETATE 80 MG/ML
80 INJECTION, SUSPENSION INTRA-ARTICULAR; INTRALESIONAL; INTRAMUSCULAR; SOFT TISSUE ONCE
Status: CANCELLED | OUTPATIENT
Start: 2025-05-29 | End: 2025-05-29

## 2025-05-29 RX ORDER — LIDOCAINE HYDROCHLORIDE 10 MG/ML
4 INJECTION, SOLUTION INFILTRATION; PERINEURAL ONCE
Status: COMPLETED | OUTPATIENT
Start: 2025-05-29 | End: 2025-05-30

## 2025-05-29 ASSESSMENT — ENCOUNTER SYMPTOMS
CHEST TIGHTNESS: 0
SHORTNESS OF BREATH: 0
DIARRHEA: 0
COLOR CHANGE: 0
RESPIRATORY NEGATIVE: 1
GASTROINTESTINAL NEGATIVE: 1
ABDOMINAL PAIN: 0
NAUSEA: 0
CONSTIPATION: 0
APNEA: 0
COUGH: 0
ABDOMINAL DISTENTION: 0
VOMITING: 0

## 2025-05-29 NOTE — PROGRESS NOTES
injection. The patient was then placed in the Seated position on the exam table. The posterior soft spot approximately 2 cm distal and 2 cm medial to the posterior acromial edge was identified and marked.  The skin was prepped with betadine in a sterile fashion. Utilizing [employer] ultrasound unit with a variable frequency linear transducer for precise placement and clean technique, a 3 cc solution containing 2 cc of 1% lidocaine  with 1 cc containing 80 mg of Depo-Medrol was injected into the glenohumeral joint. There was no resistance to injection with a 22-gauge spinal needle. The needled was withdrawn and the injection site was cleansed. A Band-Aid was placed over the injection site. There was no resistance to the injection and the patient tolerated the procedure well without difficulty. Adverse reactions of the injection was discussed with the patient including signs of infection, increasing pain, redness, swelling, warmth, fever, chills and the patient was instructed to call immediately with any of these symptoms. Successful needle placement was achieved and final images were taken and saved in the patient's chart for the permanent record. The images are stored on SD card in the machine until downloaded to the patient's chart.      Assessment:      1. Arthritis of both glenohumeral joints       Plan:     Assessment & Plan  1. Bilateral shoulder arthritis:    I reviewed the patient's x-rays.  She has severe osteoarthritis of both shoulders with the right worse than the left.  Eventually the only thing that would fix her shoulders would be shoulder replacements most likely reverse shoulder replacement.  We can try conservative treatments including cortisone injections and physical therapy.  She did get good relief from the previous right shoulder cortisone injection.  Patient would like to continue conservative treatments and avoid surgery if at all possible.  Bilateral glenohumeral injections were administered today

## 2025-05-30 RX ADMIN — METHYLPREDNISOLONE ACETATE 80 MG: 80 INJECTION, SUSPENSION INTRA-ARTICULAR; INTRALESIONAL; INTRAMUSCULAR; SOFT TISSUE at 11:58

## 2025-05-30 RX ADMIN — LIDOCAINE HYDROCHLORIDE 4 ML: 10 INJECTION, SOLUTION INFILTRATION; PERINEURAL at 11:56

## 2025-05-30 RX ADMIN — METHYLPREDNISOLONE ACETATE 80 MG: 80 INJECTION, SUSPENSION INTRA-ARTICULAR; INTRALESIONAL; INTRAMUSCULAR; SOFT TISSUE at 11:57

## 2025-06-04 ENCOUNTER — TELEPHONE (OUTPATIENT)
Dept: FAMILY MEDICINE CLINIC | Age: 85
End: 2025-06-04

## 2025-06-04 NOTE — TELEPHONE ENCOUNTER
[facility] called for CVT requisition form. Stated if approved please fax back and if denied write denied and still fax it back this is her 4th time calling regarding this form. I did inform her no other calls were documented and papers were just scanned in chart and given to provider a few days ago.

## 2025-06-04 NOTE — H&P
[facility]      HISTORY AND PHYSICAL EXAMINATION            Date:   5/6/2019  Patient name:  Tonny Estevez  Date of admission:  5/5/2019  2:24 PM  MRN:   [id number]  Account:  [de-identified]  YOB: 1940  PCP:    Jonelle Lezama PA-C  Room:   [facility]9086-64  Code Status:    Full Code    Chief Complaint:     Chief Complaint   Patient presents with    Melena    Nausea       History Obtained From:     patient, electronic medical record    History of Present Illness: The patient is a [de-identified] y.o. Non-/non  female who presents with Melena and Nausea   and she is admitted to the hospital for the management of  GI bleed. Patient came to emergency room with lightheadedness for 3-4 days. Patient reported nausea and dark stools since she had surgery . She has underlying history of hypertension, A. fib, CAD and is on long-term anticoagulation with Coumadin. No change in dose of medication and was therapeutic . She denies any use of NSAIDS or any OTC medications . Patient underwent right hip total arthroplasty on 4/9/19. Initial evaluation showed /61, pulse 89 per minute, respiration 16 per minute. Lab work showed hemoglobin 7.4 dropped to 6.5, creatinine 1.14, BUN 75, troponin 30, bilirubin 0.18, INR 3.3. EKG showed normal sinus rhythm without ST/T-wave changes. Patient denied any previous GI bleed, upper GI study. She has been taken Protonix and ranitidine for GERD symptoms.   Past Medical History:     Past Medical History:   Diagnosis Date    Allergic rhinitis     Arthritis     Atrial fibrillation ([facility] [address]     Hypertension     Melanoma ([facility] [address]     on nose    Myocardial infarction [facility]) 2008    Wears glasses     Wears partial dentures     upper        Past Surgical History:     Past Surgical History:   Procedure Laterality Date    APPENDECTOMY      CARPAL TUNNEL RELEASE Right     HERNIA REPAIR      umbilical    HYSTERECTOMY      JOINT REPLACEMENT      Faxed pap supply order    NECK SURGERY      C5-C6    NOSE SURGERY      cancer removed    OTHER SURGICAL HISTORY      heart surg/growth in heart sac    TONSILLECTOMY      TOTAL HIP ARTHROPLASTY Right 04/09/2019    TOTAL HIP ARTHROPLASTY Right 4/9/2019    HIP TOTAL ARTHROPLASTY - MEDACTA, C-ARM, MEDACTA TABLE, NSA= GENERAL VS SPINAL, FASCIA ILLIACA BLOCK, *STANDARD performed by Kevin Cuevas DO at [facility]        Medications Prior to Admission:     Prior to Admission medications    Medication Sig Start Date End Date Taking? Authorizing Provider   pantoprazole (PROTONIX) 40 MG tablet Take 1 tablet by mouth every morning (before breakfast) 4/13/19   Emily Arroyo MD   calcium carbonate (ANTACID) 500 MG chewable tablet Take 1 tablet by mouth daily 4/12/19 5/12/19  Emily Arroyo MD   docusate sodium (COLACE) 100 MG capsule Take 1 capsule by mouth 2 times daily as needed for Constipation 4/10/19   Jesus Banda DO   montelukast (SINGULAIR) 10 MG tablet Take 10 mg by mouth nightly    Historical Provider, MD   CycloSPORINE (RESTASIS OP) Apply 1 drop to eye 2 times daily    Historical Provider, MD   Multiple Vitamins-Minerals (HAIR SKIN AND NAILS FORMULA PO) Take by mouth    Historical Provider, MD   losartan (COZAAR) 50 MG tablet Take 50 mg by mouth daily    Historical Provider, MD   ranitidine (ZANTAC) 300 MG tablet nightly  6/18/18   Historical Provider, MD   polyvinyl alcohol-povidone (REFRESH) 1.4-0.6 % ophthalmic solution Place 1-2 drops into both eyes 4 times daily    Historical Provider, MD   triamterene-hydrochlorothiazide (MAXZIDE-25) 37.5-25 MG per tablet Take 1 tablet by mouth daily 12/29/15   Historical Provider, MD   diltiazem (CARDIZEM CD) 240 MG ER capsule Take 240 mg by mouth daily.  8/8/14   DUDLEY Verma - CNP   aspirin 81 MG tablet Take 81 mg by mouth daily Will stop 7 days before surgery    Historical Provider, MD   fluticasone-salmeterol (ADVAIR) 250-50 MCG/DOSE AEPB Inhale 1 puff into the lungs as needed Historical Provider, MD   azelastine (ASTELIN) 137 MCG/SPRAY nasal spray 1 spray by Nasal route 2 times daily. Use in each nostril as directed    Historical Provider, MD   Calcium-Vitamin D (CALTRATE 600 PLUS-VIT D PO) Take  by mouth. Historical Provider, MD   nitroGLYCERIN (NITRODUR) 0.1 MG/HR Place 1 patch onto the skin daily. Historical Provider, MD   Multiple Vitamins-Minerals (MULTIVITAL PO) Take  by mouth. Historical Provider, MD   warfarin (COUMADIN) 5 MG tablet Take 7.5 mg by mouth Will stop 4 day before surgery    Historical Provider, MD        Allergies:     Patient has no known allergies. Social History:     Tobacco:    reports that she quit smoking about 38 years ago. Her smoking use included cigarettes. She has a 20.00 pack-year smoking history. She has never used smokeless tobacco.  Alcohol:      reports that she does not drink alcohol. Drug Use:  reports that she does not use drugs. Family History:     History reviewed. No pertinent family history. Review of Systems:     Positive and Negative as described in HPI. Review of Systems   Constitutional: Negative for activity change, appetite change, chills, fatigue, fever and unexpected weight change. HENT: Negative for congestion, mouth sores, postnasal drip, sinus pressure, sore throat and voice change. Eyes: Negative for visual disturbance. Respiratory: Negative for cough, shortness of breath and wheezing. Cardiovascular: Negative for chest pain and palpitations. Gastrointestinal: Positive for blood in stool. Negative for abdominal pain, constipation, diarrhea, nausea and vomiting. Endocrine: Negative for polyuria. Genitourinary: Negative for difficulty urinating, dysuria, frequency and urgency. Musculoskeletal: Negative for arthralgias, joint swelling and myalgias. Neurological: Negative for dizziness, tremors, speech difficulty, light-headedness and headaches.        Physical Exam:   BP (!) 116/51   Pulse 70 Temp 98.5 °F (36.9 °C) (Oral)   Resp 16   Ht 5' 5\" (1.651 m)   Wt 198 lb 8 oz (90 kg)   SpO2 100%   BMI 33.03 kg/m²   Temp (24hrs), Av.6 °F (37 °C), Min:97.5 °F (36.4 °C), Max:99.7 °F (37.6 °C)    No results for input(s): POCGLU in the last 72 hours. Intake/Output Summary (Last 24 hours) at 2019 0733  Last data filed at 2019 0651  Gross per 24 hour   Intake 2945.57 ml   Output --   Net 2945.57 ml     Physical Exam   Constitutional: She is oriented to person, place, and time. She appears well-developed and well-nourished. No distress. HENT:   Mouth/Throat: No oropharyngeal exudate. Eyes: Pupils are equal, round, and reactive to light. Conjunctivae are normal. No scleral icterus. Neck: No JVD present. No thyromegaly present. Cardiovascular: Normal rate, regular rhythm and normal heart sounds. Exam reveals no gallop and no friction rub. No murmur heard. Pulmonary/Chest: Effort normal. No respiratory distress. She has no wheezes. She has no rales. Abdominal: Soft. Bowel sounds are normal. She exhibits no distension and no mass. There is no tenderness. There is no rebound and no guarding. Lymphadenopathy:     She has no cervical adenopathy. Neurological: She is alert and oriented to person, place, and time. No cranial nerve deficit. She exhibits normal muscle tone. Skin: She is not diaphoretic. Nursing note and vitals reviewed.       Investigations:      Laboratory Testing:  Recent Results (from the past 24 hour(s))   EKG 12 Lead    Collection Time: 19  2:30 PM   Result Value Ref Range    Ventricular Rate 86 BPM    Atrial Rate 86 BPM    P-R Interval 152 ms    QRS Duration 94 ms    Q-T Interval 370 ms    QTc Calculation (Bazett) 442 ms    P Axis -12 degrees    R Axis 16 degrees    T Axis 51 degrees   CBC Auto Differential    Collection Time: 19  2:47 PM   Result Value Ref Range    WBC 7.7 3.5 - 11.3 k/uL    RBC 2.47 (L) 3.95 - 5.11 m/uL    Hemoglobin 7.4 (L) 11.9 - 15.1 g/dL    Hematocrit 23.8 (L) 36.3 - 47.1 %    MCV 96.4 82.6 - 102.9 fL    MCH 30.0 25.2 - 33.5 pg    MCHC 31.1 28.4 - 34.8 g/dL    RDW 14.2 11.8 - 14.4 %    Platelets 801 096 - 752 k/uL    MPV 10.8 8.1 - 13.5 fL    NRBC Automated 0.0 0.0 per 100 WBC    Differential Type NOT REPORTED     WBC Morphology NOT REPORTED     RBC Morphology NOT REPORTED     Platelet Estimate NOT REPORTED     Seg Neutrophils 61 36 - 65 %    Lymphocytes 24 24 - 43 %    Monocytes 10 3 - 12 %    Eosinophils % 3 1 - 4 %    Basophils 1 0 - 2 %    Immature Granulocytes 2 (H) 0 %    Segs Absolute 4.71 1.50 - 8.10 k/uL    Absolute Lymph # 1.82 1.10 - 3.70 k/uL    Absolute Mono # 0.74 0.10 - 1.20 k/uL    Absolute Eos # 0.26 0.00 - 0.44 k/uL    Basophils # 0.07 0.00 - 0.20 k/uL    Absolute Immature Granulocyte 0.13 0.00 - 0.30 k/uL   Comprehensive Metabolic Panel    Collection Time: 05/05/19  2:47 PM   Result Value Ref Range    Glucose 146 (H) 70 - 99 mg/dL    BUN 75 (H) 8 - 23 mg/dL    CREATININE 1.14 (H) 0.50 - 0.90 mg/dL    Bun/Cre Ratio NOT REPORTED 9 - 20    Calcium 9.2 8.6 - 10.4 mg/dL    Sodium 139 135 - 144 mmol/L    Potassium 3.9 3.7 - 5.3 mmol/L    Chloride 103 98 - 107 mmol/L    CO2 22 20 - 31 mmol/L    Anion Gap 14 9 - 17 mmol/L    Alkaline Phosphatase 64 35 - 104 U/L    ALT 11 5 - 33 U/L    AST 12 <32 U/L    Total Bilirubin 0.18 (L) 0.3 - 1.2 mg/dL    Total Protein 6.1 (L) 6.4 - 8.3 g/dL    Alb 3.5 3.5 - 5.2 g/dL    Albumin/Globulin Ratio 1.3 1.0 - 2.5    GFR Non- 46 (L) >60 mL/min    GFR  56 (L) >60 mL/min    GFR Comment          GFR Staging NOT REPORTED    Protime-INR    Collection Time: 05/05/19  2:47 PM   Result Value Ref Range    Protime 31.3 (H) 9.0 - 12.0 sec    INR 3.3    APTT    Collection Time: 05/05/19  2:47 PM   Result Value Ref Range    PTT 32.8 (H) 20.5 - 30.5 sec   Troponin    Collection Time: 05/05/19  2:47 PM   Result Value Ref Range    Troponin, High Sensitivity 30 (H) 0 - 14 ng/L

## 2025-06-05 ENCOUNTER — HOSPITAL ENCOUNTER (OUTPATIENT)
Age: 85
Setting detail: SPECIMEN
Discharge: HOME | End: 2025-06-05

## 2025-06-05 LAB
POC INR: 2.7
PROTHROMBIN TIME, POC: 32.5 SEC (ref 9.6–14.4)

## 2025-07-07 ENCOUNTER — HOSPITAL ENCOUNTER (OUTPATIENT)
Age: 85
Setting detail: SPECIMEN
Discharge: HOME | End: 2025-07-07

## 2025-07-07 LAB
POC INR: 2.4
PROTHROMBIN TIME, POC: 28.6 SEC (ref 9.6–14.4)

## 2025-08-05 ENCOUNTER — HOSPITAL ENCOUNTER (OUTPATIENT)
Age: 85
Setting detail: SPECIMEN
Discharge: HOME | End: 2025-08-05

## 2025-08-05 LAB
INR PPP: 1.7
PROTHROMBIN TIME: 21 SEC (ref 11.7–14.9)

## 2025-08-12 ENCOUNTER — OFFICE VISIT (OUTPATIENT)
Dept: PRIMARY CARE CLINIC | Age: 85
End: 2025-08-12
Payer: MEDICARE

## 2025-08-12 VITALS
HEIGHT: 65 IN | SYSTOLIC BLOOD PRESSURE: 130 MMHG | DIASTOLIC BLOOD PRESSURE: 74 MMHG | HEART RATE: 78 BPM | BODY MASS INDEX: 29.16 KG/M2 | WEIGHT: 175 LBS

## 2025-08-12 DIAGNOSIS — B02.9 HERPES ZOSTER WITHOUT COMPLICATION: ICD-10-CM

## 2025-08-12 DIAGNOSIS — M54.50 ACUTE RIGHT-SIDED LOW BACK PAIN WITHOUT SCIATICA: Primary | ICD-10-CM

## 2025-08-12 PROCEDURE — 1123F ACP DISCUSS/DSCN MKR DOCD: CPT | Performed by: NURSE PRACTITIONER

## 2025-08-12 PROCEDURE — 1159F MED LIST DOCD IN RCRD: CPT | Performed by: NURSE PRACTITIONER

## 2025-08-12 PROCEDURE — G8427 DOCREV CUR MEDS BY ELIG CLIN: HCPCS | Performed by: NURSE PRACTITIONER

## 2025-08-12 PROCEDURE — 1036F TOBACCO NON-USER: CPT | Performed by: NURSE PRACTITIONER

## 2025-08-12 PROCEDURE — G8417 CALC BMI ABV UP PARAM F/U: HCPCS | Performed by: NURSE PRACTITIONER

## 2025-08-12 PROCEDURE — 1160F RVW MEDS BY RX/DR IN RCRD: CPT | Performed by: NURSE PRACTITIONER

## 2025-08-12 PROCEDURE — 1090F PRES/ABSN URINE INCON ASSESS: CPT | Performed by: NURSE PRACTITIONER

## 2025-08-12 PROCEDURE — 99213 OFFICE O/P EST LOW 20 MIN: CPT | Performed by: NURSE PRACTITIONER

## 2025-08-12 RX ORDER — BACLOFEN 10 MG/1
10 TABLET ORAL 3 TIMES DAILY PRN
Qty: 20 TABLET | Refills: 0 | Status: SHIPPED | OUTPATIENT
Start: 2025-08-12 | End: 2025-08-13 | Stop reason: SDUPTHER

## 2025-08-12 RX ORDER — VALACYCLOVIR HYDROCHLORIDE 1 G/1
1000 TABLET, FILM COATED ORAL 3 TIMES DAILY
Qty: 21 TABLET | Refills: 0 | Status: SHIPPED | OUTPATIENT
Start: 2025-08-12 | End: 2025-08-13 | Stop reason: SDUPTHER

## 2025-08-12 ASSESSMENT — ENCOUNTER SYMPTOMS
COUGH: 0
SHORTNESS OF BREATH: 0
RESPIRATORY NEGATIVE: 1
BOWEL INCONTINENCE: 0
CHEST TIGHTNESS: 0
BACK PAIN: 1
WHEEZING: 0

## 2025-08-13 ENCOUNTER — TELEPHONE (OUTPATIENT)
Dept: PRIMARY CARE CLINIC | Age: 85
End: 2025-08-13

## 2025-08-13 DIAGNOSIS — B02.9 HERPES ZOSTER WITHOUT COMPLICATION: ICD-10-CM

## 2025-08-13 DIAGNOSIS — M54.50 ACUTE RIGHT-SIDED LOW BACK PAIN WITHOUT SCIATICA: ICD-10-CM

## 2025-08-13 RX ORDER — VALACYCLOVIR HYDROCHLORIDE 1 G/1
1000 TABLET, FILM COATED ORAL 3 TIMES DAILY
Qty: 21 TABLET | Refills: 0 | Status: SHIPPED | OUTPATIENT
Start: 2025-08-13 | End: 2025-08-20

## 2025-08-13 RX ORDER — BACLOFEN 10 MG/1
10 TABLET ORAL 3 TIMES DAILY PRN
Qty: 20 TABLET | Refills: 0 | Status: SHIPPED | OUTPATIENT
Start: 2025-08-13

## 2025-08-26 ENCOUNTER — TELEPHONE (OUTPATIENT)
Dept: PRIMARY CARE CLINIC | Age: 85
End: 2025-08-26

## 2025-08-26 DIAGNOSIS — M54.50 ACUTE RIGHT-SIDED LOW BACK PAIN WITHOUT SCIATICA: ICD-10-CM

## 2025-08-26 DIAGNOSIS — B02.9 HERPES ZOSTER WITHOUT COMPLICATION: ICD-10-CM

## 2025-08-27 RX ORDER — VALACYCLOVIR HYDROCHLORIDE 1 G/1
1000 TABLET, FILM COATED ORAL 3 TIMES DAILY
Qty: 21 TABLET | Refills: 0 | Status: SHIPPED | OUTPATIENT
Start: 2025-08-27 | End: 2025-09-03

## 2025-08-27 RX ORDER — BACLOFEN 10 MG/1
10 TABLET ORAL 3 TIMES DAILY PRN
Qty: 20 TABLET | Refills: 0 | Status: SHIPPED | OUTPATIENT
Start: 2025-08-27

## 2025-08-28 ENCOUNTER — TELEPHONE (OUTPATIENT)
Dept: FAMILY MEDICINE CLINIC | Age: 85
End: 2025-08-28

## 2025-09-05 ENCOUNTER — HOSPITAL ENCOUNTER (OUTPATIENT)
Age: 85
Setting detail: SPECIMEN
Discharge: HOME | End: 2025-09-05

## 2025-09-05 LAB
ALBUMIN SERPL-MCNC: 4.1 G/DL (ref 3.5–5.2)
ALBUMIN/GLOB SERPL: 1.6 {RATIO} (ref 1–2.5)
ALP SERPL-CCNC: 52 U/L (ref 35–104)
ALT SERPL-CCNC: 24 U/L (ref 10–35)
AST SERPL-CCNC: 29 U/L (ref 10–35)
BILIRUB DIRECT SERPL-MCNC: 0.3 MG/DL (ref 0–0.2)
BILIRUB INDIRECT SERPL-MCNC: 0.4 MG/DL (ref 0–1)
BILIRUB SERPL-MCNC: 0.7 MG/DL (ref 0–1.2)
CHOLEST SERPL-MCNC: 129 MG/DL (ref 0–199)
CHOLESTEROL/HDL RATIO: 1.9
CK SERPL-CCNC: 38 U/L (ref 26–192)
GLOBULIN SER CALC-MCNC: 2.5 G/DL
HDLC SERPL-MCNC: 69 MG/DL
LDLC SERPL CALC-MCNC: 44 MG/DL (ref 0–100)
POC INR: 2.4
PROT SERPL-MCNC: 6.6 G/DL (ref 6.6–8.7)
PROTHROMBIN TIME, POC: 29.2 SEC (ref 9.6–14.4)
TRIGL SERPL-MCNC: 79 MG/DL (ref 0–149)
VLDLC SERPL CALC-MCNC: 16 MG/DL (ref 1–30)

## (undated) DEVICE — CHLORAPREP 26ML ORANGE

## (undated) DEVICE — C-ARM: Brand: UNBRANDED

## (undated) DEVICE — GLOVE ORANGE PI 8 1/2   MSG9085

## (undated) DEVICE — 1010 S-DRAPE TOWEL DRAPE 10/BX: Brand: STERI-DRAPE™

## (undated) DEVICE — GLOVE ORANGE PI 7 1/2   MSG9075

## (undated) DEVICE — GOWN,AURORA,NONREINFORCED,LARGE: Brand: MEDLINE

## (undated) DEVICE — BIPOLAR SEALER 23-112-1 AQM 6.0: Brand: AQUAMANTYS ®

## (undated) DEVICE — DECANTER FLD 9IN ST BG FOR ASEP TRNSF OF FLD

## (undated) DEVICE — ADAPTER TBNG LUER STUB 15 GA INTMED

## (undated) DEVICE — SYRINGE, LUER LOCK, 10ML: Brand: MEDLINE

## (undated) DEVICE — MINOR BSIN PK

## (undated) DEVICE — SUTURE STRATAFIX SPRL SZ 1 L14IN ABSRB VLT L48CM CTX 1/2 SXPD2B405

## (undated) DEVICE — INTENDED FOR TISSUE SEPARATION, AND OTHER PROCEDURES THAT REQUIRE A SHARP SURGICAL BLADE TO PUNCTURE OR CUT.: Brand: BARD-PARKER ® CARBON RIB-BACK BLADES

## (undated) DEVICE — BASIN EMSIS 700ML GRAPHITE PLAS KID SHP GRAD

## (undated) DEVICE — SUTURE NONABSORBABLE MONOFILAMENT 3-0 PS-1 18 IN BLK ETHILON 1663H

## (undated) DEVICE — TUBE ET DIA7.5MM ORAL NSL CUF MURPHY EYE HI LO RADPQ LN

## (undated) DEVICE — SPHINCTEROTOME: Brand: JAGTOME RX 39

## (undated) DEVICE — SHEET, T, LAPAROTOMY, STERILE: Brand: MEDLINE

## (undated) DEVICE — DRAPE,REIN 53X77,STERILE: Brand: MEDLINE

## (undated) DEVICE — FORCEPS BX L240CM WRK CHN 2.8MM STD CAP W/ NDL MIC MESH

## (undated) DEVICE — SYRINGE MED 10ML TRNSLUC BRL PLUNG BLK MRK POLYPR CTRL

## (undated) DEVICE — E-Z CLEAN, NON-STICK, PTFE COATED, ELECTROSURGICAL BLADE ELECTRODE, 6.5 INCH (16.5 CM): Brand: MEGADYNE

## (undated) DEVICE — INSERT CUSHION HEAD PRONEVIEW

## (undated) DEVICE — SNARE ENDOSCP M L240CM LOOP W27MM SHTH DIA2.4MM OVL FLX

## (undated) DEVICE — CEMENT BONE 40GM LO VISC PALACOS LV: Type: IMPLANTABLE DEVICE | Site: HIP | Status: NON-FUNCTIONAL

## (undated) DEVICE — GLOVE SURG SZ 8 THK118MIL BLK LTX FREE POLYISOPRENE BEAD CUF

## (undated) DEVICE — ZIPPERED TOGA, 2X LARGE: Brand: FLYTE

## (undated) DEVICE — 9165 UNIVERSAL PATIENT PLATE: Brand: 3M™

## (undated) DEVICE — CO2 CANNULA,SUPERSOFT, ADLT,7'O2,7'CO2: Brand: MEDLINE

## (undated) DEVICE — GAUZE,SPONGE,4"X4",16PLY,STRL,LF,10/TRAY: Brand: MEDLINE

## (undated) DEVICE — Device

## (undated) DEVICE — DRESSING FOAM W4XL10IN AG SIL ADH ANTIMIC POSTOP OPTIFOAM

## (undated) DEVICE — STOCKINETTE,IMPERVIOUS,12X48,STERILE: Brand: MEDLINE

## (undated) DEVICE — CONTAINER,SPECIMEN,4OZ,OR STRL: Brand: MEDLINE

## (undated) DEVICE — SYRINGE MED 50ML LUERLOCK TIP

## (undated) DEVICE — Device: Brand: POWER-FLO®

## (undated) DEVICE — STERILE PATIENT PROTECTIVE PAD FOR IMP® KNEE POSITIONERS & COHESIVE WRAP (10 / CASE): Brand: DE MAYO KNEE POSITIONER®

## (undated) DEVICE — BONE TAMP KIT KEX152EB FF E2 15/2 OI: Brand: KYPHON EXPRESS II KYPHOPAK TRAY

## (undated) DEVICE — PAD,ABDOMINAL,5"X9",ST,LF,25/BX: Brand: MEDLINE INDUSTRIES, INC.

## (undated) DEVICE — SPHINCTEROTOME: Brand: DREAMTOME™ RX 44

## (undated) DEVICE — TUBING, SUCTION, 1/4" X 12', STRAIGHT: Brand: MEDLINE

## (undated) DEVICE — SYSTEM BX CAP BILI RAP EXCHG CAP LOK DEV COMPATIBLE W/ OLY

## (undated) DEVICE — TOWEL,OR,DSP,ST,NATURAL,DLX,4/PK,20PK/CS: Brand: MEDLINE

## (undated) DEVICE — TOTAL TRAY, 16FR 10ML SIL FOLEY, URN: Brand: MEDLINE

## (undated) DEVICE — MIXER A07A CEMENT

## (undated) DEVICE — SYRINGE MED 30ML STD CLR PLAS LUERLOCK TIP N CTRL DISP

## (undated) DEVICE — BAG SPECIMEN BIOHAZARD 6IN X 9IN

## (undated) DEVICE — NEEDLE ECHOGENIC 20GA L4IN INSUL W/ 30DEG BVL EXTN SET

## (undated) DEVICE — BONE BIOPSY DEVICE F07A TAPERED SIZE 2: Brand: MEDTRONIC REUSABLE INSTRUMENTS

## (undated) DEVICE — APPLICATOR MEDICATED 26 CC SOLUTION HI LT ORNG CHLORAPREP

## (undated) DEVICE — STANDARD HYPODERMIC NEEDLE,POLYPROPYLENE HUB: Brand: MONOJECT

## (undated) DEVICE — SYRINGE IRRIG 60ML SFT PLIABLE BLB EZ TO GRP 1 HND USE W/

## (undated) DEVICE — STERILE PVP: Brand: MEDLINE INDUSTRIES, INC.

## (undated) DEVICE — Z DISCONTINUED CATHETER BALLOON DIL BILI 0.035 IN 5 FRX180 CM MAXFORC [M00567410] [STRYKER CORP]

## (undated) DEVICE — DRAPE IRRIG FLD WRM W44XL44IN W/ AORN STD PRTBL INTRATEMP

## (undated) DEVICE — YANKAUER,BULB TIP,W/O VENT,RIGID,STERILE: Brand: MEDLINE

## (undated) DEVICE — BLOCK BITE 60FR RUBBER ADLT DENTAL

## (undated) DEVICE — CULTURETTE AERO/ANEROBIC RED/BLUE BUNDLE

## (undated) DEVICE — 3M™ IOBAN™ 2 ANTIMICROBIAL INCISE DRAPE 6651EZ: Brand: IOBAN™ 2

## (undated) DEVICE — SUTURE VCRL SZ 2 L27IN ABSRB VLT L65MM TP-1 1/2 CIR J649G

## (undated) DEVICE — JELLY,LUBE,STERILE,FLIP TOP,TUBE,2-OZ: Brand: MEDLINE

## (undated) DEVICE — DRESSING GZ W3XL16IN CELOS ACETT OIL EMUL N ADH

## (undated) DEVICE — RETRIEVAL BALLOON CATHETER: Brand: EXTRACTOR™ PRO RX

## (undated) DEVICE — 6619 2 PTNT ISO SYS INCISE AREA&LT;(&GT;&&LT;)&GT;P: Brand: STERI-DRAPE™ IOBAN™ 2

## (undated) DEVICE — RENTAL TABLE ATTACHMENT

## (undated) DEVICE — CONTAINER,SPECIMEN,OR STERILE,4OZ: Brand: MEDLINE

## (undated) DEVICE — JCKSON TBL POSTNER NO HD REST: Brand: MEDLINE INDUSTRIES, INC.

## (undated) DEVICE — ADHESIVE SKIN CLSR 0.7ML TOP DERMBND ADV

## (undated) DEVICE — NEEDLE, QUINCKE, 18GX3.5": Brand: MEDLINE

## (undated) DEVICE — SUTURE STRATAFIX SPRL SZ 3-0 L5IN ABSRB UD FS L26MM 3/8 CIR SXMP2B411

## (undated) DEVICE — TRAP SURG QUAD PARABOLA SLOT DSGN SFTY SCRN TRAPEASE

## (undated) DEVICE — LIMB HOLDER, WRIST/ANKLE: Brand: DEROYAL

## (undated) DEVICE — 3M™ IOBAN™ 2 ANTIMICROBIAL INCISE DRAPE 6650EZ: Brand: IOBAN™ 2

## (undated) DEVICE — SUTURE STRATAFIX SPRL SZ 2-0 L9IN ABSRB VLT MH L36MM 1/2 SXPD2B408

## (undated) DEVICE — Z DISCONTINUED USE 2423037 APPLICATOR MEDICATED 3 CC CHLORHEXIDINE GLUC 2% CHLORAPREP

## (undated) DEVICE — GLOVE ORANGE PI 8   MSG9080

## (undated) DEVICE — DRAPE C ARM UNIV W41XL74IN CLR PLAS XR VELC CLSR POLY STRP

## (undated) DEVICE — NEEDLE SPNL L3.5IN DIA25GA QNCKE TYP BVL SPINOCAN

## (undated) DEVICE — PAD,NON-ADHERENT,3X8,STERILE,LF,1/PK: Brand: MEDLINE